# Patient Record
Sex: FEMALE | Race: WHITE | NOT HISPANIC OR LATINO | Employment: UNEMPLOYED | URBAN - METROPOLITAN AREA
[De-identification: names, ages, dates, MRNs, and addresses within clinical notes are randomized per-mention and may not be internally consistent; named-entity substitution may affect disease eponyms.]

---

## 2017-01-09 ENCOUNTER — OFFICE VISIT - RIVER FALLS (OUTPATIENT)
Dept: FAMILY MEDICINE | Facility: CLINIC | Age: 42
End: 2017-01-09

## 2017-01-09 ASSESSMENT — MIFFLIN-ST. JEOR: SCORE: 1444.19

## 2017-10-17 ENCOUNTER — OFFICE VISIT (OUTPATIENT)
Dept: FAMILY MEDICINE | Facility: CLINIC | Age: 42
End: 2017-10-17
Payer: COMMERCIAL

## 2017-10-17 VITALS
DIASTOLIC BLOOD PRESSURE: 98 MMHG | WEIGHT: 183 LBS | HEART RATE: 114 BPM | BODY MASS INDEX: 29.41 KG/M2 | HEIGHT: 66 IN | SYSTOLIC BLOOD PRESSURE: 138 MMHG | OXYGEN SATURATION: 100 % | TEMPERATURE: 98.2 F

## 2017-10-17 DIAGNOSIS — D72.829 ELEVATED WHITE BLOOD CELL COUNT: ICD-10-CM

## 2017-10-17 DIAGNOSIS — H92.02 LEFT EAR PAIN: ICD-10-CM

## 2017-10-17 DIAGNOSIS — I10 BENIGN ESSENTIAL HYPERTENSION: Primary | ICD-10-CM

## 2017-10-17 DIAGNOSIS — R68.84 JAW PAIN: ICD-10-CM

## 2017-10-17 LAB
ANION GAP SERPL CALCULATED.3IONS-SCNC: 10 MMOL/L (ref 3–14)
BASOPHILS # BLD AUTO: 0 10E9/L (ref 0–0.2)
BASOPHILS NFR BLD AUTO: 0.2 %
BUN SERPL-MCNC: 14 MG/DL (ref 7–30)
CALCIUM SERPL-MCNC: 9.2 MG/DL (ref 8.5–10.1)
CHLORIDE SERPL-SCNC: 108 MMOL/L (ref 94–109)
CO2 SERPL-SCNC: 20 MMOL/L (ref 20–32)
CREAT SERPL-MCNC: 0.72 MG/DL (ref 0.52–1.04)
CREAT UR-MCNC: 182 MG/DL
DIFFERENTIAL METHOD BLD: ABNORMAL
EOSINOPHIL # BLD AUTO: 0.1 10E9/L (ref 0–0.7)
EOSINOPHIL NFR BLD AUTO: 0.6 %
ERYTHROCYTE [DISTWIDTH] IN BLOOD BY AUTOMATED COUNT: 13.5 % (ref 10–15)
GFR SERPL CREATININE-BSD FRML MDRD: 89 ML/MIN/1.7M2
GLUCOSE SERPL-MCNC: 98 MG/DL (ref 70–99)
HCT VFR BLD AUTO: 42.9 % (ref 35–47)
HGB BLD-MCNC: 14.3 G/DL (ref 11.7–15.7)
LYMPHOCYTES # BLD AUTO: 3 10E9/L (ref 0.8–5.3)
LYMPHOCYTES NFR BLD AUTO: 21.6 %
MCH RBC QN AUTO: 29.6 PG (ref 26.5–33)
MCHC RBC AUTO-ENTMCNC: 33.3 G/DL (ref 31.5–36.5)
MCV RBC AUTO: 89 FL (ref 78–100)
MICROALBUMIN UR-MCNC: 27 MG/L
MICROALBUMIN/CREAT UR: 14.95 MG/G CR (ref 0–25)
MONOCYTES # BLD AUTO: 0.8 10E9/L (ref 0–1.3)
MONOCYTES NFR BLD AUTO: 5.6 %
NEUTROPHILS # BLD AUTO: 10.1 10E9/L (ref 1.6–8.3)
NEUTROPHILS NFR BLD AUTO: 72 %
PLATELET # BLD AUTO: 296 10E9/L (ref 150–450)
POTASSIUM SERPL-SCNC: 4 MMOL/L (ref 3.4–5.3)
RBC # BLD AUTO: 4.83 10E12/L (ref 3.8–5.2)
SODIUM SERPL-SCNC: 138 MMOL/L (ref 133–144)
WBC # BLD AUTO: 14 10E9/L (ref 4–11)

## 2017-10-17 PROCEDURE — 36415 COLL VENOUS BLD VENIPUNCTURE: CPT | Performed by: PHYSICIAN ASSISTANT

## 2017-10-17 PROCEDURE — 80048 BASIC METABOLIC PNL TOTAL CA: CPT | Performed by: PHYSICIAN ASSISTANT

## 2017-10-17 PROCEDURE — 82043 UR ALBUMIN QUANTITATIVE: CPT | Performed by: PHYSICIAN ASSISTANT

## 2017-10-17 PROCEDURE — 99214 OFFICE O/P EST MOD 30 MIN: CPT | Performed by: PHYSICIAN ASSISTANT

## 2017-10-17 PROCEDURE — 85025 COMPLETE CBC W/AUTO DIFF WBC: CPT | Performed by: PHYSICIAN ASSISTANT

## 2017-10-17 RX ORDER — LISINOPRIL 5 MG/1
5 TABLET ORAL DAILY
Qty: 30 TABLET | Refills: 1 | Status: SHIPPED | OUTPATIENT
Start: 2017-10-17 | End: 2017-12-22

## 2017-10-17 NOTE — PROGRESS NOTES
SUBJECTIVE:                                                    Danielle Tadeo is a 42 year old female who presents to clinic today for the following health issues:      Acute Illness   Acute illness concerns: Ear Pain  Onset: x4-5 months    Fever: no    Chills/Sweats: no    Headache (location?): YES- from ear pain -     Sinus Pressure:no    Conjunctivitis:  no    Ear Pain: YES- L - radiates to jaw and down neck to shoulder    Rhinorrhea: no    Congestion: no    Sore Throat: no     Cough: no    Wheeze: no    Decreased Appetite: no    Nausea: no    Vomiting: no    Diarrhea:  no    Dysuria/Freq.: no    Fatigue/Achiness: no    Sick/Strep Exposure: no    Tooth crack 2 weeks ago   Therapies Tried and outcome: Heating pad, tylenol, Advil - Advil minor relief      Patient reports that she has had ear pain for the past 4 months.  She denies fevers.  She denies any runny nose or sinus congestion.    She reports that the pain feels like burning.  She says that the pain is dull and goes into her jaw.  She states that about two weeks ago she cracked a tooth in the back of her mouth on the same side.  She has not been to a dentist because she does not have dental insurance.  She denies any pain from the tooth.      Patient reports that she got got her medical insurance, but she did not have insurance for the past year.  She has not been on her medication for high blood pressure.  She also used to be on anxiety medication and has not been on that in the last year either.  She does not have concerns regarding anxiety or depression.  She denies thoughts of wanting to hurt herself or others.       BP Readings from Last 6 Encounters:   10/17/17 (!) 138/98   09/16/15 112/64   08/04/15 110/60   07/24/15 138/90   07/01/15 135/78   06/22/15 118/76     Problem list and histories reviewed & adjusted, as indicated.  Additional history: as documented      ROS:  Constitutional, HEENT, cardiovascular, pulmonary, GI, , musculoskeletal,  "neuro, skin, endocrine and psych systems are negative, except as otherwise noted.    OBJECTIVE:                                                    BP (!) 138/98 (BP Location: Left arm, Patient Position: Chair, Cuff Size: Adult Large)  Pulse 114  Temp 98.2  F (36.8  C) (Oral)  Ht 5' 6\" (1.676 m)  Wt 183 lb (83 kg)  LMP 06/07/2015  SpO2 100%  Breastfeeding? No  BMI 29.54 kg/m2  Body mass index is 29.54 kg/(m^2).  GENERAL: healthy, alert and no distress  EYES: Eyes grossly normal to inspection, PERRL and conjunctivae and sclerae normal  HENT: ear canals and TM's normal, nose and mouth without ulcers or lesions  NECK: no adenopathy, no asymmetry, masses, or scars and thyroid normal to palpation  RESP: lungs clear to auscultation - no rales, rhonchi or wheezes  CV: regular rate and rhythm, normal S1 S2, no S3 or S4, no murmur, click or rub, no peripheral edema and peripheral pulses strong  MS: no gross musculoskeletal defects noted, no edema  NEURO: Normal strength and tone, mentation intact and speech normal  PSYCH: mentation appears normal, affect normal/bright    Diagnostic Test Results:  Results for orders placed or performed in visit on 10/17/17   CBC with platelets differential   Result Value Ref Range    WBC 14.0 (H) 4.0 - 11.0 10e9/L    RBC Count 4.83 3.8 - 5.2 10e12/L    Hemoglobin 14.3 11.7 - 15.7 g/dL    Hematocrit 42.9 35.0 - 47.0 %    MCV 89 78 - 100 fl    MCH 29.6 26.5 - 33.0 pg    MCHC 33.3 31.5 - 36.5 g/dL    RDW 13.5 10.0 - 15.0 %    Platelet Count 296 150 - 450 10e9/L    Diff Method Automated Method     % Neutrophils 72.0 %    % Lymphocytes 21.6 %    % Monocytes 5.6 %    % Eosinophils 0.6 %    % Basophils 0.2 %    Absolute Neutrophil 10.1 (H) 1.6 - 8.3 10e9/L    Absolute Lymphocytes 3.0 0.8 - 5.3 10e9/L    Absolute Monocytes 0.8 0.0 - 1.3 10e9/L    Absolute Eosinophils 0.1 0.0 - 0.7 10e9/L    Absolute Basophils 0.0 0.0 - 0.2 10e9/L   Albumin Random Urine Quantitative with Creat Ratio   Result " Value Ref Range    Creatinine Urine 182 mg/dL    Albumin Urine mg/L 27 mg/L    Albumin Urine mg/g Cr 14.95 0 - 25 mg/g Cr   Basic metabolic panel  (Ca, Cl, CO2, Creat, Gluc, K, Na, BUN)   Result Value Ref Range    Sodium 138 133 - 144 mmol/L    Potassium 4.0 3.4 - 5.3 mmol/L    Chloride 108 94 - 109 mmol/L    Carbon Dioxide 20 20 - 32 mmol/L    Anion Gap 10 3 - 14 mmol/L    Glucose 98 70 - 99 mg/dL    Urea Nitrogen 14 7 - 30 mg/dL    Creatinine 0.72 0.52 - 1.04 mg/dL    GFR Estimate 89 >60 mL/min/1.7m2    GFR Estimate If Black >90 >60 mL/min/1.7m2    Calcium 9.2 8.5 - 10.1 mg/dL        ASSESSMENT/PLAN:                                                      Danielle was seen today for otalgia.    Diagnoses and all orders for this visit:    Benign essential hypertension  -     lisinopril (PRINIVIL/ZESTRIL) 5 MG tablet; Take 1 tablet (5 mg) by mouth daily  -     Albumin Random Urine Quantitative with Creat Ratio  -     Basic metabolic panel  (Ca, Cl, CO2, Creat, Gluc, K, Na, BUN)    Jaw pain  -     OTOLARYNGOLOGY REFERRAL  -     CBC with platelets differential    Left ear pain      - Ear is well appearing on exam today.  NO sign of OM or Otitis externa.    - Patient advised to followup with the ENT - TMJ clinic in [place], as this is likely due to TMJ.  Patient has some popping when opening jaw on exam.  - Patient also encouraged to followup with a dentist for the cracked tooth as these can become infected.  Patient continues to deny tooth pain in clinic.      - BP treated as she has been on treatment in the past and her BP is elevated in clinic.  Close followup is advised for the BP.      - Patient to followup as advised for the BP.  She should be seen sooner if symptoms change or worsen in any way.     -- Patient agrees with and understands the plan today.        See Patient Instructions:  Please re-start the blood pressure medication.  Please followup in the pharmacy this Friday for a blood pressure check.  Please  make an office appointment next week to review the BP as well.     For the jaw pain, please see the TMJ/neck pain clinic.      Also, please followup with a dentist for the cracked tooth.          Lisa Diego PA-C    [facility] [place]

## 2017-10-17 NOTE — MR AVS SNAPSHOT
After Visit Summary   10/17/2017    Danielle Tadeo    MRN: [id number]           Patient Information     Date Of Birth          1975        Visit Information        Provider Department      10/17/2017 11:20 AM Lisa Diego PA-C [facility]        Today's Diagnoses     Benign essential hypertension    -  1    Jaw pain          Care Instructions    Please re-start the blood pressure medication.  Please followup in the pharmacy this Friday for a blood pressure check.  Please make an office appointment next week to review the BP as well.     For the jaw pain, please see the TMJ/neck pain clinic.      Also, please followup with a dentist for the cracked tooth.            Follow-ups after your visit        Additional Services     OTOLARYNGOLOGY REFERRAL       Your provider has referred you to: [facility]: [place] [facility] Only[facility] - [place] [phone]   [email or web address]    Please be aware that coverage of these services is subject to the terms and limitations of your health insurance plan.  Call member services at your health plan with any benefit or coverage questions.      Please bring the following with you to your appointment:    (1) Any X-Rays, CTs or MRIs which have been performed.  Contact the facility where they were done to arrange for  prior to your scheduled appointment.  Any new CT, MRI or other procedures ordered by your specialist must be performed at a [place] facility or coordinated by your clinic's referral office.  (2) List of current medications  (3) This referral request   (4) Any documents/labs given to you for this referral                  Who to contact     If you have questions or need follow up information about today's clinic visit or your schedule please contact [facility] directly at [phone].  Normal or non-critical lab and imaging results will be communicated to you by MyChart, letter or phone  "within 4 business days after the clinic has received the results. If you do not hear from us within 7 days, please contact the clinic through Flaviar or phone. If you have a critical or abnormal lab result, we will notify you by phone as soon as possible.  Submit refill requests through Flaviar or call your pharmacy and they will forward the refill request to us. Please allow 3 business days for your refill to be completed.          Additional Information About Your Visit        Flaviar Information     Flaviar lets you send messages to your doctor, view your test results, renew your prescriptions, schedule appointments and more. To sign up, go to [email or web address] . Click on \"Log in\" on the left side of the screen, which will take you to the Welcome page. Then click on \"Sign up Now\" on the right side of the page.     You will be asked to enter the access code listed below, as well as some personal information. Please follow the directions to create your username and password.     Your access code is: [email or web address]  Expires: 1/15/2018 12:03 PM     Your access code will  in 90 days. If you need help or a new code, please call your [place] clinic or [phone].        Care EveryWhere ID     This is your Care EveryWhere ID. This could be used by other organizations to access your [place] medical records  [id number]        Your Vitals Were     Pulse Temperature Height Last Period Pulse Oximetry Breastfeeding?    114 98.2  F (36.8  C) (Oral) 5' 6\" (1.676 m) 2015 100% No    BMI (Body Mass Index)                   29.54 kg/m2            Blood Pressure from Last 3 Encounters:   10/17/17 (!) 138/98   09/16/15 112/64   08/04/15 110/60    Weight from Last 3 Encounters:   10/17/17 183 lb (83 kg)   09/16/15 154 lb (69.9 kg)   08/04/15 148 lb (67.1 kg)              We Performed the Following     Albumin Random Urine Quantitative with Creat Ratio     Basic metabolic panel  (Ca, Cl, CO2, Creat, Gluc, K, Na, " BUN)     CBC with platelets differential     OTOLARYNGOLOGY REFERRAL          Today's Medication Changes          These changes are accurate as of: 10/17/17 12:03 PM.  If you have any questions, ask your nurse or doctor.               Start taking these medicines.        Dose/Directions    lisinopril 5 MG tablet   Commonly known as:  PRINIVIL/ZESTRIL   Used for:  Benign essential hypertension   Started by:  Lisa Diego PA-C        Dose:  5 mg   Take 1 tablet (5 mg) by mouth daily   Quantity:  30 tablet   Refills:  1            Where to get your medicines      These medications were sent to [facility] - [place], [place] - [address]  [address], [facility] [address]     Phone:  [phone]     lisinopril 5 MG tablet                Primary Care Provider Office Phone # Fax #    Fuentes kAbar [phone] [address] [facility] [address]        Equal Access to Services     DIAMOND [facility] HealthTYLER : Hadii dallas ku hadasho Soomaali, waaxda luqadaha, qaybta kaalmada adeegyada, waxay idiin hayaan henrry paul . So [facility] [phone].    ATENCIÓN: Si habla español, tiene a espinal disposición servicios gratuitos de asistencia lingüística. Llame al [phone].    We comply with applicable federal civil rights laws and [place] laws. We do not discriminate on the basis of race, color, national origin, age, disability, sex, sexual orientation, or gender identity.            Thank you!     Thank you for choosing [facility]  for your care. Our goal is always to provide you with excellent care. Hearing back from our patients is one way we can continue to improve our services. Please take a few minutes to complete the written survey that you may receive in the mail after your visit with us. Thank you!             Your Updated Medication List - Protect others around you: Learn how to safely use, store and throw away your medicines at  www.disposemymeds.org.          This list is accurate as of: 10/17/17 12:03 PM.  Always use your most recent med list.                   Brand Name Dispense Instructions for use Diagnosis    lisinopril 5 MG tablet    PRINIVIL/ZESTRIL    30 tablet    Take 1 tablet (5 mg) by mouth daily    Benign essential hypertension

## 2017-10-17 NOTE — LETTER
[address] [address]  [phone]        November 28, 2017    Danielle Tadeo  [address] [place] [id number]              Dear Danielle Tadeo    This is to remind you that your non-fasting lab work is due.    You may call our office at [phone] to schedule an appointment.    Please disregard this notice if you have already had your labs drawn or made an appointment.        Sincerely,        Lisa Diego PA-C

## 2017-10-17 NOTE — NURSING NOTE
"Chief Complaint   Patient presents with     Otalgia       Initial BP (!) 138/98 (BP Location: Left arm, Patient Position: Chair, Cuff Size: Adult Large)  Pulse 114  Temp 98.2  F (36.8  C) (Oral)  Ht 5' 6\" (1.676 m)  Wt 183 lb (83 kg)  SpO2 100%  Breastfeeding? No  BMI 29.54 kg/m2 Estimated body mass index is 29.54 kg/(m^2) as calculated from the following:    Height as of this encounter: 5' 6\" (1.676 m).    Weight as of this encounter: 183 lb (83 kg).  Medication Reconciliation: complete   Csaba Mlnarik CMA    "

## 2017-10-17 NOTE — PATIENT INSTRUCTIONS
Please re-start the blood pressure medication.  Please followup in the pharmacy this Friday for a blood pressure check.  Please make an office appointment next week to review the BP as well.     For the jaw pain, please see the TMJ/neck pain clinic.      Also, please followup with a dentist for the cracked tooth.

## 2017-10-19 NOTE — PROGRESS NOTES
Note to staff: Please call the patient to explain results.    The results from your recent lab work are within normal limits, but the white blood cells are elevated.  This should be repeated in about one week.  We can do this at your followup appointment for the blood pressure.  Please be sure to get this scheduled.      Please followup sooner if you have any symptoms or concern of infection such as fever, chills, sweats, body aches or pains or other concerns.         Thank you for choosing [facility] for your health care needs,      Lisa Diego PA-C

## 2017-10-20 ENCOUNTER — ALLIED HEALTH/NURSE VISIT (OUTPATIENT)
Dept: FAMILY MEDICINE | Facility: CLINIC | Age: 42
End: 2017-10-20
Payer: COMMERCIAL

## 2017-10-20 VITALS — DIASTOLIC BLOOD PRESSURE: 100 MMHG | SYSTOLIC BLOOD PRESSURE: 150 MMHG

## 2017-10-20 DIAGNOSIS — Z01.30 BP CHECK: Primary | ICD-10-CM

## 2017-10-20 PROCEDURE — 99207 ZZC NO CHARGE NURSE ONLY: CPT | Performed by: FAMILY MEDICINE

## 2017-10-20 NOTE — MR AVS SNAPSHOT
"              After Visit Summary   10/20/2017    Danielle Tadeo    MRN: [id number]           Patient Information     Date Of Birth          1975        Visit Information        Provider Department      10/20/2017 10:11 AM Fuentes Akbar MD [facility]        Today's Diagnoses     BP check    -  1       Follow-ups after your visit        Who to contact     If you have questions or need follow up information about today's clinic visit or your schedule please contact [facility] directly at [phone].  Normal or non-critical lab and imaging results will be communicated to you by MyChart, letter or phone within 4 business days after the clinic has received the results. If you do not hear from us within 7 days, please contact the clinic through [employer] or phone. If you have a critical or abnormal lab result, we will notify you by phone as soon as possible.  Submit refill requests through HelloFax or call your pharmacy and they will forward the refill request to us. Please allow 3 business days for your refill to be completed.          Additional Information About Your Visit        MyChart Information     HelloFax lets you send messages to your doctor, view your test results, renew your prescriptions, schedule appointments and more. To sign up, go to [email or web address] . Click on \"Log in\" on the left side of the screen, which will take you to the Welcome page. Then click on \"Sign up Now\" on the right side of the page.     You will be asked to enter the access code listed below, as well as some personal information. Please follow the directions to create your username and password.     Your access code is: [email or web address]  Expires: 1/15/2018 12:03 PM     Your access code will  in 90 days. If you need help or a new code, please call your [place] clinic or [phone].        Care EveryWhere ID     This is your Care EveryWhere ID. This could be used by other " organizations to access your [place] medical records  [id number]        Your Vitals Were     Last Period                   06/07/2015            Blood Pressure from Last 3 Encounters:   10/20/17 (!) 150/100   10/17/17 (!) 138/98   09/16/15 112/64    Weight from Last 3 Encounters:   10/17/17 183 lb (83 kg)   09/16/15 154 lb (69.9 kg)   08/04/15 148 lb (67.1 kg)              Today, you had the following     No orders found for display       Primary Care Provider Office Phone # Fax #    Fuentes Billy Akbar [phone] [address] [facility] [address]        Equal Access to Services     SARAH DOW : Hadii dallas Bowen, waaxda luqadaha, qaybta kaalmada adeegyada, aileen ayala. So [facility] [place] [phone].    ATENCIÓN: Si habla español, tiene a espinal disposición servicios gratuitos de asistencia lingüística. Llame al [phone].    We comply with applicable federal civil rights laws and [place] laws. We do not discriminate on the basis of race, color, national origin, age, disability, sex, sexual orientation, or gender identity.            Thank you!     Thank you for choosing [facility]  for your care. Our goal is always to provide you with excellent care. Hearing back from our patients is one way we can continue to improve our services. Please take a few minutes to complete the written survey that you may receive in the mail after your visit with us. Thank you!             Your Updated Medication List - Protect others around you: Learn how to safely use, store and throw away your medicines at www.disposemymeds.org.          This list is accurate as of: 10/20/17 10:13 AM.  Always use your most recent med list.                   Brand Name Dispense Instructions for use Diagnosis    lisinopril 5 MG tablet    PRINIVIL/ZESTRIL    30 tablet    Take 1 tablet (5 mg) by mouth daily    Benign essential hypertension

## 2017-10-20 NOTE — PROGRESS NOTES
Danielle Tadeo is enrolled/participating in the retail pharmacy Blood Pressure Goals Achievement Program (BPGAP).  Danielle Tadeo was evaluated at [facility] on October 20, 2017 at which time her blood pressure was:    BP Readings from Last 3 Encounters:   10/20/17 (!) 150/100   10/17/17 (!) 138/98   09/16/15 112/64     Reviewed lifestyle modifications for blood pressure control and reduction: including making healthy food choices, managing weight, getting regular exercise, smoking cessation, reducing alcohol consumption, monitoring blood pressure regularly.     Danielle Tadeo is not experiencing symptoms.    Follow-Up: BP is not at goal of < 140/90mmHg (patient 18+ years of age with or without diabetes), Recommended follow-up with PCP.  Routing to PCP for further review.        Completed by: Yuliya Sanders [facility]   [phone]

## 2017-12-22 ENCOUNTER — OFFICE VISIT (OUTPATIENT)
Dept: FAMILY MEDICINE | Facility: CLINIC | Age: 42
End: 2017-12-22
Payer: COMMERCIAL

## 2017-12-22 VITALS
RESPIRATION RATE: 16 BRPM | HEART RATE: 104 BPM | DIASTOLIC BLOOD PRESSURE: 94 MMHG | SYSTOLIC BLOOD PRESSURE: 160 MMHG | OXYGEN SATURATION: 98 % | BODY MASS INDEX: 30.62 KG/M2 | TEMPERATURE: 98.3 F | HEIGHT: 66 IN | WEIGHT: 190.5 LBS

## 2017-12-22 DIAGNOSIS — M26.609 TMJ (TEMPOROMANDIBULAR JOINT SYNDROME): ICD-10-CM

## 2017-12-22 DIAGNOSIS — M54.2 NECK PAIN: ICD-10-CM

## 2017-12-22 DIAGNOSIS — I10 HYPERTENSION GOAL BP (BLOOD PRESSURE) < 140/90: Primary | ICD-10-CM

## 2017-12-22 LAB
ANION GAP SERPL CALCULATED.3IONS-SCNC: 8 MMOL/L (ref 3–14)
BUN SERPL-MCNC: 16 MG/DL (ref 7–30)
CALCIUM SERPL-MCNC: 9 MG/DL (ref 8.5–10.1)
CHLORIDE SERPL-SCNC: 108 MMOL/L (ref 94–109)
CO2 SERPL-SCNC: 24 MMOL/L (ref 20–32)
CREAT SERPL-MCNC: 0.69 MG/DL (ref 0.52–1.04)
GFR SERPL CREATININE-BSD FRML MDRD: >90 ML/MIN/1.7M2
GLUCOSE SERPL-MCNC: 79 MG/DL (ref 70–99)
POTASSIUM SERPL-SCNC: 3.8 MMOL/L (ref 3.4–5.3)
SODIUM SERPL-SCNC: 140 MMOL/L (ref 133–144)

## 2017-12-22 PROCEDURE — 36415 COLL VENOUS BLD VENIPUNCTURE: CPT | Performed by: FAMILY MEDICINE

## 2017-12-22 PROCEDURE — 99214 OFFICE O/P EST MOD 30 MIN: CPT | Performed by: FAMILY MEDICINE

## 2017-12-22 PROCEDURE — 80048 BASIC METABOLIC PNL TOTAL CA: CPT | Performed by: FAMILY MEDICINE

## 2017-12-22 RX ORDER — CYCLOBENZAPRINE HCL 5 MG
5 TABLET ORAL 3 TIMES DAILY PRN
Qty: 30 TABLET | Refills: 0 | Status: SHIPPED | OUTPATIENT
Start: 2017-12-22 | End: 2018-03-27

## 2017-12-22 RX ORDER — LISINOPRIL 20 MG/1
20 TABLET ORAL DAILY
Qty: 30 TABLET | Refills: 1 | Status: SHIPPED | OUTPATIENT
Start: 2017-12-22 | End: 2018-04-12

## 2017-12-22 NOTE — PROGRESS NOTES
"  SUBJECTIVE:   Danielle Tadeo is a 42 year old female who presents to clinic today for the following health issues:        Left Jaw, neck and shoulder pain      Duration: Ongoing for at least 6 months    Description (location/character/radiation): See above    Intensity:  8/10    Accompanying signs and symptoms: Difficult to sleep on that side, interferes with sleep    History (similar episodes/previous evaluation): None    Precipitating or alleviating factors: None    Therapies tried and outcome: Uses Advil and tylenol daily     Has been taking her 's 10 mg tabs of Lisinopril over the past month but BP still high.    Requesting pain medicine, specifically hydro-codon for her chronic neck and jaw pain.        Problem list and histories reviewed & adjusted, as indicated.  Additional history: as documented    Labs reviewed in EPIC    Reviewed and updated as needed this visit by clinical staffTobacco  Allergies  Meds  Problems  Med Hx  Surg Hx  Fam Hx  Soc Hx        Reviewed and updated as needed this visit by Provider  Allergies  Meds  Problems         ROS:  C: NEGATIVE for fever, chills, change in weight  I: NEGATIVE for worrisome rashes, moles or lesions  E: NEGATIVE for vision changes or irritation  E/M: NEGATIVE for ear, mouth and throat problems  R: NEGATIVE for significant cough or SOB  CV: NEGATIVE for chest pain, palpitations or peripheral edema  GI: NEGATIVE for nausea, abdominal pain, heartburn, or change in bowel habits  : NEGATIVE for frequency, dysuria, or hematuria  N: NEGATIVE for weakness, dizziness or paresthesias    OBJECTIVE:     BP (!) 160/94  Pulse 104  Temp 98.3  F (36.8  C) (Tympanic)  Resp 16  Ht 5' 6\" (1.676 m)  Wt 190 lb 8 oz (86.4 kg)  LMP 06/07/2015  SpO2 98%  Breastfeeding? No  BMI 30.75 kg/m2  Body mass index is 30.75 kg/(m^2).   GENERAL: alert and no acute distress  EYES: Eyes grossly normal to inspection, PERRL and conjunctivae and sclerae normal  HENT: " ear canals and TM's normal, nose and mouth without ulcers or lesions  NECK: no adenopathy, no asymmetry, masses, or scars and thyroid normal to palpation  RESP: lungs clear to auscultation - no rales, rhonchi or wheezes  CV: regular rate and rhythm, normal S1 S2, no S3 or S4, no murmur, click or rub, no peripheral edema and peripheral pulses strong  MS: no gross musculoskeletal defects noted, no edema.  +neck musculature palpated--feels tight/tense.  No spinal pain/tenderness.  ROM/Strength in head/neck/truck normal.  Mild TMJ on the left side with soft click heard in mandible with opening/closing of the mouth  SKIN: no suspicious lesions or rashes  NEURO: Normal strength and tone, mentation intact and speech normal    Diagnostic Test Results:  BMP  ASSESSMENT/PLAN:     Problem List Items Addressed This Visit     Hypertension goal BP (blood pressure) < 140/90 - Primary    Relevant Medications    lisinopril (PRINIVIL/ZESTRIL) 20 MG tablet    Other Relevant Orders    Basic metabolic panel  (Ca, Cl, CO2, Creat, Gluc, K, Na, BUN) (Completed)      Other Visit Diagnoses     Neck pain        Relevant Medications    cyclobenzaprine (FLEXERIL) 5 MG tablet    Other Relevant Orders    PHYSICAL THERAPY REFERRAL    TMJ (temporomandibular joint syndrome)        Relevant Medications    cyclobenzaprine (FLEXERIL) 5 MG tablet    Other Relevant Orders    PHYSICAL THERAPY REFERRAL           Increasing Lisinopril to 20 mg po qd.  Obtaining BMP today.  F/u in 2-3 weeks to recheck BP  No opioids for neck pain.  Recommended to try Flexeril prn pain.  Referral to PT ordered.      Kylah Kuhn,   [facility]

## 2017-12-22 NOTE — LETTER
December 22, 2017      Danielle Tadeo  [address] [place] [id number]        Dear ,    We are writing to inform you of your test results.    Your BMP (kidney function test) is NORMAL.      Resulted Orders   Basic metabolic panel  (Ca, Cl, CO2, Creat, Gluc, K, Na, BUN)   Result Value Ref Range    Sodium 140 133 - 144 mmol/L    Potassium 3.8 3.4 - 5.3 mmol/L    Chloride 108 94 - 109 mmol/L    Carbon Dioxide 24 20 - 32 mmol/L    Anion Gap 8 3 - 14 mmol/L    Glucose 79 70 - 99 mg/dL      Comment:      Non Fasting    Urea Nitrogen 16 7 - 30 mg/dL    Creatinine 0.69 0.52 - 1.04 mg/dL    GFR Estimate >90 >60 mL/min/1.7m2      Comment:      Non  GFR Calc    GFR Estimate If Black >90 >60 mL/min/1.7m2      Comment:       GFR Calc    Calcium 9.0 8.5 - 10.1 mg/dL       If you have any questions or concerns, please call the clinic at the number listed above.       Sincerely,        Kylah Kuhn, DO

## 2017-12-22 NOTE — MR AVS SNAPSHOT
"              After Visit Summary   12/22/2017    Danielle Tadeo    MRN: [id number]           Patient Information     Date Of Birth          1975        Visit Information        Provider Department      12/22/2017 10:30 AM Kylah Kuhn DO [facility]        Today's Diagnoses     Hypertension goal BP (blood pressure) < 140/90    -  1    Muscle pain           Follow-ups after your visit        Additional Services     PHYSICAL THERAPY REFERRAL       *This therapy referral will be filtered to a centralized scheduling office at [facility] and the patient will receive a call to schedule an appointment at a [facility] location most convenient for them. *     [facility] provides Physical Therapy evaluation and treatment and many specialty services across the [facility] system.  If requesting a specialty program, please choose from the list below.    If you have not heard from the scheduling office within 2 business days, please call [phone] for all locations, with the exception of [place], please call [phone].  Treatment: Evaluation & Treatment  Special Instructions/Modalities:   Special Programs: None    Please be aware that coverage of these services is subject to the terms and limitations of your health insurance plan.  Call member services at your health plan with any benefit or coverage questions.      **Note to Provider:  If you are referring outside of [facility] for the therapy appointment, please list the name of the location in the \"special instructions\" above, print the referral and give to the patient to schedule the appointment.                  Who to contact     If you have questions or need follow up information about today's clinic visit or your schedule please contact [facility] directly at [phone].  Normal or non-critical lab and imaging results will be communicated to you by " "MyChart, letter or phone within 4 business days after the clinic has received the results. If you do not hear from us within 7 days, please contact the clinic through [employer] Templates[employer] or phone. If you have a critical or abnormal lab result, we will notify you by phone as soon as possible.  Submit refill requests through Biglion or call your pharmacy and they will forward the refill request to us. Please allow 3 business days for your refill to be completed.          Additional Information About Your Visit        Molecular TemplatesharJammit Information     Biglion lets you send messages to your doctor, view your test results, renew your prescriptions, schedule appointments and more. To sign up, go to [email or web address] . Click on \"Log in\" on the left side of the screen, which will take you to the Welcome page. Then click on \"Sign up Now\" on the right side of the page.     You will be asked to enter the access code listed below, as well as some personal information. Please follow the directions to create your username and password.     Your access code is: [email or web address]  Expires: 1/15/2018 11:03 AM     Your access code will  in 90 days. If you need help or a new code, please call your [place] clinic or [phone].        Care EveryWhere ID     This is your Care EveryWhere ID. This could be used by other organizations to access your [place] medical records  [id number]        Your Vitals Were     Pulse Temperature Respirations Height Last Period Pulse Oximetry    104 98.3  F (36.8  C) (Tympanic) 16 5' 6\" (1.676 m) 2015 98%    Breastfeeding? BMI (Body Mass Index)                No 30.75 kg/m2           Blood Pressure from Last 3 Encounters:   17 (!) 160/94   10/20/17 (!) 150/100   10/17/17 (!) 138/98    Weight from Last 3 Encounters:   17 190 lb 8 oz (86.4 kg)   10/17/17 183 lb (83 kg)   09/16/15 154 lb (69.9 kg)              We Performed the Following     Basic metabolic panel  (Ca, Cl, CO2, Creat, Gluc, K, Na, " BUN)     PHYSICAL THERAPY REFERRAL          Today's Medication Changes          These changes are accurate as of: 12/22/17 10:57 AM.  If you have any questions, ask your nurse or doctor.               Start taking these medicines.        Dose/Directions    cyclobenzaprine 5 MG tablet   Commonly known as:  FLEXERIL   Used for:  Muscle pain   Started by:  Kylah Kuhn DO        Dose:  5 mg   Take 1 tablet (5 mg) by mouth 3 times daily as needed for muscle spasms   Quantity:  30 tablet   Refills:  0         These medicines have changed or have updated prescriptions.        Dose/Directions    lisinopril 20 MG tablet   Commonly known as:  PRINIVIL/ZESTRIL   This may have changed:    - medication strength  - how much to take   Used for:  Hypertension goal BP (blood pressure) < 140/90   Changed by:  Kylah Kuhn DO        Dose:  20 mg   Take 1 tablet (20 mg) by mouth daily   Quantity:  30 tablet   Refills:  1            Where to get your medicines      These medications were sent to [facility] [address] [place], [place] - [address] AT [facility] [address]  [address], [facility] [address]     Phone:  [phone]     cyclobenzaprine 5 MG tablet    lisinopril 20 MG tablet                Primary Care Provider Office Phone # Fax #    Fuentes Akbar [phone] [address] [facility] [address]        Equal Access to Services     [facility] AH: Hadii aad ku hadasho Soomaali, waaxda luqadaha, qaybta kaalmada adeegyada, aileen santizo hayreidn henrry paul . So [facility] [phone].    ATENCIÓN: Si habla español, tiene a espinal disposición servicios gratuitos de asistencia lingüística. [place] al [phone].    We comply with applicable federal civil rights laws and [place] laws. We do not discriminate on the basis of race, color, national origin, age, disability, sex, sexual orientation, or gender identity.            Thank you!     Thank  you for choosing [facility]  for your care. Our goal is always to provide you with excellent care. Hearing back from our patients is one way we can continue to improve our services. Please take a few minutes to complete the written survey that you may receive in the mail after your visit with us. Thank you!             Your Updated Medication List - Protect others around you: Learn how to safely use, store and throw away your medicines at www.disposemymeds.org.          This list is accurate as of: 12/22/17 10:57 AM.  Always use your most recent med list.                   Brand Name Dispense Instructions for use Diagnosis    cyclobenzaprine 5 MG tablet    FLEXERIL    30 tablet    Take 1 tablet (5 mg) by mouth 3 times daily as needed for muscle spasms    Muscle pain       lisinopril 20 MG tablet    PRINIVIL/ZESTRIL    30 tablet    Take 1 tablet (20 mg) by mouth daily    Hypertension goal BP (blood pressure) < 140/90

## 2018-01-02 ENCOUNTER — TELEPHONE (OUTPATIENT)
Dept: LAB | Facility: CLINIC | Age: 43
End: 2018-01-02

## 2018-01-03 DIAGNOSIS — M26.609 TMJ (TEMPOROMANDIBULAR JOINT SYNDROME): ICD-10-CM

## 2018-01-03 DIAGNOSIS — M54.2 NECK PAIN: ICD-10-CM

## 2018-01-03 RX ORDER — CYCLOBENZAPRINE HCL 5 MG
5 TABLET ORAL 3 TIMES DAILY PRN
Qty: 30 TABLET | Refills: 0 | OUTPATIENT
Start: 2018-01-03

## 2018-01-03 NOTE — TELEPHONE ENCOUNTER
Pt called  clinic asking for a refill on flexeril     Rn advise that [facility] has not filled this for pt she should contact that providers office   Pt asked that we put the request in     cyclobenzaprine (FLEXERIL) 5 MG tablet 30 tablet 0 12/22/2017  --   Sig: Take 1 tablet (5 mg) by mouth 3 times daily as needed for muscle spasms   Class: E-Prescribe       Last Office Visit: 12/22/2017  Future Office visit:       Routing refill request to provider for review/approval because:  Drug not on the [facility], [facility] or  Health refill protocol or controlled substance    Stefania Cook RN, BSN  [facility]

## 2018-01-04 DIAGNOSIS — D72.829 ELEVATED WHITE BLOOD CELL COUNT: ICD-10-CM

## 2018-01-04 LAB
ERYTHROCYTE [DISTWIDTH] IN BLOOD BY AUTOMATED COUNT: 13.1 % (ref 10–15)
HCT VFR BLD AUTO: 38.6 % (ref 35–47)
HGB BLD-MCNC: 12.7 G/DL (ref 11.7–15.7)
MCH RBC QN AUTO: 29.7 PG (ref 26.5–33)
MCHC RBC AUTO-ENTMCNC: 32.9 G/DL (ref 31.5–36.5)
MCV RBC AUTO: 90 FL (ref 78–100)
PLATELET # BLD AUTO: 274 10E9/L (ref 150–450)
RBC # BLD AUTO: 4.27 10E12/L (ref 3.8–5.2)
WBC # BLD AUTO: 10.9 10E9/L (ref 4–11)

## 2018-01-04 PROCEDURE — 85027 COMPLETE CBC AUTOMATED: CPT | Performed by: PHYSICIAN ASSISTANT

## 2018-01-04 PROCEDURE — 36415 COLL VENOUS BLD VENIPUNCTURE: CPT | Performed by: PHYSICIAN ASSISTANT

## 2018-01-04 NOTE — PROGRESS NOTES
Note to staff: Please send a result letter    The results from your recent lab work are within normal limits.    -Normal red blood cell (hgb) levels, normal white blood cell count and normal platelet levels.      Thank you for choosing [facility] for your health care needs,      Lisa Diego PA-C

## 2018-01-04 NOTE — LETTER
[address] [place] [address]                  [phone]   January 4, 2018    Danielle Tadeo  [address] [place] [address]      Dear Danielle,    Here is a summary of your recent test results:    The results from your recent lab work are within normal limits.     -Normal red blood cell (hgb) levels, normal white blood cell count and normal platelet levels.       Your test results are enclosed.      Please contact me if you have any questions.    In addition, here is a list of due or overdue Health Maintenance reminders.    Health Maintenance Due   Topic Date Due     Tetanus Vaccine - every 10 years  08/08/2016     Flu Vaccine - yearly  09/01/2017     Pap Smear - every 3 years  06/22/2018       Please call us at [phone] (or use [employer]) to address the above recommendations.            Thank you very much for trusting [facility]..     Healthy regards,      Lisa Diego PA-C        Results for orders placed or performed in visit on 01/04/18   **CBC with platelets FUTURE 14d   Result Value Ref [place]    WBC 10.9 4.0 - 11.0 10e9/L    RBC Count 4.27 3.8 - 5.2 10e12/L    Hemoglobin 12.7 11.7 - 15.7 g/dL    Hematocrit 38.6 35.0 - 47.0 %    MCV 90 78 - 100 fl    MCH 29.7 26.5 - 33.0 pg    MCHC 32.9 31.5 - 36.5 g/dL    RDW 13.1 10.0 - 15.0 %    Platelet Count 274 150 - 450 10e9/L

## 2018-01-18 ENCOUNTER — DOCUMENTATION ONLY (OUTPATIENT)
Dept: SURGERY | Facility: CLINIC | Age: 43
End: 2018-01-18

## 2018-02-27 ENCOUNTER — TELEPHONE (OUTPATIENT)
Dept: FAMILY MEDICINE | Facility: CLINIC | Age: 43
End: 2018-02-27

## 2018-03-27 ENCOUNTER — OFFICE VISIT (OUTPATIENT)
Dept: FAMILY MEDICINE | Facility: CLINIC | Age: 43
End: 2018-03-27
Payer: COMMERCIAL

## 2018-03-27 VITALS
OXYGEN SATURATION: 98 % | SYSTOLIC BLOOD PRESSURE: 150 MMHG | RESPIRATION RATE: 14 BRPM | WEIGHT: 194.8 LBS | HEART RATE: 102 BPM | TEMPERATURE: 98.1 F | DIASTOLIC BLOOD PRESSURE: 89 MMHG | BODY MASS INDEX: 31.44 KG/M2

## 2018-03-27 DIAGNOSIS — M54.2 NECK PAIN: ICD-10-CM

## 2018-03-27 DIAGNOSIS — F41.9 ANXIETY: ICD-10-CM

## 2018-03-27 DIAGNOSIS — M26.609 TMJ (TEMPOROMANDIBULAR JOINT SYNDROME): Primary | ICD-10-CM

## 2018-03-27 PROCEDURE — 99214 OFFICE O/P EST MOD 30 MIN: CPT | Performed by: FAMILY MEDICINE

## 2018-03-27 RX ORDER — BUPROPION HYDROCHLORIDE 150 MG/1
150 TABLET, EXTENDED RELEASE ORAL 2 TIMES DAILY
Qty: 60 TABLET | Refills: 1 | Status: SHIPPED | OUTPATIENT
Start: 2018-03-27 | End: 2018-06-26

## 2018-03-27 RX ORDER — CYCLOBENZAPRINE HCL 5 MG
5 TABLET ORAL 3 TIMES DAILY PRN
Qty: 30 TABLET | Refills: 0 | Status: SHIPPED | OUTPATIENT
Start: 2018-03-27 | End: 2018-04-03

## 2018-03-27 NOTE — MR AVS SNAPSHOT
After Visit Summary   3/27/2018    Danielle Tadeo    MRN: [id number]           Patient Information     Date Of Birth          1975        Visit Information        Provider Department      3/27/2018 1:00 PM Fuentes Akbar MD [facility]        Today's Diagnoses     TMJ (temporomandibular joint syndrome)    -  1    Neck pain        Anxiety           Follow-ups after your visit        Additional Services     OTOLARYNGOLOGY REFERRAL       Your provider has referred you to: N: [facility] [phone]   [email or web address]    Please be aware that coverage of these services is subject to the terms and limitations of your health insurance plan.  Call member services at your health plan with any benefit or coverage questions.      Please bring the following with you to your appointment:    (1) Any X-Rays, CTs or MRIs which have been performed.  Contact the facility where they were done to arrange for  prior to your scheduled appointment.   (2) List of current medications  (3) This referral request   (4) Any documents/labs given to you for this referral                  Who to contact     If you have questions or need follow up information about today's clinic visit or your schedule please contact [facility] directly at [phone].  Normal or non-critical lab and imaging results will be communicated to you by MyChart, letter or phone within 4 business days after the clinic has received the results. If you do not hear from us within 7 days, please contact the clinic through MyChart or phone. If you have a critical or abnormal lab result, we will notify you by phone as soon as possible.  Submit refill requests through DeNA or call your pharmacy and they will forward the refill request to us. Please allow 3 business days for your refill to be completed.          Additional Information About Your  "Visit        Astonish Results Information     [employer]stonish Results lets you send messages to your doctor, view your test results, renew your prescriptions, schedule appointments and more. To sign up, go to [email or web address] . Click on \"Log in\" on the left side of the screen, which will take you to the Welcome page. Then click on \"Sign up Now\" on the right side of the page.     You will be asked to enter the access code listed below, as well as some personal information. Please follow the directions to create your username and password.     Your access code is: [email or web address]  Expires: 2018  1:21 PM     Your access code will  in 90 days. If you need help or a new code, please call your [place] clinic or [phone].        Care EveryWhere ID     This is your Care EveryWhere ID. This could be used by other organizations to access your [place] medical records  [id number]        Your Vitals Were     Pulse Temperature Respirations Last Period Pulse Oximetry BMI (Body Mass Index)    102 98.1  F (36.7  C) (Oral) 14 2015 98% 31.44 kg/m2       Blood Pressure from Last 3 Encounters:   18 150/89   17 (!) 160/94   10/20/17 (!) 150/100    Weight from Last 3 Encounters:   18 194 lb 12.8 oz (88.4 kg)   17 190 lb 8 oz (86.4 kg)   10/17/17 183 lb (83 kg)              We Performed the Following     OTOLARYNGOLOGY REFERRAL          Today's Medication Changes          These changes are accurate as of 3/27/18  1:23 PM.  If you have any questions, ask your nurse or doctor.               Start taking these medicines.        Dose/Directions    buPROPion 150 MG 12 hr tablet   Commonly known as:  WELLBUTRIN SR   Used for:  Anxiety   Started by:  Fuentes Akbar MD        Dose:  150 mg   Take 1 tablet (150 mg) by mouth 2 times daily   Quantity:  60 tablet   Refills:  1            Where to get your medicines      These medications were sent to [facility] [address] [facility], [place] - [address] AT [facility] [address]  [address], [facility] [place] [address]     Phone:  [phone]     buPROPion 150 MG 12 hr tablet    cyclobenzaprine 5 MG tablet                Primary Care Provider Office Phone # Fax #    Fuentes Billy Akbar [phone] [address]  [facility] [address]        Equal Access to Services     SARAH DOW : Hadii aad ku hadasho Soomaali, waaxda luqadaha, qaybta kaalmada adeegyada, waxay idiin hayaan adeeg kharash la'reidn ah. So [place] [phone].    ATENCIÓN: Si noah espparveen, tiene a espinal disposición servicios gratuitos de asistencia lingüística. [facility] [phone].    We comply with applicable federal civil rights laws and [place] laws. We do not discriminate on the basis of race, color, national origin, age, disability, sex, sexual orientation, or gender identity.            Thank you!     Thank you for choosing [facility]  for your care. Our goal is always to provide you with excellent care. Hearing back from our patients is one way we can continue to improve our services. Please take a few minutes to complete the written survey that you may receive in the mail after your visit with us. Thank you!             Your Updated Medication List - Protect others around you: Learn how to safely use, store and throw away your medicines at www.disposemymeds.org.          This list is accurate as of 3/27/18  1:23 PM.  Always use your most recent med list.                   Brand Name Dispense Instructions for use Diagnosis    buPROPion 150 MG 12 hr tablet    WELLBUTRIN SR    60 tablet    Take 1 tablet (150 mg) by mouth 2 times daily    Anxiety       cyclobenzaprine 5 MG tablet    FLEXERIL    30 tablet    Take 1 tablet (5 mg) by mouth 3 times daily as needed for muscle spasms    Neck pain, TMJ (temporomandibular joint syndrome)       lisinopril 20 MG tablet    PRINIVIL/ZESTRIL    30 tablet    Take 1 tablet (20 mg) by mouth daily     Hypertension goal BP (blood pressure) < 140/90

## 2018-03-27 NOTE — PROGRESS NOTES
SUBJECTIVE:   Danielle Tadeo is a 42 year old female who presents to clinic today for the following health issues:      Jaw pain       Duration: months    Description (location/character/radiation): cramping discomfort in jaws    Intensity:  severe    Accompanying signs and symptoms: radiates to the ears and is unable to sleep     History (similar episodes/previous evaluation): been seen a couple times for this    Precipitating or alleviating factors: None    Therapies tried and outcome: Ibuprofen constantly       Has had prior tmj, neck pain, chronic pain issues, other chronic pain      REVIEW OF SYSTEMS    Generally has been otherwise  feeling well until this episode. No problems with vision, hearing, dental or neck pain.Has hph airborne or ingestion allergy  No chest pain, palpitations, dyspnea, change in bowel habits, blood  in stool or dyspepsia.  No rashes, changing moles, weakness, lassitude or back problems.  No chronic issues . No dysuria  Patient past hx  a smoker. No problems with significant headaches.  On exam the vital signs are stable  Weight is Body mass index is 31.44 kg/(m^2).   Eyes show gibson   No neck masses or thyromegaly.Ear nose and throat shows normal   No bruits, murmers, rubs or extrasounds. No cardiomegaly or chest wall tenderness. Lungs clear, no abdominal masses or organomegaly. No CVA tenderness.  Skin eval no rash   Back exam shows bilateral tmj and masseter spasm  Current Outpatient Prescriptions   Medication     cyclobenzaprine (FLEXERIL) 5 MG tablet     buPROPion (WELLBUTRIN SR) 150 MG 12 hr tablet     lisinopril (PRINIVIL/ZESTRIL) 20 MG tablet     No current facility-administered medications for this visit.      (M26.018) TMJ (temporomandibular joint syndrome)  (primary encounter diagnosis)  Comment:   Plan: OTOLARYNGOLOGY REFERRAL, cyclobenzaprine         (FLEXERIL) 5 MG tablet        [facility]    (M54.2) Neck pain  Comment:   Plan: cyclobenzaprine (FLEXERIL) 5 MG  tablet            (F41.9) Anxiety  Comment:   Plan: buPROPion (WELLBUTRIN SR) 150 MG 12 hr tablet        Had been off , ready to get back on

## 2018-04-03 DIAGNOSIS — M26.609 TMJ (TEMPOROMANDIBULAR JOINT SYNDROME): ICD-10-CM

## 2018-04-03 DIAGNOSIS — M54.2 NECK PAIN: ICD-10-CM

## 2018-04-03 NOTE — TELEPHONE ENCOUNTER
Requested Prescriptions   Pending Prescriptions Disp Refills     cyclobenzaprine (FLEXERIL) 5 MG tablet [Pharmacy Med Name: CYCLOBENZAPRINE 5MG TABLETS] 30 tablet 0     Sig: TAKE 1 TABLET(5 MG) BY MOUTH THREE TIMES DAILY AS NEEDED FOR MUSCLE SPASMS    There is no refill protocol information for this order            Last Written Prescription Date:  3/27/18  Last Fill Quantity: 30 tablet,   # refills: 0  Last Office Visit: 3/27/18 (Naomie)  Future Office visit:       Routing refill request to provider for review/approval because:  Drug not on the [facility], P or [facility] refill protocol or controlled substance

## 2018-04-04 NOTE — TELEPHONE ENCOUNTER
Routing refill request to provider for review/approval because:  Drug not on the [facility] refill protocol     Vianca Morse RN, BS  Clinical Nurse Triage.

## 2018-04-05 ENCOUNTER — TRANSFERRED RECORDS (OUTPATIENT)
Dept: HEALTH INFORMATION MANAGEMENT | Facility: CLINIC | Age: 43
End: 2018-04-05

## 2018-04-05 DIAGNOSIS — M54.2 NECK PAIN: Primary | ICD-10-CM

## 2018-04-05 DIAGNOSIS — M26.609 TMJ (TEMPOROMANDIBULAR JOINT SYNDROME): ICD-10-CM

## 2018-04-05 NOTE — TELEPHONE ENCOUNTER
Routing refill request to provider for review/approval because:  Drug not on the [facility] refill protocol   30 tabs = 10 days    Vianca Morse RN, BS  Clinical Nurse Triage.

## 2018-04-05 NOTE — TELEPHONE ENCOUNTER
Requested Prescriptions   Pending Prescriptions Disp Refills     cyclobenzaprine (FLEXERIL) 5 MG tablet [Pharmacy Med Name: CYCLOBENZAPRINE 5MG TABLETS] 30 tablet 0     Sig: TAKE 1 TABLET(5 MG) BY MOUTH THREE TIMES DAILY AS NEEDED FOR MUSCLE SPASMS    There is no refill protocol information for this order            Last Written Prescription Date:  3/27/18  Last Fill Quantity: 30 tablet,   # refills: 0  Last Office Visit: 3/27/18 (Naomie)  Future Office visit:       Routing refill request to provider for review/approval because:  Drug not on the [facility], P or [facility] refill protocol or controlled substance

## 2018-04-06 RX ORDER — CYCLOBENZAPRINE HCL 5 MG
TABLET ORAL
Qty: 30 TABLET | Refills: 0 | Status: SHIPPED | OUTPATIENT
Start: 2018-04-06 | End: 2020-04-17

## 2018-04-06 RX ORDER — CYCLOBENZAPRINE HCL 5 MG
TABLET ORAL
Qty: 30 TABLET | Refills: 0 | OUTPATIENT
Start: 2018-04-06

## 2018-04-12 DIAGNOSIS — I10 HYPERTENSION GOAL BP (BLOOD PRESSURE) < 140/90: ICD-10-CM

## 2018-04-12 RX ORDER — LISINOPRIL 20 MG/1
20 TABLET ORAL DAILY
Qty: 30 TABLET | Refills: 1 | Status: SHIPPED | OUTPATIENT
Start: 2018-04-12 | End: 2019-01-16

## 2018-04-12 NOTE — TELEPHONE ENCOUNTER
"Last OV 03/27/2018.    Requested Prescriptions   Pending Prescriptions Disp Refills     lisinopril (PRINIVIL/ZESTRIL) 20 MG tablet 30 tablet 1     Sig: Take 1 tablet (20 mg) by mouth daily    ACE Inhibitors (Including Combos) Protocol Failed    4/12/2018  4:03 PM       Failed - Blood pressure under 140/90 in past 12 months    BP Readings from Last 3 Encounters:   03/27/18 150/89   12/22/17 (!) 160/94   10/20/17 (!) 150/100                Passed - Recent (12 mo) or future (30 days) visit within the authorizing provider's specialty    Patient had office visit in the last 12 months or has a visit in the next 30 days with authorizing provider or within the authorizing provider's specialty.  See \"Patient Info\" tab in inbasket, or \"Choose Columns\" in Meds & Orders section of the refill encounter.           Passed - Patient is age 18 or older       Passed - No active pregnancy on record       Passed - Normal serum creatinine on file in past 12 months    Recent Labs   Lab Test  12/22/17   1100   CR  0.69            Passed - Normal serum potassium on file in past 12 months    Recent Labs   Lab Test  12/22/17   1100   POTASSIUM  3.8            Passed - No positive pregnancy test in past 12 months        Routing refill request to provider for review/approval because:  Drug not on the G refill protocol      Failed - Blood pressure under 140/90 in past 12 months       "

## 2018-06-21 ENCOUNTER — TELEPHONE (OUTPATIENT)
Dept: FAMILY MEDICINE | Facility: CLINIC | Age: 43
End: 2018-06-21

## 2018-06-21 NOTE — TELEPHONE ENCOUNTER
Panel Management Review      Patient has the following on her problem list:     Hypertension   Last three blood pressure readings:  BP Readings from Last 3 Encounters:   03/27/18 150/89   12/22/17 (!) 160/94   10/20/17 (!) 150/100     Blood pressure: Failed    HTN Guidelines:  Age 18-59 BP range:  Less than 140/90  Age 60-85 with Diabetes:  Less than 140/90  Age 60-85 without Diabetes:  less than 150/90      Composite cancer screening  Chart review shows that this patient is due/due soon for the following: pap  Summary:    Patient is due/failing the following:   BP CHECK and PAP    Action needed:   Patient needs office visit for physical and pap also follow up on blood pressure.    Type of outreach:    routed to panel pool for outreach    Questions for provider review:    None                                                                                                                                    Paige Mandujano CMA       Chart routed to Care Team .

## 2018-06-26 DIAGNOSIS — F41.9 ANXIETY: ICD-10-CM

## 2018-06-26 NOTE — TELEPHONE ENCOUNTER
"Last Written Prescription Date:  3/27/18  Last Fill Quantity: 60 tablet,  # refills: 1   Last office visit: 3/27/2018 with prescribing provider:  Naomie   Future Office Visit:      Requested Prescriptions   Pending Prescriptions Disp Refills     buPROPion (WELLBUTRIN SR) 150 MG 12 hr tablet [Pharmacy Med Name: BUPROPION SR 150MG TABLETS (12 H)] 60 tablet 0     Sig: TAKE 1 TABLET(150 MG) BY MOUTH TWICE DAILY    SSRIs Protocol Passed    6/26/2018 10:53 AM       Passed - Recent (12 mo) or future (30 days) visit within the authorizing provider's specialty    Patient had office visit in the last 12 months or has a visit in the next 30 days with authorizing provider or within the authorizing provider's specialty.  See \"Patient Info\" tab in inbasket, or \"Choose Columns\" in Meds & Orders section of the refill encounter.           Passed - Medication is Bupropion    If the medication is Bupropion (Wellbutrin), and the patient is taking for smoking cessation; OK to refill.         Passed - Patient is age 18 or older       Passed - No active pregnancy on record       Passed - No positive pregnancy test in last 12 months        PHQ-9 SCORE 7/1/2015 8/4/2015 9/16/2015   Total Score 24 23 -   Total Score - - 9     JERSON-7 SCORE 3/13/2014 5/1/2014 7/1/2015   Total Score 17 21 20         "

## 2018-06-26 NOTE — LETTER
June 28, 2018      Danielle Tadeo  [address] [place] [address]        Dear Danielle,     We recently received a call from your pharmacy requesting a refill of your medication (wellbutrin).    A review of your chart indicates that an appointment is required.  Please contact our office at [phone] to schedule your doctor's appointment for your follow up visit. We have tried to reach you by phone.     We have authorized one refill of your medication to allow time for you to schedule your appointment.    Taking care of your health is important to us and ongoing visits with your provider are vital to your care.  We look forward to seeing you in the near future.    Sincerely,    Fuentes Akbar MD/Zainab LYNCH

## 2018-06-26 NOTE — TELEPHONE ENCOUNTER
Using medication for anxiety  Needs updated JERSON 7  Restarted wellbutrin last visit in March, should be seen in follow up    LM to CB, update JERSON 7, schedule appt    Vianca Morse RN, BS  Clinical Nurse Triage.

## 2018-06-28 RX ORDER — BUPROPION HYDROCHLORIDE 150 MG/1
TABLET, EXTENDED RELEASE ORAL
Qty: 30 TABLET | Refills: 0 | Status: SHIPPED | OUTPATIENT
Start: 2018-06-28 | End: 2020-04-17

## 2018-06-28 NOTE — TELEPHONE ENCOUNTER
Sent 2 weeks with pharmacy note, also letter sent, can inform pt if calls back    Prescription approved per [facility] Refill Protocol.  Zainab Norman RN, BSN

## 2018-08-25 ENCOUNTER — HEALTH MAINTENANCE LETTER (OUTPATIENT)
Age: 43
End: 2018-08-25

## 2018-10-10 ENCOUNTER — TELEPHONE (OUTPATIENT)
Dept: FAMILY MEDICINE | Facility: CLINIC | Age: 43
End: 2018-10-10

## 2018-10-10 NOTE — TELEPHONE ENCOUNTER
Panel Management Review      Patient has the following on her problem list:     Hypertension   Last three blood pressure readings:  BP Readings from Last 3 Encounters:   03/27/18 150/89   12/22/17 (!) 160/94   10/20/17 (!) 150/100     Blood pressure: FAILED    HTN Guidelines:  Age 18-59 BP range:  Less than 140/90  Age 60-85 with Diabetes:  Less than 140/90  Age 60-85 without Diabetes:  less than 150/90      Composite cancer screening  Chart review shows that this patient is due/due soon for the following Pap Smear  Summary:    Patient is due/failing the following:   BP CHECK and PAP    Action needed:   Patient needs office visit for hypertension and physical and pap.    Type of outreach:    routed to panel pool for outreach    Questions for provider review:    None                                                                                                                                    Paige Mandujano       Chart routed to care team .

## 2019-01-16 ENCOUNTER — TRANSFERRED RECORDS (OUTPATIENT)
Dept: HEALTH INFORMATION MANAGEMENT | Facility: CLINIC | Age: 44
End: 2019-01-16

## 2019-01-16 ENCOUNTER — OFFICE VISIT (OUTPATIENT)
Dept: FAMILY MEDICINE | Facility: CLINIC | Age: 44
End: 2019-01-16
Payer: COMMERCIAL

## 2019-01-16 VITALS
SYSTOLIC BLOOD PRESSURE: 138 MMHG | TEMPERATURE: 98.6 F | OXYGEN SATURATION: 96 % | BODY MASS INDEX: 26.36 KG/M2 | WEIGHT: 164 LBS | DIASTOLIC BLOOD PRESSURE: 88 MMHG | HEART RATE: 99 BPM | HEIGHT: 66 IN

## 2019-01-16 DIAGNOSIS — R19.7 VOMITING AND DIARRHEA: ICD-10-CM

## 2019-01-16 DIAGNOSIS — R11.10 VOMITING AND DIARRHEA: ICD-10-CM

## 2019-01-16 DIAGNOSIS — I10 HYPERTENSION GOAL BP (BLOOD PRESSURE) < 140/90: ICD-10-CM

## 2019-01-16 DIAGNOSIS — F07.81 POST CONCUSSIVE SYNDROME: Primary | ICD-10-CM

## 2019-01-16 LAB
BASOPHILS # BLD AUTO: 0 10E9/L (ref 0–0.2)
BASOPHILS NFR BLD AUTO: 0.3 %
DIFFERENTIAL METHOD BLD: ABNORMAL
EOSINOPHIL # BLD AUTO: 0.1 10E9/L (ref 0–0.7)
EOSINOPHIL NFR BLD AUTO: 0.9 %
ERYTHROCYTE [DISTWIDTH] IN BLOOD BY AUTOMATED COUNT: 18 % (ref 10–15)
HCT VFR BLD AUTO: 38.6 % (ref 35–47)
HGB BLD-MCNC: 12.4 G/DL (ref 11.7–15.7)
LIPASE SERPL-CCNC: 79 U/L (ref 73–393)
LYMPHOCYTES # BLD AUTO: 2.4 10E9/L (ref 0.8–5.3)
LYMPHOCYTES NFR BLD AUTO: 27.8 %
MCH RBC QN AUTO: 27.1 PG (ref 26.5–33)
MCHC RBC AUTO-ENTMCNC: 32.1 G/DL (ref 31.5–36.5)
MCV RBC AUTO: 84 FL (ref 78–100)
MONOCYTES # BLD AUTO: 0.7 10E9/L (ref 0–1.3)
MONOCYTES NFR BLD AUTO: 8.2 %
NEUTROPHILS # BLD AUTO: 5.5 10E9/L (ref 1.6–8.3)
NEUTROPHILS NFR BLD AUTO: 62.8 %
PLATELET # BLD AUTO: 358 10E9/L (ref 150–450)
RBC # BLD AUTO: 4.58 10E12/L (ref 3.8–5.2)
WBC # BLD AUTO: 8.8 10E9/L (ref 4–11)

## 2019-01-16 PROCEDURE — 85025 COMPLETE CBC W/AUTO DIFF WBC: CPT | Performed by: PHYSICIAN ASSISTANT

## 2019-01-16 PROCEDURE — 99214 OFFICE O/P EST MOD 30 MIN: CPT | Performed by: PHYSICIAN ASSISTANT

## 2019-01-16 PROCEDURE — 36415 COLL VENOUS BLD VENIPUNCTURE: CPT | Performed by: PHYSICIAN ASSISTANT

## 2019-01-16 PROCEDURE — 83690 ASSAY OF LIPASE: CPT | Performed by: PHYSICIAN ASSISTANT

## 2019-01-16 PROCEDURE — 80053 COMPREHEN METABOLIC PANEL: CPT | Performed by: PHYSICIAN ASSISTANT

## 2019-01-16 RX ORDER — LISINOPRIL 20 MG/1
20 TABLET ORAL DAILY
Qty: 30 TABLET | Refills: 1 | Status: SHIPPED | OUTPATIENT
Start: 2019-01-16 | End: 2019-04-30

## 2019-01-16 ASSESSMENT — MIFFLIN-ST. JEOR: SCORE: 1415.65

## 2019-01-16 NOTE — PROGRESS NOTES
SUBJECTIVE:                                                    Danielle Tadeo is a 43 year old female who presents to clinic today for the following health issues:      ED/UC Followup:    Facility:  [facility]  Date of visit: 12/29/2018  Reason for visit: Post Concussion Syndrome - slipped on ice aprrox 1 month ago - not seen until ER visit above. Hit back of head and tailbone.   Current Status: Still nauseated, occas headache-has tightness in jaw also causes HA.   Vomit at least 1x per day - does have cough that started 2 weeks ago.  Night after leaving ER had diarrhea - has had it every couple days.  Imodium ad-getting some relief.   States she has abdominal pain/cramping with the loose BM's.           Started taking Lisinopril again since ER visit.  Is out of meds so taking husbands 20mgs. She has mostly been taking one in morning and one at night. Not checking BP - pt states can just tell it is high.  Does not get as many chest pains since starting medication again.    Requesting refill of flexeril also.    Patient is here for an ED followup.  She states that she was seen in the [facility] about 1-3 weeks after a fall she had outside.  She says she is unsure of the specific date of the fall.  She reports that she did hit her head and tailbone, but does not think she lost consciousness.  Patient did have a head CT scan done in the ED that was within normal limits.  Patient states that since the fall she has continued to have nausea and vomiting nearly daily.  She states that now this week she has had diarrhea and abdominal pain/cramping with her symptoms.  Patient works as a , but she says she does not drink at work.  She will occasionally have a drink at home.      Patient is also here for her elevated BP.  She states that she was not regularly taking her lisinopril when she went to the ED, but that after seeing how high it was in the ED, she has been taking at least one, sometimes two  "lisinopril once daily for her BP.  Patient admits to taking only one lisinopril yesterday and none today.      Problem list and histories reviewed & adjusted, as indicated.  Additional history: as documented      ROS:  Constitutional, HEENT, cardiovascular, pulmonary, GI, , musculoskeletal, neuro, skin, endocrine and psych systems are negative, except as otherwise noted.    OBJECTIVE:                                                    /88   Pulse 99   Temp 98.6  F (37  C) (Oral)   Ht 1.676 m (5' 6\")   Wt 74.4 kg (164 lb)   LMP 06/07/2015   SpO2 96%   BMI 26.47 kg/m     Body mass index is 26.47 kg/m .  GENERAL: healthy, alert and no distress  EYES: Eyes grossly normal to inspection, PERRL and conjunctivae and sclerae normal  HENT: ear canals and TM's normal, nose and mouth without ulcers or lesions  NECK: no adenopathy, no asymmetry, masses, or scars and thyroid normal to palpation  RESP: lungs clear to auscultation - no rales, rhonchi or wheezes  CV: regular rate and rhythm, normal S1 S2, no S3 or S4, no murmur, click or rub, no peripheral edema and peripheral pulses strong  ABDOMEN: Non-tender to palpation  MS: no gross musculoskeletal defects noted, no edema  NEURO: Normal strength and tone, mentation intact and speech normal, cranial nerves in tact, normal memory recall, normal rapid alternating movements and can spell word \"world\" backwards.    PSYCH: mentation appears normal, affect normal/bright    Diagnostic Test Results:  Labs - pending     ASSESSMENT/PLAN:                                                      Diagnoses and all orders for this visit:    Post concussive syndrome  -     CONCUSSION  REFERRAL    Vomiting and diarrhea  -     CBC with platelets differential  -     Comprehensive metabolic panel  -     Lipase  -     Enteric Bacteria and Virus Panel by [facility] Stool; Future  -     Giardia antigen; Future  -     Clostridium difficile Toxin B PCR; Future  -     Ova and Parasite Exam " Routine; Future    Hypertension goal BP (blood pressure) < 140/90  -     lisinopril (PRINIVIL/ZESTRIL) 20 MG tablet; Take 1 tablet (20 mg) by mouth daily      Patient has been advised to followup in the concussion clinic for continued symptoms.  Neuro-exam today is within normal limits.    Stool cultures and lab work ordered as patient has now developed diarrhea, she may have a GI syndrome going on.      BP is within normal limits today on second BP reading.  Patient reports that she has been without the lisinopril today and took only one tablet yesterday.  Will have patient get back on her usual daily dose of 20 mg lisinopril once daily and followup closely to monitor blood pressure.  Discussed adding on Amlodipine if the BP is still elevated.    Patient to seek more immediate care if symptoms change or worsen in any way.      Followup: Return in about 5 days (around 1/21/2019) for BP Recheck, please be seen sooner if needed.    -- I have discussed the patient's diagnosis, and my plan of treatment with the patient and/or family. Patient is aware to followup if symptoms do not improve.  Patient has been advised to be seen sooner or seek more immediate care if symptoms change or worsen.  Patient agrees with and understands the plan today.     See Patient Instructions        Lisa Digeo PA-C    [facility]

## 2019-01-16 NOTE — LETTER
[facility]  [address] [place] [address]  [place] [address]                  [phone]   January 24, 2019    Danielle Tadeo  [address]  [place] [facility] [address]      Dear Danielle,    Here is a summary of your recent test results:    The results from your recent lab work are within normal limits, there is very minimally decreased levels of protein, which is likely due to the loose stool and vomiting.  We should recheck this lab in about 2 weeks.  Also, we are still awaiting the stool cultures.  Please be sure to collect those and bring them in if you are still having symptoms.       Your test results are enclosed.      Please contact me if you have any questions.    Please call us at [phone] (or use [employer]) to address the above recommendations.            Thank you very much for trusting [facility]..     Healthy regards,      Lisa Diego PA-C        Results for orders placed or performed in visit on 01/16/19   CBC with platelets differential   Result Value Ref Range    WBC 8.8 4.0 - 11.0 10e9/L    RBC Count 4.58 3.8 - 5.2 10e12/L    Hemoglobin 12.4 11.7 - 15.7 g/dL    Hematocrit 38.6 35.0 - 47.0 %    MCV 84 78 - 100 fl    MCH 27.1 26.5 - 33.0 pg    MCHC 32.1 31.5 - 36.5 g/dL    RDW 18.0 (H) 10.0 - 15.0 %    Platelet Count 358 150 - 450 10e9/L    % Neutrophils 62.8 %    % Lymphocytes 27.8 %    % Monocytes 8.2 %    % Eosinophils 0.9 %    % Basophils 0.3 %    Absolute Neutrophil 5.5 1.6 - 8.3 10e9/L    Absolute Lymphocytes 2.4 0.8 - 5.3 10e9/L    Absolute Monocytes 0.7 0.0 - 1.3 10e9/L    Absolute Eosinophils 0.1 0.0 - 0.7 10e9/L    Absolute Basophils 0.0 0.0 - 0.2 10e9/L    Diff Method Automated Method    Comprehensive metabolic panel   Result Value Ref Range    Sodium 139 133 - 144 mmol/L    Potassium 4.8 3.4 - 5.3 mmol/L    Chloride 109 94 - 109 mmol/L    Carbon Dioxide 24 20 - 32 mmol/L    Anion Gap 6 3 - 14 mmol/L    Glucose 98 70 - 99 mg/dL     Urea Nitrogen 13 7 - 30 mg/dL    Creatinine 0.68 0.52 - 1.04 mg/dL    GFR Estimate >90 >60 mL/min/[1.73_m2]    GFR Estimate If Black >90 >60 mL/min/[1.73_m2]    Calcium 8.9 8.5 - 10.1 mg/dL    Bilirubin Total 0.2 0.2 - 1.3 mg/dL    Albumin 3.2 (L) 3.4 - 5.0 g/dL    Protein Total 6.2 (L) 6.8 - 8.8 g/dL    Alkaline Phosphatase 78 40 - 150 U/L    ALT 17 0 - 50 U/L    AST 20 0 - 45 U/L   Lipase   Result Value Ref Range    Lipase 79 73 - 393 U/L

## 2019-01-16 NOTE — PATIENT INSTRUCTIONS
Please take the Lisinopril daily as prescribed.  Please followup in the pharmacy by Monday next week for a BP recheck.  Please seek more immediate medical attention if you develop any concerning symptoms.      Please followup with the concussion clinic.

## 2019-01-17 LAB
ALBUMIN SERPL-MCNC: 3.2 G/DL (ref 3.4–5)
ALP SERPL-CCNC: 78 U/L (ref 40–150)
ALT SERPL W P-5'-P-CCNC: 17 U/L (ref 0–50)
ANION GAP SERPL CALCULATED.3IONS-SCNC: 6 MMOL/L (ref 3–14)
AST SERPL W P-5'-P-CCNC: 20 U/L (ref 0–45)
BILIRUB SERPL-MCNC: 0.2 MG/DL (ref 0.2–1.3)
BUN SERPL-MCNC: 13 MG/DL (ref 7–30)
CALCIUM SERPL-MCNC: 8.9 MG/DL (ref 8.5–10.1)
CHLORIDE SERPL-SCNC: 109 MMOL/L (ref 94–109)
CO2 SERPL-SCNC: 24 MMOL/L (ref 20–32)
CREAT SERPL-MCNC: 0.68 MG/DL (ref 0.52–1.04)
GFR SERPL CREATININE-BSD FRML MDRD: >90 ML/MIN/{1.73_M2}
GLUCOSE SERPL-MCNC: 98 MG/DL (ref 70–99)
POTASSIUM SERPL-SCNC: 4.8 MMOL/L (ref 3.4–5.3)
PROT SERPL-MCNC: 6.2 G/DL (ref 6.8–8.8)
SODIUM SERPL-SCNC: 139 MMOL/L (ref 133–144)

## 2019-01-24 DIAGNOSIS — R79.9 ABNORMAL SERUM TOTAL PROTEIN LEVEL: Primary | ICD-10-CM

## 2019-01-24 NOTE — RESULT ENCOUNTER NOTE
Note to staff: Please send a result letter    The results from your recent lab work are within normal limits, there is very minimally decreased levels of protein, which is likely due to the loose stool and vomiting.  We should recheck this lab in about 2 weeks.  Also, we are still awaiting the stool cultures.  Please be sure to collect those and bring them in if you are still having symptoms.       Thank you for choosing [facility] for your health care needs,      Lisa Diego PA-C

## 2019-02-21 ENCOUNTER — TELEPHONE (OUTPATIENT)
Dept: FAMILY MEDICINE | Facility: CLINIC | Age: 44
End: 2019-02-21

## 2019-02-26 ENCOUNTER — TELEPHONE (OUTPATIENT)
Dept: FAMILY MEDICINE | Facility: CLINIC | Age: 44
End: 2019-02-26

## 2019-02-26 ENCOUNTER — OFFICE VISIT (OUTPATIENT)
Dept: FAMILY MEDICINE | Facility: CLINIC | Age: 44
End: 2019-02-26
Payer: COMMERCIAL

## 2019-02-26 VITALS
TEMPERATURE: 98 F | RESPIRATION RATE: 14 BRPM | WEIGHT: 163.2 LBS | SYSTOLIC BLOOD PRESSURE: 138 MMHG | DIASTOLIC BLOOD PRESSURE: 82 MMHG | OXYGEN SATURATION: 100 % | BODY MASS INDEX: 26.34 KG/M2 | HEART RATE: 97 BPM

## 2019-02-26 DIAGNOSIS — M54.2 NECK PAIN: ICD-10-CM

## 2019-02-26 DIAGNOSIS — M26.609 TMJ (TEMPOROMANDIBULAR JOINT SYNDROME): ICD-10-CM

## 2019-02-26 DIAGNOSIS — J01.01 ACUTE RECURRENT MAXILLARY SINUSITIS: Primary | ICD-10-CM

## 2019-02-26 PROCEDURE — 99213 OFFICE O/P EST LOW 20 MIN: CPT | Performed by: FAMILY MEDICINE

## 2019-02-26 PROCEDURE — 36415 COLL VENOUS BLD VENIPUNCTURE: CPT | Performed by: FAMILY MEDICINE

## 2019-02-26 PROCEDURE — 80076 HEPATIC FUNCTION PANEL: CPT | Performed by: FAMILY MEDICINE

## 2019-02-26 RX ORDER — CYCLOBENZAPRINE HCL 5 MG
5 TABLET ORAL 3 TIMES DAILY PRN
Qty: 60 TABLET | Refills: 0 | Status: SHIPPED | OUTPATIENT
Start: 2019-02-26 | End: 2019-03-18

## 2019-02-26 RX ORDER — CYCLOBENZAPRINE HCL 5 MG
TABLET ORAL
Qty: 30 TABLET | Refills: 0 | Status: CANCELLED | OUTPATIENT
Start: 2019-02-26

## 2019-02-26 RX ORDER — AMOXICILLIN 500 MG/1
500 CAPSULE ORAL 3 TIMES DAILY
Qty: 30 CAPSULE | Refills: 0 | Status: SHIPPED | OUTPATIENT
Start: 2019-02-26 | End: 2020-04-17

## 2019-02-26 NOTE — TELEPHONE ENCOUNTER
Pt calls, saw LOUISE COBB MD today, ANTIBIOTIC NOT SENT TO [facility] FOR SINUSITIS, pt informs [facility] will call when received, routed to Louise Cobb MD please send and close, inform pt if problem  Zainab Norman RN, BSN  Message handled by Nurse Triage.

## 2019-02-26 NOTE — LETTER
February 28, 2019      Danielle Tadeo  [address]  [place] [address]        Dear ,    We are writing to inform you of your test results.    Liver tests are normal again.    Resulted Orders   Hepatic panel   Result Value Ref Range    Bilirubin Direct <0.1 0.0 - 0.2 mg/dL    Bilirubin Total 0.2 0.2 - 1.3 mg/dL    Albumin 3.7 3.4 - 5.0 g/dL    Protein Total 6.9 6.8 - 8.8 g/dL    Alkaline Phosphatase 82 40 - 150 U/L    ALT 29 0 - 50 U/L    AST 32 0 - 45 U/L       If you have any questions or concerns, please call the clinic at the number listed above.       Sincerely,        Fuentes Akbar MD/AL

## 2019-02-26 NOTE — PROGRESS NOTES
SUBJECTIVE:   Danielle Tadeo is a 43 year old female who presents to clinic today for the following health issues:      ED/UC Followup:mental health admission after situational challenge and TMJ flare    [facility]ility: [facility]   Date of visit: 19  Reason for visit: fall  Current Status: doing better     SUBJECTIVE:    CC: Danielle Tadeo is a 43 year old female who presents for TMJ and depression follow up     HPI: she has had recurrent jaw issues, unstable pain got worse and she stressed out and was admitted to ER//Mental health: drug screen was negative         PROBLEM LIST:                   Patient Active Problem List   Diagnosis     Encounter for other general counseling or advice on contraception     Hypertension goal BP (blood pressure) < 140/90     CARDIOVASCULAR SCREENING; LDL GOAL LESS THAN 160     Lumbago     Anxiety     HTN (hypertension)     Inattention     Cervical spondylosis without myelopathy     S/P gastric bypass       PAST MEDICAL HISTORY:                  Past Medical History:   Diagnosis Date     Benign essential hypertension complicating pregnancy, childbirth, and the puerperium, unspecified as to episode of care     [facility]     Cervical spondylosis without myelopathy 2015     Myocardial infarction (H)        PAST SURGICAL HISTORY:                  Past Surgical History:   Procedure Laterality Date     C NONSPECIFIC PROCEDURE  2004    1      C NONSPECIFIC PROCEDURE      Lasik     GASTRIC BYPASS         CURRENT MEDICATIONS:                  Current Outpatient Medications   Medication Sig Dispense Refill     buPROPion (WELLBUTRIN SR) 150 MG 12 hr tablet TAKE 1 TABLET(150 MG) BY MOUTH TWICE DAILY 30 tablet 0     cyclobenzaprine (FLEXERIL) 5 MG tablet Take 1 tablet (5 mg) by mouth 3 times daily as needed for muscle spasms 60 tablet 0     cyclobenzaprine (FLEXERIL) 5 MG tablet TAKE 1 TABLET(5 MG) BY MOUTH THREE TIMES DAILY AS NEEDED FOR MUSCLE SPASMS 30  tablet 0     lisinopril (PRINIVIL/ZESTRIL) 20 MG tablet Take 1 tablet (20 mg) by mouth daily 30 tablet 1             FAMILY HISTORY:                   Family History   Problem Relation Age of Onset     Cancer Maternal Grandmother         lung ca     Hypertension Father      Cancer Father 63        pancreatic cancer     Hypertension Mother      Allergies Mother         asthma     Cancer - colorectal No family hx of      Breast Cancer No family hx of        HEALTH MAINTENANCE:                    REVIEW OF OUTSIDE RECORDS: NO    REVIEW OF SYSTEMS:  CONSTITUTIONAL: NEGATIVE for fever, chills  EYES: NEGATIVE for vision changes   RESP: NEGATIVE for significant cough or SOB  CV: NEGATIVE for chest pain, palpitations   GI: NEGATIVE for nausea, abdominal pain, heartburn, or change in bowel habits  : NEGATIVE for frequency, dysuria, or hematuria  MUSCULOSKELETAL: NEGATIVE for significant arthralgias or myalgia  NEURO: NEGATIVE for weakness, dizziness or paresthesias or headache  NVS:no headache or balance issus  INTEG:no moles or new rashes  LYMPH:no nodes or night sweats    EXAM:    /82 (BP Location: Right arm, Patient Position: Chair, Cuff Size: Adult Regular)   Pulse 97   Temp 98  F (36.7  C) (Oral)   Resp 14   Wt 74 kg (163 lb 3.2 oz)   LMP 06/07/2015   SpO2 100%   BMI 26.34 kg/m    GENERAL APPEARANCE: healthy, alert and no distress   EXAM:  GENERAL APPEARANCE: healthy, alert and no distress  EYES: EOMI,  PERRL  HENT: ear canals and TM's normal and nose and mouth without ulcers or lesions  RESP: lungs clear to auscultation - no rales, rhonchi or wheezes  CV: regular rates and rhythm, normal S1 S2, no S3 or S4 and no murmur, click or rub -  ABDOMEN:  soft, nontender, no HSM or masses and bowel sounds normal  BACK:no tenderness or pain on straight let raise           ASSESSMENT/PLAN  1. Neck pain    2. TMJ (temporomandibular joint syndrome)    myofascial release has helped, prn muscle relaxant, dental  specialist appointment upcoming                                  I have discussed with patient the risks, benefits, medications, treatment options and modalities.   I have instructed the patient to call or schedule a follow-up appointment if any problems or failure to improve.

## 2019-02-27 LAB
ALBUMIN SERPL-MCNC: 3.7 G/DL (ref 3.4–5)
ALP SERPL-CCNC: 82 U/L (ref 40–150)
ALT SERPL W P-5'-P-CCNC: 29 U/L (ref 0–50)
AST SERPL W P-5'-P-CCNC: 32 U/L (ref 0–45)
BILIRUB DIRECT SERPL-MCNC: <0.1 MG/DL (ref 0–0.2)
BILIRUB SERPL-MCNC: 0.2 MG/DL (ref 0.2–1.3)
PROT SERPL-MCNC: 6.9 G/DL (ref 6.8–8.8)

## 2019-03-01 ENCOUNTER — TELEPHONE (OUTPATIENT)
Dept: FAMILY MEDICINE | Facility: CLINIC | Age: 44
End: 2019-03-01

## 2019-03-01 NOTE — LETTER
[facility]  [address] [place], [address]  [phone]        March 1, 2019    Danielle Tadeo                                                                                                                                                       [address]  [facility] [address]    Off work 2/8 and 2/9 for illness.          Fuentes Akbar MD

## 2019-03-01 NOTE — TELEPHONE ENCOUNTER
Pt called requesting note for days she was off work due to illness. The dates are 20/08 and 02/09/19.. Please call pt when note is ready to  at [phone].

## 2019-04-30 DIAGNOSIS — I10 HYPERTENSION GOAL BP (BLOOD PRESSURE) < 140/90: ICD-10-CM

## 2019-04-30 RX ORDER — LISINOPRIL 20 MG/1
TABLET ORAL
Qty: 30 TABLET | Refills: 0 | Status: SHIPPED | OUTPATIENT
Start: 2019-04-30 | End: 2019-06-05

## 2019-05-01 NOTE — TELEPHONE ENCOUNTER
"Last Written Prescription Date:  1/16/2019  Last Fill Quantity: 30,  # refills: 1   Last office visit: 2/26/2019 with prescribing provider:  Yes   Future Office Visit:        Requested Prescriptions   Pending Prescriptions Disp Refills     lisinopril (PRINIVIL/ZESTRIL) 20 MG tablet [Pharmacy Med Name: LISINOPRIL 20MG TABLETS] 30 tablet 0     Sig: TAKE 1 TABLET(20 MG) BY MOUTH DAILY       ACE Inhibitors (Including Combos) Protocol Passed - 4/30/2019 12:33 PM        Passed - Blood pressure under 140/90 in past 12 months     BP Readings from Last 3 Encounters:   02/26/19 138/82   01/16/19 138/88   03/27/18 150/89                 Passed - Recent (12 mo) or future (30 days) visit within the authorizing provider's specialty     Patient had office visit in the last 12 months or has a visit in the next 30 days with authorizing provider or within the authorizing provider's specialty.  See \"Patient Info\" tab in inbasket, or \"Choose Columns\" in Meds & Orders section of the refill encounter.              Passed - Medication is active on med list        Passed - Patient is age 18 or older        Passed - No active pregnancy on record        Passed - Normal serum creatinine on file in past 12 months     Recent Labs   Lab Test 01/16/19  1428   CR 0.68             Passed - Normal serum potassium on file in past 12 months     Recent Labs   Lab Test 01/16/19  1428   POTASSIUM 4.8             Passed - No positive pregnancy test within past 12 months        Prescription approved per [facility] Refill Protocol.    Marie Rodriguez, ROXI, RN, PHN  [facility] [phone]      "

## 2019-11-25 ENCOUNTER — TELEPHONE (OUTPATIENT)
Dept: FAMILY MEDICINE | Facility: CLINIC | Age: 44
End: 2019-11-25

## 2019-11-25 NOTE — LETTER
[facility]  [address] [address] [address]  [phone]  December 3, 2019    Danielle Tadeo  [address]  [facility] [address]    Dear Danielle,    I care about your health and have reviewed your health plan. I have reviewed your medical conditions, medication list, and lab results and am making recommendations based on this review, to better manage your health.    You are in particular need of attention regarding:  -Cervical Cancer Screening    I am recommending that you:  {recommendations:-schedule a PAP SMEAR EXAM which is due.  Please disregard this reminder if you have had this exam elsewhere within the last year.  It would be helpful for us to have a copy of your recent pap smear report in our file so that we can best coordinate your care.  Please call us at [phone] (or use [employer]) to address the above recommendations.     Thank you for trusting [facility] and we appreciate the opportunity to serve you.  We look forward to supporting your healthcare needs in the future.    Healthy Regards,    Fuentes Akbar MD/benito

## 2019-11-26 NOTE — TELEPHONE ENCOUNTER
Panel Management Review      Patient has the following on her problem list: None      Composite cancer screening  Chart review shows that this patient is due/due soon for the following Pap Smear  Summary:    Patient is due/failing the following:   PAP    Action needed:   Patient needs office visit for Pap.    Type of outreach:    Phone, left message for patient to call back.     Questions for provider review:    None                                                                                                                                    Minerva Phillips MA  [facility].         Chart routed to Care Team .

## 2019-12-03 NOTE — TELEPHONE ENCOUNTER
Left msg to call back and letter sent.  Minerva Phillips MA  [facility].

## 2020-04-17 ENCOUNTER — VIRTUAL VISIT (OUTPATIENT)
Dept: FAMILY MEDICINE | Facility: CLINIC | Age: 45
End: 2020-04-17
Payer: COMMERCIAL

## 2020-04-17 DIAGNOSIS — F11.23 OPIOID DEPENDENCE WITH WITHDRAWAL (H): Primary | ICD-10-CM

## 2020-04-17 PROCEDURE — 99213 OFFICE O/P EST LOW 20 MIN: CPT | Mod: TEL | Performed by: PHYSICIAN ASSISTANT

## 2020-04-17 NOTE — PROGRESS NOTES
"Subjective     Danielle Tadeo is a 45 year old female who is being evaluated via a billable telephone visit.      The patient has been notified of following:     \"This telephone visit will be conducted via a call between you and your physician/provider. We have found that certain health care needs can be provided without the need for a physical exam.  This service lets us provide the care you need with a short phone conversation.  If a prescription is necessary we can send it directly to your pharmacy.  If lab work is needed we can place an order for that and you can then stop by our lab to have the test done at a later time.    Telephone visits are billed at different rates depending on your insurance coverage. During this emergency period, for some insurers they may be billed the same as an in-person visit.  Please reach out to your insurance provider with any questions.    If during the course of the call the physician/provider feels a telephone visit is not appropriate, you will not be charged for this service.\"     Physician has received verbal consent for a Telephone Visit from the patient? Yes    Danielle Tadeo complains of   Chief Complaint   Patient presents with     Medication Problem     Opoid withdrawal       ALLERGIES  Patient has no known allergies.    -going through Opoid withdrawals - has been without if for a day - was previously taking Percocet (getting illegally) 30 mg - taking a few a day for back pain, jaw pain and neck pain. Doesn't want to be on it anymore.  Reports that she took her last dose yesterday and is starting to develop sweats.  Went to the ER but had to leave as she didn't have \"time\" to wait as she had to go to work.  It appears pt was admitted to [facility] for acute intoxication/dependence and SI in 2019.  She was referred to [facility].         Patient Active Problem List   Diagnosis     Encounter for other general counseling or advice on contraception     Hypertension " goal BP (blood pressure) < 140/90     CARDIOVASCULAR SCREENING; LDL GOAL LESS THAN 160     Lumbago     Anxiety     HTN (hypertension)     Inattention     Cervical spondylosis without myelopathy     S/P gastric bypass     Past Surgical History:   Procedure Laterality Date     C NONSPECIFIC PROCEDURE  2004    1      C NONSPECIFIC PROCEDURE      Lasik     GASTRIC BYPASS         Social History     Tobacco Use     Smoking status: Current Every Day Smoker     Packs/day: 0.50     Years: 5.00     Pack years: 2.50     Types: Cigarettes     Smokeless tobacco: Never Used   Substance Use Topics     Alcohol use: Yes     Comment: 2 drinks per week     Family History   Problem Relation Age of Onset     Cancer Maternal Grandmother         lung ca     Hypertension Father      Cancer Father 63        pancreatic cancer     Hypertension Mother      Allergies Mother         asthma     Cancer - colorectal No family hx of      Breast Cancer No family hx of          Current Outpatient Medications   Medication Sig Dispense Refill     lisinopril (PRINIVIL/ZESTRIL) 20 MG tablet TAKE 1 TABLET BY MOUTH DAILY 90 tablet 1     No Known Allergies    Reviewed and updated as needed this visit by Provider  Tobacco  Allergies  Meds  Problems  Med Hx  Surg Hx  Fam Hx         Review of Systems   ROS COMP: Constitutional, HEENT, cardiovascular, pulmonary, GI, , musculoskeletal, neuro, skin, endocrine and psych systems are negative, except as otherwise noted.       Objective   Reported vitals:  LMP 2015    healthy, alert and no distress  Psych: Alert and oriented times 3; coherent speech, normal   rate and volume, able to articulate logical thoughts, able   to abstract reason, no tangential thoughts, no hallucinations   or delusions  Her affect is anxious     Diagnostic Test Results:  Labs reviewed in Epic        Assessment/Plan:   Danielle was seen today for medication problem.    Diagnoses and all orders for this  "visit:    Opioid dependence with withdrawal (H)  Patient advised that we are unable to manage opiate withdrawal in the virtual setting and that she need to be seen in a formal detox program ([facility] or [facility] recommended) for safe withdrawal.  Pt became very angry that we \"couldn't fucking help\" and that we are \"never wanting to help her\".  Tried to advise of safety concerns and the pt was very dismissive.  Pt said \"thanks for fucking nothing\" and hung up.         Return today (on 4/17/2020) for Emergency room for detox.      Phone call duration:  5:20 minutes      Deann Miranda PA-C  "

## 2020-07-14 ENCOUNTER — TELEPHONE (OUTPATIENT)
Dept: FAMILY MEDICINE | Facility: CLINIC | Age: 45
End: 2020-07-14

## 2020-07-14 NOTE — TELEPHONE ENCOUNTER
Per reception patient wants to get medications for anxiety after hospital stay yesterday. Spoke to telephone room staff and advised a visit is needed with provider.      Shelby Grissom RN Flex

## 2020-07-15 ENCOUNTER — VIRTUAL VISIT (OUTPATIENT)
Dept: FAMILY MEDICINE | Facility: CLINIC | Age: 45
End: 2020-07-15
Payer: COMMERCIAL

## 2020-07-15 ENCOUNTER — TELEPHONE (OUTPATIENT)
Dept: FAMILY MEDICINE | Facility: CLINIC | Age: 45
End: 2020-07-15

## 2020-07-15 DIAGNOSIS — F41.9 ANXIETY: Primary | ICD-10-CM

## 2020-07-15 DIAGNOSIS — M62.838 MUSCLE SPASM: ICD-10-CM

## 2020-07-15 DIAGNOSIS — I10 HYPERTENSION GOAL BP (BLOOD PRESSURE) < 140/90: ICD-10-CM

## 2020-07-15 PROCEDURE — 99214 OFFICE O/P EST MOD 30 MIN: CPT | Mod: TEL | Performed by: FAMILY MEDICINE

## 2020-07-15 RX ORDER — LISINOPRIL 20 MG/1
20 TABLET ORAL DAILY
Qty: 90 TABLET | Refills: 1 | Status: ON HOLD | OUTPATIENT
Start: 2020-07-15 | End: 2024-01-25

## 2020-07-15 RX ORDER — CYCLOBENZAPRINE HCL 10 MG
10 TABLET ORAL 3 TIMES DAILY PRN
Qty: 30 TABLET | Refills: 1 | Status: SHIPPED | OUTPATIENT
Start: 2020-07-15 | End: 2021-09-11

## 2020-07-15 RX ORDER — HYDROXYZINE HYDROCHLORIDE 25 MG/1
25 TABLET, FILM COATED ORAL EVERY 6 HOURS PRN
Qty: 30 TABLET | Refills: 0 | Status: SHIPPED | OUTPATIENT
Start: 2020-07-15 | End: 2021-09-11

## 2020-07-15 RX ORDER — CYCLOBENZAPRINE HCL 10 MG
TABLET ORAL
COMMUNITY
End: 2020-07-15

## 2020-07-15 NOTE — TELEPHONE ENCOUNTER
Called patient, left a msg to call clinic, need to schedule f/u appointment.     Return in about 4 weeks (around 8/12/2020) for Recheck Depression/Anxiety, BP Recheck.   Check out comments: Please call to schedule 4 week follow up visit for BP recheck and Anxiety recheck       Ruth Behrens.

## 2020-07-15 NOTE — PROGRESS NOTES
"Danielle Tadeo is a 45 year old female who is being evaluated via a billable telephone visit.      The patient has been notified of following:     \"This telephone visit will be conducted via a call between you and your physician/provider. We have found that certain health care needs can be provided without the need for a physical exam.  This service lets us provide the care you need with a short phone conversation.  If a prescription is necessary we can send it directly to your pharmacy.  If lab work is needed we can place an order for that and you can then stop by our lab to have the test done at a later time.    Telephone visits are billed at different rates depending on your insurance coverage. During this emergency period, for some insurers they may be billed the same as an in-person visit.  Please reach out to your insurance provider with any questions.    If during the course of the call the physician/provider feels a telephone visit is not appropriate, you will not be charged for this service.\"    Patient has given verbal consent for Telephone visit?  Yes    What phone number would you like to be contacted at? [phone]     How would you like to obtain your AVS? MyChart    Subjective     Danielle Tadeo is a 45 year old female who presents via phone visit today for the following health issues:    HPI    Hypertension Follow-up      Do you check your blood pressure regularly outside of the clinic? Yes     Are you following a low salt diet? Yes    Are your blood pressures ever more than 140 on the top number (systolic) OR more   than 90 on the bottom number (diastolic), for example 140/90? Yes    Depression and Anxiety Follow-Up    How are you doing with your depression since your last visit? Worsened    How are you doing with your anxiety since your last visit?  Worsened     Are you having other symptoms that might be associated with depression or anxiety? No    Have you had a significant life event? " Job Concerns, Housing Concerns and Grief or Loss Working on sobriety, was in ER two days ago with withdrawal symptoms    Do you have any concerns with your use of alcohol or other drugs? Yes:  sometimes    Wanted to refill some prescriptions.  Lisinopril, Cyclobenzaprine, and anxiety meds-  Last night BP was over 200.  Was not taking her medications regularly, but was also going through alcohol/drug withdrawal.  Would like refill of cyclobenzaprine for muscle spasms.  Was previously on Duloxetine and Wellbutrin for anxiety/depression, did not like either of them.  Would like to get her benzo refilled if able.  No other concerns.    Social History     Tobacco Use     Smoking status: Former Smoker     Packs/day: 0.50     Years: 5.00     Pack years: 2.50     Types: Cigarettes     Smokeless tobacco: Never Used     Tobacco comment: quit 1 week ago   Substance Use Topics     Alcohol use: Yes     Comment: 4-5 glasses/week     Drug use: No     PHQ 9/16/2015   PHQ-9 Total Score 9   Q9: Thoughts of better off dead/self-harm past 2 weeks Not at all     JERSON-7 SCORE 3/13/2014 5/1/2014 7/1/2015   Total Score 17 21 20           Current Outpatient Medications   Medication Sig Dispense Refill     cyclobenzaprine (FLEXERIL) 10 MG tablet Take 1 tablet (10 mg) by mouth 3 times daily as needed for muscle spasms 30 tablet 1     hydrOXYzine (ATARAX) 25 MG tablet Take 1 tablet (25 mg) by mouth every 6 hours as needed for anxiety 30 tablet 0     lisinopril (ZESTRIL) 20 MG tablet Take 1 tablet (20 mg) by mouth daily 90 tablet 1     sertraline (ZOLOFT) 50 MG tablet Take 1 tablet (50 mg) by mouth daily for 7 days, THEN 1.5 tablets (75 mg) daily for 23 days. 42 tablet 0     No Known Allergies    Reviewed and updated as needed this visit by Provider         Review of Systems   Constitutional, HEENT, cardiovascular, pulmonary, gi and gu systems are negative, except as otherwise noted.       Objective   Reported vitals:  [facility] 06/07/2015    healthy,  alert and no distress  PSYCH: Alert and oriented times 3; coherent speech, normal   rate and volume, able to articulate logical thoughts, able   to abstract reason, no tangential thoughts, no hallucinations   or delusions  Her affect is flat  RESP: No cough, no audible wheezing, able to talk in full sentences  Remainder of exam unable to be completed due to telephone visits    Diagnostic Test Results:  none         Assessment/Plan:    1. Anxiety  Will start patient on Sertraline for anxiety, with PRN hydroxyzine.  No benzos given her addiction history and recent withdrawal symptoms, though a benzo might be helpful to get her through withdrawals.  Will follow up in 4 weeks or sooner as needed.  - hydrOXYzine (ATARAX) 25 MG tablet; Take 1 tablet (25 mg) by mouth every 6 hours as needed for anxiety  Dispense: 30 tablet; Refill: 0  - sertraline (ZOLOFT) 50 MG tablet; Take 1 tablet (50 mg) by mouth daily for 7 days, THEN 1.5 tablets (75 mg) daily for 23 days.  Dispense: 42 tablet; Refill: 0    2. Hypertension goal BP (blood pressure) < 140/90  Refill meds.  Follow up 4 weeks or sooner as needed.  - lisinopril (ZESTRIL) 20 MG tablet; Take 1 tablet (20 mg) by mouth daily  Dispense: 90 tablet; Refill: 1    3. Muscle spasm  Follow up as needed.  - cyclobenzaprine (FLEXERIL) 10 MG tablet; Take 1 tablet (10 mg) by mouth 3 times daily as needed for muscle spasms  Dispense: 30 tablet; Refill: 1    Return in about 4 weeks (around 8/12/2020) for Recheck Depression/Anxiety, BP Recheck.      Phone call duration:  10 minutes    April EMILIE Helm MD

## 2020-08-25 ENCOUNTER — OFFICE VISIT (OUTPATIENT)
Dept: FAMILY MEDICINE | Facility: CLINIC | Age: 45
End: 2020-08-25
Payer: COMMERCIAL

## 2020-08-25 VITALS
BODY MASS INDEX: 23.46 KG/M2 | OXYGEN SATURATION: 99 % | HEART RATE: 91 BPM | SYSTOLIC BLOOD PRESSURE: 142 MMHG | WEIGHT: 146 LBS | HEIGHT: 66 IN | DIASTOLIC BLOOD PRESSURE: 84 MMHG | TEMPERATURE: 98.4 F

## 2020-08-25 DIAGNOSIS — F10.10 ETOH ABUSE: Primary | ICD-10-CM

## 2020-08-25 DIAGNOSIS — F11.90 CHRONIC, CONTINUOUS USE OF OPIOIDS: ICD-10-CM

## 2020-08-25 DIAGNOSIS — I45.81 LONG Q-T SYNDROME: ICD-10-CM

## 2020-08-25 ASSESSMENT — ANXIETY QUESTIONNAIRES
4. TROUBLE RELAXING: MORE THAN HALF THE DAYS
GAD7 TOTAL SCORE: 12
7. FEELING AFRAID AS IF SOMETHING AWFUL MIGHT HAPPEN: MORE THAN HALF THE DAYS
7. FEELING AFRAID AS IF SOMETHING AWFUL MIGHT HAPPEN: MORE THAN HALF THE DAYS
5. BEING SO RESTLESS THAT IT IS HARD TO SIT STILL: NOT AT ALL
6. BECOMING EASILY ANNOYED OR IRRITABLE: SEVERAL DAYS
GAD7 TOTAL SCORE: 12
GAD7 TOTAL SCORE: 12
3. WORRYING TOO MUCH ABOUT DIFFERENT THINGS: MORE THAN HALF THE DAYS
2. NOT BEING ABLE TO STOP OR CONTROL WORRYING: MORE THAN HALF THE DAYS
1. FEELING NERVOUS, ANXIOUS, OR ON EDGE: NEARLY EVERY DAY

## 2020-08-25 ASSESSMENT — PATIENT HEALTH QUESTIONNAIRE - PHQ9
SUM OF ALL RESPONSES TO PHQ QUESTIONS 1-9: 10
10. IF YOU CHECKED OFF ANY PROBLEMS, HOW DIFFICULT HAVE THESE PROBLEMS MADE IT FOR YOU TO DO YOUR WORK, TAKE CARE OF THINGS AT HOME, OR GET ALONG WITH OTHER PEOPLE: SOMEWHAT DIFFICULT
SUM OF ALL RESPONSES TO PHQ QUESTIONS 1-9: 10

## 2020-08-25 ASSESSMENT — MIFFLIN-ST. JEOR: SCORE: 1324

## 2020-08-25 NOTE — PROGRESS NOTES
Subjective     Danielle Tadeo is a 45 year old female who presents to clinic today for the following health issues:    History of Present Illness        Mental Health Follow-up:  Patient presents to follow-up on Anxiety.    Patient's anxiety since last visit has been:  Bad  The patient is having other symptoms associated with anxiety.  Any significant life events: financial concerns, grief or loss and health concerns  Patient is feeling anxious or having panic attacks.  Patient has no concerns about alcohol or drug use.     Social History  Tobacco Use    Smoking status: Former Smoker      Packs/day: 0.50      Years: 5.00      Pack years: 2.5      Types: Cigarettes    Smokeless tobacco: Never Used    Tobacco comment: quit 1 week ago  Alcohol use: Yes    Comment: 4-5 glasses/week  Drug use: No      Today's PHQ-9         PHQ-9 Total Score:     (P) 10   PHQ-9 Q9 Thoughts of better off dead/self-harm past 2 weeks :   (P) Not at all   Thoughts of suicide or self harm:      Self-harm Plan:        Self-harm Action:          Safety concerns for self or others:           Vascular Disease:  She presents for follow up of vascular disease.  She never takes nitroglycerin. She is not taking daily aspirin.    She eats 2-3 servings of fruits and vegetables daily.She consumes 1 sweetened beverage(s) daily.She exercises with enough effort to increase her heart rate 20 to 29 minutes per day.  She exercises with enough effort to increase her heart rate 5 days per week.   She is taking medications regularly.           Hospital Follow-up Visit:    Hospital/Nursing Home/ Rehab Facility: [facility]  Date of Admission: 8/19/20  Date of Discharge: 8/24/20  Reason(s) for Admission: Heart Problem      Was your hospitalization related to COVID-19? No   Problems taking medications regularly:  None  Medication changes since discharge: Pt has list with her  Problems adhering to non-medication therapy:  None    Summary of hospitalization:   [facility] discharge summary reviewed [place]  Diagnostic Tests/Treatments reviewed.  Follow up needed: none Outpatient CD treatment   Other Healthcare Providers Involved in Patient s Care:         CD:  [facility]   Update since discharge: improved.       Post Discharge Medication Reconciliation: discharge medications reconciled, continue medications without change.  Plan of care communicated with patient                Hospital Follow-up Visit:    Hospital/Nursing Home/IP Rehab Facility: [facility]   Date of Admission: Aug 22  Date of Discharge: Aug 25  Reason(s) for Admission: alcohol and drug overdose, long QT arrest       Was your hospitalization related to COVID-19? No   Problems taking medications regularly:  Cd   Medication changes since discharge: new regimen  Problems adhering to non-medication therapy:  Hasn't started treatment yet     Summary of hospitalization:  [facility] discharge summary reviewed  Cardiac standstill and recovery with Cardiology care   Diagnostic Tests/Treatments reviewed.  Follow up needed: inpatient note s  Other Healthcare Providers Involved in Patient s Care:           Update since discharge: improved. Post Discharge Medication Reconciliation: discharge medications reconciled, continue medications without change.  Plan of care communicated with patient            Review of Systems   Constitutional, HEENT, cardiovascular, pulmonary, GI, , musculoskeletal, neuro, skin, endocrine and psych systems are negative, except as otherwise noted.      Objective    LMP 06/07/2015   There is no height or weight on file to calculate BMI.  Physical Exam   GENERAL: healthy, alert and no distress  EYES: Eyes grossly normal to inspection, PERRL and conjunctivae and sclerae normal  HENT: ear canals and TM's normal, nose and mouth without ulcers or lesions  NECK: no adenopathy, no asymmetry, masses, or scars and thyroid normal to palpation  RESP: lungs clear to auscultation - no  rales, rhonchi or wheezes  BREAST: normal without masses, tenderness or nipple discharge and no palpable axillary masses or adenopathy  CV: regular rate and rhythm, normal S1 S2, no S3 or S4, no murmur, click or rub, no peripheral edema and peripheral pulses strong  ABDOMEN: soft, nontender, no hepatosplenomegaly, no masses and bowel sounds normal  MS: no gross musculoskeletal defects noted, no edema  SKIN: no suspicious lesions or rashes  NEURO: Normal strength and tone, mentation intact and speech normal  PSYCH: mentation appears normal, affect normal/bright            Assessment & Plan     ETOH abuse  In remission    Long Q-T syndrome  With cardiac standstill    Chronic, continuous use of opioids  Quit in the past month        Tobacco Cessation:   reports that she has been smoking cigarettes. She has a 2.50 pack-year smoking history. She has never used smokeless tobacco.  Tobacco Cessation Action Plan: Information offered: Patient not interested at this time      Depression Screening Follow Up    PHQ 8/25/2020   PHQ-9 Total Score 10   Q9: Thoughts of better off dead/self-harm past 2 weeks Not at all     Last PHQ-9 8/25/2020   1.  Little interest or pleasure in doing things 1   2.  Feeling down, depressed, or hopeless 1   3.  Trouble falling or staying asleep, or sleeping too much 3   4.  Feeling tired or having little energy 3   5.  Poor appetite or overeating 1   6.  Feeling bad about yourself 1   7.  Trouble concentrating 0   8.  Moving slowly or restless 0   Q9: Thoughts of better off dead/self-harm past 2 weeks 0   PHQ-9 Total Score 10       Follow Up Actions Taken  Crisis resource information provided in After Visit Summary  Patient counseled, no additional follow up at this time.         Follow up will be through [facility] close to her home     Return in about 2 weeks (around 9/8/2020), or follow up Cardiology  ,  arrange for Alcohol treatment startup in [place].    Fuentes Akbar MD  [facility]  [place]    Answers for HPI/ROS submitted by the patient on 8/25/2020   Chronic problems general questions HPI Form  If you checked off any problems, how difficult have these problems made it for you to do your work, take care of things at home, or get along with other people?: Somewhat difficult  PHQ9 TOTAL SCORE: 10  JERSON 7 TOTAL SCORE: 12

## 2020-08-26 ENCOUNTER — PATIENT OUTREACH (OUTPATIENT)
Dept: CARE COORDINATION | Facility: CLINIC | Age: 45
End: 2020-08-26

## 2020-08-26 DIAGNOSIS — F10.10 ETOH ABUSE: Primary | ICD-10-CM

## 2020-08-26 ASSESSMENT — PATIENT HEALTH QUESTIONNAIRE - PHQ9: SUM OF ALL RESPONSES TO PHQ QUESTIONS 1-9: 10

## 2020-08-26 ASSESSMENT — ANXIETY QUESTIONNAIRES: GAD7 TOTAL SCORE: 12

## 2020-08-26 NOTE — PROGRESS NOTES
Clinic Care Coordination Contact  [facility]/Voicemail    Referral Source: Care Team  Clinical Data: Care Coordinator Outreach  Outreach attempted x 1.  Left message on patient's voicemail with call back information and requested return call.  Plan: Care Coordinator will send care coordination introduction letter with care coordinator contact information and explanation of care coordination services via mail. Care Coordinator will try to reach patient again in 1-2 business days.    CATHERINE Garg   Care Coordination Team  [phone]

## 2020-09-01 ENCOUNTER — PATIENT OUTREACH (OUTPATIENT)
Dept: CARE COORDINATION | Facility: CLINIC | Age: 45
End: 2020-09-01

## 2020-09-01 NOTE — LETTER
[place] CARE COORDINATION  [facility]   September 1, 2020    Danielle Tadeo  [address]  [place] [facility] [address]      Dear Danielle,    I am a clinic care coordinator who works with Fuentes Akbar MD at [facility] . I have been trying to reach you recently to introduce Clinic Care Coordination and to see if there was anything I could assist you with.  Below is a description of clinic care coordination and how I can further assist you.      The clinic care coordination team is made up of a registered nurse,  and community health worker who understand the health care system. The goal of clinic care coordination is to help you manage your health and improve access to the health care system in the most efficient manner. The team can assist you in meeting your health care goals by providing education, coordinating services, strengthening the communication among your providers and supporting you with any resource needs.    Please feel free to contact me at [phone] with any questions or concerns. We are focused on providing you with the highest-quality healthcare experience possible and that all starts with you.     Sincerely,     CATHERINE Garg   Care Coordination Team  [phone]

## 2020-09-01 NOTE — PROGRESS NOTES
Clinic Care Coordination Contact  [facility]/Voicemail    Referral Source: Care Team  Clinical Data: Care Coordinator Outreach  Outreach attempted x 2.  Left message on patient's voicemail with call back information and requested return call.  Plan: Care Coordinator sent care coordination introduction letter on*9/1/20 via mail. Care Coordinator will try to reach patient again in 3-5 business days.    CATHERINE Garg   Care Coordination Team  [phone]

## 2020-09-03 DIAGNOSIS — F41.9 ANXIETY: ICD-10-CM

## 2020-09-03 RX ORDER — CHLORDIAZEPOXIDE HYDROCHLORIDE 10 MG/1
10 CAPSULE, GELATIN COATED ORAL 3 TIMES DAILY PRN
Qty: 18 CAPSULE | Refills: 0 | OUTPATIENT
Start: 2020-09-03

## 2020-09-03 NOTE — TELEPHONE ENCOUNTER
Patient calls,    1)Needs rx for Sertraline    2)Patient was given Librium from hospital  chlordiazePOXIDE (LIBRIUM) 10 mg capsule   Take 1 capsule (10 mg total) by mouth 3 (three) times a day for 5 doses. 5 capsule   0 08/24/2020 08/26/2020 Active   chlordiazePOXIDE (LIBRIUM) 10 mg capsule   Take 1 capsule (10 mg total) by mouth 2 (two) times a day for 3 days. 6 capsule   0 08/27/2020 08/30/2020 Active   chlordiazePOXIDE (LIBRIUM) 10 mg capsule   Take 1 capsule (10 mg total) by mouth daily for 3 days. 3 capsule   0 08/31/2020 09/03/2020 Active     Did not give correct amount, Last took Librium 2 days ago, patient concerned about withdrawals.     Pended librium taper that was supposed to occur post-hospitalization and Sertraline rx, please sign    Bridger Murillo RN

## 2020-09-11 ENCOUNTER — PATIENT OUTREACH (OUTPATIENT)
Dept: CARE COORDINATION | Facility: CLINIC | Age: 45
End: 2020-09-11

## 2020-09-11 NOTE — PROGRESS NOTES
Clinic Care Coordination Contact  [facility]/Voicemail    Referral Source: Care Team  Clinical Data: Care Coordinator Outreach  Outreach attempted x 3.  Left message on patient's voicemail with call back information and requested return call.  Plan: Care Coordinator sent care coordination introduction letter on 9/1/20 via mail. Care Coordinator will do no further outreaches at this time.    CATHERINE Garg   Care Coordination Team  [phone]

## 2021-09-11 ENCOUNTER — APPOINTMENT (OUTPATIENT)
Dept: ULTRASOUND IMAGING | Facility: CLINIC | Age: 46
End: 2021-09-11
Attending: EMERGENCY MEDICINE
Payer: COMMERCIAL

## 2021-09-11 ENCOUNTER — HOSPITAL ENCOUNTER (INPATIENT)
Facility: CLINIC | Age: 46
LOS: 2 days | Discharge: HOME | End: 2021-09-13
Attending: EMERGENCY MEDICINE | Admitting: INTERNAL MEDICINE
Payer: COMMERCIAL

## 2021-09-11 ENCOUNTER — APPOINTMENT (OUTPATIENT)
Dept: CT IMAGING | Facility: CLINIC | Age: 46
End: 2021-09-11
Attending: EMERGENCY MEDICINE
Payer: COMMERCIAL

## 2021-09-11 ENCOUNTER — APPOINTMENT (OUTPATIENT)
Dept: GENERAL RADIOLOGY | Facility: CLINIC | Age: 46
End: 2021-09-11
Attending: EMERGENCY MEDICINE
Payer: COMMERCIAL

## 2021-09-11 DIAGNOSIS — F10.930 ALCOHOL WITHDRAWAL SYNDROME WITHOUT COMPLICATION (H): ICD-10-CM

## 2021-09-11 DIAGNOSIS — R94.31 PROLONGED Q-T INTERVAL ON ECG: ICD-10-CM

## 2021-09-11 DIAGNOSIS — F11.23 OPIOID DEPENDENCE WITH WITHDRAWAL (H): Primary | ICD-10-CM

## 2021-09-11 LAB
ALBUMIN SERPL-MCNC: 2.9 G/DL (ref 3.4–5)
ALBUMIN UR-MCNC: 20 MG/DL
ALP SERPL-CCNC: 168 U/L (ref 40–150)
ALT SERPL W P-5'-P-CCNC: 80 U/L (ref 0–50)
ANION GAP SERPL CALCULATED.3IONS-SCNC: 9 MMOL/L (ref 3–14)
APPEARANCE UR: ABNORMAL
AST SERPL W P-5'-P-CCNC: 272 U/L (ref 0–45)
B-HCG FREE SERPL-ACNC: <5 IU/L (ref 0–5)
BACTERIA #/AREA URNS HPF: ABNORMAL /HPF
BASOPHILS # BLD AUTO: 0.1 10E3/UL (ref 0–0.2)
BASOPHILS NFR BLD AUTO: 1 %
BILIRUB SERPL-MCNC: 0.4 MG/DL (ref 0.2–1.3)
BILIRUB UR QL STRIP: NEGATIVE
BUN SERPL-MCNC: 9 MG/DL (ref 7–30)
CALCIUM SERPL-MCNC: 8.2 MG/DL (ref 8.5–10.1)
CHLORIDE BLD-SCNC: 107 MMOL/L (ref 94–109)
CO2 SERPL-SCNC: 24 MMOL/L (ref 20–32)
COLOR UR AUTO: YELLOW
CREAT SERPL-MCNC: 0.71 MG/DL (ref 0.52–1.04)
EOSINOPHIL # BLD AUTO: 0.1 10E3/UL (ref 0–0.7)
EOSINOPHIL NFR BLD AUTO: 2 %
ERYTHROCYTE [DISTWIDTH] IN BLOOD BY AUTOMATED COUNT: 15.2 % (ref 10–15)
ETHANOL SERPL-MCNC: 0.11 G/DL
GFR SERPL CREATININE-BSD FRML MDRD: >90 ML/MIN/1.73M2
GLUCOSE BLD-MCNC: 132 MG/DL (ref 70–99)
GLUCOSE UR STRIP-MCNC: NEGATIVE MG/DL
HCT VFR BLD AUTO: 38.7 % (ref 35–47)
HEMOCCULT STL QL: NEGATIVE
HGB BLD-MCNC: 12.4 G/DL (ref 11.7–15.7)
HGB UR QL STRIP: NEGATIVE
HOLD SPECIMEN: NORMAL
IMM GRANULOCYTES # BLD: 0 10E3/UL
IMM GRANULOCYTES NFR BLD: 0 %
KETONES UR STRIP-MCNC: ABNORMAL MG/DL
LEUKOCYTE ESTERASE UR QL STRIP: NEGATIVE
LIPASE SERPL-CCNC: 70 U/L (ref 73–393)
LYMPHOCYTES # BLD AUTO: 2 10E3/UL (ref 0.8–5.3)
LYMPHOCYTES NFR BLD AUTO: 43 %
MAGNESIUM SERPL-MCNC: 1.9 MG/DL (ref 1.6–2.3)
MAGNESIUM SERPL-MCNC: 2.1 MG/DL (ref 1.6–2.3)
MCH RBC QN AUTO: 32.8 PG (ref 26.5–33)
MCHC RBC AUTO-ENTMCNC: 32 G/DL (ref 31.5–36.5)
MCV RBC AUTO: 102 FL (ref 78–100)
MONOCYTES # BLD AUTO: 0.3 10E3/UL (ref 0–1.3)
MONOCYTES NFR BLD AUTO: 6 %
MUCOUS THREADS #/AREA URNS LPF: PRESENT /LPF
NEUTROPHILS # BLD AUTO: 2.3 10E3/UL (ref 1.6–8.3)
NEUTROPHILS NFR BLD AUTO: 48 %
NITRATE UR QL: NEGATIVE
NRBC # BLD AUTO: 0 10E3/UL
NRBC BLD AUTO-RTO: 0 /100
PH UR STRIP: 5.5 [PH] (ref 5–7)
PHOSPHATE SERPL-MCNC: 3.1 MG/DL (ref 2.5–4.5)
PLATELET # BLD AUTO: 272 10E3/UL (ref 150–450)
POTASSIUM BLD-SCNC: 3.3 MMOL/L (ref 3.4–5.3)
POTASSIUM BLD-SCNC: 3.4 MMOL/L (ref 3.4–5.3)
POTASSIUM BLD-SCNC: 4.4 MMOL/L (ref 3.4–5.3)
PROT SERPL-MCNC: 6.6 G/DL (ref 6.8–8.8)
RBC # BLD AUTO: 3.78 10E6/UL (ref 3.8–5.2)
RBC URINE: 2 /HPF
SARS-COV-2 RNA RESP QL NAA+PROBE: NEGATIVE
SODIUM SERPL-SCNC: 140 MMOL/L (ref 133–144)
SP GR UR STRIP: 1.03 (ref 1–1.03)
SQUAMOUS EPITHELIAL: 10 /HPF
TROPONIN I SERPL-MCNC: <0.015 UG/L (ref 0–0.04)
UROBILINOGEN UR STRIP-MCNC: 3 MG/DL
WBC # BLD AUTO: 4.8 10E3/UL (ref 4–11)
WBC URINE: 3 /HPF

## 2021-09-11 PROCEDURE — 74177 CT ABD & PELVIS W/CONTRAST: CPT

## 2021-09-11 PROCEDURE — 84484 ASSAY OF TROPONIN QUANT: CPT | Performed by: EMERGENCY MEDICINE

## 2021-09-11 PROCEDURE — 83690 ASSAY OF LIPASE: CPT | Performed by: EMERGENCY MEDICINE

## 2021-09-11 PROCEDURE — C9803 HOPD COVID-19 SPEC COLLECT: HCPCS

## 2021-09-11 PROCEDURE — 87635 SARS-COV-2 COVID-19 AMP PRB: CPT | Performed by: EMERGENCY MEDICINE

## 2021-09-11 PROCEDURE — 76705 ECHO EXAM OF ABDOMEN: CPT

## 2021-09-11 PROCEDURE — 84100 ASSAY OF PHOSPHORUS: CPT | Performed by: INTERNAL MEDICINE

## 2021-09-11 PROCEDURE — HZ2ZZZZ DETOXIFICATION SERVICES FOR SUBSTANCE ABUSE TREATMENT: ICD-10-PCS | Performed by: INTERNAL MEDICINE

## 2021-09-11 PROCEDURE — 84702 CHORIONIC GONADOTROPIN TEST: CPT

## 2021-09-11 PROCEDURE — 85004 AUTOMATED DIFF WBC COUNT: CPT | Performed by: EMERGENCY MEDICINE

## 2021-09-11 PROCEDURE — 250N000011 HC RX IP 250 OP 636: Performed by: EMERGENCY MEDICINE

## 2021-09-11 PROCEDURE — 250N000013 HC RX MED GY IP 250 OP 250 PS 637: Performed by: EMERGENCY MEDICINE

## 2021-09-11 PROCEDURE — 84132 ASSAY OF SERUM POTASSIUM: CPT | Performed by: INTERNAL MEDICINE

## 2021-09-11 PROCEDURE — 99223 1ST HOSP IP/OBS HIGH 75: CPT | Performed by: INTERNAL MEDICINE

## 2021-09-11 PROCEDURE — 80053 COMPREHEN METABOLIC PANEL: CPT | Performed by: EMERGENCY MEDICINE

## 2021-09-11 PROCEDURE — 36415 COLL VENOUS BLD VENIPUNCTURE: CPT | Performed by: INTERNAL MEDICINE

## 2021-09-11 PROCEDURE — 96374 THER/PROPH/DIAG INJ IV PUSH: CPT | Mod: 59

## 2021-09-11 PROCEDURE — 82272 OCCULT BLD FECES 1-3 TESTS: CPT | Performed by: EMERGENCY MEDICINE

## 2021-09-11 PROCEDURE — 250N000009 HC RX 250: Performed by: INTERNAL MEDICINE

## 2021-09-11 PROCEDURE — 83735 ASSAY OF MAGNESIUM: CPT | Performed by: INTERNAL MEDICINE

## 2021-09-11 PROCEDURE — 81001 URINALYSIS AUTO W/SCOPE: CPT | Performed by: EMERGENCY MEDICINE

## 2021-09-11 PROCEDURE — 258N000003 HC RX IP 258 OP 636: Performed by: INTERNAL MEDICINE

## 2021-09-11 PROCEDURE — 82077 ASSAY SPEC XCP UR&BREATH IA: CPT | Performed by: EMERGENCY MEDICINE

## 2021-09-11 PROCEDURE — 96376 TX/PRO/DX INJ SAME DRUG ADON: CPT | Mod: 76

## 2021-09-11 PROCEDURE — 71046 X-RAY EXAM CHEST 2 VIEWS: CPT

## 2021-09-11 PROCEDURE — 250N000011 HC RX IP 250 OP 636: Performed by: INTERNAL MEDICINE

## 2021-09-11 PROCEDURE — 93005 ELECTROCARDIOGRAM TRACING: CPT

## 2021-09-11 PROCEDURE — 36415 COLL VENOUS BLD VENIPUNCTURE: CPT | Performed by: EMERGENCY MEDICINE

## 2021-09-11 PROCEDURE — 99285 EMERGENCY DEPT VISIT HI MDM: CPT | Mod: 25

## 2021-09-11 PROCEDURE — 250N000009 HC RX 250: Performed by: EMERGENCY MEDICINE

## 2021-09-11 PROCEDURE — 250N000013 HC RX MED GY IP 250 OP 250 PS 637: Performed by: INTERNAL MEDICINE

## 2021-09-11 PROCEDURE — 83735 ASSAY OF MAGNESIUM: CPT | Performed by: EMERGENCY MEDICINE

## 2021-09-11 PROCEDURE — 120N000001 HC R&B MED SURG/OB

## 2021-09-11 RX ORDER — POTASSIUM CHLORIDE 1500 MG/1
40 TABLET, EXTENDED RELEASE ORAL ONCE
Status: COMPLETED | OUTPATIENT
Start: 2021-09-11 | End: 2021-09-11

## 2021-09-11 RX ORDER — GABAPENTIN 600 MG/1
1200 TABLET ORAL ONCE
Status: COMPLETED | OUTPATIENT
Start: 2021-09-11 | End: 2021-09-11

## 2021-09-11 RX ORDER — LORAZEPAM 2 MG/ML
1-2 INJECTION INTRAMUSCULAR EVERY 30 MIN PRN
Status: DISCONTINUED | OUTPATIENT
Start: 2021-09-11 | End: 2021-09-13 | Stop reason: HOSPADM

## 2021-09-11 RX ORDER — ACETAMINOPHEN 325 MG/1
650 TABLET ORAL EVERY 6 HOURS PRN
Status: DISCONTINUED | OUTPATIENT
Start: 2021-09-11 | End: 2021-09-13 | Stop reason: HOSPADM

## 2021-09-11 RX ORDER — FOLIC ACID 1 MG/1
1 TABLET ORAL ONCE
Status: COMPLETED | OUTPATIENT
Start: 2021-09-11 | End: 2021-09-11

## 2021-09-11 RX ORDER — OLANZAPINE 5 MG/1
5-10 TABLET, ORALLY DISINTEGRATING ORAL EVERY 6 HOURS PRN
Status: DISCONTINUED | OUTPATIENT
Start: 2021-09-11 | End: 2021-09-13 | Stop reason: HOSPADM

## 2021-09-11 RX ORDER — FLUMAZENIL 0.1 MG/ML
0.2 INJECTION, SOLUTION INTRAVENOUS
Status: DISCONTINUED | OUTPATIENT
Start: 2021-09-11 | End: 2021-09-13 | Stop reason: HOSPADM

## 2021-09-11 RX ORDER — MULTIPLE VITAMINS W/ MINERALS TAB 9MG-400MCG
1 TAB ORAL ONCE
Status: COMPLETED | OUTPATIENT
Start: 2021-09-11 | End: 2021-09-11

## 2021-09-11 RX ORDER — GABAPENTIN 300 MG/1
300 CAPSULE ORAL EVERY 8 HOURS
Status: DISCONTINUED | OUTPATIENT
Start: 2021-09-16 | End: 2021-09-13 | Stop reason: HOSPADM

## 2021-09-11 RX ORDER — GABAPENTIN 300 MG/1
600 CAPSULE ORAL EVERY 8 HOURS
Status: DISCONTINUED | OUTPATIENT
Start: 2021-09-14 | End: 2021-09-13 | Stop reason: HOSPADM

## 2021-09-11 RX ORDER — IOPAMIDOL 755 MG/ML
500 INJECTION, SOLUTION INTRAVASCULAR ONCE
Status: COMPLETED | OUTPATIENT
Start: 2021-09-11 | End: 2021-09-11

## 2021-09-11 RX ORDER — LIDOCAINE 40 MG/G
CREAM TOPICAL
Status: DISCONTINUED | OUTPATIENT
Start: 2021-09-11 | End: 2021-09-13 | Stop reason: HOSPADM

## 2021-09-11 RX ORDER — LISINOPRIL 20 MG/1
20 TABLET ORAL DAILY
Status: DISCONTINUED | OUTPATIENT
Start: 2021-09-12 | End: 2021-09-13 | Stop reason: HOSPADM

## 2021-09-11 RX ORDER — SODIUM CHLORIDE AND POTASSIUM CHLORIDE 150; 900 MG/100ML; MG/100ML
INJECTION, SOLUTION INTRAVENOUS CONTINUOUS
Status: DISCONTINUED | OUTPATIENT
Start: 2021-09-11 | End: 2021-09-12

## 2021-09-11 RX ORDER — MULTIPLE VITAMINS W/ MINERALS TAB 9MG-400MCG
1 TAB ORAL DAILY
Status: DISCONTINUED | OUTPATIENT
Start: 2021-09-12 | End: 2021-09-13 | Stop reason: HOSPADM

## 2021-09-11 RX ORDER — LORAZEPAM 2 MG/ML
1 INJECTION INTRAMUSCULAR
Status: DISCONTINUED | OUTPATIENT
Start: 2021-09-11 | End: 2021-09-11

## 2021-09-11 RX ORDER — GABAPENTIN 100 MG/1
100 CAPSULE ORAL EVERY 8 HOURS
Status: DISCONTINUED | OUTPATIENT
Start: 2021-09-18 | End: 2021-09-13 | Stop reason: HOSPADM

## 2021-09-11 RX ORDER — LORAZEPAM 1 MG/1
1-2 TABLET ORAL EVERY 30 MIN PRN
Status: DISCONTINUED | OUTPATIENT
Start: 2021-09-11 | End: 2021-09-13 | Stop reason: HOSPADM

## 2021-09-11 RX ORDER — ACETAMINOPHEN 650 MG/1
650 SUPPOSITORY RECTAL EVERY 6 HOURS PRN
Status: DISCONTINUED | OUTPATIENT
Start: 2021-09-11 | End: 2021-09-13 | Stop reason: HOSPADM

## 2021-09-11 RX ORDER — METHADONE HYDROCHLORIDE 10 MG/ML
100 CONCENTRATE ORAL DAILY
Status: ON HOLD | COMMUNITY
End: 2021-10-04

## 2021-09-11 RX ORDER — FOLIC ACID 1 MG/1
1 TABLET ORAL DAILY
Status: DISCONTINUED | OUTPATIENT
Start: 2021-09-12 | End: 2021-09-13 | Stop reason: HOSPADM

## 2021-09-11 RX ORDER — METOPROLOL SUCCINATE 50 MG/1
50 TABLET, EXTENDED RELEASE ORAL DAILY
Status: ON HOLD | COMMUNITY
End: 2024-01-25

## 2021-09-11 RX ORDER — CALCIUM CARBONATE 500 MG/1
500 TABLET, CHEWABLE ORAL 3 TIMES DAILY PRN
Status: DISCONTINUED | OUTPATIENT
Start: 2021-09-11 | End: 2021-09-13 | Stop reason: HOSPADM

## 2021-09-11 RX ORDER — GABAPENTIN 300 MG/1
900 CAPSULE ORAL EVERY 8 HOURS
Status: DISCONTINUED | OUTPATIENT
Start: 2021-09-11 | End: 2021-09-13 | Stop reason: HOSPADM

## 2021-09-11 RX ORDER — LANOLIN ALCOHOL/MO/W.PET/CERES
100 CREAM (GRAM) TOPICAL DAILY
Status: DISCONTINUED | OUTPATIENT
Start: 2021-09-12 | End: 2021-09-13 | Stop reason: HOSPADM

## 2021-09-11 RX ORDER — LANOLIN ALCOHOL/MO/W.PET/CERES
100 CREAM (GRAM) TOPICAL ONCE
Status: COMPLETED | OUTPATIENT
Start: 2021-09-11 | End: 2021-09-11

## 2021-09-11 RX ORDER — HALOPERIDOL 5 MG/ML
2.5-5 INJECTION INTRAMUSCULAR EVERY 6 HOURS PRN
Status: DISCONTINUED | OUTPATIENT
Start: 2021-09-11 | End: 2021-09-13 | Stop reason: HOSPADM

## 2021-09-11 RX ADMIN — THIAMINE HCL TAB 100 MG 100 MG: 100 TAB at 14:48

## 2021-09-11 RX ADMIN — FOLIC ACID: 5 INJECTION, SOLUTION INTRAMUSCULAR; INTRAVENOUS; SUBCUTANEOUS at 17:45

## 2021-09-11 RX ADMIN — GABAPENTIN 900 MG: 300 CAPSULE ORAL at 22:54

## 2021-09-11 RX ADMIN — FOLIC ACID 1 MG: 1 TABLET ORAL at 14:48

## 2021-09-11 RX ADMIN — POTASSIUM CHLORIDE 40 MEQ: 1500 TABLET, EXTENDED RELEASE ORAL at 17:56

## 2021-09-11 RX ADMIN — POTASSIUM CHLORIDE 40 MEQ: 1500 TABLET, EXTENDED RELEASE ORAL at 12:59

## 2021-09-11 RX ADMIN — IOPAMIDOL 73 ML: 755 INJECTION, SOLUTION INTRAVENOUS at 12:17

## 2021-09-11 RX ADMIN — LORAZEPAM 1 MG: 2 INJECTION INTRAMUSCULAR; INTRAVENOUS at 16:09

## 2021-09-11 RX ADMIN — MULTIPLE VITAMINS W/ MINERALS TAB 1 TABLET: TAB at 14:48

## 2021-09-11 RX ADMIN — GABAPENTIN 1200 MG: 600 TABLET, FILM COATED ORAL at 17:44

## 2021-09-11 RX ADMIN — SODIUM CHLORIDE 58 ML: 9 INJECTION, SOLUTION INTRAVENOUS at 12:17

## 2021-09-11 RX ADMIN — LORAZEPAM 1 MG: 2 INJECTION INTRAMUSCULAR; INTRAVENOUS at 14:48

## 2021-09-11 ASSESSMENT — ACTIVITIES OF DAILY LIVING (ADL)
DIFFICULTY_COMMUNICATING: NO
HEARING_DIFFICULTY_OR_DEAF: NO
DOING_ERRANDS_INDEPENDENTLY_DIFFICULTY: NO
DIFFICULTY_EATING/SWALLOWING: NO
DRESSING/BATHING_DIFFICULTY: NO
FALL_HISTORY_WITHIN_LAST_SIX_MONTHS: NO
CONCENTRATING,_REMEMBERING_OR_MAKING_DECISIONS_DIFFICULTY: NO
DIFFICULTY_EATING/SWALLOWING: NO
DOING_ERRANDS_INDEPENDENTLY_DIFFICULTY: NO
DRESSING/BATHING_DIFFICULTY: NO
WALKING_OR_CLIMBING_STAIRS_DIFFICULTY: NO
ADLS_ACUITY_SCORE: 16
PATIENT_/_FAMILY_COMMUNICATION_STYLE: SPOKEN LANGUAGE (ENGLISH OR BILINGUAL)
CONCENTRATING,_REMEMBERING_OR_MAKING_DECISIONS_DIFFICULTY: NO
WEAR_GLASSES_OR_BLIND: NO
WALKING_OR_CLIMBING_STAIRS_DIFFICULTY: NO
WEAR_GLASSES_OR_BLIND: NO
TOILETING_ISSUES: NO
DIFFICULTY_COMMUNICATING: NO
TOILETING_ISSUES: NO

## 2021-09-11 ASSESSMENT — MIFFLIN-ST. JEOR: SCORE: 1430.59

## 2021-09-11 ASSESSMENT — ENCOUNTER SYMPTOMS
BLOOD IN STOOL: 1
ABDOMINAL PAIN: 1
DYSURIA: 1

## 2021-09-11 NOTE — ED NOTES
"RN went into room to assess patient. Patient crying and walking around room. Patient stating she is crying because she doesn't know where her  went and she is requesting a phone. RN assessed if patient felt safe at home. Patient stating she does \"sometimes\" . RN offered resources for patient. Patient willing to talk with social work,. Patient also states they are going to start family counseling at home but would be open to speaking with social work here.   "

## 2021-09-11 NOTE — H&P
[facility]  Hospitalist Admission Note  Name: Danielle Tadeo    MRN: [id number]  YOB: 1975    Age: 46 year old  Date of admission: 9/11/2021  Primary care provider: Fuentes Akbar    Chief Complaint:  withdrawal    Assessment and Plan:     Danielle Tadeo is a 46 year old female with PMH including alcohol use disorder, opiate use disorder (on Methadone), HTN and prior gastric bypass surgery who was admitted 09/11/21 with acute alcohol intoxication and acute alcohol withdrawal and the desire to quit drinking alcohol.    1.  Acute alcohol intoxication with alcohol use disorder and acute alcohol withdrawal: Patient has been drinking daily for at least the past several weeks, attempted to quit a few weeks ago but developed withdrawal so started drinking again.  She has not been to alcohol treatment in the past.  She would like to quit.  BAL of 0.11 on admission.  She was already exhibiting symptoms of withdrawal and treated with Ativan in the emergency room.  -CIWA protocol with Ativan  -Gabapentin per withdrawal protocol  -IV followed by oral vitamins  -Social work consult    2.  Opiate use disorder on methadone: Patient goes to [facility] in [place].  She reports she is on methadone 100 mg daily, last dose was 9/10.  -Resume methadone once verified by pharmacy    3.  Hypokalemia: Replace per protocol.    4.  Alcohol transaminitis: Moderate transaminitis, pattern consistent with alcohol use disorder.  Repeat tomorrow.  Needs alcohol cessation.    5.  Hypertension: PTA on lisinopril.  Resume with hold parameters.    6.  Prolonged QTc with History of torsades: Previously patient had an episode of torsades after she was given Zofran.  At that time she did have prolonged QTC.  Will avoid QTC prolonging agents and replace electrolytes.  Currently QTc is 507 in the setting of hypokalemia.    7.  Nausea/vomiting: Patient has been eating very little.  She has  basically just from alcohol.  Daily episodes of nausea and vomiting.  I suspect this is due to alcoholic gastritis.  No hematemesis.  Avoid PPI for now given prolonged QTc.  Give 1 L normal saline overnight.  Advance diet as tolerated.  Avoid Zofran or Compazine as these are QTC prolonging agents.  She does have Ativan as needed. PRN Tums.    Diet:   Regular  DVT Prophylaxis: Pneumatic Compression Devices  Gaytan Catheter: Not present  Code Status:   Full code    Disposition Plan   Expected discharge: Admit to inpatient status recommended to prior living arrangement once withdrawal resolved.  Entered: Umm Meza MD 09/11/2021, 2:53 PM     The patient's care was discussed with the Bedside Nurse and Patient.    Umm Meza MD  [facility]        Clinically Significant Risk Factors Present on Admission                        History of Present Illness:  Danielle Tadeo is a 46 year old female with PMH including alcohol use disorder, opiate use disorder (on Methadone), HTN and prior gastric bypass surgery who was admitted 09/11/21 with acute alcohol intoxication and acute alcohol withdrawal and the desire to quit drinking alcohol. History was obtained through patient interview, chart review and discussion with Dr. Zarate in the ER.    Patient was seen with her daughter at the bedside.  She states that she has been drinking heavily daily for at least the past several weeks.  She drinks at least a pint of hard alcohol daily, probably more.  Her last drink was this morning.  She tried to quit drinking a few weeks ago but developed symptoms of withdrawal so went back to drinking.    She came to the emergency room today because she was intoxicated this morning and she knew she could go to her methadone clinic.  She felt like she was going to go through withdrawal and wants help to quit drinking.    She goes to [facility] in [place] for her methadone clinic.  She states she is on 100 mg  daily methadone.  Her last dose was on 9/10.  She goes for daily dosing.  She has not had a recent dose adjustment in her medication.  She has never talked with the eller about alcohol treatment, they have just been managing her opiate use disorder.      She has been having daily nausea and vomiting. She is eating and drinking very little.  Basically just drinking alcohol.  She has not had hematemesis.  She has had some loose stools.  Her abdomen has felt distended and intermittently painful.  She does get chest tightness and some shortness of breath after an episode of emesis.  She has hot and cold flashes but no fevers.     Past Medical History:  Past Medical History:   Diagnosis Date     Benign essential hypertension complicating pregnancy, childbirth, and the puerperium, unspecified as to episode of care     [facility]     Cervical spondylosis without myelopathy 2015     Myocardial infarction (H)      Past Surgical History:  Past Surgical History:   Procedure Laterality Date     GASTRIC BYPASS       [facility] NONSPECIFIC PROCEDURE      1      [facility] NONSPECIFIC PROCEDURE      Sally     Social History:  Social History     Tobacco Use     Smoking status: Current Some Day Smoker     Packs/day: 0.50     Years: 5.00     Pack years: 2.50     Types: Cigarettes     Smokeless tobacco: Never Used     Tobacco comment: some   Substance Use Topics     Alcohol use: Not Currently     Comment: stopped  10 days ago     Social History     Social History Narrative     Not on file   Patient drinks daily.  She occasionally smokes cigarettes.  Denies other illicit drugs.    Family History:  Family History   Problem Relation Age of Onset     Cancer Maternal Grandmother         lung ca     Hypertension Father      Cancer Father 63        pancreatic cancer     Hypertension Mother      Allergies Mother         asthma     Cancer - colorectal No family hx of      Breast Cancer No family hx of      Allergies:  Allergies   Allergen  "Reactions     Zofran [Ondansetron] Other (See Comments)     \"heart stopped\" \"Long QT\"     Medications:  Current Facility-Administered Medications   Medication     LORazepam (ATIVAN) injection 1 mg     Current Outpatient Medications   Medication     cyclobenzaprine (FLEXERIL) 10 MG tablet     hydrOXYzine (ATARAX) 25 MG tablet     lisinopril (ZESTRIL) 20 MG tablet     sertraline (ZOLOFT) 50 MG tablet      Review of Systems:  A Comprehensive greater than 10 system review of systems was carried out.  Pertinent positives and negatives are noted above.  Otherwise negative for contributory information.     Physical Exam:  Blood pressure (!) 153/86, pulse 109, temperature 97.1  F (36.2  C), temperature source Temporal, resp. rate 20, last menstrual period 06/07/2015, SpO2 99 %, not currently breastfeeding.  Wt Readings from Last 1 Encounters:   08/25/20 66.2 kg (146 lb)     Exam:   General: Alert, awake, no acute distress.  HEENT: NC/AT, eyes anicteric and without injection, EOMI, face symmetric.  Dentition WNL, MMM.  Cardiac: Tachycardic, regular rhythm, normal S1, S2.  No murmurs/g/r.  No LE edema  Pulmonary: Normal chest rise, normal work of breathing.  Lungs CTAB without crackles or wheezing  Abdomen: soft, non-tender, non-distended.  Normoactive BS.  No guarding or rebound tenderness.  Extremities: no deformities.  Warm, well perfused.  Skin: no rashes or lesions noted.  Warm and Dry.  Neuro: No focal deficits noted.  Speech clear.  Coordination and strength grossly normal. Tremor of outstretched hand  Psych: Appropriate affect. Alert and oriented x3    Data Reviewed Today:  EKG:  Sinus tachycardia with prolonged QTc  Imaging:  Results for orders placed or performed during the hospital encounter of 09/11/21   US Abdomen Limited (RUQ)    Narrative    US ABDOMEN LIMITED 9/11/2021 10:12 AM    CLINICAL HISTORY: Right upper quadrant pain, alcohol use.    TECHNIQUE: Limited abdominal ultrasound.    COMPARISON: " None.    FINDINGS:    GALLBLADDER: The gallbladder is normal. No gallstones, wall  thickening, or pericholecystic fluid. Negative sonographic Lopez's  sign.    BILE DUCTS: There is no biliary dilatation. The common duct measures 5  mm.    LIVER: Liver is enlarged at 22.5 cm in length and diffusely increased  in echogenicity. No focal liver lesions are evident.    RIGHT KIDNEY: No hydronephrosis.    PANCREAS: Visualized portions of the pancreas are unremarkable.  Superior to the pancreas, there is a hypoechoic lesion with  questionable posterior acoustic enhancement that measures 3.4 x 2.1 x  2.5 cm.    No ascites.      Impression    IMPRESSION:  1.  Enlarged, fatty infiltrated liver.  2.  No evidence of gallbladder disease.  3.  Hypoechoic, possibly cystic lesion above the head of the pancreas  could represent a cystic pancreatic lesion or pseudocyst. Consider  contrast-enhanced CT of the abdomen and pelvis for further evaluation.      ADRY CHURCHILL MD         SYSTEM ID:  [id number]   XR Chest 2 Views    Narrative    CHEST TWO VIEWS 9/11/2021 10:16 AM     HISTORY: Chest pain.    COMPARISON: 10/5/2013.    FINDINGS: Heart size and pulmonary vascularity are within normal  limits. The lungs are clear. No pneumothorax or pleural effusion.       Impression    IMPRESSION: Normal two views of the chest.      ADRY CHURCHILL MD         SYSTEM ID:  [id number]   CT Abdomen Pelvis w Contrast    Narrative    CT ABDOMEN PELVIS WITH CONTRAST 9/11/2021 12:38 PM    CLINICAL HISTORY: Abdominal distention.    TECHNIQUE: CT scan of the abdomen and pelvis was performed following  injection of IV contrast. Multiplanar reformats were obtained. Dose  reduction techniques were used.  CONTRAST: 73mL Isovue-370    COMPARISON: None.    FINDINGS:   LOWER CHEST: Small hiatal hernia.    HEPATOBILIARY: Marked fatty infiltration of the liver without focal  lesion. Gallbladder unremarkable.    PANCREAS: Normal.    SPLEEN: Normal.    ADRENAL GLANDS:  Normal.    KIDNEYS/BLADDER: Normal.    BOWEL: Previous gastric bypass surgery. There are a few colonic  diverticula but no evidence of diverticulitis. Normal appendix. No  bowel obstruction or inflammatory change. No free air.    PELVIC ORGANS: Uterus not visualized.    ADDITIONAL FINDINGS: No ascites or lymphadenopathy.    MUSCULOSKELETAL: No destructive bone lesions.      Impression    IMPRESSION: No acute cause for abdominal distention demonstrated.     ADRY CHURCHILL MD         SYSTEM ID:  [id number]     Labs:  Recent Labs   Lab 09/11/21  0910   WBC 4.8   HGB 12.4   HCT 38.7   *        Recent Labs   Lab 09/11/21  0910      POTASSIUM 3.3*   CHLORIDE 107   CO2 24   ANIONGAP 9   *   BUN 9   CR 0.71   GFRESTIMATED >90   HAZEL 8.2*   MAG 2.1   PROTTOTAL 6.6*   ALBUMIN 2.9*   BILITOTAL 0.4   ALKPHOS 168*   *   ALT 80*       Umm Meza MD  Hospitalist  [facility]

## 2021-09-11 NOTE — PHARMACY
"Pharmacy contacted Methadone clinic. Methadone dose confirmed with [facility]- Phone number  [phone]. Spoke with Arina.  Addiction counselor (if known) [place]  Maintenance dose: 100mg  Patient receives (daily dose dispensed or take out dose every \"__\" days): daily dose, last given 9/9/21. No take home doses  Patient has missed 1+ doses over the past month     "

## 2021-09-11 NOTE — ED NOTES
"[facility]  ED Nurse Handoff Report    Danielle Tadeo is a 46 year old female   ED Chief complaint: Abdominal Pain and Alcohol Problem  . ED Diagnosis:   Final diagnoses:   None     Allergies:   Allergies   Allergen Reactions     Zofran [Ondansetron] Other (See Comments)     \"heart stopped\" \"Long QT\"       Code Status: Prior  Activity level - Baseline/Home:  Independent. Activity Level - Current:   Independent. Lift room needed: No. Bariatric: No   Needed: No   Isolation: No. Infection: Not Applicable.     Vital Signs:   Vitals:    09/11/21 0910 09/11/21 0915 09/11/21 0930 09/11/21 0940   BP: (!) 157/105 (!) 176/102 (!) 140/89    Pulse: 107 100 95 91   Resp:       Temp:       TempSrc:       SpO2:   96% 98%       Cardiac Rhythm:  ,      Pain level:    Patient confused: No. Patient Falls Risk: Yes.   Elimination Status: Has voided   Patient Report - Initial Complaint: Arrives with right side abdomen pain and ETOH abuse, sleepy in triage, words garbled, ABCs intact at this time.. Focused Assessment: Generalized Abdominal Pain.    Tests Performed: Labs, UA, EKG. Abnormal Results:   Labs Ordered and Resulted from Time of ED Arrival Up to the Time of Departure from the ED   COMPREHENSIVE METABOLIC PANEL - Abnormal; Notable for the following components:       Result Value    Potassium 3.3 (*)     Calcium 8.2 (*)     Glucose 132 (*)     Alkaline Phosphatase 168 (*)      (*)     ALT 80 (*)     Protein Total 6.6 (*)     Albumin 2.9 (*)     All other components within normal limits   LIPASE - Abnormal; Notable for the following components:    Lipase 70 (*)     All other components within normal limits   CBC WITH PLATELETS AND DIFFERENTIAL - Abnormal; Notable for the following components:    RBC Count 3.78 (*)      (*)     RDW 15.2 (*)     All other components within normal limits   MAGNESIUM - Normal   TROPONIN I - Normal   COVID-19 VIRUS (CORONAVIRUS) BY PCR - Normal    Narrative:  "    Testing was performed using the belinda  SARS-CoV-2 & Influenza A/B Assay on the belinda  Bettie  System.  This test should be ordered for the detection of SARS-COV-2 in individuals who meet SARS-CoV-2 clinical and/or epidemiological criteria. Test performance is unknown in asymptomatic patients.  This test is for in vitro diagnostic use under the FDA EUA for laboratories certified under CLIA to perform moderate and/or high complexity testing. This test has not been FDA cleared or approved.  A negative test does not rule out the presence of PCR inhibitors in the specimen or target RNA in concentration below the limit of detection for the assay. The possibility of a false negative should be considered if the patient's recent exposure or clinical presentation suggests COVID-19.  [facility] are certified under the Clinical Laboratory Improvement Amendments of 1988 (CLIA-88) as qualified to perform moderate and/or high complexity laboratory testing.   OCCULT BLOOD STOOL - Normal   ISTAT HCG QUANTITATIVE PREGNANCY POCT - Normal   CBC WITH PLATELETS & DIFFERENTIAL    Narrative:     The following orders were created for panel order CBC with platelets differential.  Procedure                               Abnormality         Status                     ---------                               -----------         ------                     CBC with platelets and d...[[id number]]  Abnormal            Final result                 Please view results for these tests on the individual orders.   EXTRA BLUE TOP TUBE   EXTRA RED TOP TUBE   EXTRA GREEN TOP (LITHIUM HEPARIN) TUBE   EXTRA PURPLE TOP TUBE   EXTRA BLOOD BANK PURPLE TOP TUBE   EXTRA BLOOD BANK PURPLE TOP TUBE   ETHYL ALCOHOL LEVEL   ROUTINE UA WITH MICROSCOPIC REFLEX TO CULTURE   ISTAT HCG QUANTITATIVE PREGNANCY NURSING POCT   EXTRA TUBE    Narrative:     The following orders were created for panel order [place] Draw.  Procedure                                Abnormality         Status                     ---------                               -----------         ------                     Extra Blue Top Tube[[id number]]                              Final result               Extra Red Top Tube[[id number]]                               Final result               Extra Green Top (Lithium...[[id number]]                      Final result               Extra Purple Top Tube[[id number]]                            Final result               Extra Blood Bank Purple ...[[id number]]                      Final result               Extra Blood Bank Purple ...[[id number]]                      Final result                 Please view results for these tests on the individual orders.     XR Chest 2 Views   Final Result   IMPRESSION: Normal two views of the chest.        ADRY CHURCHILL MD            SYSTEM ID:  [id number]      US Abdomen Limited (RUQ)   Final Result   IMPRESSION:   1.  Enlarged, fatty infiltrated liver.   2.  No evidence of gallbladder disease.   3.  Hypoechoic, possibly cystic lesion above the head of the pancreas   could represent a cystic pancreatic lesion or pseudocyst. Consider   contrast-enhanced CT of the abdomen and pelvis for further evaluation.         ADRY CHURCHILL MD            SYSTEM ID:  [id number]        .   Treatments provided: Monitor.   Family Comments:  at bedside.   OBS brochure/video discussed/provided to patient:  Yes  ED Medications: Medications - No data to display  Drips infusing:  No  For the majority of the shift, the patient's behavior Green. Interventions performed were NA.    Sepsis treatment initiated: No     Patient tested for COVID 19 prior to admission: YES    ED Nurse Name/Phone Number: Ashley Escalante RN,   10:37 AM   RECEIVING UNIT ED HANDOFF REVIEW    Above ED Nurse Handoff Report was reviewed: Yes  Reviewed by: Fabi Clarke RN on September 11, 2021 at 4:03 PM

## 2021-09-11 NOTE — ED NOTES
"Patient presents to ED with . SW was asked to speak with patient to give resources after patient reported she \"sometimes\" feels safe at home.  walked into the room and her  was present. The patient was sleeping and did not wake up with SW said her name. Per chart review, patient uses methadone. Patient's  noted she has a  at [facility], which is an addiction and mental health provider. It is unknown at this time what types of services the patient gets at [facility]. Patient's  noted that it is best if \"we keep her sleeping\" so that she is not riled up.     SW noted she would check in later once patient is awake.     MARISELA Luo, [facility]  , ED Care Management   [phone]  "

## 2021-09-11 NOTE — PHARMACY-ADMISSION MEDICATION HISTORY
Admission medication history interview status for this patient is complete. See [facility] admission navigator for allergy information, prior to admission medications and immunization status.     Medication history interview done, indicate source(s): Patient and Caregiver-[facility] ([phone])  Medication history resources (including written lists, pill bottles, clinic record): [facility] records, [facility]s and Care Everywhere  Pharmacy: [facility] [place]. [facility] for discharge.    Changes made to PTA medication list:  Added: methadone and metoprolol  Changed: none  Reported as Not Taking: none  Removed: cyclobenzaprine, hydroxyzine, sertraline (2020)    Actions taken by pharmacist (provider contacted, etc): Spoke with patient about home med list. Called [facility] to confirm methadone dose.     Additional medication history information: Last methadone dose of 100mg 9/9/21, no take home doses given to patient. Patient has been taking spouse's metoprolol succinate 50mg dose at home. Pt's spouse also requested a consult for the vitamins Danielle should be taking post gastric bypass.    Medication reconciliation/reorder completed by provider prior to medication history?  N   (Y/N)   Prior to Admission medications    Medication Sig Last Dose Taking? Auth Provider   lisinopril (ZESTRIL) 20 MG tablet Take 1 tablet (20 mg) by mouth daily 9/10/2021 at am Yes Coming Deepika Helm MD   methadone (DOLOPHINE-INTENSOL) 10 MG/ML (HIGH CONC) solution Take 100 mg by mouth daily  9/9/2021 at am Yes Unknown, Entered By History   metoprolol succinate ER (TOPROL-XL) 50 MG 24 hr tablet Take 50 mg by mouth daily 9/10/2021 at am Yes Unknown, Entered By History
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
## 2021-09-11 NOTE — CONSULTS
See ED note for care management SW Consult.     MARISELA Luo, [facility]  , ED Care Management   [phone]

## 2021-09-11 NOTE — ED TRIAGE NOTES
Arrives with right side abdomen pain and ETOH abuse, sleepy in triage, words garbled, ABCs intact at this time.

## 2021-09-11 NOTE — ED PROVIDER NOTES
"                                         History   Chief Complaint:  Abdominal Pain and Alcohol Problem       The history is provided by the patient.      Danielle Tadeo is a 46 year old female with history of hypertension, alcohol abuse, and alcohol withdrawal who presents with abdominal pain and alcohol problem. Symptoms have been ongoing for a \"couple\" of months. She endorses black and bloody stool, dry heaving (baseline), dysuria, urgency, and chest pain. Denies bloody vomit, shortness of breath, and cough. Her last drink was last night at approximately 2200. She goes to a methadone clinic every morning, so she uses a home breathalyzer. This morning, she was still positive for alcohol which is atypical. She has not taken her methadone dose today. Denies taking Tylenol, ibuprofen, and aspirin. She drinks one bottle of alcohol a day. She still has her gallbladder.     Review of Systems   Cardiovascular: Positive for chest pain.   Gastrointestinal: Positive for abdominal pain and blood in stool.        Black stool  Dry heaving (baseline)   Genitourinary: Positive for dysuria and urgency.   Psychiatric/Behavioral:        Alcohol problem    All other systems reviewed and are negative.      Allergies:  Zofran [Ondansetron]    Medications:  hydroxyzine   lisinopril   sertraline   methadone     Past Medical History:    Myocardial Infarction   Cervical spondylosis without myelopathy   Opioid dependence with withdrawal    Inattention   Hypertension   Anxiety   Lumbago   Depression   Degenerative disc disease   Suicidal ideation   Mood disorder   Alcohol abuse   TBI (traumatic brain injury)   Prolonged QT Syndrome    Past Surgical History:    Gastric bypass    section   Lasik   Breast reduction   Vaginal Hysterectomy   Arm and thigh lift   Abdominoplasty     Family History:    Father: pancreatic cancer, hypertension   Mother: hypertension, asthma     Social History:  Presents with her , Eleazar.   PCP: " Fuentes Akbar  Drinks 1 bottle of alcohol daily.     Physical Exam     Patient Vitals for the past 24 hrs:   BP Temp Temp src Pulse Resp SpO2   09/11/21 1430 (!) 153/86 -- -- 109 -- 99 %   09/11/21 1400 (!) 153/92 -- -- 95 -- 96 %   09/11/21 1345 (!) 150/92 -- -- 93 -- 97 %   09/11/21 1330 (!) 149/92 -- -- 96 -- 98 %   09/11/21 1300 (!) 157/97 -- -- 98 -- 100 %   09/11/21 1200 -- -- -- -- -- 98 %   09/11/21 0940 -- -- -- 91 -- 98 %   09/11/21 0930 (!) 140/89 -- -- 95 -- 96 %   09/11/21 0915 (!) 176/102 -- -- 100 -- --   09/11/21 0910 (!) 157/105 -- -- 107 -- --   09/11/21 0850 (!) 160/105 97.1  F (36.2  C) Temporal 112 20 98 %       Physical Exam  General: Resting on the bed.  Appears sleepy but answers to voice  Head: No obvious trauma to head.  Ears, Nose, Throat:  External ears normal.  Nose normal.    Eyes:  Conjunctivae clear.  Pupils are equal, round, and reactive.   Neck: Normal range of motion.  Neck supple.   CV: Regular rate and rhythm.  No murmurs.      Respiratory: Effort normal and breath sounds normal.  No wheezing or crackles.   Gastrointestinal: Soft.  mild distension. There is no tenderness.  There is no rigidity, no rebound and no guarding.   Neuro: Alert. Moving all extremities appropriately.  Normal speech.    Skin: Skin is warm and dry.  No rash noted.     Emergency Department Course     ECG:  ECG taken at 1026  Normal sinus rhythm  Prolonged QT   Abnormal ECG   No significant change when compared to EKG dated 3/8/14.   Rate 94 bpm. RI interval 126 ms. QRS duration 88 ms. QT/QTc 406/507 ms. P-R-T axes 75 26 40.    Imaging:  CT Abdomen Pelvis w Contrast  IMPRESSION: No acute cause for abdominal distention demonstrated.   Reading per radiology    XR Chest 2 Views  IMPRESSION: Normal two views of the chest.    Reading per radiology    US Abdomen Limited (RUQ)  IMPRESSION:  1.  Enlarged, fatty infiltrated liver.  2.  No evidence of gallbladder disease.  3.  Hypoechoic, possibly cystic  lesion above the head of the pancreas  could represent a cystic pancreatic lesion or pseudocyst. Consider  contrast-enhanced CT of the abdomen and pelvis for further evaluation.    Laboratory:  CBC: WBC 4.8, HGB 12.4,    CMP: Potassium 3.3 (L) , Calcium 8.2 (L), Glucose 132 (H), alkaline phosphatase 168 (H),  (H), ALT 80 (H), Protein total 6.6 (L), Albumin 2.9 (L), Creatinine 0.71 o/w WNL  Lipase: 70 (L)   Magnesium: 2.1  Asymptomatic COVID19 Virus PCR by nasopharyngeal swab Negative   ISTAT HCG Quantitative Pregnancy POCT: <5.0  Stool: occult blood: Negative   UA with microscopic: appearance slightly cloudy, ketones trace, Protein Albumin 20, urobilinogen 3.0, bacteria moderate, mucus present , squamous epithelial 10 (H) o/w WNL  Troponin (Collected 0910): <0.015  Ethyl Alcohol level: 0.11 (H)    Emergency Department Course:    Reviewed:  I reviewed nursing notes, vitals, past medical history and care everywhere    Assessments:  0900 I obtained history and examined the patient as noted above.   1200 I rechecked the patient and explained findings.   1425 I returned to recheck the patient.    Consults:   1451 : I spoke with Ruchi dietz for Dr. Meza of the Hospitalist service from [facility] regarding patient's presentation, findings, and plan of care.    Interventions:  1259 KLOR-CON M 40mEq Oral     Disposition:  The patient was admitted to the hospital under the care of Dr. Meza.     Impression & Plan     Medical Decision Making:  Danielle Tadeo is a 46 year old female who presents for evaluation of symptoms of alcohol withdrawal.  She is intoxicated here in ED by blood work.  Broad differential was pursued include not limited to alcohol withdrawal, liver disease, alcoholic hepatitis, GI bleed, electrolyte, metabolic or renal dysfunction, ACS, arrhythmia, etc.  CBC without leukocytosis or anemia.  BMP with mild hypokalemia and mild elevation in AST and ALT consistent with alcohol abuse.   Remainder of LFTs unremarkable.  Lipase normal not suggestive of pancreatic disease.  Magnesium level normal.  EKG reveals a mildly prolonged  but no other acute ischemic changes.  Troponin is negative.  Story does not appear consistent with ACS at this time.  Largely presents with abdominal pain.  Chest x-ray without evidence acute pneumonia, pneumothorax or effusion.  Ultrasound shows no evidence of acute gallbladder disease.  CT abdomen showed no evidence of abdominal distention or acute other pathology.  Patient became more tremulous and tachycardic and hypertensive in the ER.  Will treat with alcohol withdrawal.   was consulted as well due to other social concerns.  Discussed with hospitalist who graciously accepted    Diagnosis:    ICD-10-CM    1. Alcohol withdrawal syndrome without complication (H)  F10.230    2. Prolonged Q-T interval on ECG  R94.31        Scribe Disclosure:  Lilian WADDELL, am serving as a scribe at 8:59 AM on 9/11/2021 to document services personally performed by Tootie Gusman MD based on my observations and the provider's statements to me.        Tootie Gusman MD  09/11/21 [id number]

## 2021-09-11 NOTE — PLAN OF CARE
End of Shift Summary  For vital signs and complete assessments, please see documentation flowsheets.     Pertinent assessments: AAOx4, VSS, afebrile, denies pain. no N/V.  Eating full meal for dinner. CIWA 6, gabapentin given, IVF running, pleasant and cooperative. No tremors, voiding without difficulty.  Major Shift Events: none  Treatment Plan:  CIWA, tele, ativan PRN.   Bedside Nurse: Deann Garrison RN
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
## 2021-09-12 LAB
ALBUMIN SERPL-MCNC: 2.3 G/DL (ref 3.4–5)
ALP SERPL-CCNC: 162 U/L (ref 40–150)
ALT SERPL W P-5'-P-CCNC: 69 U/L (ref 0–50)
ANION GAP SERPL CALCULATED.3IONS-SCNC: 5 MMOL/L (ref 3–14)
AST SERPL W P-5'-P-CCNC: 266 U/L (ref 0–45)
BILIRUB SERPL-MCNC: 0.4 MG/DL (ref 0.2–1.3)
BUN SERPL-MCNC: 5 MG/DL (ref 7–30)
CALCIUM SERPL-MCNC: 8.3 MG/DL (ref 8.5–10.1)
CHLORIDE BLD-SCNC: 112 MMOL/L (ref 94–109)
CO2 SERPL-SCNC: 25 MMOL/L (ref 20–32)
CREAT SERPL-MCNC: 0.65 MG/DL (ref 0.52–1.04)
ERYTHROCYTE [DISTWIDTH] IN BLOOD BY AUTOMATED COUNT: 14.9 % (ref 10–15)
GFR SERPL CREATININE-BSD FRML MDRD: >90 ML/MIN/1.73M2
GLUCOSE BLD-MCNC: 89 MG/DL (ref 70–99)
HCT VFR BLD AUTO: 32.9 % (ref 35–47)
HGB BLD-MCNC: 10.6 G/DL (ref 11.7–15.7)
MAGNESIUM SERPL-MCNC: 1.9 MG/DL (ref 1.6–2.3)
MCH RBC QN AUTO: 34 PG (ref 26.5–33)
MCHC RBC AUTO-ENTMCNC: 32.2 G/DL (ref 31.5–36.5)
MCV RBC AUTO: 105 FL (ref 78–100)
PLATELET # BLD AUTO: 177 10E3/UL (ref 150–450)
POTASSIUM BLD-SCNC: 4 MMOL/L (ref 3.4–5.3)
PROT SERPL-MCNC: 5.3 G/DL (ref 6.8–8.8)
RBC # BLD AUTO: 3.12 10E6/UL (ref 3.8–5.2)
SODIUM SERPL-SCNC: 142 MMOL/L (ref 133–144)
WBC # BLD AUTO: 3.9 10E3/UL (ref 4–11)

## 2021-09-12 PROCEDURE — 250N000013 HC RX MED GY IP 250 OP 250 PS 637: Performed by: INTERNAL MEDICINE

## 2021-09-12 PROCEDURE — 99232 SBSQ HOSP IP/OBS MODERATE 35: CPT | Performed by: INTERNAL MEDICINE

## 2021-09-12 PROCEDURE — 80053 COMPREHEN METABOLIC PANEL: CPT | Performed by: INTERNAL MEDICINE

## 2021-09-12 PROCEDURE — 36415 COLL VENOUS BLD VENIPUNCTURE: CPT | Performed by: INTERNAL MEDICINE

## 2021-09-12 PROCEDURE — 120N000001 HC R&B MED SURG/OB

## 2021-09-12 PROCEDURE — 85014 HEMATOCRIT: CPT | Performed by: INTERNAL MEDICINE

## 2021-09-12 PROCEDURE — 83735 ASSAY OF MAGNESIUM: CPT | Performed by: INTERNAL MEDICINE

## 2021-09-12 RX ORDER — METHADONE HYDROCHLORIDE 10 MG/ML
100 CONCENTRATE ORAL DAILY
Status: DISCONTINUED | OUTPATIENT
Start: 2021-09-12 | End: 2021-09-13 | Stop reason: HOSPADM

## 2021-09-12 RX ORDER — MAGNESIUM OXIDE 400 MG/1
400 TABLET ORAL 2 TIMES DAILY
Status: DISCONTINUED | OUTPATIENT
Start: 2021-09-12 | End: 2021-09-13 | Stop reason: HOSPADM

## 2021-09-12 RX ORDER — METOPROLOL SUCCINATE 50 MG/1
50 TABLET, EXTENDED RELEASE ORAL DAILY
Status: DISCONTINUED | OUTPATIENT
Start: 2021-09-12 | End: 2021-09-13 | Stop reason: HOSPADM

## 2021-09-12 RX ADMIN — GABAPENTIN 900 MG: 300 CAPSULE ORAL at 22:40

## 2021-09-12 RX ADMIN — GABAPENTIN 900 MG: 300 CAPSULE ORAL at 07:53

## 2021-09-12 RX ADMIN — FOLIC ACID 1 MG: 1 TABLET ORAL at 08:33

## 2021-09-12 RX ADMIN — LORAZEPAM 1 MG: 1 TABLET ORAL at 06:53

## 2021-09-12 RX ADMIN — MULTIPLE VITAMINS W/ MINERALS TAB 1 TABLET: TAB at 08:33

## 2021-09-12 RX ADMIN — ACETAMINOPHEN 650 MG: 325 TABLET, FILM COATED ORAL at 08:32

## 2021-09-12 RX ADMIN — GABAPENTIN 900 MG: 300 CAPSULE ORAL at 15:59

## 2021-09-12 RX ADMIN — THIAMINE HCL TAB 100 MG 100 MG: 100 TAB at 08:33

## 2021-09-12 RX ADMIN — Medication 400 MG: at 20:47

## 2021-09-12 RX ADMIN — METHADONE HYDROCHLORIDE 100 MG: 10 CONCENTRATE ORAL at 09:46

## 2021-09-12 RX ADMIN — Medication 400 MG: at 09:46

## 2021-09-12 RX ADMIN — LISINOPRIL 20 MG: 20 TABLET ORAL at 08:33

## 2021-09-12 RX ADMIN — METOPROLOL SUCCINATE 50 MG: 50 TABLET, EXTENDED RELEASE ORAL at 09:46

## 2021-09-12 RX ADMIN — LORAZEPAM 1 MG: 1 TABLET ORAL at 16:14

## 2021-09-12 ASSESSMENT — ACTIVITIES OF DAILY LIVING (ADL)
ADLS_ACUITY_SCORE: 16

## 2021-09-12 NOTE — PROGRESS NOTES
[facility]  Hospitalist Progress Note  Umm Meza MD 09/12/21  Text Page  Pager: [phone] (7am-6pm)    Reason for Stay (Diagnosis): withdrawal         Assessment and Plan:      Summary of Stay: Danielle Tadeo is a 46 year old female with PMH including alcohol use disorder, opiate use disorder (on Methadone), HTN and prior gastric bypass surgery who was admitted 09/11/21 with acute alcohol intoxication and acute alcohol withdrawal and the desire to quit drinking alcohol. She continues to have mild withdrawal.    Problem List/Assessment and Plan:     1.  Acute alcohol intoxication with alcohol use disorder and acute alcohol withdrawal: Patient has been drinking daily for at least the past several weeks, attempted to quit a few weeks ago but developed withdrawal so started drinking again.  She has not been to alcohol treatment in the past.  She would like to quit.  BAL of 0.11 on admission.  She is being treated for withdrawal, moderate symptoms at this time.  -CIWA protocol with Ativan  -Gabapentin per withdrawal protocol  -Continue vitamins  -Social work consulted, patient would like to follow up with [facility]     2.  Opiate use disorder on methadone: Patient goes to [facility] in [place].  She reports she is on methadone 100 mg daily, last dose was 9/9, pharmacy confirmed this.  -Resumed Methadone     3.  Hypokalemia: Replace per protocol.     4.  Alcohol transaminitis: Moderate transaminitis, pattern consistent with alcohol use disorder.  Repeat tomorrow.  Needs alcohol cessation.  Slightly improved from admission.     5.  Hypertension: PTA on lisinopril and Metoprolol.  Resume with hold parameters.     6.  Prolonged QTc with History of torsades: Previously patient had an episode of torsades after she was given Zofran.  At that time she did have prolonged QTC.  Will avoid QTC prolonging agents and replace electrolytes.  Currently QTc is 507 in the  "setting of hypokalemia.     7.  Nausea/vomiting: Patient has been eating very little.  She has basically just from alcohol.  Daily episodes of nausea and vomiting.  I suspect this is due to alcoholic gastritis.  No hematemesis.  Avoid PPI for now given prolonged QTc.  She was given 1L NS.  Advance diet as tolerated.  Avoid Zofran or Compazine as these are QTC prolonging agents.  She does have Ativan as needed. PRN Tums.    Diet: Combination Diet Regular Diet Adult    DVT Prophylaxis: Pneumatic Compression Devices  Gaytan Catheter: Not present  Code Status: Full Code      Disposition Plan   Expected discharge: Discharge in 1-2 days pending resolution of withdrawal.  Entered: Umm Meza MD 09/12/2021, 1:26 PM       The patient's care was discussed with the Bedside Nurse and Patient.    Hospitalist Service  [facility]          Interval History (Subjective):      Patient states that she still feels like she is going through withdrawal.  She had a lot of nausea and emesis around 6 AM.  She did receive Ativan and has subsequently not had further emesis.  She was able to tolerate some apple juice and a tien cracker.  She has a headache and feels tremulous.  She denies chest pain, shortness of breath or cough.  No fevers or chills.  She is planning to speak with [facility] tomorrow.                  Physical Exam:      Last Vital Signs:  BP (!) 185/82 (BP Location: Left arm)   Pulse 107   Temp 97.5  F (36.4  C) (Oral)   Resp 20   Ht 1.676 m (5' 6\")   Wt 77.4 kg (170 lb 9.6 oz)   LMP 06/07/2015   SpO2 100%   BMI 27.54 kg/m      General: Sleeping, easily awoken, appears uncomfortable when awake  HEENT: Normocephalic and atraumatic, eyes anicteric and without scleral injection, EOMI, face symmetric, MMM.  Cardiac: Tachycardic with regular rhythm, normal S1, S2. No m/g/r, no LE edema.  Pulmonary: Normal chest rise, normal work of breathing.  Lungs CTAB without crackles or " wheezing.  Abdomen: soft, non-tender, non-distended.  Normoactive bowel sounds, no guarding or rebound tenderness.  Extremities: no deformities.  Warm, well perfused.  Skin: no rashes or lesions.  Warm and Dry.  Neuro: No focal deficits.  Speech clear.  Coordination and strength grossly normal. Tremor of outstretched hands  Psych: Alert and oriented x3. Appropriate affect.         Medications:      All current medications were reviewed with changes reflected in problem list.         Data:      All new lab and imaging data was reviewed.   Labs:  Recent Labs   Lab 09/12/21  0635 09/11/21  0910   WBC 3.9* 4.8   HGB 10.6* 12.4   HCT 32.9* 38.7   * 102*    272     Recent Labs   Lab 09/12/21 0635 09/11/21  2206 09/11/21  1702 09/11/21  0910     --   --  140   POTASSIUM 4.0 4.4 3.4 3.3*   CHLORIDE 112*  --   --  107   CO2 25  --   --  24   ANIONGAP 5  --   --  9   GLC 89  --   --  132*   BUN 5*  --   --  9   CR 0.65  --   --  0.71   GFRESTIMATED >90  --   --  >90   HAZEL 8.3*  --   --  8.2*   MAG 1.9  --  1.9 2.1   PHOS  --   --  3.1  --    PROTTOTAL 5.3*  --   --  6.6*   ALBUMIN 2.3*  --   --  2.9*   BILITOTAL 0.4  --   --  0.4   ALKPHOS 162*  --   --  168*   *  --   --  272*   ALT 69*  --   --  80*      Imaging:   No results found for this or any previous visit (from the past 24 hour(s)).    Umm Meza MD

## 2021-09-12 NOTE — CONSULTS
SW received another consult for MH/MANAS, however, pt was seen by SW on 9/11/21.      I did meet with pt to find out if she was requesting to see SW and she let SW know that she was provided with resources from SW on 9/11.  She also reports she has a counselor she works with at [facility].  She also reports her dtr is working with  on finding a therapist for family counseling.     Pt denies any other concerns/issues at this time.      Katlyn ANTONIO, [facility]  Inpatient Care Coordination   [facility]   [phone]

## 2021-09-12 NOTE — PLAN OF CARE
To Do:  End of Shift Summary  For vital signs and complete assessments, please see documentation flowsheets.     Pertinent assessments: -175, DBP 80-95; AAOx4, VSS, afebrile, denies pain. no N/V.  Eating full meal for dinner. Gabapentin given, IVF running, pleasant and cooperative. No tremors, voiding without difficulty.    4 am last CIWA score was at 1 (one),   Major Shift Events: none    Treatment Plan:  CIWA, tele, ativan for nausea.

## 2021-09-13 VITALS
DIASTOLIC BLOOD PRESSURE: 77 MMHG | RESPIRATION RATE: 18 BRPM | BODY MASS INDEX: 27.42 KG/M2 | TEMPERATURE: 98.9 F | SYSTOLIC BLOOD PRESSURE: 140 MMHG | OXYGEN SATURATION: 97 % | HEART RATE: 93 BPM | HEIGHT: 66 IN | WEIGHT: 170.6 LBS

## 2021-09-13 LAB
ALBUMIN SERPL-MCNC: 2.3 G/DL (ref 3.4–5)
ALP SERPL-CCNC: 137 U/L (ref 40–150)
ALT SERPL W P-5'-P-CCNC: 62 U/L (ref 0–50)
ANION GAP SERPL CALCULATED.3IONS-SCNC: 5 MMOL/L (ref 3–14)
AST SERPL W P-5'-P-CCNC: 193 U/L (ref 0–45)
ATRIAL RATE - MUSE: 94 BPM
BILIRUB SERPL-MCNC: 0.3 MG/DL (ref 0.2–1.3)
BUN SERPL-MCNC: 4 MG/DL (ref 7–30)
CALCIUM SERPL-MCNC: 8.1 MG/DL (ref 8.5–10.1)
CHLORIDE BLD-SCNC: 112 MMOL/L (ref 94–109)
CO2 SERPL-SCNC: 25 MMOL/L (ref 20–32)
CREAT SERPL-MCNC: 0.58 MG/DL (ref 0.52–1.04)
DIASTOLIC BLOOD PRESSURE - MUSE: NORMAL MMHG
GFR SERPL CREATININE-BSD FRML MDRD: >90 ML/MIN/1.73M2
GLUCOSE BLD-MCNC: 113 MG/DL (ref 70–99)
INTERPRETATION ECG - MUSE: NORMAL
P AXIS - MUSE: 75 DEGREES
POTASSIUM BLD-SCNC: 3.6 MMOL/L (ref 3.4–5.3)
PR INTERVAL - MUSE: 126 MS
PROT SERPL-MCNC: 5.4 G/DL (ref 6.8–8.8)
QRS DURATION - MUSE: 88 MS
QT - MUSE: 406 MS
QTC - MUSE: 507 MS
R AXIS - MUSE: 26 DEGREES
SODIUM SERPL-SCNC: 142 MMOL/L (ref 133–144)
SYSTOLIC BLOOD PRESSURE - MUSE: NORMAL MMHG
T AXIS - MUSE: 40 DEGREES
VENTRICULAR RATE- MUSE: 94 BPM

## 2021-09-13 PROCEDURE — 250N000013 HC RX MED GY IP 250 OP 250 PS 637: Performed by: INTERNAL MEDICINE

## 2021-09-13 PROCEDURE — 36415 COLL VENOUS BLD VENIPUNCTURE: CPT | Performed by: INTERNAL MEDICINE

## 2021-09-13 PROCEDURE — 99239 HOSP IP/OBS DSCHRG MGMT >30: CPT | Performed by: INTERNAL MEDICINE

## 2021-09-13 PROCEDURE — 82040 ASSAY OF SERUM ALBUMIN: CPT | Performed by: INTERNAL MEDICINE

## 2021-09-13 RX ADMIN — GABAPENTIN 900 MG: 300 CAPSULE ORAL at 06:28

## 2021-09-13 RX ADMIN — METHADONE HYDROCHLORIDE 100 MG: 10 CONCENTRATE ORAL at 10:19

## 2021-09-13 RX ADMIN — METOPROLOL SUCCINATE 50 MG: 50 TABLET, EXTENDED RELEASE ORAL at 09:44

## 2021-09-13 RX ADMIN — FOLIC ACID 1 MG: 1 TABLET ORAL at 09:44

## 2021-09-13 RX ADMIN — LISINOPRIL 20 MG: 20 TABLET ORAL at 09:44

## 2021-09-13 RX ADMIN — Medication 400 MG: at 09:44

## 2021-09-13 RX ADMIN — THIAMINE HCL TAB 100 MG 100 MG: 100 TAB at 09:44

## 2021-09-13 RX ADMIN — MULTIPLE VITAMINS W/ MINERALS TAB 1 TABLET: TAB at 09:44

## 2021-09-13 ASSESSMENT — ACTIVITIES OF DAILY LIVING (ADL)
ADLS_ACUITY_SCORE: 16

## 2021-09-13 NOTE — PLAN OF CARE
Patient hospitalized for acute alcohol withdrawal. Cleared for discharge to home today per MD. Discharge instructions and follow-ups reviewed with patient and spouse in detail. No medication changes were made. Patient and spouse verbalized understanding of discharge instructions. Belongings were returned to patient at time of discharge, including cell phone and purse. Spouse providing transport home.

## 2021-09-13 NOTE — DISCHARGE SUMMARY
[facility]  Discharge Summary  Name: Danielle Tadeo    MRN: [id number]  YOB: 1975    Age: 46 year old  Date of Discharge:  9/13/2021  Date of Admission: 9/11/2021  Primary Care Provider: Fuentes Akbar  Discharge Physician:  Umm Meza MD  Discharging Service:  Hospitalist      Discharge Diagnoses:  1.  Acute alcohol intoxication and alcohol use disorder and acute alcohol withdrawal   2.  Opiate use disorder on methadone  3.  Hypokalemia 4.  Alcohol transaminitis  5.  Hypertension  6.  Prolonged QTc  7.  Nausea/Vomiting  8.  Anxiety     Follow-ups Needed After Discharge   Follow up with [facility] tomorrow, PCP within one week    Unresulted Labs Ordered in the Past 30 Days of this Admission     No orders found from 10/16/2018 to 12/16/2018.        Hospital Course:  Danielle Tadeo is a 46 year old female with PMH including alcohol use disorder, opiate use disorder (on Methadone), HTN and prior gastric bypass surgery who was admitted 09/11/21 with acute alcohol intoxication and acute alcohol withdrawal and the desire to quit drinking alcohol. She was treated for mild withdrawal which resolved by time of discharge.     1.  Acute alcohol intoxication with alcohol use disorder and acute alcohol withdrawal: Patient has been drinking daily for at least the past several weeks, attempted to quit a few weeks ago but developed withdrawal so started drinking again.  She has not been to alcohol treatment in the past.  She would like to quit.  BAL of 0.11 on admission.  Treated for alcohol withdrawal with CIWA (Ativan) and Gabapentin.  She was seen by SW.  She would like to follow up with [facility] regarding alcohol use resources.     2.  Opiate use disorder on methadone: Patient goes to [facility] in [place].  She reports she is on methadone 100 mg daily, last dose was 9/9, pharmacy confirmed this.  She was given Methadone per her treatment  "protocol.     3.  Hypokalemia: Replace per protocol.     4.  Alcohol transaminitis: Moderate transaminitis, pattern consistent with alcohol use disorder.  Needs alcohol cessation.  Slightly improved from admission.  Discussed with her that she needs to have follow up with   PCP to ensure this normalizes.     5.  Hypertension: PTA on lisinopril and Metoprolol, continued.     6.  Prolonged QTc with History of torsades: Previously patient had an episode of torsades after she was given Zofran.  At that time she did have prolonged QTC.  Will avoid QTC prolonging agents and replace electrolytes.  Currently QTc is 507 in the setting of hypokalemia.     7.  Nausea/vomiting: Patient has been eating very little.  She has basically just from alcohol.  Daily episodes of nausea and vomiting.  I suspect this is due to alcoholic gastritis.  No hematemesis.  Avoid PPI for now given prolonged QTc.  She was given 1L NS.  Advance diet as tolerated.  Avoid Zofran or Compazine as these are QTC prolonging agents.  she was able to tolerate regular diet by discharge.    8.  Social Stressors/Anxiety: Suspect this is leading her to drink heavily.  Offered psychiatry consult this admission but she does not feel she needs this.  Follow up in outpatient setting.     Discharge Disposition:  Discharged to home     Allergies:  Allergies   Allergen Reactions     Zofran [Ondansetron] Other (See Comments)     \"heart stopped\" \"Long QT\"        Condition on Discharge:  Discharge condition: Stable   Discharge vitals: Blood pressure (!) 140/77, pulse 93, temperature 98.9  F (37.2  C), temperature source Oral, resp. rate 18, height 1.676 m (5' 6\"), weight 77.4 kg (170 lb 9.6 oz), last menstrual period 06/07/2015, SpO2 97 %, not currently breastfeeding.   Code status on discharge: Full Code     History of Illness:  See detailed admission note for full details.    Physical Exam:  Blood pressure (!) 140/77, pulse 93, temperature 98.9  F (37.2  C), temperature " "source Oral, resp. rate 18, height 1.676 m (5' 6\"), weight 77.4 kg (170 lb 9.6 oz), last menstrual period 06/07/2015, SpO2 97 %, not currently breastfeeding.  Wt Readings from Last 1 Encounters:   09/11/21 77.4 kg (170 lb 9.6 oz)     General: Alert, awake, no acute distress.  HEENT: Normocephalic, atraumatic, eyes anicteric and without scleral injection, EOMI, MMM.  Cardiac: RRR, normal S1, S2.  No m/g/r. No LE edema.  Pulmonary: Normal chest rise, normal work of breathing.  Lungs CTAB  Abdomen: soft, non-tender, non-distended.  Normoactive BS.  No guarding or rebound tenderness.  Extremities: no deformities.  Warm, well perfused.  Skin: no rashes or lesions noted.  Warm and Dry.  Neuro: No focal deficits noted.  Speech clear.  Coordination and strength grossly normal.  Psych: Appropriate affect. Alert and oriented x3    Procedures other than Imaging:  Phlebotomy     Imaging:  Results for orders placed or performed during the hospital encounter of 09/11/21   US Abdomen Limited (RUQ)    Narrative    US ABDOMEN LIMITED 9/11/2021 10:12 AM    CLINICAL HISTORY: Right upper quadrant pain, alcohol use.    TECHNIQUE: Limited abdominal ultrasound.    COMPARISON: None.    FINDINGS:    GALLBLADDER: The gallbladder is normal. No gallstones, wall  thickening, or pericholecystic fluid. Negative sonographic Lopez's  sign.    BILE DUCTS: There is no biliary dilatation. The common duct measures 5  mm.    LIVER: Liver is enlarged at 22.5 cm in length and diffusely increased  in echogenicity. No focal liver lesions are evident.    RIGHT KIDNEY: No hydronephrosis.    PANCREAS: Visualized portions of the pancreas are unremarkable.  Superior to the pancreas, there is a hypoechoic lesion with  questionable posterior acoustic enhancement that measures 3.4 x 2.1 x  2.5 cm.    No ascites.      Impression    IMPRESSION:  1.  Enlarged, fatty infiltrated liver.  2.  No evidence of gallbladder disease.  3.  Hypoechoic, possibly cystic lesion " above the head of the pancreas  could represent a cystic pancreatic lesion or pseudocyst. Consider  contrast-enhanced CT of the abdomen and pelvis for further evaluation.      ADRY CHURCHILL MD         SYSTEM ID:  [id number]   XR Chest 2 Views    Narrative    CHEST TWO VIEWS 9/11/2021 10:16 AM     HISTORY: Chest pain.    COMPARISON: 10/5/2013.    FINDINGS: Heart size and pulmonary vascularity are within normal  limits. The lungs are clear. No pneumothorax or pleural effusion.       Impression    IMPRESSION: Normal two views of the chest.      ADRY CHURCHILL MD         SYSTEM ID:  [id number]   CT Abdomen Pelvis w Contrast    Narrative    CT ABDOMEN PELVIS WITH CONTRAST 9/11/2021 12:38 PM    CLINICAL HISTORY: Abdominal distention.    TECHNIQUE: CT scan of the abdomen and pelvis was performed following  injection of IV contrast. Multiplanar reformats were obtained. Dose  reduction techniques were used.  CONTRAST: 73mL [id number]    COMPARISON: None.    FINDINGS:   LOWER CHEST: Small hiatal hernia.    HEPATOBILIARY: Marked fatty infiltration of the liver without focal  lesion. Gallbladder unremarkable.    PANCREAS: Normal.    SPLEEN: Normal.    ADRENAL GLANDS: Normal.    KIDNEYS/BLADDER: Normal.    BOWEL: Previous gastric bypass surgery. There are a few colonic  diverticula but no evidence of diverticulitis. Normal appendix. No  bowel obstruction or inflammatory change. No free air.    PELVIC ORGANS: Uterus not visualized.    ADDITIONAL FINDINGS: No ascites or lymphadenopathy.    MUSCULOSKELETAL: No destructive bone lesions.      Impression    IMPRESSION: No acute cause for abdominal distention demonstrated.     ADRY CHURCHILL MD         SYSTEM ID:  [id number]        Consultations:  Consultations This Hospital Stay   CARE MANAGEMENT / SOCIAL WORK IP CONSULT  CARE MANAGEMENT / SOCIAL WORK IP CONSULT  PSYCHIATRY IP CONSULT     Recent Lab Results:  Recent Labs   Lab 09/12/21  0635 09/11/21  0910   WBC 3.9* 4.8   HGB 10.6*  12.4   HCT 32.9* 38.7   * 102*    272     Recent Labs   Lab 09/13/21  0727 09/12/21  0635 09/11/21  2206 09/11/21  1702 09/11/21  0910    142  --   --  140   POTASSIUM 3.6 4.0 4.4 3.4 3.3*   CHLORIDE 112* 112*  --   --  107   CO2 25 25  --   --  24   ANIONGAP 5 5  --   --  9   * 89  --   --  132*   BUN 4* 5*  --   --  9   CR 0.58 0.65  --   --  0.71   GFRESTIMATED >90 >90  --   --  >90   HAZEL 8.1* 8.3*  --   --  8.2*   MAG  --  1.9  --  1.9 2.1   PHOS  --   --   --  3.1  --    PROTTOTAL 5.4* 5.3*  --   --  6.6*   ALBUMIN 2.3* 2.3*  --   --  2.9*   BILITOTAL 0.3 0.4  --   --  0.4   ALKPHOS 137 162*  --   --  168*   * 266*  --   --  272*   ALT 62* 69*  --   --  80*          Pending Results:    Unresulted Labs Ordered in the Past 30 Days of this Admission     No orders found from 8/12/2021 to 9/12/2021.           Discharge Instructions and Follow-Up:   Discharge Orders      Reason for your hospital stay    You were hospitalized for nausea, vomiting and alcohol intoxication and were found to have mild alcohol withdrawal.  You were seen by social work during your admission.  I would recommend that you speak with Ana Lilia about treatment options for alcohol withdrawal as well as finding a psychiatrist/psychologist to help with anxiety.     Follow-up and recommended labs and tests     Follow up tomorrow with Ana Lilia.  Follow up with your primary care doctor within 2 weeks. At that time you should have blood work drawn to recheck your liver function tests. These were elevated which was likely due to your alcohol use. With cessation of alcohol this should normalize. If your liver function tests do not normalize after cessation of alcohol he will need further work-up to determine the underlying etiology of your abnormal liver function.     Activity    Your activity upon discharge: activity as tolerated     Diet    Follow this diet upon discharge: Orders Placed This Encounter      Combination  Diet Regular Diet Adult     Discharge Medications   Current Discharge Medication List      CONTINUE these medications which have NOT CHANGED    Details   lisinopril (ZESTRIL) 20 MG tablet Take 1 tablet (20 mg) by mouth daily  Qty: 90 tablet, Refills: 1    Associated Diagnoses: Hypertension goal BP (blood pressure) < 140/90      methadone (DOLOPHINE-INTENSOL) 10 MG/ML (HIGH CONC) solution Take 100 mg by mouth daily       metoprolol succinate ER (TOPROL-XL) 50 MG 24 hr tablet Take 50 mg by mouth daily             Time Spent on this Encounter   I, Umm Meza MD, personally saw the patient today and spent greater than 30 minutes discharging this patient.    Umm Meza MD

## 2021-09-13 NOTE — CONSULTS
Care Management Discharge Note    Discharge Date: 09/13/2021     Additional Information:  Pt identified as a Primary Care through [place] . SW did see in ED.    Tierney De La Rosa RN BSN   Inpatient Care Coordination  [facility]  [phone]      Jane De La Rosa, RN

## 2021-09-13 NOTE — PLAN OF CARE
End of Shift Summary  For vital signs and complete assessments, please see documentation flowsheets.     Pertinent assessments: AAOx4, B/p elevated, denies pain x headache, vomited her food up twice. Eating frequently throughout the day. About midday, became very anxious about spouse not being here with her and family calling her nonstop. Ativan given for anxiety (ok per Dr. Meza). Pt has a hard time coping with stress and her anxiety. Magnesium being replaced orally. Tele SR. No hallucinations/tremors, agitation. Pt pleasant.     Pt was not able to be located outside of her room this PM. Staff notified security and searched lower level of the hospital. Tele tech lost signal and notified staff. After approx 15 minutes, patient arrived back to room and stated that she was on the floor going for a walk. Pt educated on importance of staying on the unit at all times. Door alarm ordered.       Treatment Plan:  tracy KELLEY, ativan for nausea.     Deann Garrison RN

## 2021-09-13 NOTE — PLAN OF CARE
To Do:  End of Shift Summary  For vital signs and complete assessments, please see documentation flowsheets.     Pertinent assessments: AAOx4, B/p elevated, denies pain x headache, vomited three times for the last 24 hrs. Eating frequently throughout the day. Magnesium given orally. Tele SR. No hallucinations/tremors, agitation. Psych consult ordered. CIWA q4h New LDA placed    Treatment Plan:  CIWA, tele, ativan for nausea and anxiety

## 2021-09-14 ENCOUNTER — PATIENT OUTREACH (OUTPATIENT)
Dept: CARE COORDINATION | Facility: CLINIC | Age: 46
End: 2021-09-14

## 2021-09-14 NOTE — PROGRESS NOTES
Clinic Care Coordination Contact  [facility]/Voicemail    Referral Source: IP Report  Clinical Data: Care Coordinator Outreach  Outreach attempted x 1.  Unable toleave t message on patient's voicemail with call back information and requested return call.  Plan: Care Coordinator will send care coordination introduction letter with care coordinator contact information and explanation of care coordination services via mail. Care Coordinator will try to reach patient again in 1-2 business days.    CATHERINE Garg   Care Coordination Team  [phone]

## 2021-09-14 NOTE — LETTER
[facility] CARE COORDINATION  [facility]   September 16, 2021    Danielle Tadeo  [address]  [place] [address]      Dear Danielle,    I am a clinic care coordinator who works with Fuentes Akbar MD at [facility] . I have been trying to reach you recently to introduce Clinic Care Coordination and to see if there was anything I could assist you with.  Below is a description of clinic care coordination and how I can further assist you.      The clinic care coordination team is made up of a registered nurse,  and community health worker who understand the health care system. The goal of clinic care coordination is to help you manage your health and improve access to the health care system in the most efficient manner. The team can assist you in meeting your health care goals by providing education, coordinating services, strengthening the communication among your providers and supporting you with any resource needs.    Please feel free to contact me at [phone] with any questions or concerns. We are focused on providing you with the highest-quality healthcare experience possible and that all starts with you.     Sincerely,     CATHERINE Garg   Care Coordination Team  [phone]

## 2021-09-16 NOTE — PROGRESS NOTES
Clinic Care Coordination Contact  [facility]/Voicemail    Referral Source: IP Report  Clinical Data: Care Coordinator Outreach  Outreach attempted x 2.  Left message on patient's voicemail with call back information and requested return call.  Plan: Care Coordinator sent care coordination introduction letter on 9/16/21 via mail. Care Coordinator will try to reach patient again in 3-5 business days.    CATHERINE Garg   Care Coordination Team  [phone]

## 2021-09-27 ENCOUNTER — HOSPITAL ENCOUNTER (INPATIENT)
Facility: CLINIC | Age: 46
LOS: 7 days | Discharge: HOME | End: 2021-10-04
Attending: EMERGENCY MEDICINE | Admitting: INTERNAL MEDICINE
Payer: COMMERCIAL

## 2021-09-27 DIAGNOSIS — F10.930 ALCOHOL WITHDRAWAL SYNDROME WITHOUT COMPLICATION (H): Primary | ICD-10-CM

## 2021-09-27 DIAGNOSIS — F10.921 ALCOHOL INTOXICATION WITH DELIRIUM (H): ICD-10-CM

## 2021-09-27 DIAGNOSIS — R79.89 ELEVATED LACTIC ACID LEVEL: ICD-10-CM

## 2021-09-27 DIAGNOSIS — E86.0 DEHYDRATION: ICD-10-CM

## 2021-09-27 DIAGNOSIS — F11.23 OPIOID DEPENDENCE WITH WITHDRAWAL (H): ICD-10-CM

## 2021-09-27 DIAGNOSIS — E87.6 HYPOKALEMIA: ICD-10-CM

## 2021-09-27 DIAGNOSIS — R11.2 NON-INTRACTABLE VOMITING WITH NAUSEA, UNSPECIFIED VOMITING TYPE: ICD-10-CM

## 2021-09-27 DIAGNOSIS — R79.89 ELEVATED LFTS: ICD-10-CM

## 2021-09-27 LAB
ALBUMIN SERPL-MCNC: 2.9 G/DL (ref 3.4–5)
ALBUMIN UR-MCNC: 20 MG/DL
ALP SERPL-CCNC: 199 U/L (ref 40–150)
ALT SERPL W P-5'-P-CCNC: 112 U/L (ref 0–50)
AMPHETAMINES UR QL SCN: ABNORMAL
ANION GAP SERPL CALCULATED.3IONS-SCNC: 11 MMOL/L (ref 3–14)
ANION GAP SERPL CALCULATED.3IONS-SCNC: 23 MMOL/L (ref 3–14)
APAP SERPL-MCNC: <2 MG/L (ref 10–30)
APPEARANCE UR: CLEAR
AST SERPL W P-5'-P-CCNC: 750 U/L (ref 0–45)
ATRIAL RATE - MUSE: 147 BPM
BARBITURATES UR QL: ABNORMAL
BASOPHILS # BLD AUTO: 0 10E3/UL (ref 0–0.2)
BASOPHILS NFR BLD AUTO: 0 %
BENZODIAZ UR QL: ABNORMAL
BILIRUB SERPL-MCNC: 1.3 MG/DL (ref 0.2–1.3)
BILIRUB UR QL STRIP: ABNORMAL
BUN SERPL-MCNC: 12 MG/DL (ref 7–30)
BUN SERPL-MCNC: 7 MG/DL (ref 7–30)
CALCIUM SERPL-MCNC: 7.3 MG/DL (ref 8.5–10.1)
CALCIUM SERPL-MCNC: 8.3 MG/DL (ref 8.5–10.1)
CANNABINOIDS UR QL SCN: ABNORMAL
CHLORIDE BLD-SCNC: 107 MMOL/L (ref 94–109)
CHLORIDE BLD-SCNC: 96 MMOL/L (ref 94–109)
CO2 SERPL-SCNC: 15 MMOL/L (ref 20–32)
CO2 SERPL-SCNC: 20 MMOL/L (ref 20–32)
COCAINE UR QL: ABNORMAL
COLOR UR AUTO: YELLOW
CREAT SERPL-MCNC: 0.5 MG/DL (ref 0.52–1.04)
CREAT SERPL-MCNC: 0.55 MG/DL (ref 0.52–1.04)
DIASTOLIC BLOOD PRESSURE - MUSE: NORMAL MMHG
EOSINOPHIL # BLD AUTO: 0 10E3/UL (ref 0–0.7)
EOSINOPHIL NFR BLD AUTO: 0 %
ERYTHROCYTE [DISTWIDTH] IN BLOOD BY AUTOMATED COUNT: 15.3 % (ref 10–15)
ETHANOL SERPL-MCNC: 0.16 G/DL
GFR SERPL CREATININE-BSD FRML MDRD: >90 ML/MIN/1.73M2
GFR SERPL CREATININE-BSD FRML MDRD: >90 ML/MIN/1.73M2
GLUCOSE BLD-MCNC: 122 MG/DL (ref 70–99)
GLUCOSE BLD-MCNC: 146 MG/DL (ref 70–99)
GLUCOSE UR STRIP-MCNC: NEGATIVE MG/DL
HCT VFR BLD AUTO: 41.7 % (ref 35–47)
HGB BLD-MCNC: 14.5 G/DL (ref 11.7–15.7)
HGB UR QL STRIP: NEGATIVE
HOLD SPECIMEN: NORMAL
IMM GRANULOCYTES # BLD: 0 10E3/UL
IMM GRANULOCYTES NFR BLD: 0 %
INTERPRETATION ECG - MUSE: NORMAL
KETONES UR STRIP-MCNC: ABNORMAL MG/DL
LACTATE SERPL-SCNC: 7.9 MMOL/L (ref 0.7–2)
LACTATE SERPL-SCNC: 9.8 MMOL/L (ref 0.7–2)
LEUKOCYTE ESTERASE UR QL STRIP: NEGATIVE
LYMPHOCYTES # BLD AUTO: 1.7 10E3/UL (ref 0.8–5.3)
LYMPHOCYTES NFR BLD AUTO: 16 %
MAGNESIUM SERPL-MCNC: 1.7 MG/DL (ref 1.6–2.3)
MCH RBC QN AUTO: 33.8 PG (ref 26.5–33)
MCHC RBC AUTO-ENTMCNC: 34.8 G/DL (ref 31.5–36.5)
MCV RBC AUTO: 97 FL (ref 78–100)
MONOCYTES # BLD AUTO: 0.3 10E3/UL (ref 0–1.3)
MONOCYTES NFR BLD AUTO: 3 %
MUCOUS THREADS #/AREA URNS LPF: PRESENT /LPF
NEUTROPHILS # BLD AUTO: 8.8 10E3/UL (ref 1.6–8.3)
NEUTROPHILS NFR BLD AUTO: 81 %
NITRATE UR QL: NEGATIVE
NRBC # BLD AUTO: 0 10E3/UL
NRBC BLD AUTO-RTO: 0 /100
OPIATES UR QL SCN: ABNORMAL
P AXIS - MUSE: 68 DEGREES
PH UR STRIP: 6.5 [PH] (ref 5–7)
PHOSPHATE SERPL-MCNC: 1 MG/DL (ref 2.5–4.5)
PHOSPHATE SERPL-MCNC: 1.7 MG/DL (ref 2.5–4.5)
PLATELET # BLD AUTO: 278 10E3/UL (ref 150–450)
POTASSIUM BLD-SCNC: 2.5 MMOL/L (ref 3.4–5.3)
POTASSIUM BLD-SCNC: 4.2 MMOL/L (ref 3.4–5.3)
PR INTERVAL - MUSE: 96 MS
PROT SERPL-MCNC: 6.8 G/DL (ref 6.8–8.8)
QRS DURATION - MUSE: 78 MS
QT - MUSE: 346 MS
QTC - MUSE: 541 MS
R AXIS - MUSE: 18 DEGREES
RBC # BLD AUTO: 4.29 10E6/UL (ref 3.8–5.2)
RBC URINE: 1 /HPF
SARS-COV-2 RNA RESP QL NAA+PROBE: NEGATIVE
SODIUM SERPL-SCNC: 134 MMOL/L (ref 133–144)
SODIUM SERPL-SCNC: 138 MMOL/L (ref 133–144)
SP GR UR STRIP: 1.02 (ref 1–1.03)
SQUAMOUS EPITHELIAL: 3 /HPF
SYSTOLIC BLOOD PRESSURE - MUSE: NORMAL MMHG
T AXIS - MUSE: 59 DEGREES
TROPONIN I SERPL-MCNC: <0.015 UG/L (ref 0–0.04)
UROBILINOGEN UR STRIP-MCNC: 4 MG/DL
VENTRICULAR RATE- MUSE: 147 BPM
WBC # BLD AUTO: 10.9 10E3/UL (ref 4–11)
WBC URINE: 1 /HPF

## 2021-09-27 PROCEDURE — 250N000009 HC RX 250: Performed by: EMERGENCY MEDICINE

## 2021-09-27 PROCEDURE — 87635 SARS-COV-2 COVID-19 AMP PRB: CPT | Performed by: EMERGENCY MEDICINE

## 2021-09-27 PROCEDURE — HZ2ZZZZ DETOXIFICATION SERVICES FOR SUBSTANCE ABUSE TREATMENT: ICD-10-PCS | Performed by: INTERNAL MEDICINE

## 2021-09-27 PROCEDURE — 96375 TX/PRO/DX INJ NEW DRUG ADDON: CPT

## 2021-09-27 PROCEDURE — 93005 ELECTROCARDIOGRAM TRACING: CPT

## 2021-09-27 PROCEDURE — 80307 DRUG TEST PRSMV CHEM ANLYZR: CPT | Performed by: INTERNAL MEDICINE

## 2021-09-27 PROCEDURE — C9803 HOPD COVID-19 SPEC COLLECT: HCPCS

## 2021-09-27 PROCEDURE — 99207 PR APP CREDIT; MD BILLING SHARED VISIT: CPT | Performed by: INTERNAL MEDICINE

## 2021-09-27 PROCEDURE — 96365 THER/PROPH/DIAG IV INF INIT: CPT

## 2021-09-27 PROCEDURE — 120N000013 HC R&B IMCU

## 2021-09-27 PROCEDURE — 99285 EMERGENCY DEPT VISIT HI MDM: CPT | Mod: 25

## 2021-09-27 PROCEDURE — 36415 COLL VENOUS BLD VENIPUNCTURE: CPT | Performed by: INTERNAL MEDICINE

## 2021-09-27 PROCEDURE — 250N000009 HC RX 250: Performed by: INTERNAL MEDICINE

## 2021-09-27 PROCEDURE — 80143 DRUG ASSAY ACETAMINOPHEN: CPT | Performed by: EMERGENCY MEDICINE

## 2021-09-27 PROCEDURE — 96366 THER/PROPH/DIAG IV INF ADDON: CPT

## 2021-09-27 PROCEDURE — 250N000011 HC RX IP 250 OP 636: Performed by: INTERNAL MEDICINE

## 2021-09-27 PROCEDURE — 84484 ASSAY OF TROPONIN QUANT: CPT | Performed by: EMERGENCY MEDICINE

## 2021-09-27 PROCEDURE — 83735 ASSAY OF MAGNESIUM: CPT | Performed by: INTERNAL MEDICINE

## 2021-09-27 PROCEDURE — 80053 COMPREHEN METABOLIC PANEL: CPT | Performed by: EMERGENCY MEDICINE

## 2021-09-27 PROCEDURE — 81001 URINALYSIS AUTO W/SCOPE: CPT | Performed by: INTERNAL MEDICINE

## 2021-09-27 PROCEDURE — 83605 ASSAY OF LACTIC ACID: CPT | Performed by: EMERGENCY MEDICINE

## 2021-09-27 PROCEDURE — 250N000013 HC RX MED GY IP 250 OP 250 PS 637: Performed by: INTERNAL MEDICINE

## 2021-09-27 PROCEDURE — 36415 COLL VENOUS BLD VENIPUNCTURE: CPT | Performed by: EMERGENCY MEDICINE

## 2021-09-27 PROCEDURE — 250N000013 HC RX MED GY IP 250 OP 250 PS 637: Performed by: EMERGENCY MEDICINE

## 2021-09-27 PROCEDURE — 99223 1ST HOSP IP/OBS HIGH 75: CPT | Mod: AI | Performed by: INTERNAL MEDICINE

## 2021-09-27 PROCEDURE — 84100 ASSAY OF PHOSPHORUS: CPT | Performed by: INTERNAL MEDICINE

## 2021-09-27 PROCEDURE — 258N000003 HC RX IP 258 OP 636: Performed by: EMERGENCY MEDICINE

## 2021-09-27 PROCEDURE — 82077 ASSAY SPEC XCP UR&BREATH IA: CPT | Performed by: EMERGENCY MEDICINE

## 2021-09-27 PROCEDURE — 96367 TX/PROPH/DG ADDL SEQ IV INF: CPT

## 2021-09-27 PROCEDURE — 96376 TX/PRO/DX INJ SAME DRUG ADON: CPT

## 2021-09-27 PROCEDURE — 250N000011 HC RX IP 250 OP 636: Performed by: EMERGENCY MEDICINE

## 2021-09-27 PROCEDURE — 258N000003 HC RX IP 258 OP 636: Performed by: INTERNAL MEDICINE

## 2021-09-27 PROCEDURE — 85025 COMPLETE CBC W/AUTO DIFF WBC: CPT | Performed by: EMERGENCY MEDICINE

## 2021-09-27 RX ORDER — LORAZEPAM 2 MG/ML
1-2 INJECTION INTRAMUSCULAR EVERY 30 MIN PRN
Status: DISCONTINUED | OUTPATIENT
Start: 2021-09-27 | End: 2021-10-04

## 2021-09-27 RX ORDER — AMOXICILLIN 250 MG
1 CAPSULE ORAL 2 TIMES DAILY PRN
Status: DISCONTINUED | OUTPATIENT
Start: 2021-09-27 | End: 2021-10-04 | Stop reason: HOSPADM

## 2021-09-27 RX ORDER — LORAZEPAM 1 MG/1
1-2 TABLET ORAL EVERY 30 MIN PRN
Status: DISCONTINUED | OUTPATIENT
Start: 2021-09-27 | End: 2021-10-04

## 2021-09-27 RX ORDER — GABAPENTIN 300 MG/1
900 CAPSULE ORAL EVERY 8 HOURS
Status: COMPLETED | OUTPATIENT
Start: 2021-09-27 | End: 2021-09-30

## 2021-09-27 RX ORDER — POTASSIUM CHLORIDE 7.45 MG/ML
10 INJECTION INTRAVENOUS ONCE
Status: COMPLETED | OUTPATIENT
Start: 2021-09-27 | End: 2021-09-27

## 2021-09-27 RX ORDER — MULTIPLE VITAMINS W/ MINERALS TAB 9MG-400MCG
1 TAB ORAL DAILY
Status: DISCONTINUED | OUTPATIENT
Start: 2021-09-28 | End: 2021-10-04 | Stop reason: HOSPADM

## 2021-09-27 RX ORDER — LIDOCAINE 40 MG/G
CREAM TOPICAL
Status: DISCONTINUED | OUTPATIENT
Start: 2021-09-27 | End: 2021-09-27

## 2021-09-27 RX ORDER — HALOPERIDOL 5 MG/ML
2.5-5 INJECTION INTRAMUSCULAR EVERY 6 HOURS PRN
Status: DISCONTINUED | OUTPATIENT
Start: 2021-09-27 | End: 2021-10-03

## 2021-09-27 RX ORDER — DIPHENHYDRAMINE HYDROCHLORIDE 50 MG/ML
25 INJECTION INTRAMUSCULAR; INTRAVENOUS ONCE
Status: COMPLETED | OUTPATIENT
Start: 2021-09-27 | End: 2021-09-27

## 2021-09-27 RX ORDER — METOCLOPRAMIDE HYDROCHLORIDE 5 MG/ML
10 INJECTION INTRAMUSCULAR; INTRAVENOUS ONCE
Status: COMPLETED | OUTPATIENT
Start: 2021-09-27 | End: 2021-09-27

## 2021-09-27 RX ORDER — GABAPENTIN 300 MG/1
300 CAPSULE ORAL EVERY 8 HOURS
Status: COMPLETED | OUTPATIENT
Start: 2021-10-02 | End: 2021-10-04

## 2021-09-27 RX ORDER — SODIUM CHLORIDE AND POTASSIUM CHLORIDE 150; 900 MG/100ML; MG/100ML
INJECTION, SOLUTION INTRAVENOUS CONTINUOUS
Status: DISCONTINUED | OUTPATIENT
Start: 2021-09-27 | End: 2021-09-30

## 2021-09-27 RX ORDER — LANOLIN ALCOHOL/MO/W.PET/CERES
100 CREAM (GRAM) TOPICAL DAILY
Status: COMPLETED | OUTPATIENT
Start: 2021-09-28 | End: 2021-10-02

## 2021-09-27 RX ORDER — POLYETHYLENE GLYCOL 3350 17 G/17G
17 POWDER, FOR SOLUTION ORAL DAILY PRN
Status: DISCONTINUED | OUTPATIENT
Start: 2021-09-27 | End: 2021-10-04 | Stop reason: HOSPADM

## 2021-09-27 RX ORDER — LORAZEPAM 2 MG/ML
.5-1 INJECTION INTRAMUSCULAR EVERY 4 HOURS PRN
Status: DISCONTINUED | OUTPATIENT
Start: 2021-09-27 | End: 2021-10-04

## 2021-09-27 RX ORDER — OLANZAPINE 5 MG/1
5-10 TABLET, ORALLY DISINTEGRATING ORAL EVERY 6 HOURS PRN
Status: DISCONTINUED | OUTPATIENT
Start: 2021-09-27 | End: 2021-10-03

## 2021-09-27 RX ORDER — FLUMAZENIL 0.1 MG/ML
0.2 INJECTION, SOLUTION INTRAVENOUS
Status: DISCONTINUED | OUTPATIENT
Start: 2021-09-27 | End: 2021-10-04 | Stop reason: HOSPADM

## 2021-09-27 RX ORDER — DIAZEPAM 10 MG/2ML
5 INJECTION, SOLUTION INTRAMUSCULAR; INTRAVENOUS
Status: DISCONTINUED | OUTPATIENT
Start: 2021-09-27 | End: 2021-09-27

## 2021-09-27 RX ORDER — GABAPENTIN 600 MG/1
1200 TABLET ORAL ONCE
Status: COMPLETED | OUTPATIENT
Start: 2021-09-27 | End: 2021-09-27

## 2021-09-27 RX ORDER — GABAPENTIN 100 MG/1
100 CAPSULE ORAL EVERY 8 HOURS
Status: DISCONTINUED | OUTPATIENT
Start: 2021-10-04 | End: 2021-10-04 | Stop reason: HOSPADM

## 2021-09-27 RX ORDER — LIDOCAINE 40 MG/G
CREAM TOPICAL
Status: DISCONTINUED | OUTPATIENT
Start: 2021-09-27 | End: 2021-10-04 | Stop reason: HOSPADM

## 2021-09-27 RX ORDER — FOLIC ACID 1 MG/1
1 TABLET ORAL DAILY
Status: DISCONTINUED | OUTPATIENT
Start: 2021-09-28 | End: 2021-10-04 | Stop reason: HOSPADM

## 2021-09-27 RX ORDER — CLONIDINE HYDROCHLORIDE 0.1 MG/1
0.1 TABLET ORAL EVERY 8 HOURS
Status: DISCONTINUED | OUTPATIENT
Start: 2021-09-27 | End: 2021-10-04 | Stop reason: HOSPADM

## 2021-09-27 RX ORDER — GABAPENTIN 300 MG/1
600 CAPSULE ORAL EVERY 8 HOURS
Status: COMPLETED | OUTPATIENT
Start: 2021-09-30 | End: 2021-10-02

## 2021-09-27 RX ORDER — NITROGLYCERIN 0.4 MG/1
0.4 TABLET SUBLINGUAL EVERY 5 MIN PRN
Status: DISCONTINUED | OUTPATIENT
Start: 2021-09-27 | End: 2021-10-04 | Stop reason: HOSPADM

## 2021-09-27 RX ORDER — AMOXICILLIN 250 MG
2 CAPSULE ORAL 2 TIMES DAILY PRN
Status: DISCONTINUED | OUTPATIENT
Start: 2021-09-27 | End: 2021-10-04 | Stop reason: HOSPADM

## 2021-09-27 RX ORDER — POTASSIUM CHLORIDE 1.5 G/1.58G
40 POWDER, FOR SOLUTION ORAL ONCE
Status: COMPLETED | OUTPATIENT
Start: 2021-09-27 | End: 2021-09-27

## 2021-09-27 RX ADMIN — DIPHENHYDRAMINE HYDROCHLORIDE 25 MG: 50 INJECTION INTRAMUSCULAR; INTRAVENOUS at 01:14

## 2021-09-27 RX ADMIN — GABAPENTIN 900 MG: 300 CAPSULE ORAL at 15:43

## 2021-09-27 RX ADMIN — POTASSIUM CHLORIDE 40 MEQ: 1.5 POWDER, FOR SOLUTION ORAL at 03:22

## 2021-09-27 RX ADMIN — LORAZEPAM 2 MG: 2 INJECTION INTRAMUSCULAR; INTRAVENOUS at 08:03

## 2021-09-27 RX ADMIN — LORAZEPAM 1 MG: 1 TABLET ORAL at 12:13

## 2021-09-27 RX ADMIN — POTASSIUM CHLORIDE 10 MEQ: 7.46 INJECTION, SOLUTION INTRAVENOUS at 03:37

## 2021-09-27 RX ADMIN — SODIUM PHOSPHATE, MONOBASIC, MONOHYDRATE 15 MMOL: 276; 142 INJECTION, SOLUTION INTRAVENOUS at 15:20

## 2021-09-27 RX ADMIN — GABAPENTIN 900 MG: 300 CAPSULE ORAL at 23:49

## 2021-09-27 RX ADMIN — SODIUM CHLORIDE 1000 ML: 9 INJECTION, SOLUTION INTRAVENOUS at 01:13

## 2021-09-27 RX ADMIN — LORAZEPAM 1 MG: 1 TABLET ORAL at 19:03

## 2021-09-27 RX ADMIN — CLONIDINE HYDROCHLORIDE 0.1 MG: 0.1 TABLET ORAL at 08:04

## 2021-09-27 RX ADMIN — SODIUM CHLORIDE 1000 ML: 9 INJECTION, SOLUTION INTRAVENOUS at 02:00

## 2021-09-27 RX ADMIN — POTASSIUM CHLORIDE AND SODIUM CHLORIDE: 900; 150 INJECTION, SOLUTION INTRAVENOUS at 19:04

## 2021-09-27 RX ADMIN — DIAZEPAM 5 MG: 5 INJECTION, SOLUTION INTRAMUSCULAR; INTRAVENOUS at 04:33

## 2021-09-27 RX ADMIN — DIAZEPAM 5 MG: 5 INJECTION, SOLUTION INTRAMUSCULAR; INTRAVENOUS at 06:49

## 2021-09-27 RX ADMIN — POTASSIUM CHLORIDE 10 MEQ: 7.46 INJECTION, SOLUTION INTRAVENOUS at 04:27

## 2021-09-27 RX ADMIN — DIAZEPAM 5 MG: 5 INJECTION, SOLUTION INTRAMUSCULAR; INTRAVENOUS at 05:29

## 2021-09-27 RX ADMIN — LORAZEPAM 2 MG: 2 INJECTION INTRAMUSCULAR; INTRAVENOUS at 23:02

## 2021-09-27 RX ADMIN — DIAZEPAM 5 MG: 5 INJECTION, SOLUTION INTRAMUSCULAR; INTRAVENOUS at 03:21

## 2021-09-27 RX ADMIN — METOCLOPRAMIDE HYDROCHLORIDE 10 MG: 5 INJECTION INTRAMUSCULAR; INTRAVENOUS at 01:19

## 2021-09-27 RX ADMIN — LORAZEPAM 1 MG: 1 TABLET ORAL at 17:59

## 2021-09-27 RX ADMIN — LORAZEPAM 1 MG: 1 TABLET ORAL at 15:02

## 2021-09-27 RX ADMIN — GABAPENTIN 1200 MG: 600 TABLET, FILM COATED ORAL at 08:03

## 2021-09-27 RX ADMIN — POTASSIUM CHLORIDE AND SODIUM CHLORIDE: 900; 150 INJECTION, SOLUTION INTRAVENOUS at 08:21

## 2021-09-27 RX ADMIN — LORAZEPAM 1 MG: 2 INJECTION INTRAMUSCULAR; INTRAVENOUS at 16:15

## 2021-09-27 RX ADMIN — LORAZEPAM 2 MG: 1 TABLET ORAL at 23:50

## 2021-09-27 RX ADMIN — CLONIDINE HYDROCHLORIDE 0.1 MG: 0.1 TABLET ORAL at 15:43

## 2021-09-27 RX ADMIN — LORAZEPAM 2 MG: 1 TABLET ORAL at 09:24

## 2021-09-27 RX ADMIN — POTASSIUM CHLORIDE: 2 INJECTION, SOLUTION, CONCENTRATE INTRAVENOUS at 01:31

## 2021-09-27 RX ADMIN — CLONIDINE HYDROCHLORIDE 0.1 MG: 0.1 TABLET ORAL at 23:53

## 2021-09-27 ASSESSMENT — ACTIVITIES OF DAILY LIVING (ADL)
FALL_HISTORY_WITHIN_LAST_SIX_MONTHS: NO
ADLS_ACUITY_SCORE: 3
WEAR_GLASSES_OR_BLIND: NO
DRESSING/BATHING_DIFFICULTY: NO
ADLS_ACUITY_SCORE: 7
CONCENTRATING,_REMEMBERING_OR_MAKING_DECISIONS_DIFFICULTY: NO
TOILETING_ISSUES: NO
DIFFICULTY_EATING/SWALLOWING: NO
HEARING_DIFFICULTY_OR_DEAF: NO
WALKING_OR_CLIMBING_STAIRS_DIFFICULTY: NO
DIFFICULTY_COMMUNICATING: NO
ADLS_ACUITY_SCORE: 7
ADLS_ACUITY_SCORE: 7
DOING_ERRANDS_INDEPENDENTLY_DIFFICULTY: NO

## 2021-09-27 ASSESSMENT — ENCOUNTER SYMPTOMS
VOMITING: 1
NAUSEA: 1
DIARRHEA: 0

## 2021-09-27 ASSESSMENT — MIFFLIN-ST. JEOR: SCORE: 1407.75

## 2021-09-27 NOTE — H&P
[facility]  History and Physical  Hospitalist - Jayro Gillis DO       Date of Admission:  9/27/2021    Chief Complaint   Vomiting    History is obtained from the patient, the emergency department physician, as well as the electronic medical record.    History of Present Illness   Danielle Tadeo is a 46 year old female with past medical history of alcohol abuse, opiate use disorder previously on methadone, hypertension, prolonged QTC, anxiety who presented on 9/27/2021 with chief complaint of nausea and vomiting.  The patient was recently admitted from 9/11/2021 to 9/13/2021 with treatment of acute alcohol intoxication and alcohol use disorder and alcohol withdrawal, hypokalemia, alcohol transaminitis.  The patient was discharged home and began to drink again.  She states because she was drinking she could not take her methadone.  The patient states that she has been vomiting since yesterday.  She denies any blood or black emesis.  She denies any abdominal pain.  She denies any diarrhea.  She denies any fevers or chills.  She denies using any other illicit drugs.  The patient is unable to quantify how much she has been drinking but states that she has been drinking vodka.    In the emergency department the patient had a heart rate of 152, blood pressure 185/127, respiratory rate 33, temperature 98.9  F.  Lab work revealed a potassium of 2.5, bicarb 15, lactic acid 9.8, alkaline phosphatase 199, AST//112, troponin less than 0.015, blood alcohol level 0.16.  The patient was started on IV fluids.    ASSESSMENT/PLAN    Acute Intractable Nausea and Vomiting  Acute Alcohol Intoxication with Severe Alcohol Abuse and Impending Alcohol Withdrawal  Opiate Use Dependence with Methadone Withdrawal  - CIWA with Ativan and Gabapentin taper  - MV, FA, Thiamine  - Clonidine  - IVF  - Would benefit from CD evaluation once stable  - Consult Social Work    Lactic Acidosis  - Likely secondary to  vomiting  - IVF  - Repeat LA in AM    Hypokalemia  - Likely secondary to decreased PO intake and vomiting  - IVF with KCl  - Electrolyte replacement protocol    Alcoholic Hepatitis  - IVF  - Daily Hepatic Panel    Prolonged QTc  - QTc = 541  - Avoid QT pronlonging medications    Hypertension  - Resume home antihypertensives once confirmed by pharmacy    PLAN: Resume home medications as appropriate once confirmed by pharmacy.     DVT Prophylaxis: Pneumatic Compression Devices  Code Status: Full Code  Discharge Plan: Expected discharge: 2-3 days recommended to prior living arrangement once withdrawal symptoms improved, electrolytes stabalized.      Jayro Gillis DO    Primary Care Physician   Fuentes Akbar    -----------------------------------------------------------------------------------------------------------------------------------------------------------------------------------------------------    Past Medical History    I have reviewed this patient's medical history and updated it with pertinent information if needed.   Past Medical History:   Diagnosis Date     Alcohol dependence      Anxiety      Benign essential hypertension     PIH     CAD      Cervical spondylosis without myelopathy 2015     Continuous opioid dependence      Depression      Seasonal allergies        Past Surgical History   I have reviewed this patient's surgical history and updated it with pertinent information if needed.  Past Surgical History:   Procedure Laterality Date     ABDOMINOPLASTY       Arm and thigh lift        SECTION  2004     GASTRIC BYPASS       HYSTERECTOMY       LASIK Bilateral 2001     MAMMOPLASTY REDUCTION         Prior to Admission Medications   Prior to Admission Medications   Prescriptions Last Dose Informant Patient Reported? Taking?   lisinopril (ZESTRIL) 20 MG tablet   No No   Sig: Take 1 tablet (20 mg) by mouth daily   methadone (DOLOPHINE-INTENSOL) 10 MG/ML (HIGH CONC) solution   Yes  "No   Sig: Take 100 mg by mouth daily    metoprolol succinate ER (TOPROL-XL) 50 MG 24 hr tablet   Yes No   Sig: Take 50 mg by mouth daily      Facility-Administered Medications: None     Allergies   Allergies   Allergen Reactions     Zofran [Ondansetron] Other (See Comments)     \"heart stopped\" \"Long QT\"       Social History   I have reviewed this patient's social history and updated it with pertinent information if needed. Danielle Tadeo  reports that she has been smoking cigarettes. She has a 2.50 pack-year smoking history. She has never used smokeless tobacco. She reports previous alcohol use. She reports that she does not use drugs.    Family History   I have reviewed this patient's family history and updated it with pertinent information if needed.   Family History   Problem Relation Age of Onset     Cancer Maternal Grandmother         lung ca     Hypertension Father      Cancer Father 63        pancreatic cancer     Hypertension Mother      Allergies Mother         asthma     Cancer - colorectal No family hx of      Breast Cancer No family hx of        -----------------------------------------------------------------------------------------------------------------------------------------------------------------------------------------------------    Review of Systems   The 10 point Review of Systems is negative other than noted in the HPI or here.     Physical Exam   Temp: 98.9  F (37.2  C) Temp src: Oral BP: (!) 172/95 Pulse: (!) 122   Resp: (!) 32 SpO2: 99 % O2 Device: None (Room air)    Vital Signs with Ranges  Temp:  [98.9  F (37.2  C)] 98.9  F (37.2  C)  Pulse:  [116-152] 122  Resp:  [17-33] 32  BP: (172-190)/() 172/95  SpO2:  [97 %-100 %] 99 %  165 lbs 9.05 oz    Constitutional: Awake, alert, cooperative, no apparent distress.  Eyes: Conjunctiva and pupils examined and normal.  HEENT: Poor dentition  Respiratory: Clear to auscultation bilaterally, no crackles or wheezing.  Cardiovascular: " Regular rate and rhythm, normal S1 and S2, and no murmur noted.  GI: Soft, non-distended, non-tender, normal bowel sounds.  Lymph/Hematologic: No anterior cervical or supraclavicular adenopathy.  Skin: No rashes, no cyanosis, no edema.  Musculoskeletal: No joint swelling, erythema or tenderness.  Neurologic: Cranial nerves 2-12 intact, normal strength and sensation.  Psychiatric: Alert, oriented to person, place and time, no obvious anxiety or depression.     Data     Recent Labs   Lab 09/27/21  0105 09/27/21  0059   WBC 10.9  --    HGB 14.5  --    MCV 97  --      --    NA  --  134   POTASSIUM  --  2.5*   CHLORIDE  --  96   CO2  --  15*   BUN  --  12   CR  --  0.55   ANIONGAP  --  23*   HAZEL  --  8.3*   GLC  --  146*   ALBUMIN  --  2.9*   PROTTOTAL  --  6.8   BILITOTAL  --  1.3   ALKPHOS  --  199*   ALT  --  112*   AST  --  750*   TROPONIN  --  <0.015       No results found for this or any previous visit (from the past 24 hour(s)).

## 2021-09-27 NOTE — ED PROVIDER NOTES
History     Chief Complaint:  Alcohol Intoxication    HPI   Danielle Tadeo is a 46 year old female with history of alcohol dependence and opoid dependence who presents via EMS with alcohol intoxication and opoid withdrawal. Patient says that she has had around 10 drinks of alcohol this evening. Later on she developed nausea and vomiting with tremors. She believed this to be alcohol withdrawal and called EMS. En route to the ED, EMS reports 25 mg benadryl administration, 0.5 Ativan given, -180s, 's. Here at the ED Danielle now claims that she also has stopped going to Methadone clinic one week ago. She says the last time she had methadone was 4 days ago.    Review of Systems   Gastrointestinal: Positive for nausea and vomiting. Negative for diarrhea.   All other systems reviewed and are negative.    Allergies:  Zofran [Ondansetron]    Medications:  lisinopril   methadone   metoprolol succinate ER     Past Medical History:    Diagnosis   Alcohol dependence   Anxiety   Benign essential hypertension   CAD   Cervical spondylosis without myelopathy   Continuous opioid dependence   Depression   Seasonal allergies       Past Surgical History:    Abdominoplasty  Arm and thigh lift   section  Gastric bypass  Hysterectomy  Lasik  Mammoplasty reduction    Family History:    Cancer  HTN  HTN  Allergies    Social History:  Patient unaccompanied in the ED    Physical Exam     Patient Vitals for the past 24 hrs:   BP Temp Temp src Pulse Resp SpO2 Height Weight   21 0730  169/102 -- --  121 23 99 % -- --   21 0700  162/101 -- --  125 19 99 % -- --   21 0645 158/97 -- --  126 15 99 % -- --   21 0630  162/97 -- --  129 30 99 % -- --   21 0615  151/96 -- --  123 25 96 % -- --   21 0600 156/96 -- --  125 32 99 % -- --   21 0545 145/91 -- --  126 25 99 % -- --   21 0530 162/97 -- --  123 30 99 % -- --   21 0515  176/104 -- --  126  33 -- -- --   21 0500  " 170/112 -- -- 123  32 100 % -- --   09/27/21 0445  178/117 -- -- 116  33 100 % -- --   09/27/21 0430  173/110 -- --  124  34 100 % -- --   09/27/21 0415  169/108 100  F (37.8  C) Oral  125 30 100 % -- --   09/27/21 0400  175/107 -- --  122  31 100 % -- --   09/27/21 0345  180/138 -- --  131  40 -- -- --   09/27/21 0330  186/110 -- -- 116 24 99 % -- --   09/27/21 0315  185/118 -- -- 120 25 100 % -- --   09/27/21 0300  164/108 -- --  125 22 99 % -- --   09/27/21 0245  184/106 -- --  128 15 100 % -- --   09/27/21 0230 148/98 -- -- 118 26 99 % -- --   09/27/21 0215 145/92 -- --  122 32 99 % -- --   09/27/21 0200  172/95 -- -- 118 28 -- -- --   09/27/21 0145  183/111 -- -- 116 25 99 % -- --   09/27/21 0130  177/118 -- -- 131 28 97 % -- --   09/27/21 0115  190/145 -- --  144 17 98 % -- --   09/27/21 0100  172/122 -- --  151  33 100 % -- --   09/27/21 0045  185/127 98.9  F (37.2  C) Oral 152 22 99 % 1.676 m (5' 6\") 75.1 kg (165 lb 9.1 oz)     Physical Exam  Nursing note and vitals reviewed.  Constitutional: Cooperative but ill appearing. Tremulous. Actively retching.   HENT:   Mouth/Throat: Mucous membranes are very dry.  Eyes: Pupils are equal, round, and reactive to light. Horizontal nystagmus noted.   Cardiovascular: Tachycardic, regular rhythm and normal heart sounds.  No murmur.  Pulmonary/Chest: Effort normal and breath sounds normal. No respiratory distress. No wheezes. No rales.   Abdominal: Soft. Normal appearance and bowel sounds are normal. No distension. There is no tenderness. There is no rigidity and no guarding.   Musculoskeletal: No LE edema  Neurological: Alert. Oriented x4  Skin: Skin is warm and dry. No rash noted.   Psychiatric: Anxious    Emergency Department Course   ECG  ECG taken at 0052, ECG read at 0058  Sinus tachycardia with short ND   No previous ECG compared.  Rate 147 bpm. ND interval 96 ms. QRS duration 78 ms. QT/QTc 346/541 ms. P-R-T axes 68 18 59.     Laboratory:  Asymptomatic COVID19 " Virus PCR by nasopharyngeal swab: Negative     CBC: WBC 10.9, HGB 14.5,    CMP: Potassium 2.5 (L), CO2 15 (L), Anion Gap 23 (H), Calcium 8.3 (L), Glucose 146 (H), Alkaline Phosphatase 199 (H),  (H),  (H), Albumin 2.9 (L), o/w WNL (Creatinine 0.55)   Magnesium: 1.7  Phosphorus: 1.7 (L)    Troponin (Collected 0059): <0.015    Lactic acid (result time 0122) 9.8 (H)  Lactic acid (result time 0244) 7.9 (H)     Alcohol ethyl: 0.16 (H)  Acetaminophen level: <2 (L)    UA: pending  Urine drug screen: pending    Reviewed:  I reviewed nursing notes, vitals, past medical history and care everywhere    Assessments:  0059 I obtained history and examined the patient as noted above.   0200 I rechecked the patient. She reports that her nausea is better, but she still feels poor. Heart rate was 118.     Consults:   0235 I consulted with Dr. Matt Gillis regarding the patients history and presentation in the emergency department.    Interventions:  0113 0.9 % sodium chloride BOLUS 1000 mL, IV  0119 Metoclopramide 10 mg, IV  0131 sodium chloride 0.9 % 1,000 mL with Infuvite Adult 10 mL, thiamine 100 mg, folic acid 1 mg, potassium chloride 20 mEq, magnesium sulfate 2 g infusion, IV  0200 0.9 % sodium chloride BOLUS 1000 mL, IV  0321 Diazepam 5 mg, IV  0322 Potassium chloride 40 mEq, Oral  0337 Potassium chloride 10 mEq in 100 sterile water intermittent infusion  0433 Diazepam 5 mg, IV  0529 Diazepam 5 mg, IV  0604 Potassium chloride 10 mEq in 100 sterile water intermittent infusion  0649 Diazepam 5 mg, IV    Disposition:  The patient was admitted to the hospital under the care of Dr. Gillis.     Impression & Plan     Medical Decision Making:  Danielle Tadeo is a 46 year old female with a history of both opoid as well as alcohol dependency. She appears to be having a complex withdrawal syndrome including opoid withdrawal. She has not been taking her methadone in the past several days. She is currently intoxicated with a  blood alcohol of 0.16 but with a tachycardia and hypertension. I do suspect that she has mild alcohol withdrawal given her chronic dependencies as well. She is being treated aggressively with IV fluids and received 3 liters total including 1 liter of a banana bag. We are repleting her potassium both orally and intravenously. Magnesium was added to the IV fluid. Initial lactic acid is markedly elevated and improving with IV fluid. No fevers or infectious source identified to this point. Her neurological exam has been reassuring. No indication for head imaging. We are waiting on her urinalysis and drug screen to complete our evaluation. Given the complex nature of her presentation she [place] [facility] [place] and acidosis correction. She is at high risk for alcohol withdrawal and is currently getting valium in the ER and I would place her one the [facility] protocol    Critical Care Time: was 35 minutes for this patient excluding procedures    Covid-19  Danielle Tadeo was evaluated during a global COVID-19 pandemic, which necessitated consideration that the patient might be at risk for infection with the SARS-CoV-2 virus that causes COVID-19.   Applicable protocols for evaluation were followed during the patient's care.   COVID-19 was considered as part of the patient's evaluation. The plan for testing is:  a test was obtained during this visit.    Diagnosis:    ICD-10-CM    1. Opioid dependence with withdrawal  F11.23    2. Alcohol intoxication with delirium  F10.921    3. Non-intractable vomiting with nausea R11.2    4. Dehydration  E86.0    5. Hypokalemia  E87.6    6. Elevated LFTs  R79.89    7. Elevated lactic acid level  R79.89        Scribe Disclosure:  BAIRON, Dawood Anna, am serving as a scribe at 12:58 AM on 9/27/2021 to document services personally performed by Marcelo Ridley MD based on my observations and the provider's statements to me.            Marcelo Ridley MD  09/27/21  0757

## 2021-09-27 NOTE — PHARMACY-ADMISSION MEDICATION HISTORY
Admission medication history interview status for this patient is complete. See [facility] admission navigator for allergy information, prior to admission medications and immunization status.     Medication history interview done, indicate source(s): Patient  Medication history resources (including written lists, pill bottles, clinic record):[facility]  [facility]: [facility]    Changes made to PTA medication list:  Added: None  Changed: None  Reported as Not Taking: None  Removed: None    Actions taken by pharmacist (provider contacted, etc):Called [facility], [facility] ([phone])  Per provider - Patient  Missed last 10 days of methadone (Last dose on Sept 16th, 2021)  When plan to restart (depending on pt's assessment) would recommend the following:)  Day 1: Methadone 50mg (50% of normal dose) daily  Day 2: Methadone 70mg daily  Day 3: Methadone 100mg daily    Additional medication history information:None    Medication reconciliation/reorder completed by provider prior to medication history?  N   (Y/N)       Prior to Admission medications    Medication Sig Last Dose Taking? Auth Provider   lisinopril (ZESTRIL) 20 MG tablet Take 1 tablet (20 mg) by mouth daily Past Week at Unknown time Yes Coming Deepika Helm MD   methadone (DOLOPHINE-INTENSOL) 10 MG/ML (HIGH CONC) solution Take 100 mg by mouth daily  9/16/2021 at Unknown time Yes Unknown, Entered By History   metoprolol succinate ER (TOPROL-XL) 50 MG 24 hr tablet Take 50 mg by mouth daily Past Week at Unknown time Yes Unknown, Entered By History

## 2021-09-27 NOTE — ED NOTES
Pt up on commode pt has runny stool has voider 600 ml now clear fluid, very orange at first pt is cooperative able to drink potassium ok

## 2021-09-27 NOTE — PROGRESS NOTES
[facility]    Hospitalist Progress Note  Name: Danielle Tadeo    MRN: [id number]  Provider: Ev Ramos MD  Date of Service: 09/27/2021    Assessment & Plan   Summary of Stay: Danielle Tadeo is a 46 year old female who was admitted on 9/27/2021 for intractable nausea vomiting acute alcohol intoxication with severe alcohol abuse and impending alcohol withdrawal.  Opioid use disorder with methadone withdrawal.     Patient with known history of alcohol abuse opioid disorder on methadone, hypertension prolonged QTC recent hospitalization for acute alcohol intoxication discharged on 9/13/2021.    She once again presented with nausea and vomiting after consuming alcohol.  On admission in the emergency room he had lactic acid levels of 9.8, blood alcohol level was 0.16.  She was hypertensive tachycardic tachypneic.    Acute Intractable Nausea and Vomiting  Acute Alcohol Intoxication with Severe Alcohol Abuse and Impending Alcohol Withdrawal  Opiate Use Dependence with Methadone Withdrawal  - CIWA with Ativan and Gabapentin taper  -Multivitamin, folic acid and thiamine  - Clonidine  - IVF  - Would benefit from CD evaluation once stable  - Consult Social Work     Lactic Acidosis  - Likely secondary to vomiting  - IVF       Hypokalemia  - Likely secondary to decreased PO intake and vomiting  - IVF with KCl  - Electrolyte replacement protocol     Alcoholic Hepatitis  - IVF  - Daily Hepatic Panel     Prolonged QTc  - QTc = 541  - Avoid QT pronlonging medications     Hypertension  - prn hydralazine     Hypokalemia  - replace per protocol       DVT Prophylaxis: Pneumatic Compression Devices  Code Status: Full Code    Disposition: Expected discharge to be decided at least 2 nights      Interval History   Assumed care reviewed chart.  Patient has been requiring Ativan was pretty somnolent.  Unable to get more than 10 point review of systems as patient was sleeping    -Data reviewed today: I reviewed all  new labs and imaging reports over the last 24 hours. I personally reviewed no images or EKG's today.    Physical Exam   Temp: 97.9  F (36.6  C) Temp src: Oral BP: (!) 159/102 Pulse: 117   Resp: 20 SpO2: 100 % O2 Device: None (Room air)    Vitals:    09/27/21 0045   Weight: 75.1 kg (165 lb 9.1 oz)     Vital Signs with Ranges  Temp:  [97.9  F (36.6  C)-100  F (37.8  C)] 97.9  F (36.6  C)  Pulse:  [116-152] 117  Resp:  [15-40] 20  BP: (145-190)/() 159/102  SpO2:  [96 %-100 %] 100 %  No intake/output data recorded.      GEN: Sleeping after Ativan.  HEENT:  Normocephalic/atraumatic, no scleral icterus, no nasal discharge, mouth moist.  CV: Tachycardic S1 + S2 noted, no S3 or S4.  LUNGS:  Clear to auscultation bilaterally without rales/rhonchi/wheezing/retractions.  Symmetric chest rise on inhalation noted.  ABD:  Active bowel sounds, soft, non-tender/non-distended.  No rebound/guarding/rigidity.  EXT:  No edema.  No cyanosis.  No joint synovitis noted.  SKIN:  Dry to touch, no exanthems noted in the visualized areas.    Medications     0.9% sodium chloride + KCl 20 mEq/L 100 mL/hr at 09/27/21 0821       cloNIDine  0.1 mg Oral Q8H     [START ON 9/28/2021] folic acid  1 mg Oral Daily     [START ON 10/4/2021] gabapentin  100 mg Oral Q8H     [START ON 10/2/2021] gabapentin  300 mg Oral Q8H     [START ON 9/30/2021] gabapentin  600 mg Oral Q8H     gabapentin  900 mg Oral Q8H     [START ON 9/28/2021] multivitamin w/minerals  1 tablet Oral Daily     sodium chloride (PF)  3 mL Intracatheter Q8H     [START ON 9/28/2021] thiamine  100 mg Oral Daily     Data     No results for input(s): PH, PHV, PO2, PO2V, SAT, PCO2, PCO2V, HCO3, HCO3V in the last 168 hours.  Recent Labs   Lab 09/27/21  0105   WBC 10.9   HGB 14.5   HCT 41.7   MCV 97        Recent Labs   Lab 09/27/21  0059      POTASSIUM 2.5*   CHLORIDE 96   CO2 15*   ANIONGAP 23*   *   BUN 12   CR 0.55   GFRESTIMATED >90   HAZEL 8.3*     No results for  input(s): CULT in the last 168 hours.  No results for input(s): NTBNPI, NTBNP in the last 168 hours.  No results for input(s): CKT in the last 168 hours.    Invalid input(s): CK, CK TOTAL  Recent Labs   Lab 09/27/21  0059   CR 0.55     Recent Labs   Lab 09/27/21 0059   *     Recent Labs   Lab 09/27/21  0105   HGB 14.5     Recent Labs   Lab 09/27/21 0059   *   *   ALKPHOS 199*   BILITOTAL 1.3     No results for input(s): INR in the last 168 hours.  Recent Labs   Lab 09/27/21  0234 09/27/21 0105   LACT 7.9* 9.8*     No results for input(s): LIPASE in the last 168 hours.  Recent Labs   Lab 09/27/21 0105        Recent Labs   Lab 09/27/21 0059   BUN 12   CR 0.55     No results for input(s): TSH in the last 168 hours.  Recent Labs   Lab 09/27/21 0059   TROPONIN <0.015     No results for input(s): COLOR, APPEARANCE, URINEGLC, URINEBILI, URINEKETONE, SG, UBLD, URINEPH, PROTEIN, UROBILINOGEN, NITRITE, LEUKEST, RBCU, WBCU in the last 168 hours.    No results found for this or any previous visit (from the past 24 hour(s)).

## 2021-09-27 NOTE — CONSULTS
"CLINICAL NUTRITION SERVICES  -  ASSESSMENT NOTE  Future/Additional Recommendations:    Electrolyte replacement protocols     Continue micronutrients as ordered    When able: Oral nutrition supplements   Malnutrition:   % Weight Loss:None noted  % Intake:Decreased intake does not meet criteria for malnutrition   Subcutaneous Fat Loss: none observed   Muscle Loss: none observed   Fluid Retention:None noted    Malnutrition Diagnosis: Patient does not meet two of the above criteria necessary for diagnosing malnutrition, with risk for decline      REASON FOR ASSESSMENT  Danielle Tadeo is a 46 year old female seen by Registered Dietitian for Provider Order - alcohol withdrawal    History of  alcohol abuse, opiate use disorder previously on methadone, hypertension, prolonged QTC, anxiety who presented on 9/27/2021 with chief complaint of nausea and vomiting.  The patient was recently admitted from 9/11/2021 to 9/13/2021 with treatment of acute alcohol intoxication and alcohol use disorder and alcohol withdrawal, hypokalemia, alcohol transaminitis.  The patient was discharged home and began to drink again.     NUTRITION HISTORY    Information obtained from patient, limited due to lethargy this AM    Food allergies/intolerances: NKFA     Patient is on a regular diet at home.    Typical food/fluid intake: variable meal intake (baseline) until ~2 days prior to admit, when nausea and vomiting started    Supplements at home: none    Meal preparation and grocery shopping: she and \"everyone\" help    Food access concerns: denies    CURRENT NUTRITION ORDERS    Diet: Clear liquid     Supplement: none   Current Intake/Tolerance:    Per flow sheet review, no intake for meals documented.     NUTRITION FOCUSED PHYSICAL ASSESSMENT FOR DIAGNOSING MALNUTRITION + PHYSICAL FINDINGS  Completed and Observed:  Yes, no obvious signs of fat or msucle wasting. Unable to assess dentition. Lethargy  Obtained from Chart/Interdisciplinary " "Team    Edema: none    CIWA trending    Temp (24hrs), Av.9  F (37.2  C), Min:97.9  F (36.6  C), Max:100  F (37.8  C)       ANTHROPOMETRICS  Height: 5' 6\"  Weight: 75 kg   Body mass index is 26.72 kg/m .  Weight Status:  Overweight BMI 25-29.9  Weight History: Weight appears relatively stable, as noted below  Wt Readings from Last 10 Encounters:   21 75.1 kg (165 lb 9.1 oz)   21 77.4 kg (170 lb 9.6 oz)   20 66.2 kg (146 lb)   19 74 kg (163 lb 3.2 oz)   19 74.4 kg (164 lb)   18 88.4 kg (194 lb 12.8 oz)   17 86.4 kg (190 lb 8 oz)   10/17/17 83 kg (183 lb)   09/16/15 69.9 kg (154 lb)   08/04/15 67.1 kg (148 lb)       ASSESSED NUTRITION NEEDS (PER APPROVED PRACTICE GUIDELINES, Dosing weight: 75 kg):  Estimated Energy Needs: 25-30 kcal per kg  Justification: minimum maintenance while hospitalized   Estimated Protein Needs: 1+ grams per kg  Justification: preservation of lean body mass  Estimated Fluid Needs: per provider     LABS  Labs reviewed  Labs:  Electrolytes  Potassium (mmol/L)   Date Value   2021 2.5 (LL)   2021 3.6   2021 4.0   2019 4.8   2017 3.8   10/17/2017 4.0     Phosphorus (mg/dL)   Date Value   2021 1.7 (L)   2021 3.1    Blood Glucose  Glucose (mg/dL)   Date Value   2021 146 (H)   2021 113 (H)   2021 89   2021 132 (H)   2019 66 (L)   2019 98   2017 79   10/17/2017 98   2015 76   2014 81    Inflammatory Markers    WBC Count (10e3/uL)   Date Value   2021 10.9   2021 3.9 (L)   2021 4.8     Albumin (g/dL)   Date Value   2021 2.9 (L)   2021 2.3 (L)   2021 2.3 (L)      Magnesium (mg/dL)   Date Value   2021 1.7   2021 1.9   2021 1.9   2014 2.2     Sodium (mmol/L)   Date Value   2021 134   2021 142   2021 142   2019 139   2017 140   10/17/2017 138    Renal  Urea Nitrogen (mg/dL)   Date " Value   09/27/2021 12   09/13/2021 4 (L)   09/12/2021 5 (L)   01/16/2019 13   12/22/2017 16   10/17/2017 14     Creatinine (mg/dL)   Date Value   09/27/2021 0.55   09/13/2021 0.58   09/12/2021 0.65   01/16/2019 0.68   12/22/2017 0.69   10/17/2017 0.72     Additional  Triglycerides (mg/dL)   Date Value   08/08/2006 199 (H)     Ketones Urine (mg/dL)   Date Value   09/11/2021 Trace (A)   07/08/2015 Negative        MEDICATIONS    Medications reviewed    cloNIDine  0.1 mg Oral Q8H     [START ON 9/28/2021] folic acid  1 mg Oral Daily     [START ON 10/4/2021] gabapentin  100 mg Oral Q8H     [START ON 10/2/2021] gabapentin  300 mg Oral Q8H     [START ON 9/30/2021] gabapentin  600 mg Oral Q8H     gabapentin  900 mg Oral Q8H     [START ON 9/28/2021] multivitamin w/minerals  1 tablet Oral Daily     sodium chloride (PF)  3 mL Intracatheter Q8H     [START ON 9/28/2021] thiamine  100 mg Oral Daily          0.9% sodium chloride + KCl 20 mEq/L 100 mL/hr at 09/27/21 0821          NUTRITION DIAGNOSIS:  Inadequate nutrient intake (protein energy) related to nausea and vomiting with ETOH withdrawal as evidenced by clear liquid diet, minimal PO x 3 days    INTERVENTIONS  Recommendations / Nutrition Prescription  Diet advancement per MD discretion  Continue micronutrients diet  Oral nutrition supplements when able  Electrolyte replacement protocols     Implementation  Nutrition education: not appropriate   Collaboration and Referral of care: Discussed patient during interdisciplinary care rounds this morning and with RN    Goals  Diet >/= full liquids within 72 hours    MONITORING AND EVALUATION:  Progress towards goals will be monitored and evaluated per protocol and Practice Guidelines      Chantal Collado, MS, RDN-AP, LD, CNSC  Pager - 3rd floor/ICU: [phone]  Pager - All other floors: [phone]  Pager - Weekend/holiday: [phone]  Office: [phone]

## 2021-09-27 NOTE — ED TRIAGE NOTES
Pt. Brought in via EMS for alcohol withdrawal. Unknown amount of alcohol intake. Pt. Stopped attending Methadone clinic 1 week ago. Nausea and vomiting. 25mg Benadryl given, 0.5 Ativan given PTA. A&Ox4. -180's. 's. ABC's intact. EKG completed.

## 2021-09-27 NOTE — ED NOTES
"[facility]  ED Nurse Handoff Report    Danielle Tadeo is a 46 year old female   ED Chief complaint: Alcohol Intoxication  . ED Diagnosis:   Final diagnoses:   Opioid dependence with withdrawal (H)   Alcohol intoxication with delirium (H)   Non-intractable vomiting with nausea, unspecified vomiting type   Dehydration   Hypokalemia   Elevated LFTs   Elevated lactic acid level     Allergies:   Allergies   Allergen Reactions     Zofran [Ondansetron] Other (See Comments)     \"heart stopped\" \"Long QT\"       Code Status: Full Code  Activity level - Baseline/Home:  Independent. Activity Level - Current:   Assist X 1. Lift room needed: No. Bariatric: No   Needed: No   Isolation: No. Infection: Not Applicable.     Vital Signs:   Vitals:    09/27/21 0245 09/27/21 0300 09/27/21 0315 09/27/21 0330   BP: (!) 184/106 (!) 164/108 (!) 185/118 (!) 186/110   Pulse: (!) 128 (!) 125 120 116   Resp: 15 22 25 24   Temp:       TempSrc:       SpO2: 100% 99% 100% 99%   Weight:       Height:           Cardiac Rhythm:  ,   Cardiac  Cardiac Rhythm: Other (Comment) (Sinus tachycardia)  Pain level:    Patient confused: No. Patient Falls Risk: Yes.   Elimination Status: Has voided   Patient Report - Initial Complaint: pt came in by ambulance etoh. Focused Assessment:pt high heart rate and bp  Tests Performed:labsAbnormal Results: labs  Treatments provided:meds, potassium  Family Comments: pt will do  OBS brochure/video discussed/provided to patient:  Yes  ED Medications:   Medications   lidocaine 1 % 0.1-1 mL (has no administration in time range)   lidocaine (LMX4) cream (has no administration in time range)   sodium chloride (PF) 0.9% PF flush 3 mL (has no administration in time range)   sodium chloride (PF) 0.9% PF flush 3 mL (has no administration in time range)   diazepam (VALIUM) injection 5 mg (5 mg Intravenous Given 9/27/21 0321)   potassium chloride 10 mEq in 100 mL sterile water intermittent infusion (premix) " (has no administration in time range)   potassium chloride 10 mEq in 100 mL sterile water intermittent infusion (premix) (10 mEq Intravenous New Bag 9/27/21 0337)   0.9% sodium chloride BOLUS (0 mLs Intravenous Stopped 9/27/21 0200)   sodium chloride 0.9 % 1,000 mL with Infuvite Adult 10 mL, thiamine 100 mg, folic acid 1 mg, potassium chloride 20 mEq, magnesium sulfate 2 g infusion ( Intravenous Stopped 9/27/21 0404)   metoclopramide (REGLAN) injection 10 mg (10 mg Intravenous Given 9/27/21 0119)   diphenhydrAMINE (BENADRYL) injection 25 mg (25 mg Intravenous Given 9/27/21 0114)   0.9% sodium chloride BOLUS (0 mLs Intravenous Stopped 9/27/21 0239)   potassium chloride (KLOR-CON) Packet 40 mEq (40 mEq Oral Given 9/27/21 0322)     Drips infusing:  No  For the majority of the shift, the patient's behavior Green. Interventions performed were none.    Sepsis treatment initiated: No     Patient tested for COVID 19 prior to admission: YES    ED Nurse Name/Phone Number: Carol Crespo RN,   4:16 AM  RECEIVING UNIT ED HANDOFF REVIEW    Above ED Nurse Handoff Report was reviewed: Yes  Reviewed by: Reji Medina RN on September 27, 2021 at 7:30 AM

## 2021-09-27 NOTE — PLAN OF CARE
ER admit this am. B/p elevated. Some decrease with clonidine. CIWA 15 for tremors, nausea, diaphoresis and anxiety and then 7 after ativan. Falls asleep easily after receiving ativan. Continuous pulse ox on. Sats % on RA. Alarms on. Pt can be impulsive. Up with A1 and belt. Unsteady. K 2.5 replaced in er recheck 4.2. potassium chloride infusing continuously. Phos 1.7 replacing now. Lactic 7.9. lose stools. Continent.

## 2021-09-28 ENCOUNTER — PATIENT OUTREACH (OUTPATIENT)
Dept: CARE COORDINATION | Facility: CLINIC | Age: 46
End: 2021-09-28

## 2021-09-28 LAB
ALBUMIN SERPL-MCNC: 2.1 G/DL (ref 3.4–5)
ALP SERPL-CCNC: 148 U/L (ref 40–150)
ALT SERPL W P-5'-P-CCNC: 93 U/L (ref 0–50)
ANION GAP SERPL CALCULATED.3IONS-SCNC: 8 MMOL/L (ref 3–14)
AST SERPL W P-5'-P-CCNC: 647 U/L (ref 0–45)
BILIRUB DIRECT SERPL-MCNC: 0.9 MG/DL (ref 0–0.2)
BILIRUB SERPL-MCNC: 1.5 MG/DL (ref 0.2–1.3)
BUN SERPL-MCNC: 2 MG/DL (ref 7–30)
CALCIUM SERPL-MCNC: 7.7 MG/DL (ref 8.5–10.1)
CHLORIDE BLD-SCNC: 113 MMOL/L (ref 94–109)
CO2 SERPL-SCNC: 20 MMOL/L (ref 20–32)
CREAT SERPL-MCNC: 0.49 MG/DL (ref 0.52–1.04)
ERYTHROCYTE [DISTWIDTH] IN BLOOD BY AUTOMATED COUNT: 15.8 % (ref 10–15)
GFR SERPL CREATININE-BSD FRML MDRD: >90 ML/MIN/1.73M2
GLUCOSE BLD-MCNC: 108 MG/DL (ref 70–99)
HCT VFR BLD AUTO: 32 % (ref 35–47)
HGB BLD-MCNC: 10.5 G/DL (ref 11.7–15.7)
INR PPP: 1.03 (ref 0.85–1.15)
LACTATE SERPL-SCNC: 2 MMOL/L (ref 0.7–2)
LIPASE SERPL-CCNC: 1007 U/L (ref 73–393)
MAGNESIUM SERPL-MCNC: 1.9 MG/DL (ref 1.6–2.3)
MCH RBC QN AUTO: 33.7 PG (ref 26.5–33)
MCHC RBC AUTO-ENTMCNC: 32.8 G/DL (ref 31.5–36.5)
MCV RBC AUTO: 103 FL (ref 78–100)
PHOSPHATE SERPL-MCNC: 1.4 MG/DL (ref 2.5–4.5)
PHOSPHATE SERPL-MCNC: 1.6 MG/DL (ref 2.5–4.5)
PLATELET # BLD AUTO: 112 10E3/UL (ref 150–450)
POTASSIUM BLD-SCNC: 3.9 MMOL/L (ref 3.4–5.3)
PROT SERPL-MCNC: 5.4 G/DL (ref 6.8–8.8)
RBC # BLD AUTO: 3.12 10E6/UL (ref 3.8–5.2)
SODIUM SERPL-SCNC: 141 MMOL/L (ref 133–144)
WBC # BLD AUTO: 4.4 10E3/UL (ref 4–11)

## 2021-09-28 PROCEDURE — 84100 ASSAY OF PHOSPHORUS: CPT | Performed by: INTERNAL MEDICINE

## 2021-09-28 PROCEDURE — 80048 BASIC METABOLIC PNL TOTAL CA: CPT | Performed by: INTERNAL MEDICINE

## 2021-09-28 PROCEDURE — 36415 COLL VENOUS BLD VENIPUNCTURE: CPT | Performed by: INTERNAL MEDICINE

## 2021-09-28 PROCEDURE — 83735 ASSAY OF MAGNESIUM: CPT | Performed by: INTERNAL MEDICINE

## 2021-09-28 PROCEDURE — 83605 ASSAY OF LACTIC ACID: CPT | Performed by: INTERNAL MEDICINE

## 2021-09-28 PROCEDURE — 85027 COMPLETE CBC AUTOMATED: CPT | Performed by: INTERNAL MEDICINE

## 2021-09-28 PROCEDURE — 82248 BILIRUBIN DIRECT: CPT | Performed by: INTERNAL MEDICINE

## 2021-09-28 PROCEDURE — 85610 PROTHROMBIN TIME: CPT | Performed by: INTERNAL MEDICINE

## 2021-09-28 PROCEDURE — 99233 SBSQ HOSP IP/OBS HIGH 50: CPT | Performed by: INTERNAL MEDICINE

## 2021-09-28 PROCEDURE — 250N000011 HC RX IP 250 OP 636: Performed by: INTERNAL MEDICINE

## 2021-09-28 PROCEDURE — 80069 RENAL FUNCTION PANEL: CPT | Performed by: INTERNAL MEDICINE

## 2021-09-28 PROCEDURE — 83690 ASSAY OF LIPASE: CPT | Performed by: INTERNAL MEDICINE

## 2021-09-28 PROCEDURE — 120N000013 HC R&B IMCU

## 2021-09-28 PROCEDURE — 250N000013 HC RX MED GY IP 250 OP 250 PS 637: Performed by: INTERNAL MEDICINE

## 2021-09-28 RX ADMIN — LORAZEPAM 2 MG: 1 TABLET ORAL at 12:01

## 2021-09-28 RX ADMIN — LORAZEPAM 1 MG: 1 TABLET ORAL at 07:03

## 2021-09-28 RX ADMIN — CLONIDINE HYDROCHLORIDE 0.1 MG: 0.1 TABLET ORAL at 14:54

## 2021-09-28 RX ADMIN — LORAZEPAM 2 MG: 1 TABLET ORAL at 19:29

## 2021-09-28 RX ADMIN — GABAPENTIN 900 MG: 300 CAPSULE ORAL at 14:55

## 2021-09-28 RX ADMIN — LORAZEPAM 1 MG: 1 TABLET ORAL at 14:55

## 2021-09-28 RX ADMIN — MULTIPLE VITAMINS W/ MINERALS TAB 1 TABLET: TAB at 08:22

## 2021-09-28 RX ADMIN — FOLIC ACID 1 MG: 1 TABLET ORAL at 08:22

## 2021-09-28 RX ADMIN — THIAMINE HCL TAB 100 MG 100 MG: 100 TAB at 08:22

## 2021-09-28 RX ADMIN — CLONIDINE HYDROCHLORIDE 0.1 MG: 0.1 TABLET ORAL at 08:22

## 2021-09-28 RX ADMIN — POTASSIUM CHLORIDE AND SODIUM CHLORIDE: 900; 150 INJECTION, SOLUTION INTRAVENOUS at 05:48

## 2021-09-28 RX ADMIN — LORAZEPAM 1 MG: 1 TABLET ORAL at 03:09

## 2021-09-28 RX ADMIN — LORAZEPAM 1 MG: 1 TABLET ORAL at 01:46

## 2021-09-28 RX ADMIN — GABAPENTIN 900 MG: 300 CAPSULE ORAL at 08:22

## 2021-09-28 RX ADMIN — LORAZEPAM 2 MG: 2 INJECTION INTRAMUSCULAR; INTRAVENOUS at 14:04

## 2021-09-28 RX ADMIN — LORAZEPAM 1 MG: 1 TABLET ORAL at 20:09

## 2021-09-28 RX ADMIN — POTASSIUM & SODIUM PHOSPHATES POWDER PACK 280-160-250 MG 2 PACKET: 280-160-250 PACK at 03:42

## 2021-09-28 RX ADMIN — LORAZEPAM 1 MG: 1 TABLET ORAL at 00:28

## 2021-09-28 RX ADMIN — LORAZEPAM 1 MG: 1 TABLET ORAL at 23:16

## 2021-09-28 RX ADMIN — POTASSIUM & SODIUM PHOSPHATES POWDER PACK 280-160-250 MG 2 PACKET: 280-160-250 PACK at 08:21

## 2021-09-28 RX ADMIN — LORAZEPAM 2 MG: 1 TABLET ORAL at 21:34

## 2021-09-28 RX ADMIN — POTASSIUM CHLORIDE AND SODIUM CHLORIDE: 900; 150 INJECTION, SOLUTION INTRAVENOUS at 16:47

## 2021-09-28 RX ADMIN — LORAZEPAM 2 MG: 1 TABLET ORAL at 08:21

## 2021-09-28 RX ADMIN — POTASSIUM & SODIUM PHOSPHATES POWDER PACK 280-160-250 MG 2 PACKET: 280-160-250 PACK at 14:55

## 2021-09-28 ASSESSMENT — ACTIVITIES OF DAILY LIVING (ADL)
ADLS_ACUITY_SCORE: 7
ADLS_ACUITY_SCORE: 5

## 2021-09-28 ASSESSMENT — MIFFLIN-ST. JEOR: SCORE: 1450.09

## 2021-09-28 NOTE — PROGRESS NOTES
Clinic Care Coordination Contact    Situation: Patient chart reviewed by care coordinator.    Background: Past medical history of alcohol abuse, opiate use disorder previously on methadone, hypertension, prolonged QTC, anxiety who presented on 9/27/2021 with chief complaint of nausea and vomiting.  The patient was recently admitted from 9/11/2021 to 9/13/2021 with treatment of acute alcohol intoxication and alcohol use disorder and alcohol withdrawal, hypokalemia, alcohol transaminitis.  The patient was discharged home and began to drink agai    Assessment: Danielle is currently hospitalized at [facility]    Plan/Recommendations:  CC will monitor when she is discharged from the hospital and will outreach to her at that time.      CATHERINE Garg Care Coordination Team  [phone]

## 2021-09-28 NOTE — PLAN OF CARE
VSS ex HTN. Pt A&Ox4. Denies pain ex HA. On CIWA protocol. Scoring on tremors, HA, nausea, and some pins/needles. Intermittent visual hallucinations. Total of 7mg of ativan given. CIWA scored 7-20 this shift. IVF infusing. On clear liquid diet, advanced to regular. Tele SR. Intermittently anxious/tearful about POC. Emotional support provided. Discharge pending WD symptoms.

## 2021-09-28 NOTE — PLAN OF CARE
"BP (!) 155/90 (BP Location: Left arm)   Pulse 115   Temp 99  F (37.2  C) (Axillary)   Resp 22   Ht 1.676 m (5' 6\")   Wt 75.1 kg (165 lb 9.1 oz)   LMP 06/07/2015   SpO2 100%   BMI 26.72 kg/m      NEURO:A/Ox4, anxious and restless, CIWAs - given 5 mg of ativan per protocol this shift  VS:BP elevated 150s systolic, HR tachy  PAIN:Denies  TELE:ST  IV:LA PIV X2, IVF infusing at 100 ml/hr  RESPIRATORY:LS- Clear, denies SOB  GI:+BS, diarrhea, denies nausea  : Voiding appropriately  SKIN:Scabs, bruises  ACTIVITY:Ax1 and gait belt  DIET:Clears  LABS: Phos replaced recheck ordered  PLAN: Continue IVF, CIWAs, electrolyte replacement, continue POC.   "

## 2021-09-28 NOTE — PROGRESS NOTES
[facility]    Hospitalist Progress Note  Name: Danielle Tadeo    MRN: [id number]  Provider: Ev Ramos MD  Date of Service: 09/28/2021    Assessment & Plan   Summary of Stay: Danielle Tadeo is a 46 year old female who was admitted on 9/27/2021 for intractable nausea vomiting acute alcohol intoxication with severe alcohol abuse and impending alcohol withdrawal.  Opioid use disorder with methadone withdrawal.     Patient with known history of alcohol abuse opioid disorder on methadone, hypertension prolonged QTC recent hospitalization for acute alcohol intoxication discharged on 9/13/2021.    She once again presented with nausea and vomiting after consuming alcohol.  On admission in the emergency room he had lactic acid levels of 9.8, blood alcohol level was 0.16.  She was hypertensive tachycardic tachypneic.    On CIWA protocol scored about 18 earlier today required 2 mg of Ativan has been asleep for me during evaluation    Acute Intractable Nausea and Vomiting  Acute Alcohol Intoxication with Severe Alcohol Abuse and Impending Alcohol Withdrawal  Opiate Use Dependence with Methadone Withdrawal  - CIWA with Ativan and Gabapentin taper  -Multivitamin, folic acid and thiamine  - Clonidine  - IVF  - Would benefit from CD evaluation once stable  - Consult Social Work/chemical dependency consult     Lactic Acidosis: resolved  - Likely secondary to vomiting  - IVF       Hypokalemia  - Likely secondary to decreased PO intake and vomiting  - IVF with KCl  - Electrolyte replacement protocol     Alcoholic Hepatitis  - slow improvement  - IVF  - Daily Hepatic Panel     Prolonged QTc  - QTc = 541  - Avoid QT pronlonging medications     Hypertension  - prn hydralazine     Hypokalemia  - replace per protocol    Drop in hgb  - no signs of bleeding  - likely dilutional  - will recheck hgb       DVT Prophylaxis: Pneumatic Compression Devices  Code Status: Full Code    Disposition: Expected discharge to be  decided at least 2 nights      Interval History   Reviewed chart.  Once again was scoring about 18.  Sleeping for me unable to get more than 10 point review of systems as patient was sleeping    -Data reviewed today: I reviewed all new labs and imaging reports over the last 24 hours. I personally reviewed no images or EKG's today.    Physical Exam   Temp: 99.4  F (37.4  C) Temp src: Tympanic BP: (!) 154/85 Pulse: 95   Resp: 22 SpO2: 99 % O2 Device: None (Room air)    Vitals:    09/27/21 0045 09/28/21 0556   Weight: 75.1 kg (165 lb 9.1 oz) 79.3 kg (174 lb 14.4 oz)     Vital Signs with Ranges  Temp:  [98  F (36.7  C)-100  F (37.8  C)] 99.4  F (37.4  C)  Pulse:  [] 95  Resp:  [20-32] 22  BP: (144-157)/() 154/85  SpO2:  [97 %-100 %] 99 %  I/O last 3 completed shifts:  In: 720 [P.O.:720]  Out: 1900 [Urine:1900]      GEN: Sleeping after Ativan.  HEENT:  Normocephalic/atraumatic, no scleral icterus, no nasal discharge, mouth moist.  CV: Tachycardic S1 + S2 noted, no S3 or S4.  LUNGS:  Clear to auscultation bilaterally without rales/rhonchi/wheezing/retractions.  Symmetric chest rise on inhalation noted.  ABD:  Active bowel sounds, soft, non-tender/non-distended.  No rebound/guarding/rigidity.  EXT:  No edema.  No cyanosis.  No joint synovitis noted.  SKIN:  Dry to touch, no exanthems noted in the visualized areas.    Medications     0.9% sodium chloride + KCl 20 mEq/L 100 mL/hr at 09/28/21 0826       cloNIDine  0.1 mg Oral Q8H     folic acid  1 mg Oral Daily     [START ON 10/4/2021] gabapentin  100 mg Oral Q8H     [START ON 10/2/2021] gabapentin  300 mg Oral Q8H     [START ON 9/30/2021] gabapentin  600 mg Oral Q8H     gabapentin  900 mg Oral Q8H     multivitamin w/minerals  1 tablet Oral Daily     potassium & sodium phosphates  2 packet Oral or Feeding Tube TID     sodium chloride (PF)  3 mL Intracatheter Q8H     thiamine  100 mg Oral Daily     Data     No results for input(s): PH, PHV, PO2, PO2V, SAT, PCO2,  PCO2V, HCO3, HCO3V in the last 168 hours.  Recent Labs   Lab 09/28/21  0737 09/27/21  0105   WBC 4.4 10.9   HGB 10.5* 14.5   HCT 32.0* 41.7   * 97   * 278     Recent Labs   Lab 09/28/21  0737 09/27/21  1105 09/27/21  0059    138 134   POTASSIUM 3.9 4.2 2.5*   CHLORIDE 113* 107 96   CO2 20 20 15*   ANIONGAP 8 11 23*   * 122* 146*   BUN 2* 7 12   CR 0.49* 0.50* 0.55   GFRESTIMATED >90 >90 >90   HAZEL 7.7* 7.3* 8.3*     No results for input(s): CULT in the last 168 hours.  No results for input(s): NTBNPI, NTBNP in the last 168 hours.  No results for input(s): CKT in the last 168 hours.    Invalid input(s): CK, CK TOTAL  Recent Labs   Lab 09/28/21  0737 09/27/21  1105 09/27/21  0059   CR 0.49* 0.50* 0.55     Recent Labs   Lab 09/28/21  0737 09/27/21  1105 09/27/21  0059   * 122* 146*     Recent Labs   Lab 09/28/21  0737 09/27/21  0105   HGB 10.5* 14.5     Recent Labs   Lab 09/28/21  0737 09/27/21  0059   * 750*   ALT 93* 112*   ALKPHOS 148 199*   BILITOTAL 1.5* 1.3     Recent Labs   Lab 09/28/21  0737   INR 1.03     Recent Labs   Lab 09/28/21  0737 09/27/21  0234 09/27/21  0105   LACT 2.0 7.9* 9.8*     Recent Labs   Lab 09/28/21  0737   LIPASE 1,007*     Recent Labs   Lab 09/28/21  0737 09/27/21  0105   * 278     Recent Labs   Lab 09/28/21  0737 09/27/21  1105 09/27/21  0059   BUN 2* 7 12   CR 0.49* 0.50* 0.55     No results for input(s): TSH in the last 168 hours.  Recent Labs   Lab 09/27/21  0059   TROPONIN <0.015     Recent Labs   Lab 09/27/21  1030   COLOR Yellow   APPEARANCE Clear   URINEGLC Negative   URINEBILI Small*   URINEKETONE Trace*   SG 1.022   UBLD Negative   URINEPH 6.5   PROTEIN 20 *   NITRITE Negative   LEUKEST Negative   RBCU 1   WBCU 1       No results found for this or any previous visit (from the past 24 hour(s)).

## 2021-09-28 NOTE — PLAN OF CARE
VSS BP elevated at 155/91. Tele: Sinus tachy, scheduled clonidine given. CIWA 18, 11,10,9,7. Tremors, nausea, headache, visual hallucinations, anxiety. 5MG Ativan given during shift. Pt responded well and slept. Pt experiencing loose stools and incontinence. Continuous pulse O2 Sats % on RA. Assist x1. Phos 1.4 replacement given at 03:42 last dose due at 16:00.

## 2021-09-28 NOTE — PROVIDER NOTIFICATION
MD notified: Can pts diet be advanced? She has been tolerating clears all day just fine. Kitchen closes soon and she would love to eat, thank you!    Regular diet ordered.

## 2021-09-28 NOTE — CONSULTS
Brief social work (Sw) note:    Sw is aware of consult ordered on 9/27 for discharge planning and chemical dependency resources.  Per chart review and RN's note from this morning, the patient (pt) scored a 18,11,10,9, and 7 overnight on the CIWA.  The pt is having visual hallucinations, tremors, nausea, and anxiety.  The pt is not appropriate to be seen by Sw at this time.     Sw will continue to follow the pt and see her once appropriate.      MARISELA Domínguez, [facility]  Inpatient Care Coordination  [facility]  [phone]

## 2021-09-29 LAB
ALBUMIN SERPL-MCNC: 2.2 G/DL (ref 3.4–5)
ALP SERPL-CCNC: 162 U/L (ref 40–150)
ALT SERPL W P-5'-P-CCNC: 92 U/L (ref 0–50)
ANION GAP SERPL CALCULATED.3IONS-SCNC: 11 MMOL/L (ref 3–14)
AST SERPL W P-5'-P-CCNC: 414 U/L (ref 0–45)
BILIRUB DIRECT SERPL-MCNC: 0.5 MG/DL (ref 0–0.2)
BILIRUB SERPL-MCNC: 1 MG/DL (ref 0.2–1.3)
BUN SERPL-MCNC: 1 MG/DL (ref 7–30)
CALCIUM SERPL-MCNC: 7.7 MG/DL (ref 8.5–10.1)
CHLORIDE BLD-SCNC: 112 MMOL/L (ref 94–109)
CO2 SERPL-SCNC: 19 MMOL/L (ref 20–32)
CREAT SERPL-MCNC: 0.49 MG/DL (ref 0.52–1.04)
ERYTHROCYTE [DISTWIDTH] IN BLOOD BY AUTOMATED COUNT: 15.9 % (ref 10–15)
GFR SERPL CREATININE-BSD FRML MDRD: >90 ML/MIN/1.73M2
GLUCOSE BLD-MCNC: 139 MG/DL (ref 70–99)
HCT VFR BLD AUTO: 32.1 % (ref 35–47)
HGB BLD-MCNC: 9.6 G/DL (ref 11.7–15.7)
MAGNESIUM SERPL-MCNC: 1.7 MG/DL (ref 1.6–2.3)
MCH RBC QN AUTO: 32.5 PG (ref 26.5–33)
MCHC RBC AUTO-ENTMCNC: 29.9 G/DL (ref 31.5–36.5)
MCV RBC AUTO: 109 FL (ref 78–100)
PLATELET # BLD AUTO: 100 10E3/UL (ref 150–450)
POTASSIUM BLD-SCNC: 3.6 MMOL/L (ref 3.4–5.3)
PROT SERPL-MCNC: 5.5 G/DL (ref 6.8–8.8)
RBC # BLD AUTO: 2.95 10E6/UL (ref 3.8–5.2)
SODIUM SERPL-SCNC: 142 MMOL/L (ref 133–144)
WBC # BLD AUTO: 4.2 10E3/UL (ref 4–11)

## 2021-09-29 PROCEDURE — 36415 COLL VENOUS BLD VENIPUNCTURE: CPT | Performed by: INTERNAL MEDICINE

## 2021-09-29 PROCEDURE — 83735 ASSAY OF MAGNESIUM: CPT | Performed by: INTERNAL MEDICINE

## 2021-09-29 PROCEDURE — 120N000013 HC R&B IMCU

## 2021-09-29 PROCEDURE — 80048 BASIC METABOLIC PNL TOTAL CA: CPT | Performed by: INTERNAL MEDICINE

## 2021-09-29 PROCEDURE — 82040 ASSAY OF SERUM ALBUMIN: CPT | Performed by: INTERNAL MEDICINE

## 2021-09-29 PROCEDURE — 250N000013 HC RX MED GY IP 250 OP 250 PS 637: Performed by: INTERNAL MEDICINE

## 2021-09-29 PROCEDURE — 250N000011 HC RX IP 250 OP 636: Performed by: INTERNAL MEDICINE

## 2021-09-29 PROCEDURE — 85027 COMPLETE CBC AUTOMATED: CPT | Performed by: INTERNAL MEDICINE

## 2021-09-29 PROCEDURE — 99233 SBSQ HOSP IP/OBS HIGH 50: CPT | Performed by: INTERNAL MEDICINE

## 2021-09-29 RX ORDER — METOPROLOL TARTRATE 25 MG/1
25 TABLET, FILM COATED ORAL 2 TIMES DAILY
Status: DISCONTINUED | OUTPATIENT
Start: 2021-09-29 | End: 2021-10-04 | Stop reason: HOSPADM

## 2021-09-29 RX ORDER — HYDRALAZINE HYDROCHLORIDE 20 MG/ML
10 INJECTION INTRAMUSCULAR; INTRAVENOUS EVERY 4 HOURS PRN
Status: DISCONTINUED | OUTPATIENT
Start: 2021-09-29 | End: 2021-10-04 | Stop reason: HOSPADM

## 2021-09-29 RX ORDER — CYCLOBENZAPRINE HCL 10 MG
10 TABLET ORAL EVERY 8 HOURS PRN
Status: DISCONTINUED | OUTPATIENT
Start: 2021-09-29 | End: 2021-10-04 | Stop reason: HOSPADM

## 2021-09-29 RX ADMIN — LORAZEPAM 1 MG: 1 TABLET ORAL at 03:08

## 2021-09-29 RX ADMIN — POTASSIUM & SODIUM PHOSPHATES POWDER PACK 280-160-250 MG 2 PACKET: 280-160-250 PACK at 16:43

## 2021-09-29 RX ADMIN — GABAPENTIN 900 MG: 300 CAPSULE ORAL at 23:58

## 2021-09-29 RX ADMIN — LORAZEPAM 2 MG: 1 TABLET ORAL at 08:34

## 2021-09-29 RX ADMIN — CLONIDINE HYDROCHLORIDE 0.1 MG: 0.1 TABLET ORAL at 23:58

## 2021-09-29 RX ADMIN — METOPROLOL TARTRATE 25 MG: 25 TABLET, FILM COATED ORAL at 21:27

## 2021-09-29 RX ADMIN — LORAZEPAM 1 MG: 1 TABLET ORAL at 14:20

## 2021-09-29 RX ADMIN — MULTIPLE VITAMINS W/ MINERALS TAB 1 TABLET: TAB at 08:35

## 2021-09-29 RX ADMIN — LORAZEPAM 1 MG: 1 TABLET ORAL at 20:15

## 2021-09-29 RX ADMIN — LORAZEPAM 1 MG: 1 TABLET ORAL at 11:56

## 2021-09-29 RX ADMIN — POTASSIUM & SODIUM PHOSPHATES POWDER PACK 280-160-250 MG 2 PACKET: 280-160-250 PACK at 08:34

## 2021-09-29 RX ADMIN — LORAZEPAM 2 MG: 1 TABLET ORAL at 09:40

## 2021-09-29 RX ADMIN — THIAMINE HCL TAB 100 MG 100 MG: 100 TAB at 08:35

## 2021-09-29 RX ADMIN — POTASSIUM CHLORIDE AND SODIUM CHLORIDE: 900; 150 INJECTION, SOLUTION INTRAVENOUS at 04:22

## 2021-09-29 RX ADMIN — GABAPENTIN 900 MG: 300 CAPSULE ORAL at 00:27

## 2021-09-29 RX ADMIN — CLONIDINE HYDROCHLORIDE 0.1 MG: 0.1 TABLET ORAL at 00:27

## 2021-09-29 RX ADMIN — GABAPENTIN 900 MG: 300 CAPSULE ORAL at 16:43

## 2021-09-29 RX ADMIN — CYCLOBENZAPRINE HYDROCHLORIDE 10 MG: 5 TABLET, FILM COATED ORAL at 21:27

## 2021-09-29 RX ADMIN — LORAZEPAM 1 MG: 1 TABLET ORAL at 06:41

## 2021-09-29 RX ADMIN — CLONIDINE HYDROCHLORIDE 0.1 MG: 0.1 TABLET ORAL at 08:36

## 2021-09-29 RX ADMIN — LORAZEPAM 1 MG: 1 TABLET ORAL at 04:10

## 2021-09-29 RX ADMIN — POTASSIUM CHLORIDE AND SODIUM CHLORIDE: 900; 150 INJECTION, SOLUTION INTRAVENOUS at 14:29

## 2021-09-29 RX ADMIN — FOLIC ACID 1 MG: 1 TABLET ORAL at 08:35

## 2021-09-29 RX ADMIN — CYCLOBENZAPRINE HYDROCHLORIDE 10 MG: 5 TABLET, FILM COATED ORAL at 03:36

## 2021-09-29 RX ADMIN — LORAZEPAM 1 MG: 1 TABLET ORAL at 00:27

## 2021-09-29 RX ADMIN — GABAPENTIN 900 MG: 300 CAPSULE ORAL at 08:35

## 2021-09-29 RX ADMIN — CLONIDINE HYDROCHLORIDE 0.1 MG: 0.1 TABLET ORAL at 16:43

## 2021-09-29 RX ADMIN — LORAZEPAM 1 MG: 1 TABLET ORAL at 05:45

## 2021-09-29 RX ADMIN — LORAZEPAM 1 MG: 1 TABLET ORAL at 22:34

## 2021-09-29 RX ADMIN — POTASSIUM & SODIUM PHOSPHATES POWDER PACK 280-160-250 MG 2 PACKET: 280-160-250 PACK at 00:26

## 2021-09-29 ASSESSMENT — ACTIVITIES OF DAILY LIVING (ADL)
ADLS_ACUITY_SCORE: 5

## 2021-09-29 ASSESSMENT — MIFFLIN-ST. JEOR: SCORE: 1463.7

## 2021-09-29 NOTE — PROVIDER NOTIFICATION
Pt c/o 8/10 pain from back spasms that extend to bilateral legs. Could she get any medication ordered for spasms/pain? Thanks!    Response: prn flexeril ordered

## 2021-09-29 NOTE — PLAN OF CARE
Temp: 97.2  F (36.2  C) Temp src: Oral BP: 135/73 Pulse: 91   Resp: 25 SpO2: 100 % O2 Device: None (Room air)      CIWA scores ranging from 6-13 this shift, total of 5 mg PO ativan given. Scoring mainly for anxiety, headache, tremor. A&Ox4. Pt c/o spasms to low back radiating down to darius legs. PRN flexeril given x1, heating pad applied. Continues to have loose stools. NS+20KCl @100 ml/hr. Phosphorus 1.6, 1 dose phos replacement given PO with 2 more doses scheduled 0900 and 1600.

## 2021-09-29 NOTE — PLAN OF CARE
"Patient continues on CIWA protocol; tearful/anxious and weeping at times. Requesting \"medication for anxiety\" frequently. CIWA scored per protocol. Drowsy at times, awakens easily. Pt up to BR with SBA x1. Bed alarms on; pt slightly unsteady on feet. Good Po intake. Loose stools; pt states this is \"normal for me when I with draw from alcohol\". Denies abd pain. Tele- SR/ST. SWS and Chem Dep following.   "

## 2021-09-29 NOTE — PROGRESS NOTES
[facility]    Hospitalist Progress Note  Name: Danielle Tadeo    MRN: [id number]  Provider: Ev Ramos MD  Date of Service: 09/29/2021    Assessment & Plan   Summary of Stay: Danielle Tadeo is a 46 year old female who was admitted on 9/27/2021 for intractable nausea vomiting acute alcohol intoxication with severe alcohol abuse and impending alcohol withdrawal.  Opioid use disorder with methadone withdrawal.     Patient with known history of alcohol abuse opioid disorder on methadone, hypertension prolonged QTC recent hospitalization for acute alcohol intoxication discharged on 9/13/2021.    She once again presented with nausea and vomiting after consuming alcohol.  On admission in the emergency room he had lactic acid levels of 9.8, blood alcohol level was 0.16.  She was hypertensive tachycardic tachypneic.    On CIWA protocol.  Today patient is awake able to communicate is anxious and tremulous.  Is afraid of for future with ongoing challenges with alcohol     Acute Intractable Nausea and Vomiting  Acute Alcohol Intoxication with Severe Alcohol Abuse and Impending Alcohol Withdrawal  Opiate Use Dependence with Methadone Withdrawal  - CIWA with Ativan and Gabapentin taper  -Multivitamin, folic acid and thiamine  - Clonidine  - IVF  -CD consult pending  - Consult Social Work/chemical dependency consult     Lactic Acidosis: resolved  - Likely secondary to vomiting  - IVF       Hypokalemia  - Likely secondary to decreased PO intake and vomiting  - IVF with KCl  - Electrolyte replacement protocol     Alcoholic Hepatitis  - slow improvement  - IVF  - Daily Hepatic Panel     Prolonged QTc  - QTc = 541  - Avoid QT pronlonging medications     Elevated Lipase  -Denies any abdominal pain nausea vomiting.  Able to tolerate p.o.    Hypertension  - prn hydralazine     Hypokalemia  - replace per protocol    Drop in hgb  - no signs of bleeding  - likely dilutional  - will recheck hgb in a.m.       DVT  Prophylaxis: Pneumatic Compression Devices  Code Status: Full Code    Disposition: Expected discharge to be decided at least 2 nights      Interval History   Reviewed chart.  Patient is awake today is tremulous teary and anxious.  Denies chest pain pressure heaviness or tightness.  Is fearful for what to do next.*more than 10 point review of systems as patient was sleeping    -Data reviewed today: I reviewed all new labs and imaging reports over the last 24 hours. I personally reviewed no images or EKG's today.    Physical Exam   Temp: 98.9  F (37.2  C) Temp src: Oral BP: (!) 153/90 Pulse: 97   Resp: 20 SpO2: 99 % O2 Device: None (Room air)    Vitals:    09/27/21 0045 09/28/21 0556 09/29/21 0413   Weight: 75.1 kg (165 lb 9.1 oz) 79.3 kg (174 lb 14.4 oz) 80.7 kg (177 lb 14.4 oz)     Vital Signs with Ranges  Temp:  [97.2  F (36.2  C)-99.5  F (37.5  C)] 98.9  F (37.2  C)  Pulse:  [] 97  Resp:  [16-27] 20  BP: (124-162)/() 153/90  SpO2:  [94 %-100 %] 99 %  I/O last 3 completed shifts:  In: 5425 [P.O.:1200; I.V.:4225]  Out: 250 [Urine:250]      GEN: Sleeping after Ativan.  HEENT:  Normocephalic/atraumatic, no scleral icterus, no nasal discharge, mouth moist.  CV: Tachycardic S1 + S2 noted, no S3 or S4.  LUNGS:  Clear to auscultation bilaterally without rales/rhonchi/wheezing/retractions.  Symmetric chest rise on inhalation noted.  ABD:  Active bowel sounds, soft, non-tender/non-distended.  No rebound/guarding/rigidity.  EXT:  No edema.  No cyanosis.  No joint synovitis noted.  SKIN:  Dry to touch, no exanthems noted in the visualized areas.    Medications     0.9% sodium chloride + KCl 20 mEq/L 100 mL/hr at 09/29/21 0422       cloNIDine  0.1 mg Oral Q8H     folic acid  1 mg Oral Daily     [START ON 10/4/2021] gabapentin  100 mg Oral Q8H     [START ON 10/2/2021] gabapentin  300 mg Oral Q8H     [START ON 9/30/2021] gabapentin  600 mg Oral Q8H     gabapentin  900 mg Oral Q8H     multivitamin w/minerals  1 tablet  Oral Daily     potassium & sodium phosphates  2 packet Oral or Feeding Tube TID     sodium chloride (PF)  3 mL Intracatheter Q8H     thiamine  100 mg Oral Daily     Data     No results for input(s): PH, PHV, PO2, PO2V, SAT, PCO2, PCO2V, HCO3, HCO3V in the last 168 hours.  Recent Labs   Lab 09/29/21  0617 09/28/21  0737 09/27/21  0105   WBC 4.2 4.4 10.9   HGB 9.6* 10.5* 14.5   HCT 32.1* 32.0* 41.7   * 103* 97   * 112* 278     Recent Labs   Lab 09/29/21  0617 09/28/21  0737 09/27/21  1105    141 138   POTASSIUM 3.6 3.9 4.2   CHLORIDE 112* 113* 107   CO2 19* 20 20   ANIONGAP 11 8 11   * 108* 122*   BUN 1* 2* 7   CR 0.49* 0.49* 0.50*   GFRESTIMATED >90 >90 >90   HAZEL 7.7* 7.7* 7.3*     No results for input(s): CULT in the last 168 hours.  No results for input(s): NTBNPI, NTBNP in the last 168 hours.  No results for input(s): CKT in the last 168 hours.    Invalid input(s): CK, CK TOTAL  Recent Labs   Lab 09/29/21  0617 09/28/21  0737 09/27/21  1105   CR 0.49* 0.49* 0.50*     Recent Labs   Lab 09/29/21  0617 09/28/21  0737 09/27/21  1105 09/27/21  0059   * 108* 122* 146*     Recent Labs   Lab 09/29/21  0617 09/28/21  0737 09/27/21  0105   HGB 9.6* 10.5* 14.5     Recent Labs   Lab 09/29/21  0617 09/28/21  0737 09/27/21  0059   * 647* 750*   ALT 92* 93* 112*   ALKPHOS 162* 148 199*   BILITOTAL 1.0 1.5* 1.3     Recent Labs   Lab 09/28/21  0737   INR 1.03     Recent Labs   Lab 09/28/21  0737 09/27/21  0234 09/27/21  0105   LACT 2.0 7.9* 9.8*     Recent Labs   Lab 09/28/21  0737   LIPASE 1,007*     Recent Labs   Lab 09/29/21  0617 09/28/21  0737 09/27/21  0105   * 112* 278     Recent Labs   Lab 09/29/21  0617 09/28/21  0737 09/27/21  1105   BUN 1* 2* 7   CR 0.49* 0.49* 0.50*     No results for input(s): TSH in the last 168 hours.  Recent Labs   Lab 09/27/21  0059   TROPONIN <0.015     Recent Labs   Lab 09/27/21  1030   COLOR Yellow   APPEARANCE Clear   URINEGLC Negative    URINEBILI Small*   URINEKETONE Trace*   SG 1.022   UBLD Negative   URINEPH 6.5   PROTEIN 20 *   NITRITE Negative   LEUKEST Negative   RBCU 1   WBCU 1       No results found for this or any previous visit (from the past 24 hour(s)).

## 2021-09-29 NOTE — PLAN OF CARE
"VSS. On room air. LS clear. A&O, tearful throughout shift. States \"I have no where to go after being here\". SW following. Gave ativan per CIWA protocol, 6mg on shift. Moves SBA. Having diarrhea. PIV left infusing fluids. Chem dep still needs to see patient. Phosphorus low, will continue to replace.Tele- SR/ST. Continue to monitor patient.   "

## 2021-09-30 LAB
BASOPHILS # BLD AUTO: 0 10E3/UL (ref 0–0.2)
BASOPHILS NFR BLD AUTO: 1 %
EOSINOPHIL # BLD AUTO: 0.1 10E3/UL (ref 0–0.7)
EOSINOPHIL NFR BLD AUTO: 3 %
ERYTHROCYTE [DISTWIDTH] IN BLOOD BY AUTOMATED COUNT: 16.5 % (ref 10–15)
HCT VFR BLD AUTO: 34.4 % (ref 35–47)
HGB BLD-MCNC: 10.1 G/DL (ref 11.7–15.7)
IMM GRANULOCYTES # BLD: 0 10E3/UL
IMM GRANULOCYTES NFR BLD: 1 %
LIPASE SERPL-CCNC: 316 U/L (ref 73–393)
LYMPHOCYTES # BLD AUTO: 2 10E3/UL (ref 0.8–5.3)
LYMPHOCYTES NFR BLD AUTO: 48 %
MAGNESIUM SERPL-MCNC: 1.9 MG/DL (ref 1.6–2.3)
MCH RBC QN AUTO: 32.9 PG (ref 26.5–33)
MCHC RBC AUTO-ENTMCNC: 29.4 G/DL (ref 31.5–36.5)
MCV RBC AUTO: 112 FL (ref 78–100)
MONOCYTES # BLD AUTO: 0.3 10E3/UL (ref 0–1.3)
MONOCYTES NFR BLD AUTO: 6 %
NEUTROPHILS # BLD AUTO: 1.7 10E3/UL (ref 1.6–8.3)
NEUTROPHILS NFR BLD AUTO: 41 %
NRBC # BLD AUTO: 0 10E3/UL
NRBC BLD AUTO-RTO: 1 /100
PHOSPHATE SERPL-MCNC: 3.7 MG/DL (ref 2.5–4.5)
PLATELET # BLD AUTO: 91 10E3/UL (ref 150–450)
POTASSIUM BLD-SCNC: 4.2 MMOL/L (ref 3.4–5.3)
RBC # BLD AUTO: 3.07 10E6/UL (ref 3.8–5.2)
WBC # BLD AUTO: 4.1 10E3/UL (ref 4–11)

## 2021-09-30 PROCEDURE — 83735 ASSAY OF MAGNESIUM: CPT | Performed by: INTERNAL MEDICINE

## 2021-09-30 PROCEDURE — 36415 COLL VENOUS BLD VENIPUNCTURE: CPT | Performed by: INTERNAL MEDICINE

## 2021-09-30 PROCEDURE — 84100 ASSAY OF PHOSPHORUS: CPT | Performed by: INTERNAL MEDICINE

## 2021-09-30 PROCEDURE — 84132 ASSAY OF SERUM POTASSIUM: CPT | Performed by: INTERNAL MEDICINE

## 2021-09-30 PROCEDURE — 250N000013 HC RX MED GY IP 250 OP 250 PS 637: Performed by: INTERNAL MEDICINE

## 2021-09-30 PROCEDURE — 83690 ASSAY OF LIPASE: CPT | Performed by: INTERNAL MEDICINE

## 2021-09-30 PROCEDURE — 99233 SBSQ HOSP IP/OBS HIGH 50: CPT | Performed by: INTERNAL MEDICINE

## 2021-09-30 PROCEDURE — 85025 COMPLETE CBC W/AUTO DIFF WBC: CPT | Performed by: INTERNAL MEDICINE

## 2021-09-30 PROCEDURE — 250N000011 HC RX IP 250 OP 636: Performed by: INTERNAL MEDICINE

## 2021-09-30 PROCEDURE — 120N000001 HC R&B MED SURG/OB

## 2021-09-30 RX ORDER — TRAZODONE HYDROCHLORIDE 50 MG/1
50 TABLET, FILM COATED ORAL
Status: DISCONTINUED | OUTPATIENT
Start: 2021-09-30 | End: 2021-10-04 | Stop reason: HOSPADM

## 2021-09-30 RX ORDER — FAMOTIDINE 10 MG
10 TABLET ORAL 2 TIMES DAILY
Status: DISCONTINUED | OUTPATIENT
Start: 2021-09-30 | End: 2021-10-04 | Stop reason: HOSPADM

## 2021-09-30 RX ADMIN — METOPROLOL TARTRATE 25 MG: 25 TABLET, FILM COATED ORAL at 09:16

## 2021-09-30 RX ADMIN — FOLIC ACID 1 MG: 1 TABLET ORAL at 09:15

## 2021-09-30 RX ADMIN — CLONIDINE HYDROCHLORIDE 0.1 MG: 0.1 TABLET ORAL at 15:51

## 2021-09-30 RX ADMIN — LORAZEPAM 2 MG: 1 TABLET ORAL at 12:18

## 2021-09-30 RX ADMIN — LORAZEPAM 2 MG: 1 TABLET ORAL at 23:07

## 2021-09-30 RX ADMIN — METOPROLOL TARTRATE 25 MG: 25 TABLET, FILM COATED ORAL at 20:22

## 2021-09-30 RX ADMIN — LORAZEPAM 1 MG: 1 TABLET ORAL at 00:08

## 2021-09-30 RX ADMIN — FAMOTIDINE 10 MG: 10 TABLET, FILM COATED ORAL at 14:05

## 2021-09-30 RX ADMIN — LORAZEPAM 2 MG: 1 TABLET ORAL at 09:40

## 2021-09-30 RX ADMIN — LORAZEPAM 2 MG: 1 TABLET ORAL at 18:24

## 2021-09-30 RX ADMIN — THIAMINE HCL TAB 100 MG 100 MG: 100 TAB at 09:15

## 2021-09-30 RX ADMIN — LORAZEPAM 1 MG: 1 TABLET ORAL at 05:49

## 2021-09-30 RX ADMIN — POTASSIUM CHLORIDE AND SODIUM CHLORIDE: 900; 150 INJECTION, SOLUTION INTRAVENOUS at 10:59

## 2021-09-30 RX ADMIN — GABAPENTIN 900 MG: 300 CAPSULE ORAL at 09:15

## 2021-09-30 RX ADMIN — LORAZEPAM 1 MG: 1 TABLET ORAL at 03:03

## 2021-09-30 RX ADMIN — GABAPENTIN 600 MG: 300 CAPSULE ORAL at 15:51

## 2021-09-30 RX ADMIN — CYCLOBENZAPRINE HYDROCHLORIDE 10 MG: 5 TABLET, FILM COATED ORAL at 21:33

## 2021-09-30 RX ADMIN — FAMOTIDINE 10 MG: 10 TABLET, FILM COATED ORAL at 20:22

## 2021-09-30 RX ADMIN — POTASSIUM CHLORIDE AND SODIUM CHLORIDE: 900; 150 INJECTION, SOLUTION INTRAVENOUS at 00:29

## 2021-09-30 RX ADMIN — MULTIPLE VITAMINS W/ MINERALS TAB 1 TABLET: TAB at 09:15

## 2021-09-30 RX ADMIN — CLONIDINE HYDROCHLORIDE 0.1 MG: 0.1 TABLET ORAL at 09:16

## 2021-09-30 RX ADMIN — CLONIDINE HYDROCHLORIDE 0.1 MG: 0.1 TABLET ORAL at 23:51

## 2021-09-30 RX ADMIN — CYCLOBENZAPRINE HYDROCHLORIDE 10 MG: 5 TABLET, FILM COATED ORAL at 14:05

## 2021-09-30 RX ADMIN — LORAZEPAM 2 MG: 1 TABLET ORAL at 20:22

## 2021-09-30 RX ADMIN — GABAPENTIN 600 MG: 300 CAPSULE ORAL at 23:50

## 2021-09-30 ASSESSMENT — ACTIVITIES OF DAILY LIVING (ADL)
ADLS_ACUITY_SCORE: 7
ADLS_ACUITY_SCORE: 5

## 2021-09-30 ASSESSMENT — MIFFLIN-ST. JEOR: SCORE: 1451.45

## 2021-09-30 NOTE — PROGRESS NOTES
[facility]    Hospitalist Progress Note  Name: Danielle Tadeo    MRN: [id number]  Provider: Ev Ramos MD  Date of Service: 09/30/2021    Assessment & Plan   Summary of Stay: Danielle Tadeo is a 46 year old female who was admitted on 9/27/2021 for intractable nausea vomiting acute alcohol intoxication with severe alcohol abuse and impending alcohol withdrawal.  Opioid use disorder with methadone withdrawal.     Patient with known history of alcohol abuse opioid disorder on methadone, hypertension prolonged QTC recent hospitalization for acute alcohol intoxication discharged on 9/13/2021.    She once again presented with nausea and vomiting after consuming alcohol.  On admission in the emergency room he had lactic acid levels of 9.8, blood alcohol level was 0.16.  She was hypertensive tachycardic tachypneic.    On CIWA protocol.  Continues to score about 17 requiring Ativan.   Acute Intractable Nausea and Vomiting  Acute Alcohol Intoxication with Severe Alcohol Abuse and Impending Alcohol Withdrawal  Opiate Use Dependence with Methadone Withdrawal  - CIWA with Ativan and Gabapentin taper  -Multivitamin, folic acid and thiamine  - Clonidine  - IVF  -CD consult pending  - Consult Social Work/chemical dependency consult    Hypertension  -Started on metoprolol 25 mg twice daily  -As needed hydralazine  -Likely secondary to alcohol withdrawal     Lactic Acidosis: resolved  - Likely secondary to vomiting  - IVF       Hypokalemia  - Likely secondary to decreased PO intake and vomiting  - Electrolyte replacement protocol     Alcoholic Hepatitis  - slow improvement  - IVF  - Daily Hepatic Panel     Prolonged QTc  - QTc = 541 now down to 470  - Avoid QT pronlonging medications     Elevated Lipase  -Denies any abdominal pain nausea vomiting.  Able to tolerate p.o.    Hypertension  - prn hydralazine     Hypokalemia  - replace per protocol    Drop in hgb  - no signs of bleeding  - likely dilutional  -:  Recheck stable       DVT Prophylaxis: Pneumatic Compression Devices  Code Status: Full Code    Disposition: Expected discharge to be decided at least 2 nights      Interval History   Reviewed chart. ,  Continues to feel nauseous unsteady complains of generalized malaise and weakness earlier scored 17 complains of nausea vomiting unable to tolerate p.o.  More than 10 point review of systems was carried out was otherwise negative    -Data reviewed today: I reviewed all new labs and imaging reports over the last 24 hours. I personally reviewed no images or EKG's today.    Physical Exam   Temp: 99  F (37.2  C) Temp src: Oral BP: (!) 149/87 Pulse: 76   Resp: 18 SpO2: 97 % O2 Device: None (Room air)    Vitals:    09/28/21 0556 09/29/21 0413 09/30/21 0616   Weight: 79.3 kg (174 lb 14.4 oz) 80.7 kg (177 lb 14.4 oz) 79.5 kg (175 lb 3.2 oz)     Vital Signs with Ranges  Temp:  [98.2  F (36.8  C)-100.7  F (38.2  C)] 99  F (37.2  C)  Pulse:  [] 76  Resp:  [18-30] 18  BP: (144-178)/() 149/87  SpO2:  [94 %-99 %] 97 %  I/O last 3 completed shifts:  In: 600 [P.O.:600]  Out: 350 [Urine:350]      GEN: Sleeping after Ativan.  HEENT:  Normocephalic/atraumatic, no scleral icterus, no nasal discharge, mouth moist.  CV: Tachycardic S1 + S2 noted, no S3 or S4.  LUNGS:  Clear to auscultation bilaterally without rales/rhonchi/wheezing/retractions.  Symmetric chest rise on inhalation noted.  ABD:  Active bowel sounds, soft, non-tender/non-distended.  No rebound/guarding/rigidity.  EXT:  No edema.  No cyanosis.  No joint synovitis noted.  SKIN:  Dry to touch, no exanthems noted in the visualized areas.    Medications     0.9% sodium chloride + KCl 20 mEq/L 100 mL/hr at 09/30/21 0029       cloNIDine  0.1 mg Oral Q8H     folic acid  1 mg Oral Daily     [START ON 10/4/2021] gabapentin  100 mg Oral Q8H     [START ON 10/2/2021] gabapentin  300 mg Oral Q8H     gabapentin  600 mg Oral Q8H     gabapentin  900 mg Oral Q8H     metoprolol  tartrate  25 mg Oral BID     multivitamin w/minerals  1 tablet Oral Daily     sodium chloride (PF)  3 mL Intracatheter Q8H     thiamine  100 mg Oral Daily     Data     No results for input(s): PH, PHV, PO2, PO2V, SAT, PCO2, PCO2V, HCO3, HCO3V in the last 168 hours.  Recent Labs   Lab 09/30/21  0703 09/29/21  0617 09/28/21  0737   WBC 4.1 4.2 4.4   HGB 10.1* 9.6* 10.5*   HCT 34.4* 32.1* 32.0*   * 109* 103*   PLT 91* 100* 112*     Recent Labs   Lab 09/29/21  0617 09/28/21  0737 09/27/21  1105    141 138   POTASSIUM 3.6 3.9 4.2   CHLORIDE 112* 113* 107   CO2 19* 20 20   ANIONGAP 11 8 11   * 108* 122*   BUN 1* 2* 7   CR 0.49* 0.49* 0.50*   GFRESTIMATED >90 >90 >90   HAZEL 7.7* 7.7* 7.3*     No results for input(s): CULT in the last 168 hours.  No results for input(s): NTBNPI, NTBNP in the last 168 hours.  No results for input(s): CKT in the last 168 hours.    Invalid input(s): CK, CK TOTAL  Recent Labs   Lab 09/29/21  0617 09/28/21  0737 09/27/21  1105   CR 0.49* 0.49* 0.50*     Recent Labs   Lab 09/29/21  0617 09/28/21  0737 09/27/21  1105 09/27/21  0059   * 108* 122* 146*     Recent Labs   Lab 09/30/21  0703 09/29/21  0617 09/28/21  0737   HGB 10.1* 9.6* 10.5*     Recent Labs   Lab 09/29/21  0617 09/28/21  0737 09/27/21  0059   * 647* 750*   ALT 92* 93* 112*   ALKPHOS 162* 148 199*   BILITOTAL 1.0 1.5* 1.3     Recent Labs   Lab 09/28/21  0737   INR 1.03     Recent Labs   Lab 09/28/21  0737 09/27/21  0234 09/27/21  0105   LACT 2.0 7.9* 9.8*     Recent Labs   Lab 09/30/21  0703 09/28/21  0737   LIPASE 316 1,007*     Recent Labs   Lab 09/30/21  0703 09/29/21  0617 09/28/21  0737   PLT 91* 100* 112*     Recent Labs   Lab 09/29/21  0617 09/28/21  0737 09/27/21  1105   BUN 1* 2* 7   CR 0.49* 0.49* 0.50*     No results for input(s): TSH in the last 168 hours.  Recent Labs   Lab 09/27/21  0059   TROPONIN <0.015     Recent Labs   Lab 09/27/21  1030   COLOR Yellow   APPEARANCE Clear   URINEGLC  Negative   URINEBILI Small*   URINEKETONE Trace*   SG 1.022   UBLD Negative   URINEPH 6.5   PROTEIN 20 *   NITRITE Negative   LEUKEST Negative   RBCU 1   WBCU 1       No results found for this or any previous visit (from the past 24 hour(s)).

## 2021-09-30 NOTE — PLAN OF CARE
"A/O, SBA, Tele SR. RA, LS clear. PIV (L) arm, infusing IVF. CIWA 10,6,8,6,10 Pt has PO ativan as needed; received 3mg for night shift. Chem Dep/SW following. Continue to monitor. VS: BP (!) 150/84 (BP Location: Left arm)   Pulse 80   Temp 98.2  F (36.8  C) (Oral)   Resp 20   Ht 1.676 m (5' 6\")   Wt 79.5 kg (175 lb 3.2 oz)   LMP 06/07/2015   SpO2 99%   BMI 28.28 kg/m     "

## 2021-09-30 NOTE — PLAN OF CARE
S: pt is agitated and restless, Aox4    B: behaviors resulting from ETOH withdrawal    A: CIWA score 17, 2mg Ativan given, CIWA score of 3. Hand tremors noted during CIWA assessment but fluid during other activities.    R: calm environment, continue to monitor and manage symptoms r/t ETOH withdrawl

## 2021-09-30 NOTE — PLAN OF CARE
A&OX4. Up with SBA, slightly unsteady on feet. Bed alarm is on. VSS except BP elevated, Metoprolol given. Tele: SR/ST. CIWA scored 5,9,6,9 and 5, Ativan given X2 per CIWA protocol. Tearful/anxious through out the shift. Loose stools X 5 per this shift. Denies any abd pain. SWS and Chem Dep following. Will continue to monitor.

## 2021-09-30 NOTE — PROVIDER NOTIFICATION
MD notified if IVF could be discontinued as patient is up voiding every 20-30 mins and tolerating PO.     - IVF discontinued.

## 2021-09-30 NOTE — PROVIDER NOTIFICATION
MD paged: Pt.s /103, other VSS WNL, !mg of ativan given per CIWA protocol. Recheck /105. Please advice? Thanks    MD ordered metoprolol 25 mg twice a day and hydralazine 10 mg IV every 4 hours if needed for SBP greater than 180.

## 2021-09-30 NOTE — PROGRESS NOTES
Cross cover note.    Notified of significant hypertension.    Add metoprolol 25 mg twice a day.  Add hydralazine 10 mg IV every 4 hours if needed for SBP greater than 180.

## 2021-10-01 LAB
ANION GAP SERPL CALCULATED.3IONS-SCNC: 8 MMOL/L (ref 3–14)
BUN SERPL-MCNC: 3 MG/DL (ref 7–30)
CALCIUM SERPL-MCNC: 8.4 MG/DL (ref 8.5–10.1)
CHLORIDE BLD-SCNC: 110 MMOL/L (ref 94–109)
CO2 SERPL-SCNC: 22 MMOL/L (ref 20–32)
CREAT SERPL-MCNC: 0.45 MG/DL (ref 0.52–1.04)
GFR SERPL CREATININE-BSD FRML MDRD: >90 ML/MIN/1.73M2
GLUCOSE BLD-MCNC: 143 MG/DL (ref 70–99)
MAGNESIUM SERPL-MCNC: 2.2 MG/DL (ref 1.6–2.3)
PHOSPHATE SERPL-MCNC: 3.2 MG/DL (ref 2.5–4.5)
POTASSIUM BLD-SCNC: 4 MMOL/L (ref 3.4–5.3)
SODIUM SERPL-SCNC: 140 MMOL/L (ref 133–144)

## 2021-10-01 PROCEDURE — 250N000013 HC RX MED GY IP 250 OP 250 PS 637: Performed by: INTERNAL MEDICINE

## 2021-10-01 PROCEDURE — 120N000001 HC R&B MED SURG/OB

## 2021-10-01 PROCEDURE — 36415 COLL VENOUS BLD VENIPUNCTURE: CPT | Performed by: INTERNAL MEDICINE

## 2021-10-01 PROCEDURE — 80048 BASIC METABOLIC PNL TOTAL CA: CPT | Performed by: INTERNAL MEDICINE

## 2021-10-01 PROCEDURE — 99233 SBSQ HOSP IP/OBS HIGH 50: CPT | Performed by: INTERNAL MEDICINE

## 2021-10-01 PROCEDURE — 84100 ASSAY OF PHOSPHORUS: CPT | Performed by: INTERNAL MEDICINE

## 2021-10-01 PROCEDURE — 83735 ASSAY OF MAGNESIUM: CPT | Performed by: INTERNAL MEDICINE

## 2021-10-01 RX ADMIN — LORAZEPAM 1 MG: 1 TABLET ORAL at 11:24

## 2021-10-01 RX ADMIN — GABAPENTIN 600 MG: 300 CAPSULE ORAL at 08:30

## 2021-10-01 RX ADMIN — TRAZODONE HYDROCHLORIDE 50 MG: 50 TABLET ORAL at 23:54

## 2021-10-01 RX ADMIN — LORAZEPAM 1 MG: 1 TABLET ORAL at 23:58

## 2021-10-01 RX ADMIN — CLONIDINE HYDROCHLORIDE 0.1 MG: 0.1 TABLET ORAL at 23:55

## 2021-10-01 RX ADMIN — LORAZEPAM 1 MG: 1 TABLET ORAL at 10:45

## 2021-10-01 RX ADMIN — Medication 5 MG: at 21:00

## 2021-10-01 RX ADMIN — CYCLOBENZAPRINE HYDROCHLORIDE 10 MG: 5 TABLET, FILM COATED ORAL at 11:24

## 2021-10-01 RX ADMIN — LORAZEPAM 2 MG: 1 TABLET ORAL at 17:09

## 2021-10-01 RX ADMIN — LORAZEPAM 1 MG: 1 TABLET ORAL at 10:50

## 2021-10-01 RX ADMIN — GABAPENTIN 600 MG: 300 CAPSULE ORAL at 17:10

## 2021-10-01 RX ADMIN — FAMOTIDINE 10 MG: 10 TABLET, FILM COATED ORAL at 10:51

## 2021-10-01 RX ADMIN — GABAPENTIN 600 MG: 300 CAPSULE ORAL at 23:55

## 2021-10-01 RX ADMIN — FOLIC ACID 1 MG: 1 TABLET ORAL at 08:30

## 2021-10-01 RX ADMIN — LORAZEPAM 1 MG: 1 TABLET ORAL at 18:44

## 2021-10-01 RX ADMIN — CLONIDINE HYDROCHLORIDE 0.1 MG: 0.1 TABLET ORAL at 17:10

## 2021-10-01 RX ADMIN — FAMOTIDINE 10 MG: 10 TABLET, FILM COATED ORAL at 20:59

## 2021-10-01 RX ADMIN — LORAZEPAM 2 MG: 1 TABLET ORAL at 21:00

## 2021-10-01 RX ADMIN — THIAMINE HCL TAB 100 MG 100 MG: 100 TAB at 08:30

## 2021-10-01 RX ADMIN — CYCLOBENZAPRINE HYDROCHLORIDE 10 MG: 5 TABLET, FILM COATED ORAL at 18:44

## 2021-10-01 RX ADMIN — CLONIDINE HYDROCHLORIDE 0.1 MG: 0.1 TABLET ORAL at 08:30

## 2021-10-01 RX ADMIN — METOPROLOL TARTRATE 25 MG: 25 TABLET, FILM COATED ORAL at 20:59

## 2021-10-01 RX ADMIN — METOPROLOL TARTRATE 25 MG: 25 TABLET, FILM COATED ORAL at 08:30

## 2021-10-01 RX ADMIN — MULTIPLE VITAMINS W/ MINERALS TAB 1 TABLET: TAB at 08:30

## 2021-10-01 ASSESSMENT — ACTIVITIES OF DAILY LIVING (ADL)
ADLS_ACUITY_SCORE: 5

## 2021-10-01 ASSESSMENT — MIFFLIN-ST. JEOR: SCORE: 1448.28

## 2021-10-01 NOTE — PROGRESS NOTES
Cross Cx:     Patient requesting sleep aide. She normally drinks herself to sleep and refuses melatonin. She is actively withdrawing from alcohol, opiates and methadone. She is already on ativan, gabapentin, and flexeril. No evidence of sedation. Trazodone 50 mg q HS prn ordered. Patient to trial melatonin prior to using trazodone.

## 2021-10-01 NOTE — PLAN OF CARE
A&Ox4. VSS. BP elev Tele reads SR. MD notified d/t pt voiding very frequently and tolerating PO. PIV removed, no IV access. Had formed bowel movement at 0300. Reports no pain since 2300.  On K, Mgn, P protocol- labs @0600. - within normal range.  Pt forgets to use call light when is up, frequent bathroom visits.

## 2021-10-01 NOTE — PLAN OF CARE
Pt is A&Ox4. VSS. Pain in low back and head ache. Tooth pain d/t chipping front teeth prior to admission. Flexeril given once. Heating pad applied to low back. Provider paged for tooth pain topical medication. CIWA monitoring. Pt appears to be asleep between cares. Wakes up and asks for ativan. Scores 6, 16, 9, and 3. Up independently in the room. Refused bed alarm, gait belt, and assistance. Steady on feet and alert and oriented. Walking in the eller. Feeling better with ambulating. Barrier cream provided for tenderness on buttock d/t frequent small stools. Voiding in the bathroom. Will continue to monitor.

## 2021-10-01 NOTE — PROGRESS NOTES
[facility]    Hospitalist Progress Note  Name: Danielle Tadeo    MRN: [id number]  Provider: Ev Ramos MD  Date of Service: 10/01/2021    Assessment & Plan   Summary of Stay: Danielle Tadeo is a 46 year old female who was admitted on 9/27/2021 for intractable nausea vomiting acute alcohol intoxication with severe alcohol abuse and impending alcohol withdrawal.  Opioid use disorder with methadone withdrawal.     Patient with known history of alcohol abuse opioid disorder on methadone, hypertension prolonged QTC recent hospitalization for acute alcohol intoxication discharged on 9/13/2021.    She once again presented with nausea and vomiting after consuming alcohol.  On admission in the emergency room he had lactic acid levels of 9.8, blood alcohol level was 0.16.  She was hypertensive tachycardic tachypneic.    On CIWA protocol.  Continues to have intractable nausea and vomiting unable to tolerate p.o.      Acute Intractable Nausea and Vomiting  Acute Alcohol Intoxication with Severe Alcohol Abuse and Impending Alcohol Withdrawal  Opiate Use Dependence with Methadone Withdrawal  - CIWA with Ativan and Gabapentin taper  -Multivitamin, folic acid and thiamine  - Clonidine  - IVF  -CD consult pending  - Consult Social Work/chemical dependency consult    Hypertension  -Started on metoprolol 25 mg twice daily  -As needed hydralazine  -Likely secondary to alcohol withdrawal     Lactic Acidosis: resolved  - Likely secondary to vomiting  - IVF       Hypokalemia  - Likely secondary to decreased PO intake and vomiting  - Electrolyte replacement protocol     Alcoholic Hepatitis  - slow improvement  - IVF  - Daily Hepatic Panel     Prolonged QTc  - QTc = 541 now down to 470  - Avoid QT pronlonging medications     Elevated Lipase  -Lipase trending down  -Intractable nausea vomiting    Hypertension  - prn hydralazine     Hypokalemia  - replace per protocol    Drop in hemoglobin noted initially now  stable  - no signs of bleeding  - likely dilutional  -: Globin is stable       DVT Prophylaxis: Pneumatic Compression Devices  Code Status: Full Code    Disposition: Expected discharge to be decided at least 2 nights      Interval History   Reviewed chart. ,  Continues to be nauseous and vomited once today unable to keep food down.  More than 10 point review of systems was carried out was otherwise negative    -Data reviewed today: I reviewed all new labs and imaging reports over the last 24 hours. I personally reviewed no images or EKG's today.    Physical Exam   Temp: 97  F (36.1  C) Temp src: Oral BP: 132/83 Pulse: 75   Resp: 20 SpO2: 98 % O2 Device: None (Room air)    Vitals:    09/29/21 0413 09/30/21 0616 10/01/21 0125   Weight: 80.7 kg (177 lb 14.4 oz) 79.5 kg (175 lb 3.2 oz) 79.2 kg (174 lb 8 oz)     Vital Signs with Ranges  Temp:  [97  F (36.1  C)-98.4  F (36.9  C)] 97  F (36.1  C)  Pulse:  [73-86] 75  Resp:  [19-20] 20  BP: (127-165)/(75-87) 132/83  SpO2:  [96 %-100 %] 98 %  I/O last 3 completed shifts:  In: 3745 [P.O.:400; I.V.:3345]  Out: 2700 [Urine:2650; Stool:50]      GEN: Sleeping after Ativan.  HEENT:  Normocephalic/atraumatic, no scleral icterus, no nasal discharge, mouth moist.  CV: Tachycardic S1 + S2 noted, no S3 or S4.  LUNGS:  Clear to auscultation bilaterally without rales/rhonchi/wheezing/retractions.  Symmetric chest rise on inhalation noted.  ABD:  Active bowel sounds, soft, non-tender/non-distended.  No rebound/guarding/rigidity.  EXT:  No edema.  No cyanosis.  No joint synovitis noted.  SKIN:  Dry to touch, no exanthems noted in the visualized areas.    Medications       cloNIDine  0.1 mg Oral Q8H     famotidine  10 mg Oral BID     folic acid  1 mg Oral Daily     [START ON 10/4/2021] gabapentin  100 mg Oral Q8H     [START ON 10/2/2021] gabapentin  300 mg Oral Q8H     gabapentin  600 mg Oral Q8H     metoprolol tartrate  25 mg Oral BID     multivitamin w/minerals  1 tablet Oral Daily      sodium chloride (PF)  3 mL Intracatheter Q8H     thiamine  100 mg Oral Daily     Data     No results for input(s): PH, PHV, PO2, PO2V, SAT, PCO2, PCO2V, HCO3, HCO3V in the last 168 hours.  Recent Labs   Lab 09/30/21 0703 09/29/21 0617 09/28/21 0737   WBC 4.1 4.2 4.4   HGB 10.1* 9.6* 10.5*   HCT 34.4* 32.1* 32.0*   * 109* 103*   PLT 91* 100* 112*     Recent Labs   Lab 10/01/21  0905 09/30/21  0703 09/29/21  0617 09/28/21  0737 09/28/21  0737     --  142  --  141   POTASSIUM 4.0 4.2 3.6   < > 3.9   CHLORIDE 110*  --  112*  --  113*   CO2 22  --  19*  --  20   ANIONGAP 8  --  11  --  8   *  --  139*  --  108*   BUN 3*  --  1*  --  2*   CR 0.45*  --  0.49*  --  0.49*   GFRESTIMATED >90  --  >90  --  >90   HAZEL 8.4*  --  7.7*  --  7.7*    < > = values in this interval not displayed.     No results for input(s): CULT in the last 168 hours.  No results for input(s): NTBNPI, NTBNP in the last 168 hours.  No results for input(s): CKT in the last 168 hours.    Invalid input(s): CK, CK TOTAL  Recent Labs   Lab 10/01/21  0905 09/29/21  0617 09/28/21  0737   CR 0.45* 0.49* 0.49*     Recent Labs   Lab 10/01/21  0905 09/29/21  0617 09/28/21  0737 09/27/21  1105 09/27/21  0059   * 139* 108* 122* 146*     Recent Labs   Lab 09/30/21 0703 09/29/21 0617 09/28/21 0737   HGB 10.1* 9.6* 10.5*     Recent Labs   Lab 09/29/21 0617 09/28/21 0737 09/27/21  0059   * 647* 750*   ALT 92* 93* 112*   ALKPHOS 162* 148 199*   BILITOTAL 1.0 1.5* 1.3     Recent Labs   Lab 09/28/21  0737   INR 1.03     Recent Labs   Lab 09/28/21  0737 09/27/21  0234 09/27/21  0105   LACT 2.0 7.9* 9.8*     Recent Labs   Lab 09/30/21  0703 09/28/21  0737   LIPASE 316 1,007*     Recent Labs   Lab 09/30/21  0703 09/29/21  0617 09/28/21  0737   PLT 91* 100* 112*     Recent Labs   Lab 10/01/21  0905 09/29/21  0617 09/28/21  0737   BUN 3* 1* 2*   CR 0.45* 0.49* 0.49*     No results for input(s): TSH in the last 168 hours.  Recent Labs    Lab 09/27/21  0059   TROPONIN <0.015     Recent Labs   Lab 09/27/21  1030   COLOR Yellow   APPEARANCE Clear   URINEGLC Negative   URINEBILI Small*   URINEKETONE Trace*   SG 1.022   UBLD Negative   URINEPH 6.5   PROTEIN 20 *   NITRITE Negative   LEUKEST Negative   RBCU 1   WBCU 1       No results found for this or any previous visit (from the past 24 hour(s)).
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
## 2021-10-01 NOTE — PLAN OF CARE
"Pt transferred to floor at 1820. A&Ox4. Pt was very anxious upon arrival saying \" I didn't get my ativan for couple hours.\" pt started crying talking about her family. Ciwa score 19,14. Pt slept after giving ativan 2mg. Given flexeril for leg cramps. Eat 100% supper. Saline locked. Voiding. Bm X1 loose. Tele-Sr 80's.  VSS, RAYMOND.   "

## 2021-10-02 LAB
ANION GAP SERPL CALCULATED.3IONS-SCNC: 8 MMOL/L (ref 3–14)
BUN SERPL-MCNC: 5 MG/DL (ref 7–30)
CALCIUM SERPL-MCNC: 8.1 MG/DL (ref 8.5–10.1)
CHLORIDE BLD-SCNC: 109 MMOL/L (ref 94–109)
CO2 SERPL-SCNC: 24 MMOL/L (ref 20–32)
CREAT SERPL-MCNC: 0.48 MG/DL (ref 0.52–1.04)
GFR SERPL CREATININE-BSD FRML MDRD: >90 ML/MIN/1.73M2
GLUCOSE BLD-MCNC: 122 MG/DL (ref 70–99)
MAGNESIUM SERPL-MCNC: 2.4 MG/DL (ref 1.6–2.3)
PHOSPHATE SERPL-MCNC: 4 MG/DL (ref 2.5–4.5)
POTASSIUM BLD-SCNC: 3.9 MMOL/L (ref 3.4–5.3)
SODIUM SERPL-SCNC: 141 MMOL/L (ref 133–144)

## 2021-10-02 PROCEDURE — 36415 COLL VENOUS BLD VENIPUNCTURE: CPT | Performed by: INTERNAL MEDICINE

## 2021-10-02 PROCEDURE — 250N000013 HC RX MED GY IP 250 OP 250 PS 637: Performed by: INTERNAL MEDICINE

## 2021-10-02 PROCEDURE — 84100 ASSAY OF PHOSPHORUS: CPT | Performed by: INTERNAL MEDICINE

## 2021-10-02 PROCEDURE — 83735 ASSAY OF MAGNESIUM: CPT | Performed by: INTERNAL MEDICINE

## 2021-10-02 PROCEDURE — 99232 SBSQ HOSP IP/OBS MODERATE 35: CPT | Performed by: INTERNAL MEDICINE

## 2021-10-02 PROCEDURE — 82374 ASSAY BLOOD CARBON DIOXIDE: CPT | Performed by: INTERNAL MEDICINE

## 2021-10-02 PROCEDURE — 120N000001 HC R&B MED SURG/OB

## 2021-10-02 RX ADMIN — LORAZEPAM 2 MG: 1 TABLET ORAL at 19:50

## 2021-10-02 RX ADMIN — CLONIDINE HYDROCHLORIDE 0.1 MG: 0.1 TABLET ORAL at 16:53

## 2021-10-02 RX ADMIN — CLONIDINE HYDROCHLORIDE 0.1 MG: 0.1 TABLET ORAL at 08:35

## 2021-10-02 RX ADMIN — GABAPENTIN 300 MG: 300 CAPSULE ORAL at 16:53

## 2021-10-02 RX ADMIN — TRAZODONE HYDROCHLORIDE 50 MG: 50 TABLET ORAL at 20:54

## 2021-10-02 RX ADMIN — LORAZEPAM 2 MG: 1 TABLET ORAL at 16:53

## 2021-10-02 RX ADMIN — CYCLOBENZAPRINE HYDROCHLORIDE 10 MG: 5 TABLET, FILM COATED ORAL at 05:28

## 2021-10-02 RX ADMIN — LORAZEPAM 2 MG: 1 TABLET ORAL at 14:28

## 2021-10-02 RX ADMIN — GABAPENTIN 600 MG: 300 CAPSULE ORAL at 08:34

## 2021-10-02 RX ADMIN — FOLIC ACID 1 MG: 1 TABLET ORAL at 08:35

## 2021-10-02 RX ADMIN — MULTIPLE VITAMINS W/ MINERALS TAB 1 TABLET: TAB at 08:34

## 2021-10-02 RX ADMIN — LORAZEPAM 1 MG: 1 TABLET ORAL at 21:01

## 2021-10-02 RX ADMIN — LORAZEPAM 1 MG: 1 TABLET ORAL at 18:02

## 2021-10-02 RX ADMIN — FAMOTIDINE 10 MG: 10 TABLET, FILM COATED ORAL at 20:54

## 2021-10-02 RX ADMIN — LORAZEPAM 2 MG: 1 TABLET ORAL at 08:35

## 2021-10-02 RX ADMIN — FAMOTIDINE 10 MG: 10 TABLET, FILM COATED ORAL at 08:34

## 2021-10-02 RX ADMIN — THIAMINE HCL TAB 100 MG 100 MG: 100 TAB at 08:35

## 2021-10-02 RX ADMIN — METOPROLOL TARTRATE 25 MG: 25 TABLET, FILM COATED ORAL at 08:35

## 2021-10-02 ASSESSMENT — MIFFLIN-ST. JEOR: SCORE: 1429.23

## 2021-10-02 ASSESSMENT — ACTIVITIES OF DAILY LIVING (ADL)
ADLS_ACUITY_SCORE: 5

## 2021-10-02 NOTE — PLAN OF CARE
Evening RN    Patient vital signs are at baseline: Yes  Patient able to ambulate as they were prior to admission or with assist devices provided by therapies during their stay:  Yes  Patient MUST void prior to discharge:  Yes  Patient able to tolerate oral intake:  Yes  Pain has adequate pain control using Oral analgesics:  Yes    Pt A/O x4.  Pt anxious, restless, frustrated majority of shift, mood labile.  Frequent requests for medication - always rates symptoms extremely high if asked.  CIWAA scored as per flowsheets - pt c/o anxiety, headache, nausea, sweatiness, some tremor noteable.  Pt states she drinks because she cannot sleep, gave melatonin to trial and passed along to night RN that trazadone is available.  VSS and afebrile.  Tele SR.  Pain managed adequately with PRN PO Flexeril, scheduled PO gabapentin.  CMS intact - baseline tingling in feet.  Skin ok.  Up independently in room - steady on feet and refuses any assistance or monitoring.  Voiding adequately.  Pt reported small stools today.  Tolerating regular diet well.  Plan is home on discharge.  Will continue to monitor.
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
## 2021-10-02 NOTE — PLAN OF CARE
Pt A/O x4, VSS. Up independently in room. Pain in low back managed with scheduled gabapentin and PRN flexeril. CIWA score 8/2/5. PRN trazodone given for sleep. Voiding. IV SL. K+, mag, and phos protocol managed. Will continue to monitor.

## 2021-10-02 NOTE — PROGRESS NOTES
[facility]    Hospitalist Progress Note  Name: Danielle Tadeo    MRN: [id number]  Provider: Radha Murphy MD  Date of Service: 10/02/2021    Assessment & Plan   Summary of Stay: Danielle Tadeo is a 46 year old female who was admitted on 9/27/2021 for intractable nausea vomiting acute alcohol intoxication with severe alcohol abuse and impending alcohol withdrawal.  Opioid use disorder with methadone withdrawal.     Patient with known history of alcohol abuse opioid disorder on methadone, hypertension prolonged QTC recent hospitalization for acute alcohol intoxication discharged on 9/13/2021.    She once again presented with nausea and vomiting after consuming alcohol.  On admission in the emergency room she had lactic acid levels of 9.8, blood alcohol level was 0.16.  She was hypertensive tachycardic tachypneic.    Patient is currently on CIWA protocol.     Acute Intractable Nausea and Vomiting  Acute Alcohol Intoxication with Severe Alcohol Abuse and Impending Alcohol Withdrawal  Opiate Use Dependence with Methadone Withdrawal  -CIWA with Ativan and Gabapentin taper  -Multivitamin, folic acid and thiamine  -Clonidine  -IVF  -CD consult pending  -Consult Social Work/chemical dependency consult    Hypertension  -Started on metoprolol 25 mg twice daily  -As needed hydralazine  -Likely secondary to alcohol withdrawal     Lactic Acidosis: resolved  - Likely secondary to vomiting  - IVF    Hypokalemia  - Likely secondary to decreased PO intake and vomiting  - Electrolyte replacement protocol     Alcoholic Hepatitis  -Slow improvement. Needs outpatient follow up.   -Now off  IVF  - Daily Hepatic Panel     Prolonged QTc  - QTc = 541 now down to 470  - Avoid QT pronlonging medications     Elevated Lipase  -Lipase trending down  -Intractable nausea vomiting    Hypertension  - prn hydralazine     Hypokalemia  - replace per protocol    Drop in hemoglobin noted initially now stable  - no signs of  bleeding  - likely dilutional  -Hgb stable     DVT Prophylaxis: Pneumatic Compression Devices  Code Status: Full Code    Disposition: Expected discharge to be decided at least 2 nights      Interval History   Reviewed chart. Continues to be nauseous and vomited once today unable to keep food down.  More than 10 point review of systems was carried out was otherwise negative    -Data reviewed today: I reviewed all new labs and imaging reports over the last 24 hours. I personally reviewed no images or EKG's today.    Physical Exam   Temp: 98.3  F (36.8  C) Temp src: Temporal BP: 115/70 (standing ) Pulse: 81   Resp: 20 SpO2: 97 % O2 Device: None (Room air)    Vitals:    09/30/21 0616 10/01/21 0125 10/02/21 0813   Weight: 79.5 kg (175 lb 3.2 oz) 79.2 kg (174 lb 8 oz) 77.2 kg (170 lb 4.8 oz)     Vital Signs with Ranges  Temp:  [97  F (36.1  C)-98.3  F (36.8  C)] 98.3  F (36.8  C)  Pulse:  [75-81] 81  Resp:  [20] 20  BP: (113-143)/(68-87) 115/70  SpO2:  [96 %-98 %] 97 %  I/O last 3 completed shifts:  In: 400 [P.O.:400]  Out: 1150 [Urine:1150]      GEN: Sleeping after Ativan.  HEENT:  Normocephalic/atraumatic, no scleral icterus, no nasal discharge, mouth moist.  CV: Tachycardic S1 + S2 noted, no S3 or S4.  LUNGS:  Clear to auscultation bilaterally without rales/rhonchi/wheezing/retractions.  Symmetric chest rise on inhalation noted.  ABD:  Active bowel sounds, soft, non-tender/non-distended.  No rebound/guarding/rigidity.  EXT:  No edema.  No cyanosis.  No joint synovitis noted.  SKIN:  Dry to touch, no exanthems noted in the visualized areas.    Medications       cloNIDine  0.1 mg Oral Q8H     famotidine  10 mg Oral BID     folic acid  1 mg Oral Daily     [START ON 10/4/2021] gabapentin  100 mg Oral Q8H     gabapentin  300 mg Oral Q8H     metoprolol tartrate  25 mg Oral BID     multivitamin w/minerals  1 tablet Oral Daily     sodium chloride (PF)  3 mL Intracatheter Q8H     Data     No results for input(s): PH, PHV, PO2,  PO2V, SAT, PCO2, PCO2V, HCO3, HCO3V in the last 168 hours.  Recent Labs   Lab 09/30/21 0703 09/29/21 0617 09/28/21  0737   WBC 4.1 4.2 4.4   HGB 10.1* 9.6* 10.5*   HCT 34.4* 32.1* 32.0*   * 109* 103*   PLT 91* 100* 112*     Recent Labs   Lab 10/02/21  0600 10/01/21  0905 09/30/21  0703 09/29/21  0617 09/29/21  0617    140  --   --  142   POTASSIUM 3.9 4.0 4.2   < > 3.6   CHLORIDE 109 110*  --   --  112*   CO2 24 22  --   --  19*   ANIONGAP 8 8  --   --  11   * 143*  --   --  139*   BUN 5* 3*  --   --  1*   CR 0.48* 0.45*  --   --  0.49*   GFRESTIMATED >90 >90  --   --  >90   HAZEL 8.1* 8.4*  --   --  7.7*    < > = values in this interval not displayed.     No results for input(s): CULT in the last 168 hours.  No results for input(s): NTBNPI, NTBNP in the last 168 hours.  No results for input(s): CKT in the last 168 hours.    Invalid input(s): CK, CK TOTAL  Recent Labs   Lab 10/02/21  0600 10/01/21  0905 09/29/21  0617   CR 0.48* 0.45* 0.49*     Recent Labs   Lab 10/02/21  0600 10/01/21  0905 09/29/21  0617 09/28/21  0737 09/27/21  1105   * 143* 139* 108* 122*     Recent Labs   Lab 09/30/21 0703 09/29/21 0617 09/28/21  0737   HGB 10.1* 9.6* 10.5*     Recent Labs   Lab 09/29/21 0617 09/28/21 0737 09/27/21  0059   * 647* 750*   ALT 92* 93* 112*   ALKPHOS 162* 148 199*   BILITOTAL 1.0 1.5* 1.3     Recent Labs   Lab 09/28/21  0737   INR 1.03     Recent Labs   Lab 09/28/21  0737 09/27/21  0234 09/27/21  0105   LACT 2.0 7.9* 9.8*     Recent Labs   Lab 09/30/21  0703 09/28/21  0737   LIPASE 316 1,007*     Recent Labs   Lab 09/30/21  0703 09/29/21  0617 09/28/21  0737   PLT 91* 100* 112*     Recent Labs   Lab 10/02/21  0600 10/01/21  0905 09/29/21  0617   BUN 5* 3* 1*   CR 0.48* 0.45* 0.49*     No results for input(s): TSH in the last 168 hours.  Recent Labs   Lab 09/27/21  0059   TROPONIN <0.015     Recent Labs   Lab 09/27/21  1030   COLOR Yellow   APPEARANCE Clear   URINEGLC Negative    URINEBILI Small*   URINEKETONE Trace*   SG 1.022   UBLD Negative   URINEPH 6.5   PROTEIN 20 *   NITRITE Negative   LEUKEST Negative   RBCU 1   WBCU 1       No results found for this or any previous visit (from the past 24 hour(s)).

## 2021-10-03 LAB
MAGNESIUM SERPL-MCNC: 2.5 MG/DL (ref 1.6–2.3)
PHOSPHATE SERPL-MCNC: 4.2 MG/DL (ref 2.5–4.5)
POTASSIUM BLD-SCNC: 4.3 MMOL/L (ref 3.4–5.3)

## 2021-10-03 PROCEDURE — 250N000013 HC RX MED GY IP 250 OP 250 PS 637: Performed by: INTERNAL MEDICINE

## 2021-10-03 PROCEDURE — 120N000001 HC R&B MED SURG/OB

## 2021-10-03 PROCEDURE — 84100 ASSAY OF PHOSPHORUS: CPT | Performed by: INTERNAL MEDICINE

## 2021-10-03 PROCEDURE — 83735 ASSAY OF MAGNESIUM: CPT | Performed by: INTERNAL MEDICINE

## 2021-10-03 PROCEDURE — 99233 SBSQ HOSP IP/OBS HIGH 50: CPT | Performed by: INTERNAL MEDICINE

## 2021-10-03 PROCEDURE — 84132 ASSAY OF SERUM POTASSIUM: CPT | Performed by: INTERNAL MEDICINE

## 2021-10-03 PROCEDURE — 36415 COLL VENOUS BLD VENIPUNCTURE: CPT | Performed by: INTERNAL MEDICINE

## 2021-10-03 RX ORDER — NALOXONE HYDROCHLORIDE 0.4 MG/ML
0.2 INJECTION, SOLUTION INTRAMUSCULAR; INTRAVENOUS; SUBCUTANEOUS
Status: DISCONTINUED | OUTPATIENT
Start: 2021-10-03 | End: 2021-10-04 | Stop reason: HOSPADM

## 2021-10-03 RX ORDER — CARBOXYMETHYLCELLULOSE SODIUM 5 MG/ML
1 SOLUTION/ DROPS OPHTHALMIC
Status: DISCONTINUED | OUTPATIENT
Start: 2021-10-03 | End: 2021-10-04 | Stop reason: HOSPADM

## 2021-10-03 RX ORDER — METHADONE HYDROCHLORIDE 10 MG/ML
100 CONCENTRATE ORAL DAILY
Status: DISCONTINUED | OUTPATIENT
Start: 2021-10-03 | End: 2021-10-03

## 2021-10-03 RX ORDER — NALOXONE HYDROCHLORIDE 0.4 MG/ML
0.4 INJECTION, SOLUTION INTRAMUSCULAR; INTRAVENOUS; SUBCUTANEOUS
Status: DISCONTINUED | OUTPATIENT
Start: 2021-10-03 | End: 2021-10-04 | Stop reason: HOSPADM

## 2021-10-03 RX ADMIN — LORAZEPAM 2 MG: 1 TABLET ORAL at 21:21

## 2021-10-03 RX ADMIN — Medication 5 MG: at 21:21

## 2021-10-03 RX ADMIN — Medication 1 DROP: at 14:38

## 2021-10-03 RX ADMIN — LORAZEPAM 2 MG: 1 TABLET ORAL at 00:21

## 2021-10-03 RX ADMIN — CLONIDINE HYDROCHLORIDE 0.1 MG: 0.1 TABLET ORAL at 16:18

## 2021-10-03 RX ADMIN — CLONIDINE HYDROCHLORIDE 0.1 MG: 0.1 TABLET ORAL at 00:15

## 2021-10-03 RX ADMIN — TRAZODONE HYDROCHLORIDE 50 MG: 50 TABLET ORAL at 21:21

## 2021-10-03 RX ADMIN — CLONIDINE HYDROCHLORIDE 0.1 MG: 0.1 TABLET ORAL at 08:37

## 2021-10-03 RX ADMIN — LORAZEPAM 2 MG: 1 TABLET ORAL at 05:43

## 2021-10-03 RX ADMIN — FAMOTIDINE 10 MG: 10 TABLET, FILM COATED ORAL at 21:21

## 2021-10-03 RX ADMIN — LORAZEPAM 2 MG: 1 TABLET ORAL at 08:38

## 2021-10-03 RX ADMIN — LORAZEPAM 2 MG: 1 TABLET ORAL at 14:38

## 2021-10-03 RX ADMIN — LORAZEPAM 1 MG: 1 TABLET ORAL at 16:22

## 2021-10-03 RX ADMIN — LORAZEPAM 2 MG: 1 TABLET ORAL at 10:34

## 2021-10-03 RX ADMIN — GABAPENTIN 300 MG: 300 CAPSULE ORAL at 08:37

## 2021-10-03 RX ADMIN — METOPROLOL TARTRATE 25 MG: 25 TABLET, FILM COATED ORAL at 08:38

## 2021-10-03 RX ADMIN — LORAZEPAM 2 MG: 1 TABLET ORAL at 12:32

## 2021-10-03 RX ADMIN — GABAPENTIN 300 MG: 300 CAPSULE ORAL at 16:18

## 2021-10-03 RX ADMIN — GABAPENTIN 300 MG: 300 CAPSULE ORAL at 00:16

## 2021-10-03 RX ADMIN — METOPROLOL TARTRATE 25 MG: 25 TABLET, FILM COATED ORAL at 21:21

## 2021-10-03 RX ADMIN — Medication 5 MG: at 01:01

## 2021-10-03 RX ADMIN — LORAZEPAM 2 MG: 1 TABLET ORAL at 02:27

## 2021-10-03 RX ADMIN — FOLIC ACID 1 MG: 1 TABLET ORAL at 08:37

## 2021-10-03 RX ADMIN — FAMOTIDINE 10 MG: 10 TABLET, FILM COATED ORAL at 08:37

## 2021-10-03 RX ADMIN — MULTIPLE VITAMINS W/ MINERALS TAB 1 TABLET: TAB at 08:37

## 2021-10-03 RX ADMIN — LORAZEPAM 1 MG: 1 TABLET ORAL at 16:57

## 2021-10-03 ASSESSMENT — ACTIVITIES OF DAILY LIVING (ADL)
ADLS_ACUITY_SCORE: 5

## 2021-10-03 NOTE — PLAN OF CARE
7PM-7AM RN     Patient vital signs are at baseline: Yes  Patient able to ambulate as they were prior to admission or with assist devices provided by therapies during their stay: Yes   Patient MUST void prior to discharge: Yes   Patient able to tolerate oral intake: Yes  Patient has adequate pain control using oral analgesics: Yes    Restless and unable to sleep despite Trazadone, Melatonin and Ativan (4 mg total for CIWA 16/15). Generally pleasant and discussed desire for inpatient treatment. Stated whiskey has been the best sleep aid for her in the past. Up independently. Voiding appropriately. Forgetful.

## 2021-10-03 NOTE — PROGRESS NOTES
1825:    RN charting when briefly saw pt go by in hallway towards [facility].  RN checked pt room and found all of pt belonging still present, pt no-where on unit.  Looked outside and saw pt outside of main hospital entrance smoking.      This RN informed charge RN, charge RN having conversation with pt regarding hospital no-smoking/going outside policy and especially with her still on high doses of Ativan.  Updated that she could get nicotine patch or gum as smoking alternative.  Pt states she won't go outside again.  Will continue to monitor.

## 2021-10-03 NOTE — PLAN OF CARE
"Pt is A&Ox4. VSS. Pain in low back and head ache. Heating pad applied to low back. Tingling in bilateral LE. CIWA monitoring: see flow sheet for scores. 8 mg ativan given total. Pt appears to be asleep between cares. Wakes up and asks for ativan. States she feels \"nauseated and is dry heaving\".Tolerating small bites of food, on a regular diet. Drinking fluids. Up independently in the room. Steady on feet and alert and oriented. Voiding in the bathroom. Stating she is \"interested in inpatient treatment for alcohol and opioid dependence\". Visibly distressed after talking with father and . Stating she \"does not want to talk about it\". Chemical dependency consult placed. Will continue to monitor.          "
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
## 2021-10-03 NOTE — PROGRESS NOTES
[facility]    Hospitalist Progress Note  Name: Danielle Tadeo    MRN: [id number]  Provider: Radha Murphy MD  Date of Service: 10/03/2021    Assessment & Plan   Summary of Stay: Danielle Tadeo is a 46 year old female who was admitted on 9/27/2021 for intractable nausea vomiting acute alcohol intoxication with severe alcohol abuse and impending alcohol withdrawal.  Opioid use disorder with methadone withdrawal.     Patient with known history of alcohol abuse opioid disorder on methadone, hypertension prolonged QTC recent hospitalization for acute alcohol intoxication discharged on 9/13/2021.    She once again presented with nausea and vomiting after consuming alcohol. On admission in the emergency room she had lactic acid levels of 9.8, blood alcohol level was 0.16.  She was hypertensive tachycardic tachypneic.    Patient is currently on CIWA protocol.     Acute Intractable Nausea and Vomiting  Acute Alcohol Intoxication with Severe Alcohol Abuse and Impending Alcohol Withdrawal  Opiate Use Dependence with Methadone Withdrawal  -CIWA with Ativan and Gabapentin taper  -Multivitamin, folic acid and thiamine  -Will continue clonidine  -CD consulted for chemical dependency treatment recommendations    Hypertension  -Will continue  metoprolol 25 mg twice daily  -As needed hydralazine  -Likely secondary to alcohol withdrawal     Lactic Acidosis: resolved  - Likely secondary to vomiting  - IVF    Hypokalemia  - Likely secondary to decreased PO intake and vomiting  - Electrolyte replacement protocol     Alcoholic Hepatitis  -Slow improvement. Needs outpatient follow up.   -Now off  IVF  - Daily Hepatic Panel     Prolonged QTc  -QTc = 541 now down to 470  -Avoid QT pronlonging medications  -Will hold methadone for now     Elevated Lipase  -Lipase trending down  -Intractable nausea vomiting    Hypertension  - prn hydralazine     Hypokalemia  - replace per protocol    Drop in hemoglobin noted initially  now stable  -no signs of bleeding  -likely dilutional  -Hgb stable     DVT Prophylaxis: Pneumatic Compression Devices  Code Status: Full Code    Disposition: Expected discharge in 2 days.     Interval History   Reviewed chart. Patient seen and examined. She stated that her nausea and vomiting is improving. She denies cough or shortness of breath.  More than 10 point review of systems was carried out was otherwise negative    -Data reviewed today: I reviewed all new labs and imaging reports over the last 24 hours. I personally reviewed no images or EKG's today.    Physical Exam   Temp: 98.1  F (36.7  C) Temp src: Temporal BP: 124/76 Pulse: 72   Resp: 20 SpO2: 98 % O2 Device: None (Room air)    Vitals:    09/30/21 0616 10/01/21 0125 10/02/21 0813   Weight: 79.5 kg (175 lb 3.2 oz) 79.2 kg (174 lb 8 oz) 77.2 kg (170 lb 4.8 oz)     Vital Signs with Ranges  Temp:  [97.2  F (36.2  C)-98.1  F (36.7  C)] 98.1  F (36.7  C)  Pulse:  [57-74] 72  Resp:  [18-20] 20  BP: ()/(58-76) 124/76  SpO2:  [98 %] 98 %  I/O last 3 completed shifts:  In: 750 [P.O.:750]  Out: -       GEN: Sleeping after Ativan.  HEENT:  Normocephalic/atraumatic, no scleral icterus, no nasal discharge, mouth moist.  CV: Tachycardic S1 + S2 noted, no S3 or S4.  LUNGS:  Clear to auscultation bilaterally without rales/rhonchi/wheezing/retractions.  Symmetric chest rise on inhalation noted.  ABD:  Active bowel sounds, soft, non-tender/non-distended.  No rebound/guarding/rigidity.  EXT:  No edema.  No cyanosis.  No joint synovitis noted.  SKIN:  Dry to touch, no exanthems noted in the visualized areas.    Medications       cloNIDine  0.1 mg Oral Q8H     famotidine  10 mg Oral BID     folic acid  1 mg Oral Daily     [START ON 10/4/2021] gabapentin  100 mg Oral Q8H     gabapentin  300 mg Oral Q8H     metoprolol tartrate  25 mg Oral BID     multivitamin w/minerals  1 tablet Oral Daily     Data     No results for input(s): PH, PHV, PO2, PO2V, SAT, PCO2, PCO2V,  HCO3, HCO3V in the last 168 hours.  Recent Labs   Lab 09/30/21 0703 09/29/21 0617 09/28/21  0737   WBC 4.1 4.2 4.4   HGB 10.1* 9.6* 10.5*   HCT 34.4* 32.1* 32.0*   * 109* 103*   PLT 91* 100* 112*     Recent Labs   Lab 10/03/21  0615 10/02/21  0600 10/01/21  0905 09/30/21  0703 09/29/21  0617   NA  --  141 140  --  142   POTASSIUM 4.3 3.9 4.0   < > 3.6   CHLORIDE  --  109 110*  --  112*   CO2  --  24 22  --  19*   ANIONGAP  --  8 8  --  11   GLC  --  122* 143*  --  139*   BUN  --  5* 3*  --  1*   CR  --  0.48* 0.45*  --  0.49*   GFRESTIMATED  --  >90 >90  --  >90   HAZEL  --  8.1* 8.4*  --  7.7*    < > = values in this interval not displayed.     No results for input(s): CULT in the last 168 hours.  No results for input(s): NTBNPI, NTBNP in the last 168 hours.  No results for input(s): CKT in the last 168 hours.    Invalid input(s): CK, CK TOTAL  Recent Labs   Lab 10/02/21  0600 10/01/21  0905 09/29/21  0617   CR 0.48* 0.45* 0.49*     Recent Labs   Lab 10/02/21  0600 10/01/21  0905 09/29/21  0617 09/28/21 0737 09/27/21  1105   * 143* 139* 108* 122*     Recent Labs   Lab 09/30/21 0703 09/29/21 0617 09/28/21  0737   HGB 10.1* 9.6* 10.5*     Recent Labs   Lab 09/29/21 0617 09/28/21 0737 09/27/21  0059   * 647* 750*   ALT 92* 93* 112*   ALKPHOS 162* 148 199*   BILITOTAL 1.0 1.5* 1.3     Recent Labs   Lab 09/28/21  0737   INR 1.03     Recent Labs   Lab 09/28/21  0737 09/27/21  0234 09/27/21  0105   LACT 2.0 7.9* 9.8*     Recent Labs   Lab 09/30/21  0703 09/28/21  0737   LIPASE 316 1,007*     Recent Labs   Lab 09/30/21  0703 09/29/21  0617 09/28/21  0737   PLT 91* 100* 112*     Recent Labs   Lab 10/02/21  0600 10/01/21  0905 09/29/21  0617   BUN 5* 3* 1*   CR 0.48* 0.45* 0.49*     No results for input(s): TSH in the last 168 hours.  Recent Labs   Lab 09/27/21  0059   TROPONIN <0.015     Recent Labs   Lab 09/27/21  1030   COLOR Yellow   APPEARANCE Clear   URINEGLC Negative   URINEBILI Small*    URINEKETONE Trace*   SG 1.022   UBLD Negative   URINEPH 6.5   PROTEIN 20 *   NITRITE Negative   LEUKEST Negative   RBCU 1   WBCU 1       No results found for this or any previous visit (from the past 24 hour(s)).

## 2021-10-03 NOTE — PLAN OF CARE
"Evening RN ([phone])     Patient vital signs are at baseline: Yes  Patient able to ambulate as they were prior to admission or with assist devices provided by therapies during their stay:  Yes  Patient MUST void prior to discharge:  Yes  Patient able to tolerate oral intake:  Yes  Pain has adequate pain control using Oral analgesics:  Yes     Pt A/O x4.  Pt anxious and tearful off and on this shift, mood labile.  Was on the phone with someone and afterwards very tearful saying that they had called her \"worthless and stupid\".  Tried to support pt emotionally, and pt had a female visitor in the room.  See note regarding pt going outside to smoke.  CIWA scored as in flowsheets - pt c/o anxiety, headache, nausea, sweatiness, some tremor noteable.  Pt c/o nausea and dry heaving, not visualized by RN.  Very frequently requests medications.  VSS and afebrile.  Tele in place.  Pain managed adequately with Ativan doses for headache.  CMS intact - baseline tingling in feet/legs.  Skin ok.  Up independently.  Voiding adequately.  Pt continues to report having bowel movements.  Tolerating regular diet well.  Plan is chem dep consult and potential inpatient treatment d/t pt not wanting to return home at this time.  Pt asking about methadone, informed her plan to watch her QT interval concerns. Will continue to monitor.      "

## 2021-10-04 VITALS
DIASTOLIC BLOOD PRESSURE: 70 MMHG | HEIGHT: 66 IN | WEIGHT: 173 LBS | OXYGEN SATURATION: 96 % | SYSTOLIC BLOOD PRESSURE: 125 MMHG | BODY MASS INDEX: 27.8 KG/M2 | TEMPERATURE: 97.4 F | HEART RATE: 75 BPM | RESPIRATION RATE: 18 BRPM

## 2021-10-04 LAB
ANION GAP SERPL CALCULATED.3IONS-SCNC: 8 MMOL/L (ref 3–14)
BUN SERPL-MCNC: 8 MG/DL (ref 7–30)
CALCIUM SERPL-MCNC: 8 MG/DL (ref 8.5–10.1)
CHLORIDE BLD-SCNC: 111 MMOL/L (ref 94–109)
CO2 SERPL-SCNC: 21 MMOL/L (ref 20–32)
CREAT SERPL-MCNC: 0.58 MG/DL (ref 0.52–1.04)
ERYTHROCYTE [DISTWIDTH] IN BLOOD BY AUTOMATED COUNT: 16.9 % (ref 10–15)
GFR SERPL CREATININE-BSD FRML MDRD: >90 ML/MIN/1.73M2
GLUCOSE BLD-MCNC: 166 MG/DL (ref 70–99)
HCT VFR BLD AUTO: 35.8 % (ref 35–47)
HGB BLD-MCNC: 10.8 G/DL (ref 11.7–15.7)
MAGNESIUM SERPL-MCNC: 2.5 MG/DL (ref 1.6–2.3)
MCH RBC QN AUTO: 33 PG (ref 26.5–33)
MCHC RBC AUTO-ENTMCNC: 30.2 G/DL (ref 31.5–36.5)
MCV RBC AUTO: 110 FL (ref 78–100)
PHOSPHATE SERPL-MCNC: 3.1 MG/DL (ref 2.5–4.5)
PLATELET # BLD AUTO: 204 10E3/UL (ref 150–450)
POTASSIUM BLD-SCNC: 3.9 MMOL/L (ref 3.4–5.3)
RBC # BLD AUTO: 3.27 10E6/UL (ref 3.8–5.2)
SODIUM SERPL-SCNC: 140 MMOL/L (ref 133–144)
WBC # BLD AUTO: 3.7 10E3/UL (ref 4–11)

## 2021-10-04 PROCEDURE — 83735 ASSAY OF MAGNESIUM: CPT | Performed by: INTERNAL MEDICINE

## 2021-10-04 PROCEDURE — 250N000013 HC RX MED GY IP 250 OP 250 PS 637: Performed by: INTERNAL MEDICINE

## 2021-10-04 PROCEDURE — 85027 COMPLETE CBC AUTOMATED: CPT | Performed by: INTERNAL MEDICINE

## 2021-10-04 PROCEDURE — 80048 BASIC METABOLIC PNL TOTAL CA: CPT | Performed by: INTERNAL MEDICINE

## 2021-10-04 PROCEDURE — 84100 ASSAY OF PHOSPHORUS: CPT | Performed by: INTERNAL MEDICINE

## 2021-10-04 PROCEDURE — H0001 ALCOHOL AND/OR DRUG ASSESS: HCPCS

## 2021-10-04 PROCEDURE — 99207 PR CDG-CODE INCORRECT PER BILLING BASED ON TIME: CPT | Performed by: INTERNAL MEDICINE

## 2021-10-04 PROCEDURE — 36415 COLL VENOUS BLD VENIPUNCTURE: CPT | Performed by: INTERNAL MEDICINE

## 2021-10-04 PROCEDURE — 99239 HOSP IP/OBS DSCHRG MGMT >30: CPT | Performed by: INTERNAL MEDICINE

## 2021-10-04 RX ORDER — GABAPENTIN 100 MG/1
100 CAPSULE ORAL EVERY 8 HOURS
Qty: 45 CAPSULE | Refills: 0 | Status: ON HOLD | OUTPATIENT
Start: 2021-10-04 | End: 2024-01-25

## 2021-10-04 RX ORDER — CLONIDINE HYDROCHLORIDE 0.1 MG/1
0.1 TABLET ORAL EVERY 8 HOURS PRN
Qty: 20 TABLET | Refills: 0 | Status: ON HOLD | OUTPATIENT
Start: 2021-10-04 | End: 2024-01-25

## 2021-10-04 RX ORDER — LOPERAMIDE HCL 2 MG
2 CAPSULE ORAL 4 TIMES DAILY PRN
Status: DISCONTINUED | OUTPATIENT
Start: 2021-10-04 | End: 2021-10-04 | Stop reason: HOSPADM

## 2021-10-04 RX ORDER — HYDROXYZINE HYDROCHLORIDE 10 MG/1
10 TABLET, FILM COATED ORAL EVERY 6 HOURS PRN
Status: DISCONTINUED | OUTPATIENT
Start: 2021-10-04 | End: 2021-10-04 | Stop reason: HOSPADM

## 2021-10-04 RX ADMIN — GABAPENTIN 300 MG: 300 CAPSULE ORAL at 08:49

## 2021-10-04 RX ADMIN — LORAZEPAM 1 MG: 1 TABLET ORAL at 00:15

## 2021-10-04 RX ADMIN — METOPROLOL TARTRATE 25 MG: 25 TABLET, FILM COATED ORAL at 08:48

## 2021-10-04 RX ADMIN — CLONIDINE HYDROCHLORIDE 0.1 MG: 0.1 TABLET ORAL at 08:49

## 2021-10-04 RX ADMIN — GABAPENTIN 100 MG: 100 CAPSULE ORAL at 16:27

## 2021-10-04 RX ADMIN — HYDROXYZINE HYDROCHLORIDE 10 MG: 10 TABLET ORAL at 16:27

## 2021-10-04 RX ADMIN — LORAZEPAM 2 MG: 1 TABLET ORAL at 01:04

## 2021-10-04 RX ADMIN — GABAPENTIN 300 MG: 300 CAPSULE ORAL at 00:14

## 2021-10-04 RX ADMIN — FOLIC ACID 1 MG: 1 TABLET ORAL at 08:49

## 2021-10-04 RX ADMIN — LORAZEPAM 1 MG: 1 TABLET ORAL at 08:48

## 2021-10-04 RX ADMIN — MULTIPLE VITAMINS W/ MINERALS TAB 1 TABLET: TAB at 08:49

## 2021-10-04 RX ADMIN — LOPERAMIDE HYDROCHLORIDE 2 MG: 2 CAPSULE ORAL at 16:28

## 2021-10-04 RX ADMIN — CLONIDINE HYDROCHLORIDE 0.1 MG: 0.1 TABLET ORAL at 00:14

## 2021-10-04 RX ADMIN — CLONIDINE HYDROCHLORIDE 0.1 MG: 0.1 TABLET ORAL at 16:29

## 2021-10-04 RX ADMIN — HYDROXYZINE HYDROCHLORIDE 10 MG: 10 TABLET ORAL at 10:49

## 2021-10-04 RX ADMIN — LORAZEPAM 2 MG: 1 TABLET ORAL at 05:05

## 2021-10-04 RX ADMIN — FAMOTIDINE 10 MG: 10 TABLET, FILM COATED ORAL at 08:49

## 2021-10-04 ASSESSMENT — ACTIVITIES OF DAILY LIVING (ADL)
ADLS_ACUITY_SCORE: 5

## 2021-10-04 ASSESSMENT — MIFFLIN-ST. JEOR: SCORE: 1441.47

## 2021-10-04 NOTE — CONSULTS
10/4/2021    Pt has been interviewed via [employer] and CD Consult has been completed.     Recommendations:      Pt reports she would be interested in attending inpatient CD treatment. Pt is recommended to:     1. Abstain from all non-prescribed mood-altering substances  2. Take all medications as prescribed  3. Enter and complete a residential or inpatient treatment program  4. Increase sober support, attend AA meetings at least one time weekly  5.  Follow all recommendations of your medical providers        Referrals:  [facility]  [facility]  [address] [place] [address]  Phone: [phone]  Fax: [phone]     [facility] Team for Referrals  Phone: [phone]  Fax: [phone]     [facility]  Phone: [phone]  Fax: [phone] ATTN: INTAKE         MANAS consult completed by: KEVIN Neal  Phone Number: [phone]  E-mail Address: [email or web address] Mental Health and Addiction Services Evaluation Department  [address] [place] [address]

## 2021-10-04 NOTE — DISCHARGE SUMMARY
[facility]    Discharge Summary  Hospitalist    Date of Admission:  9/27/2021  Date of Discharge:  10/4/2021  Discharging Provider: Radha Murphy MD  Date of Service (when I saw the patient): 10/04/21    Discharge Diagnoses     Acute Intractable Nausea and Vomiting  Acute Alcohol Intoxication with Severe Alcohol Abuse and Impending Alcohol Withdrawal  Opiate Use Dependence with Methadone Withdrawal     Hypertension     Lactic Acidosis: resolved     Hypokalemia     Alcoholic Hepatitis     Prolonged QTc     Elevated Lipase     Hypertension     Hypokalemia     Drop in hemoglobin noted initially now stable    History of Present Illness   Danielle Tadeo is an 46 year old female who presented with acute intractable nausea and vomiting. Please see hospital course for details.     Hospital Course   Summary of Stay: Danielle Tadeo is a 46 year old female who was admitted on 9/27/2021 for intractable nausea vomiting acute alcohol intoxication with severe alcohol abuse and impending alcohol withdrawal.  Opioid use disorder with methadone withdrawal.      Patient with known history of alcohol abuse opioid disorder on methadone, hypertension prolonged QTC recent hospitalization for acute alcohol intoxication discharged on 9/13/2021.     She once again presented with nausea and vomiting after consuming alcohol. On admission in the emergency room she had lactic acid levels of 9.8, blood alcohol level was 0.16.  She was hypertensive tachycardic tachypneic.     She was treated with CIWA protocol.      Acute Intractable Nausea and Vomiting  Acute Alcohol Intoxication with Severe Alcohol Abuse and Impending Alcohol Withdrawal  Opiate Use Dependence with Methadone Withdrawal  -She was put on CIWA with Ativan and Gabapentin taper  -Treated with multivitamin, folic acid and thiamine  -She was was given gabapentin and clonidine for her anxiety. She was advised to avoid alcohol. She has not been on  methadone for more than 2 weeks. She has prolonged QTC on EKG. Methadone was discontinued. Patient also doesn't want to be back on it.      Hypertension  -Treated with  metoprolol 25 mg twice daily  -As needed hydralazine  -Likely secondary to alcohol withdrawal  -Her Toprol Xl was resumed on discharge    Lactic Acidosis: resolved  - Likely secondary to vomiting  - IVF     Hypokalemia  - Likely secondary to decreased PO intake and vomiting  - Electrolyte replacement protocol     Alcoholic Hepatitis  -Slow improvement. Needs outpatient follow up.   -Now off  IVF  -Daily Hepatic Panel     Prolonged QTc  -QTc = 541 now down to 470  -Avoid QT pronlonging medications  -discontinued methadone     Elevated Lipase  -Lipase trending down  -Intractable nausea vomiting     Hypokalemia  - replaced per protocol     Patient discharged home in a stable condition. She was discharged home        Significant Results and Procedures   Results for orders placed or performed during the hospital encounter of 09/11/21   US Abdomen Limited (RUQ)    Narrative    US ABDOMEN LIMITED 9/11/2021 10:12 AM    CLINICAL HISTORY: Right upper quadrant pain, alcohol use.    TECHNIQUE: Limited abdominal ultrasound.    COMPARISON: None.    FINDINGS:    GALLBLADDER: The gallbladder is normal. No gallstones, wall  thickening, or pericholecystic fluid. Negative sonographic Lopez's  sign.    BILE DUCTS: There is no biliary dilatation. The common duct measures 5  mm.    LIVER: Liver is enlarged at 22.5 cm in length and diffusely increased  in echogenicity. No focal liver lesions are evident.    RIGHT KIDNEY: No hydronephrosis.    PANCREAS: Visualized portions of the pancreas are unremarkable.  Superior to the pancreas, there is a hypoechoic lesion with  questionable posterior acoustic enhancement that measures 3.4 x 2.1 x  2.5 cm.    No ascites.      Impression    IMPRESSION:  1.  Enlarged, fatty infiltrated liver.  2.  No evidence of gallbladder disease.  3.   Hypoechoic, possibly cystic lesion above the head of the pancreas  could represent a cystic pancreatic lesion or pseudocyst. Consider  contrast-enhanced CT of the abdomen and pelvis for further evaluation.      ADRY CHURCHILL MD         SYSTEM ID:  [id number]   XR Chest 2 Views    Narrative    CHEST TWO VIEWS 9/11/2021 10:16 AM     HISTORY: Chest pain.    COMPARISON: 10/5/2013.    FINDINGS: Heart size and pulmonary vascularity are within normal  limits. The lungs are clear. No pneumothorax or pleural effusion.       Impression    IMPRESSION: Normal two views of the chest.      ADRY CHURCHILL MD         SYSTEM ID:  [id number]   CT Abdomen Pelvis w Contrast    Narrative    CT ABDOMEN PELVIS WITH CONTRAST 9/11/2021 12:38 PM    CLINICAL HISTORY: Abdominal distention.    TECHNIQUE: CT scan of the abdomen and pelvis was performed following  injection of IV contrast. Multiplanar reformats were obtained. Dose  reduction techniques were used.  CONTRAST: 73mL Isovue-370    COMPARISON: None.    FINDINGS:   LOWER CHEST: Small hiatal hernia.    HEPATOBILIARY: Marked fatty infiltration of the liver without focal  lesion. Gallbladder unremarkable.    PANCREAS: Normal.    SPLEEN: Normal.    ADRENAL GLANDS: Normal.    KIDNEYS/BLADDER: Normal.    BOWEL: Previous gastric bypass surgery. There are a few colonic  diverticula but no evidence of diverticulitis. Normal appendix. No  bowel obstruction or inflammatory change. No free air.    PELVIC ORGANS: Uterus not visualized.    ADDITIONAL FINDINGS: No ascites or lymphadenopathy.    MUSCULOSKELETAL: No destructive bone lesions.      Impression    IMPRESSION: No acute cause for abdominal distention demonstrated.     ADRY CHURCHILL MD         SYSTEM ID:  [id number]         Pending Results   None  Code Status   Full Code       Primary Care Physician   Fuentes Akbar        Discharge Disposition   Discharged to home  Condition at discharge: Stable    Consultations This Hospital Stay  "  CARE MANAGEMENT / SOCIAL WORK IP CONSULT  NUTRITION SERVICES ADULT IP CONSULT  SOCIAL WORK IP CONSULT  CHEMICAL DEPENDENCY IP CONSULT    Time Spent on this Encounter   I, Radha Murphy MD, MD, personally saw the patient today and spent greater than 30 minutes discharging this patient.    Discharge Orders      Reason for your hospital stay    Alcohol withdrawal     Follow-up and recommended labs and tests     Follow up with primary care provider, Fuentes Akbar, within 7 days     Activity    Your activity upon discharge: activity as tolerated     Diet    Follow this diet upon discharge: Orders Placed This Encounter      Regular Diet Adult     Discharge Medications   Current Discharge Medication List      START taking these medications    Details   cloNIDine (CATAPRES) 0.1 MG tablet Take 1 tablet (0.1 mg) by mouth every 8 hours as needed (anxiety)  Qty: 20 tablet, Refills: 0    Associated Diagnoses: Alcohol withdrawal syndrome without complication (H)      gabapentin (NEURONTIN) 100 MG capsule Take 1 capsule (100 mg) by mouth every 8 hours  Qty: 45 capsule, Refills: 0    Associated Diagnoses: Alcohol withdrawal syndrome without complication (H)         CONTINUE these medications which have NOT CHANGED    Details   lisinopril (ZESTRIL) 20 MG tablet Take 1 tablet (20 mg) by mouth daily  Qty: 90 tablet, Refills: 1    Associated Diagnoses: Hypertension goal BP (blood pressure) < 140/90      metoprolol succinate ER (TOPROL-XL) 50 MG 24 hr tablet Take 50 mg by mouth daily         STOP taking these medications       methadone (DOLOPHINE-INTENSOL) 10 MG/ML (HIGH CONC) solution Comments:   Reason for Stopping:               Allergies   Allergies   Allergen Reactions     Zofran [Ondansetron] Other (See Comments)     \"heart stopped\" \"Long QT\"     Data   Most Recent 3 CBC's:Recent Labs   Lab Test 10/04/21  0654 09/30/21  0703 09/29/21  0617   WBC 3.7* 4.1 4.2   HGB 10.8* 10.1* 9.6*   * 112* 109*   PLT " 204 91* 100*      Most Recent 3 BMP's:  Recent Labs   Lab Test 10/04/21  0654 10/03/21  0615 10/02/21  0600 10/01/21  0905 10/01/21  0905     --  141  --  140   POTASSIUM 3.9 4.3 3.9   < > 4.0   CHLORIDE 111*  --  109  --  110*   CO2 21  --  24  --  22   BUN 8  --  5*  --  3*   CR 0.58  --  0.48*  --  0.45*   ANIONGAP 8  --  8  --  8   HAZEL 8.0*  --  8.1*  --  8.4*   *  --  122*  --  143*    < > = values in this interval not displayed.     Most Recent 2 LFT's:  Recent Labs   Lab Test 09/29/21  0617 09/28/21  0737   * 647*   ALT 92* 93*   ALKPHOS 162* 148   BILITOTAL 1.0 1.5*     Most Recent INR's and Anticoagulation Dosing History:  Anticoagulation Dose History     Recent Dosing and Labs Latest Ref Rng & Units 5/7/2008 9/28/2021    INR 0.85 - 1.15 0.87 1.03        Most Recent 3 Troponin's:  Recent Labs   Lab Test 09/27/21  0059 09/11/21  0910 10/05/13  2222   TROPONIN <0.015 <0.015 0.00     Most Recent Cholesterol Panel:No lab results found.  Most Recent 6 Bacteria Isolates From Any Culture (See EPIC Reports for Culture Details):  Recent Labs   Lab Test 10/05/13  2215   CULT No Beta Streptococcus isolated     Most Recent TSH, T4 and A1c Labs:  Recent Labs   Lab Test 03/08/14  1854   TSH 1.10

## 2021-10-04 NOTE — PLAN OF CARE
Pt discharge instructions and medications were reviewed with pt.All personal belongings were packed by pt.Medications were signed for and uplifted by pt in sealed envelope.Left unit via wheelchair accompanied by NA.Family downstairs to take pt home.

## 2021-10-04 NOTE — PLAN OF CARE
A/O x 4.  Anxious and tearful on and off throughout shift.  VSS, afebrile.  Remote telemetry monitoring.  CIWA 10 and 6, given 1 mg ativan.  CIWA and ativan discontinued and started on PRN atarax for anxiety.  Denies pain.  Up independently in room.  CMS intact, baseline neuropathy to BLE.  Voiding adequately.  BM this shift.  Tolerating regular diet, despite complaining of nausea.  CD consulted, looking for possible inpatient treatment facilities.  Plan is to discharge to home this afternoon after another dose of atarax.  Will continue to monitor.

## 2021-10-04 NOTE — PROGRESS NOTES
CLINICAL NUTRITION SERVICES - REASSESSMENT NOTE      Malnutrition: (10/04/2021)  % Weight Loss:None noted  % Intake:Decreased intake does not meet criteria for malnutrition   Subcutaneous Fat Loss: none observed   Muscle Loss: None observed   Fluid Retention: None noted     Malnutrition Diagnosis: Patient does not meet two of the above criteria necessary for diagnosing malnutrition        EVALUATION OF PROGRESS TOWARD GOALS   Diet: Regular, Ensure with meals prn    Intake/Tolerance:  Diet advanced to regular 9/28.  Variable PO intakes of 0-100% since last RD assessment.  Consistently ordering/receiving meals TID, no use of Ensure.  No vomiting over the past >48 hrs per documentation, confirmed with patient.  She hopes to discharge today.    Barriers to PO intakes including: Previous N/V.    ASSESSED NUTRITION NEEDS (PER APPROVED PRACTICE GUIDELINES, Dosing weight: 77 kg):  Estimated Energy Needs: 25-30 kcal per kg  Justification: minimum maintenance while hospitalized   Estimated Protein Needs: >/=1.2 grams per kg  Justification: preservation of lean body mass  Estimated Fluid Needs: per provider       NEW FINDINGS:   - Medications reviewed including:     cloNIDine  0.1 mg Oral Q8H     famotidine  10 mg Oral BID     folic acid  1 mg Oral Daily     gabapentin  100 mg Oral Q8H     metoprolol tartrate  25 mg Oral BID     multivitamin w/minerals  1 tablet Oral Daily         - Labs reviewed including:  Electrolytes  Potassium (mmol/L)   Date Value   10/04/2021 3.9   10/03/2021 4.3   10/02/2021 3.9   01/16/2019 4.8   12/22/2017 3.8   10/17/2017 4.0     Phosphorus (mg/dL)   Date Value   10/04/2021 3.1   10/03/2021 4.2   10/02/2021 4.0   10/01/2021 3.2   09/30/2021 3.7    Blood Glucose  Glucose (mg/dL)   Date Value   10/04/2021 166 (H)   10/02/2021 122 (H)   10/01/2021 143 (H)   09/29/2021 139 (H)   09/28/2021 108 (H)   01/16/2019 98   12/22/2017 79   10/17/2017 98   07/01/2015 76   03/08/2014 81    Inflammatory  Markers  WBC (10e9/L)   Date Value   01/16/2019 8.8   01/04/2018 10.9   10/17/2017 14.0 (H)     WBC Count (10e3/uL)   Date Value   10/04/2021 3.7 (L)   09/30/2021 4.1   09/29/2021 4.2     Albumin (g/dL)   Date Value   09/29/2021 2.2 (L)   09/28/2021 2.1 (L)   09/27/2021 2.9 (L)   02/26/2019 3.7   01/16/2019 3.2 (L)   01/16/2008 4.2      Magnesium (mg/dL)   Date Value   10/04/2021 2.5 (H)   10/03/2021 2.5 (H)   10/02/2021 2.4 (H)   03/08/2014 2.2     Sodium (mmol/L)   Date Value   10/04/2021 140   10/02/2021 141   10/01/2021 140   01/16/2019 139   12/22/2017 140   10/17/2017 138    Renal  Urea Nitrogen (mg/dL)   Date Value   10/04/2021 8   10/02/2021 5 (L)   10/01/2021 3 (L)   01/16/2019 13   12/22/2017 16   10/17/2017 14     Creatinine (mg/dL)   Date Value   10/04/2021 0.58   10/02/2021 0.48 (L)   10/01/2021 0.45 (L)   01/16/2019 0.68   12/22/2017 0.69   10/17/2017 0.72     Additional  Triglycerides (mg/dL)   Date Value   08/08/2006 199 (H)     Ketones Urine (mg/dL)   Date Value   09/27/2021 Trace (A)   07/08/2015 Negative        -  Weight trending reviewed and suspect that admit wt of 165# may be reported.  Wt trends since have been closer to 170-174# and no documented edema:  Vitals:    09/29/21 0413 09/30/21 0616 10/01/21 0125 10/02/21 0813   Weight: 80.7 kg (177 lb 14.4 oz) 79.5 kg (175 lb 3.2 oz) 79.2 kg (174 lb 8 oz) 77.2 kg (170 lb 4.8 oz)    10/04/21 0548   Weight: 78.5 kg (173 lb)     - Stooling patterns reviewed.      Previous Goals:   Diet >/= full liquids within 72 hours  Evaluation: Met    Previous Nutrition Diagnosis:   Inadequate nutrient intake (protein energy) related to nausea and vomiting with ETOH withdrawal as evidenced by clear liquid diet, minimal PO x 3 days  Evaluation: Completed      CURRENT NUTRITION DIAGNOSIS  Predicted suboptimal nutrient intake (energy/protein) related to potential for decline in PO intakes pending clinical course.    INTERVENTIONS  Recommendations / Nutrition  Prescription  Diet per MD.  Self-selection of small/frequent meals as needed.    Discontinue Ensure per patient request.    Continue daily MVI/M.    Implementation  Medical Food Supplement: As above.    Nutrition Education: As above.    Goals  Patient to consume at least 75% of meals TID.      MONITORING AND EVALUATION:  Progress towards goals will be monitored and evaluated per protocol and Practice Guidelines      Dayan Sage RDN, LD  Clinical Dietitian  3rd floor/ICU: [phone]  All other floors: [phone]  Weekend/holiday: [phone]

## 2021-10-04 NOTE — PLAN OF CARE
Evening RN ([phone])     Patient vital signs are at baseline: Yes  Patient able to ambulate as they were prior to admission or with assist devices provided by therapies during their stay:  Yes  Patient MUST void prior to discharge:  Yes  Patient able to tolerate oral intake:  Yes  Pain has adequate pain control using Oral analgesics:  Yes     Pt A/O x4.  Pt anxious and tearful off and on this shift, mood labile.    CIWA scored as in flowsheets - pt c/o anxiety, headache, nausea, sweatiness, some tremor noteable.  Pt c/o nausea and dry heaving, not visualized by RN.  Very frequently requests medications.  VSS and afebrile.  Tele in place.  Pain managed adequately with Ativan doses for headache.  CMS intact - baseline tingling in feet/legs.  Skin ok.  Up independently.  Voiding adequately.  Pt continues to report having bowel movements.  Tolerating regular diet well.  Plan is chem dep consult and potential inpatient treatment d/t pt not wanting to return home at this time.
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
## 2021-10-06 ENCOUNTER — PATIENT OUTREACH (OUTPATIENT)
Dept: CARE COORDINATION | Facility: CLINIC | Age: 46
End: 2021-10-06

## 2021-10-06 NOTE — PROGRESS NOTES
Clinic Care Coordination Contact  [facility]/Voicemail    Referral Source: IP Report  Clinical Data: Care Coordinator Outreach  Outreach attempted x 1.  Left message on patient's voicemail with call back information and requested return call.  Plan: Care Coordinator will send care coordination introduction letter with care coordinator contact information and explanation of care coordination services via mail. Care Coordinator will try to reach patient again in 1-2 business days.    CATHERINE Garg   Care Coordination Team  [phone]

## 2021-10-08 ENCOUNTER — TRANSFERRED RECORDS (OUTPATIENT)
Dept: HEALTH INFORMATION MANAGEMENT | Facility: CLINIC | Age: 46
End: 2021-10-08

## 2021-10-08 ENCOUNTER — PATIENT OUTREACH (OUTPATIENT)
Dept: CARE COORDINATION | Facility: CLINIC | Age: 46
End: 2021-10-08

## 2021-10-08 NOTE — LETTER
[facility] CARE COORDINATION  [facility]   October 8, 2021    Danielle Tadeo  [address]  [facility] [address]      Dear Danielle,    I am a clinic care coordinator who works with Fuentes Akbar MD at [facility] . I have been trying to reach you recently to introduce Clinic Care Coordination and to see if there was anything I could assist you with.  Below is a description of clinic care coordination and how I can further assist you.      The clinic care coordination team is made up of a registered nurse,  and community health worker who understand the health care system. The goal of clinic care coordination is to help you manage your health and improve access to the health care system in the most efficient manner. The team can assist you in meeting your health care goals by providing education, coordinating services, strengthening the communication among your providers and supporting you with any resource needs.    Please feel free to contact me at [phone] with any questions or concerns. We are focused on providing you with the highest-quality healthcare experience possible and that all starts with you.     Sincerely,     CATEHRINE Garg   Care Coordination Team  [phone]

## 2021-10-08 NOTE — PROGRESS NOTES
Clinic Care Coordination Contact  [facility]/Voicemail    Referral Source: IP Report  Clinical Data: Care Coordinator Outreach  Outreach attempted x 2.  Left message on patient's voicemail with call back information and requested return call.  Plan: Care Coordinator sent care coordination introduction letter on 10/8/21 via mail. Care Coordinator will do no further outreaches at this time.    CATHERINE Garg   Care Coordination Team  [phone]

## 2022-02-12 VITALS
HEART RATE: 80 BPM | DIASTOLIC BLOOD PRESSURE: 86 MMHG | SYSTOLIC BLOOD PRESSURE: 130 MMHG | HEIGHT: 66 IN | WEIGHT: 173.6 LBS | TEMPERATURE: 99.6 F | BODY MASS INDEX: 27.9 KG/M2

## 2022-02-16 NOTE — CARE COORDINATION
Pt appears on  [facility] chronic disease panel as out of parameters for smoking.   Patient has not responded to requests for appointment and no showed for an appointment awhile ago.  I am not sure if we are still her PCP clinic.     Called and left a message for her to call me back.  Number to my desk was given.

## 2022-02-16 NOTE — CARE COORDINATION
Pt appears on  [facility] chronic disease panel as out for HTN and smoking cessation.   She is due for annual exam this monrt and RTC set.  WIll discuss at annual exam.    Notes states that she only smokes 1 a day.  Address at next visit.

## 2022-02-16 NOTE — PROGRESS NOTES
Patient:   CHAU ABAD            MRN: [id number]            FIN: [id number]               Age:   41 years     Sex:  Female     :  1975   Associated Diagnoses:   Acute UTI   Author:   Dawood Irwin MD      Subjective   Chief complaint 2017 11:39 AM CST    Possible UTI again x 4 days  .  Additional information 4 days of urinary frequency and urgency.   some lower discomfort. no nausea or emesis. no flank pain  no diarrhea.        Health Status   Allergies:    Allergic Reactions (Selected)  No Known Medication Allergies   Medications:  (Selected)   Prescriptions  Prescribed  Flexeril 10 mg oral tablet: 1 tab(s) ( 10 mg ), PO, tid, PRN: for spasm, # 90 tab(s), 0 Refill(s), Type: Maintenance, [facility]: [facility], 1 tab(s) po tid,PRN:for spasm  LORazepam 1 mg oral tablet: ( 1 mg ), po, tid, Instructions: up to 3 a day as needed, PRN: as needed for anxiety, # 60 tab(s), 0 Refill(s), Type: Maintenance  Qvar 80 mcg/inh inhalation aerosol: See Instructions, Instructions: 4 puff(s) inh bid x1 week then 2 puffs bid x1 week then taper if doing well.   use with spacer chamber, # 1 EA, 0 Refill(s), Type: Maintenance, [facility], 4 puff(s) inh bid x1 week then 2 puffs bi...  Septra  mg-160 mg oral tablet: 1 tab(s), PO, BID, # 20 tab(s), 0 Refill(s), Type: Maintenance, Pharmacy: [facility], 1 tab(s) po bid,x10 day(s)  ZyrTEC 10 mg oral tablet: 1 tab(s) ( 10 mg ), po, daily, # 30 tab(s), 5 Refill(s), Type: Maintenance, Pharmacy: [facility], 1 tab(s) po daily,x30 day(s)  albuterol CFC free 90 mcg/inh inhalation aerosol: 2 puff(s), inh, qid, Instructions: dispense with Valved Chamber please May dispense spacer sep if doesnt need inhaler now)  use with spacer chamber, PRN: as needed for wheezing, # 18 gm, 1 Refill(s), Type: Maintenance  buPROPion 150 mg/12 hours (SR) oral tablet, extended release: 1 tab(s) ( 150 mg ), PO, BID, # 60 tab(s), 5 Refill(s),  Type: Maintenance, Pharmacy: [facility], 1 tab(s) po bid  ferrous sulfate 325 mg (65 mg elemental iron) oral delayed release tablet: 1 tab(s) ( 325 mg ), PO, Daily, Instructions: may take with food to minimize abdominal discomfort, # 30 tab(s), 1 Refill(s), Type: Maintenance, Pharmacy: [facility], 1 tab(s) po daily,Instr:may take with food to minimize abdominal discomfort...  hydrochlorothiazide-lisinopril 12.5 mg-10 mg oral tablet: 1 tab(s), PO, Daily, # 90 tab(s), 0 Refill(s), Type: Maintenance, Pharmacy: [facility], 1 tab(s) po daily  oxyCODONE 5 mg oral capsule: 1 cap(s) ( 5 mg ), po, q4-6 hrs, # 12 cap(s), 0 Refill(s), Type: Maintenance, Pharmacy: [facility], 1 cap(s) po q4-6 hrs   Problem list:    All Problems (Selected)  Tobacco user / [id number] / Probable  Anxiety, generalized / [id number] / Confirmed  Seasonal allergies / 147101923 / Confirmed  HTN (hypertension) / [id number] / Confirmed      Objective   Vital Signs   1/9/2017 11:39 AM CST Temperature Tympanic 99.6 DegF    Peripheral Pulse Rate 80 bpm    Pulse Site Radial artery    HR Method Manual    Systolic Blood Pressure 130 mmHg    Diastolic Blood Pressure 86 mmHg    Mean Arterial Pressure 101 mmHg    BP Site Right arm    BP Method Manual      Measurements from flowsheet : Measurements   1/9/2017 11:39 AM CST Height Measured 66 in    Weight Measured 173.6 lb    BSA 1.91 m2    Body Mass Index 28.02 kg/m2      General:  Alert and oriented, No acute distress.    Integumentary:  Warm.    Psychiatric:  Cooperative, Appropriate mood & affect.       Results Review   Results review   Lab results   1/9/2017 12:09 PM CST UA Color YELLOW    UA Appear CLOUDY    UA pH < OR = 5.0    UA Specific [place] 1.011    UA Glucose NEGATIVE    UA Bilirubin NEGATIVE    UA Ketones NEGATIVE    UA Blood NEGATIVE    UA Protein NEGATIVE    UA Nitrite NEGATIVE    UA Leukocyte Esterase 2+    UA WBC 10-20 /HPF    UA RBC 0-2 /HPF    UA  Squamous Epithelial Cells 0-5 /HPF    UA Bacteria FEW /HPF    UA Hyaline Cast NONE SEEN /LPF         Impression and Plan   Assessment and Plan:          Diagnosis: Acute UTI (IJQ75-SP N39.0).    Orders      (Selected)   Prescriptions  Prescribed  Septra  mg-160 mg oral tablet: 1 tab(s), PO, BID, # 20 tab(s), 0 Refill(s), Type: Maintenance, Pharmacy: [facility], 1 tab(s) po bid,x10 day(s)  oxyCODONE 5 mg oral capsule: 1 cap(s) ( 5 mg ), po, q4-6 hrs, # 12 cap(s), 0 Refill(s), Type: Maintenance, Pharmacy: [facility], 1 cap(s) po q4-6 hrs.     .

## 2023-08-26 NOTE — TELEPHONE ENCOUNTER
Labs on 10/20/2017 have  and a letter was sent on 2017. Patient has not followed up. Routing to team to address.    [facility]  
pts WBC were elevated and Sergio FRANCOIS wanted to repeat in 1 week     Called # [phone] (home)      advised pt on the information above - pt made lab visit       Stefania Cook RN, BSN  [place] [facility]         
DISPLAY PLAN FREE TEXT

## 2024-01-23 ENCOUNTER — APPOINTMENT (OUTPATIENT)
Dept: CARDIOLOGY | Facility: CLINIC | Age: 49
End: 2024-01-23
Attending: ANESTHESIOLOGY
Payer: COMMERCIAL

## 2024-01-23 ENCOUNTER — APPOINTMENT (OUTPATIENT)
Dept: CT IMAGING | Facility: CLINIC | Age: 49
End: 2024-01-23
Attending: STUDENT IN AN ORGANIZED HEALTH CARE EDUCATION/TRAINING PROGRAM
Payer: COMMERCIAL

## 2024-01-23 ENCOUNTER — HOSPITAL ENCOUNTER (INPATIENT)
Facility: CLINIC | Age: 49
LOS: 2 days | Discharge: ANOTHER HOSPITAL | End: 2024-01-25
Attending: STUDENT IN AN ORGANIZED HEALTH CARE EDUCATION/TRAINING PROGRAM | Admitting: INTERNAL MEDICINE
Payer: COMMERCIAL

## 2024-01-23 ENCOUNTER — APPOINTMENT (OUTPATIENT)
Dept: GENERAL RADIOLOGY | Facility: CLINIC | Age: 49
End: 2024-01-23
Attending: STUDENT IN AN ORGANIZED HEALTH CARE EDUCATION/TRAINING PROGRAM
Payer: COMMERCIAL

## 2024-01-23 DIAGNOSIS — T40.2X1A OPIOID OVERDOSE, ACCIDENTAL OR UNINTENTIONAL, INITIAL ENCOUNTER (H): ICD-10-CM

## 2024-01-23 DIAGNOSIS — I46.9 CARDIAC ARREST (H): Primary | ICD-10-CM

## 2024-01-23 DIAGNOSIS — J96.02 ACUTE RESPIRATORY FAILURE WITH HYPOXIA AND HYPERCAPNIA (H): ICD-10-CM

## 2024-01-23 DIAGNOSIS — E87.20 LACTIC ACIDOSIS: ICD-10-CM

## 2024-01-23 DIAGNOSIS — J96.01 ACUTE RESPIRATORY FAILURE WITH HYPOXIA AND HYPERCAPNIA (H): ICD-10-CM

## 2024-01-23 PROBLEM — R68.84 CHRONIC JAW PAIN: Status: ACTIVE | Noted: 2024-01-23

## 2024-01-23 PROBLEM — F19.94 SUBSTANCE INDUCED MOOD DISORDER (H): Status: ACTIVE | Noted: 2019-02-14

## 2024-01-23 PROBLEM — G47.00 PERSISTENT INSOMNIA: Status: ACTIVE | Noted: 2019-02-14

## 2024-01-23 PROBLEM — T40.411A: Status: ACTIVE | Noted: 2024-01-23

## 2024-01-23 PROBLEM — R45.851 SUICIDAL IDEATION: Status: ACTIVE | Noted: 2019-02-14

## 2024-01-23 PROBLEM — F06.31 ORGANIC MOOD DISORDER OF DEPRESSED TYPE: Status: ACTIVE | Noted: 2019-02-14

## 2024-01-23 PROBLEM — G89.29 CHRONIC JAW PAIN: Status: ACTIVE | Noted: 2024-01-23

## 2024-01-23 PROBLEM — F33.2 MAJOR DEPRESSIVE DISORDER, RECURRENT SEVERE WITHOUT PSYCHOTIC FEATURES (H): Status: ACTIVE | Noted: 2024-01-23

## 2024-01-23 PROBLEM — F10.10 ALCOHOL ABUSE: Status: ACTIVE | Noted: 2019-02-14

## 2024-01-23 PROBLEM — F17.210 CIGARETTE NICOTINE DEPENDENCE WITHOUT COMPLICATION: Status: ACTIVE | Noted: 2024-01-23

## 2024-01-23 PROBLEM — J30.2 SEASONAL ALLERGIC RHINITIS: Status: ACTIVE | Noted: 2024-01-23

## 2024-01-23 LAB
ALBUMIN SERPL BCG-MCNC: 3.7 G/DL (ref 3.5–5.2)
ALLEN'S TEST: ABNORMAL
ALLEN'S TEST: ABNORMAL
ALP SERPL-CCNC: 207 U/L (ref 40–150)
ALT SERPL W P-5'-P-CCNC: 44 U/L (ref 0–50)
AMPHETAMINES UR QL SCN: ABNORMAL
ANION GAP BLD CALCULATED.3IONS-SCNC: 20 MMOL/L (ref 3–14)
ANION GAP SERPL CALCULATED.3IONS-SCNC: 12 MMOL/L (ref 7–15)
ANION GAP SERPL CALCULATED.3IONS-SCNC: 16 MMOL/L (ref 7–15)
APTT PPP: 30 SECONDS (ref 22–38)
AST SERPL W P-5'-P-CCNC: 163 U/L (ref 0–45)
BARBITURATES UR QL SCN: ABNORMAL
BASE EXCESS BLDA CALC-SCNC: -1.9 MMOL/L (ref -3–3)
BASE EXCESS BLDA CALC-SCNC: -2.8 MMOL/L (ref -3–3)
BASOPHILS # BLD AUTO: 0 10E3/UL (ref 0–0.2)
BASOPHILS NFR BLD AUTO: 0 %
BENZODIAZ UR QL SCN: ABNORMAL
BILIRUB SERPL-MCNC: 0.4 MG/DL
BUN SERPL-MCNC: 11.8 MG/DL (ref 6–20)
BUN SERPL-MCNC: 13 MG/DL (ref 7–30)
BUN SERPL-MCNC: 15.6 MG/DL (ref 6–20)
BZE UR QL SCN: ABNORMAL
CA-I BLD-MCNC: 4.2 MG/DL (ref 4.4–5.2)
CALCIUM SERPL-MCNC: 7.6 MG/DL (ref 8.6–10)
CALCIUM SERPL-MCNC: 8.2 MG/DL (ref 8.6–10)
CANNABINOIDS UR QL SCN: ABNORMAL
CHLORIDE BLD-SCNC: 104 MMOL/L (ref 94–109)
CHLORIDE SERPL-SCNC: 103 MMOL/L (ref 98–107)
CHLORIDE SERPL-SCNC: 105 MMOL/L (ref 98–107)
CO2 BLD-SCNC: 22 MMOL/L (ref 20–32)
COHGB MFR BLD: 94.4 % (ref 96–97)
COHGB MFR BLD: 98.7 % (ref 96–97)
CREAT BLD-MCNC: 0.9 MG/DL (ref 0.5–1)
CREAT SERPL-MCNC: 0.51 MG/DL (ref 0.51–0.95)
CREAT SERPL-MCNC: 0.83 MG/DL (ref 0.51–0.95)
DEPRECATED HCO3 PLAS-SCNC: 19 MMOL/L (ref 22–29)
DEPRECATED HCO3 PLAS-SCNC: 20 MMOL/L (ref 22–29)
EGFRCR SERPLBLD CKD-EPI 2021: 86 ML/MIN/1.73M2
EGFRCR SERPLBLD CKD-EPI 2021: >90 ML/MIN/1.73M2
EOSINOPHIL # BLD AUTO: 0 10E3/UL (ref 0–0.7)
EOSINOPHIL NFR BLD AUTO: 0 %
ERYTHROCYTE [DISTWIDTH] IN BLOOD BY AUTOMATED COUNT: 13.3 % (ref 10–15)
FENTANYL UR QL: ABNORMAL
FIBRINOGEN PPP-MCNC: 251 MG/DL (ref 170–490)
GLUCOSE BLD-MCNC: 283 MG/DL (ref 70–99)
GLUCOSE BLDC GLUCOMTR-MCNC: 130 MG/DL (ref 70–99)
GLUCOSE BLDC GLUCOMTR-MCNC: 143 MG/DL (ref 70–99)
GLUCOSE BLDC GLUCOMTR-MCNC: 147 MG/DL (ref 70–99)
GLUCOSE BLDC GLUCOMTR-MCNC: 157 MG/DL (ref 70–99)
GLUCOSE BLDC GLUCOMTR-MCNC: 158 MG/DL (ref 70–99)
GLUCOSE BLDC GLUCOMTR-MCNC: 291 MG/DL (ref 70–99)
GLUCOSE SERPL-MCNC: 156 MG/DL (ref 70–99)
GLUCOSE SERPL-MCNC: 281 MG/DL (ref 70–99)
HBA1C MFR BLD: 4.8 %
HCG SERPL QL: NEGATIVE
HCO3 BLD-SCNC: 24 MMOL/L (ref 21–28)
HCO3 BLD-SCNC: 24 MMOL/L (ref 21–28)
HCO3 BLDV-SCNC: 21 MMOL/L (ref 21–28)
HCT VFR BLD AUTO: 43.6 % (ref 35–47)
HCT VFR BLD CALC: 43 % (ref 35–47)
HGB BLD-MCNC: 14.1 G/DL (ref 11.7–15.7)
HGB BLD-MCNC: 14.6 G/DL (ref 11.7–15.7)
HOLD SPECIMEN: NORMAL
IMM GRANULOCYTES # BLD: 0.4 10E3/UL
IMM GRANULOCYTES NFR BLD: 2 %
INR PPP: 1.05 (ref 0.85–1.15)
LACTATE BLD-SCNC: 6.4 MMOL/L
LACTATE SERPL-SCNC: 0.8 MMOL/L (ref 0.7–2)
LACTATE SERPL-SCNC: 0.9 MMOL/L (ref 0.7–2)
LVEF ECHO: NORMAL
LYMPHOCYTES # BLD AUTO: 3.2 10E3/UL (ref 0.8–5.3)
LYMPHOCYTES NFR BLD AUTO: 20 %
MAGNESIUM SERPL-MCNC: 1.8 MG/DL (ref 1.7–2.3)
MAGNESIUM SERPL-MCNC: 2.1 MG/DL (ref 1.7–2.3)
MAGNESIUM SERPL-MCNC: 2.5 MG/DL (ref 1.7–2.3)
MCH RBC QN AUTO: 29.3 PG (ref 26.5–33)
MCHC RBC AUTO-ENTMCNC: 32.3 G/DL (ref 31.5–36.5)
MCV RBC AUTO: 91 FL (ref 78–100)
MONOCYTES # BLD AUTO: 0.3 10E3/UL (ref 0–1.3)
MONOCYTES NFR BLD AUTO: 2 %
MRSA DNA SPEC QL NAA+PROBE: NEGATIVE
NEUTROPHILS # BLD AUTO: 12 10E3/UL (ref 1.6–8.3)
NEUTROPHILS NFR BLD AUTO: 76 %
NRBC # BLD AUTO: 0 10E3/UL
NRBC BLD AUTO-RTO: 0 /100
O2/TOTAL GAS SETTING VFR VENT: 100 %
O2/TOTAL GAS SETTING VFR VENT: 60 %
OPIATES UR QL SCN: ABNORMAL
PCO2 BLD: 44 MM HG (ref 35–45)
PCO2 BLD: 46 MM HG (ref 35–45)
PCO2 BLDV: 52 MM HG (ref 40–50)
PCP QUAL URINE (ROCHE): ABNORMAL
PEEP: 5 CM H2O
PH BLD: 7.32 [PH] (ref 7.35–7.45)
PH BLD: 7.35 [PH] (ref 7.35–7.45)
PH BLDV: 7.21 [PH] (ref 7.32–7.43)
PHOSPHATE SERPL-MCNC: 3 MG/DL (ref 2.5–4.5)
PHOSPHATE SERPL-MCNC: 3.4 MG/DL (ref 2.5–4.5)
PLATELET # BLD AUTO: 224 10E3/UL (ref 150–450)
PO2 BLD: 222 MM HG (ref 80–105)
PO2 BLD: 81 MM HG (ref 80–105)
PO2 BLDV: 53 MM HG (ref 25–47)
POTASSIUM BLD-SCNC: 3.4 MMOL/L (ref 3.4–5.3)
POTASSIUM SERPL-SCNC: 3.3 MMOL/L (ref 3.4–5.3)
POTASSIUM SERPL-SCNC: 3.4 MMOL/L (ref 3.4–5.3)
POTASSIUM SERPL-SCNC: 3.9 MMOL/L (ref 3.4–5.3)
POTASSIUM SERPL-SCNC: 3.9 MMOL/L (ref 3.4–5.3)
PROT SERPL-MCNC: 5.8 G/DL (ref 6.4–8.3)
RBC # BLD AUTO: 4.82 10E6/UL (ref 3.8–5.2)
SA TARGET DNA: POSITIVE
SAO2 % BLDA: 93 % (ref 92–100)
SAO2 % BLDA: 96 % (ref 92–100)
SAO2 % BLDV: 79 % (ref 70–75)
SARS-COV-2 RNA RESP QL NAA+PROBE: NEGATIVE
SODIUM BLD-SCNC: 142 MMOL/L (ref 135–145)
SODIUM SERPL-SCNC: 136 MMOL/L (ref 135–145)
SODIUM SERPL-SCNC: 139 MMOL/L (ref 135–145)
WBC # BLD AUTO: 16 10E3/UL (ref 4–11)

## 2024-01-23 PROCEDURE — 70450 CT HEAD/BRAIN W/O DYE: CPT

## 2024-01-23 PROCEDURE — 94002 VENT MGMT INPAT INIT DAY: CPT

## 2024-01-23 PROCEDURE — 83036 HEMOGLOBIN GLYCOSYLATED A1C: CPT | Performed by: INTERNAL MEDICINE

## 2024-01-23 PROCEDURE — 250N000011 HC RX IP 250 OP 636

## 2024-01-23 PROCEDURE — 250N000011 HC RX IP 250 OP 636: Mod: JZ | Performed by: ANESTHESIOLOGY

## 2024-01-23 PROCEDURE — 272N000017 HC KIT MONITOR CVP

## 2024-01-23 PROCEDURE — 83735 ASSAY OF MAGNESIUM: CPT | Performed by: INTERNAL MEDICINE

## 2024-01-23 PROCEDURE — 999N000185 HC STATISTIC TRANSPORT TIME EA 15 MIN

## 2024-01-23 PROCEDURE — 200N000001 HC R&B ICU

## 2024-01-23 PROCEDURE — 80053 COMPREHEN METABOLIC PANEL: CPT | Performed by: STUDENT IN AN ORGANIZED HEALTH CARE EDUCATION/TRAINING PROGRAM

## 2024-01-23 PROCEDURE — 250N000011 HC RX IP 250 OP 636: Performed by: INTERNAL MEDICINE

## 2024-01-23 PROCEDURE — 80307 DRUG TEST PRSMV CHEM ANLYZR: CPT | Performed by: INTERNAL MEDICINE

## 2024-01-23 PROCEDURE — 93325 DOPPLER ECHO COLOR FLOW MAPG: CPT | Mod: 26 | Performed by: INTERNAL MEDICINE

## 2024-01-23 PROCEDURE — 82805 BLOOD GASES W/O2 SATURATION: CPT | Performed by: ANESTHESIOLOGY

## 2024-01-23 PROCEDURE — 99291 CRITICAL CARE FIRST HOUR: CPT | Performed by: ANESTHESIOLOGY

## 2024-01-23 PROCEDURE — 80047 BASIC METABLC PNL IONIZED CA: CPT

## 2024-01-23 PROCEDURE — 93321 DOPPLER ECHO F-UP/LMTD STD: CPT | Mod: 26 | Performed by: INTERNAL MEDICINE

## 2024-01-23 PROCEDURE — 250N000011 HC RX IP 250 OP 636: Performed by: STUDENT IN AN ORGANIZED HEALTH CARE EDUCATION/TRAINING PROGRAM

## 2024-01-23 PROCEDURE — 5A1945Z RESPIRATORY VENTILATION, 24-96 CONSECUTIVE HOURS: ICD-10-PCS | Performed by: INTERNAL MEDICINE

## 2024-01-23 PROCEDURE — 84100 ASSAY OF PHOSPHORUS: CPT | Performed by: INTERNAL MEDICINE

## 2024-01-23 PROCEDURE — 82803 BLOOD GASES ANY COMBINATION: CPT

## 2024-01-23 PROCEDURE — 85025 COMPLETE CBC W/AUTO DIFF WBC: CPT | Performed by: STUDENT IN AN ORGANIZED HEALTH CARE EDUCATION/TRAINING PROGRAM

## 2024-01-23 PROCEDURE — 250N000009 HC RX 250: Performed by: INTERNAL MEDICINE

## 2024-01-23 PROCEDURE — 87641 MR-STAPH DNA AMP PROBE: CPT | Performed by: INTERNAL MEDICINE

## 2024-01-23 PROCEDURE — 258N000003 HC RX IP 258 OP 636: Performed by: ANESTHESIOLOGY

## 2024-01-23 PROCEDURE — 84132 ASSAY OF SERUM POTASSIUM: CPT | Performed by: INTERNAL MEDICINE

## 2024-01-23 PROCEDURE — 999N000065 XR CHEST PORT 1 VIEW

## 2024-01-23 PROCEDURE — 3E033XZ INTRODUCTION OF VASOPRESSOR INTO PERIPHERAL VEIN, PERCUTANEOUS APPROACH: ICD-10-PCS | Performed by: INTERNAL MEDICINE

## 2024-01-23 PROCEDURE — 84703 CHORIONIC GONADOTROPIN ASSAY: CPT | Performed by: STUDENT IN AN ORGANIZED HEALTH CARE EDUCATION/TRAINING PROGRAM

## 2024-01-23 PROCEDURE — 83605 ASSAY OF LACTIC ACID: CPT | Performed by: INTERNAL MEDICINE

## 2024-01-23 PROCEDURE — 85730 THROMBOPLASTIN TIME PARTIAL: CPT | Performed by: ANESTHESIOLOGY

## 2024-01-23 PROCEDURE — 36556 INSERT NON-TUNNEL CV CATH: CPT

## 2024-01-23 PROCEDURE — 93005 ELECTROCARDIOGRAM TRACING: CPT

## 2024-01-23 PROCEDURE — 93010 ELECTROCARDIOGRAM REPORT: CPT | Mod: 76 | Performed by: INTERNAL MEDICINE

## 2024-01-23 PROCEDURE — 82805 BLOOD GASES W/O2 SATURATION: CPT | Performed by: STUDENT IN AN ORGANIZED HEALTH CARE EDUCATION/TRAINING PROGRAM

## 2024-01-23 PROCEDURE — 99223 1ST HOSP IP/OBS HIGH 75: CPT | Performed by: INTERNAL MEDICINE

## 2024-01-23 PROCEDURE — 258N000003 HC RX IP 258 OP 636: Performed by: INTERNAL MEDICINE

## 2024-01-23 PROCEDURE — 99292 CRITICAL CARE ADDL 30 MIN: CPT

## 2024-01-23 PROCEDURE — 999N000157 HC STATISTIC RCP TIME EA 10 MIN

## 2024-01-23 PROCEDURE — 272N000007 HC KIT ART LINE INSERTION

## 2024-01-23 PROCEDURE — 36620 INSERTION CATHETER ARTERY: CPT

## 2024-01-23 PROCEDURE — 83605 ASSAY OF LACTIC ACID: CPT

## 2024-01-23 PROCEDURE — 36415 COLL VENOUS BLD VENIPUNCTURE: CPT

## 2024-01-23 PROCEDURE — 87635 SARS-COV-2 COVID-19 AMP PRB: CPT | Performed by: INTERNAL MEDICINE

## 2024-01-23 PROCEDURE — 99291 CRITICAL CARE FIRST HOUR: CPT | Mod: 25

## 2024-01-23 PROCEDURE — 250N000009 HC RX 250: Performed by: STUDENT IN AN ORGANIZED HEALTH CARE EDUCATION/TRAINING PROGRAM

## 2024-01-23 PROCEDURE — 93325 DOPPLER ECHO COLOR FLOW MAPG: CPT

## 2024-01-23 PROCEDURE — 36415 COLL VENOUS BLD VENIPUNCTURE: CPT | Performed by: STUDENT IN AN ORGANIZED HEALTH CARE EDUCATION/TRAINING PROGRAM

## 2024-01-23 PROCEDURE — 85384 FIBRINOGEN ACTIVITY: CPT | Performed by: ANESTHESIOLOGY

## 2024-01-23 PROCEDURE — 83735 ASSAY OF MAGNESIUM: CPT | Performed by: STUDENT IN AN ORGANIZED HEALTH CARE EDUCATION/TRAINING PROGRAM

## 2024-01-23 PROCEDURE — 82962 GLUCOSE BLOOD TEST: CPT

## 2024-01-23 PROCEDURE — 6A4Z0ZZ HYPOTHERMIA, SINGLE: ICD-10-PCS | Performed by: INTERNAL MEDICINE

## 2024-01-23 PROCEDURE — 85610 PROTHROMBIN TIME: CPT | Performed by: ANESTHESIOLOGY

## 2024-01-23 PROCEDURE — 258N000003 HC RX IP 258 OP 636: Performed by: STUDENT IN AN ORGANIZED HEALTH CARE EDUCATION/TRAINING PROGRAM

## 2024-01-23 PROCEDURE — 93308 TTE F-UP OR LMTD: CPT | Mod: 26 | Performed by: INTERNAL MEDICINE

## 2024-01-23 RX ORDER — NICOTINE POLACRILEX 4 MG
15-30 LOZENGE BUCCAL
Status: DISCONTINUED | OUTPATIENT
Start: 2024-01-23 | End: 2024-01-25 | Stop reason: HOSPADM

## 2024-01-23 RX ORDER — AMPICILLIN AND SULBACTAM 2; 1 G/1; G/1
3 INJECTION, POWDER, FOR SOLUTION INTRAMUSCULAR; INTRAVENOUS EVERY 6 HOURS
Status: DISCONTINUED | OUTPATIENT
Start: 2024-01-23 | End: 2024-01-25 | Stop reason: HOSPADM

## 2024-01-23 RX ORDER — MAGNESIUM SULFATE HEPTAHYDRATE 40 MG/ML
2 INJECTION, SOLUTION INTRAVENOUS ONCE
Status: COMPLETED | OUTPATIENT
Start: 2024-01-23 | End: 2024-01-23

## 2024-01-23 RX ORDER — HYDRALAZINE HYDROCHLORIDE 20 MG/ML
20 INJECTION INTRAMUSCULAR; INTRAVENOUS EVERY 4 HOURS PRN
Status: DISCONTINUED | OUTPATIENT
Start: 2024-01-23 | End: 2024-01-25 | Stop reason: HOSPADM

## 2024-01-23 RX ORDER — VECURONIUM BROMIDE 1 MG/ML
1 INJECTION, POWDER, LYOPHILIZED, FOR SOLUTION INTRAVENOUS
Status: DISCONTINUED | OUTPATIENT
Start: 2024-01-23 | End: 2024-01-25 | Stop reason: HOSPADM

## 2024-01-23 RX ORDER — PROPOFOL 10 MG/ML
5-75 INJECTION, EMULSION INTRAVENOUS CONTINUOUS
Status: DISCONTINUED | OUTPATIENT
Start: 2024-01-23 | End: 2024-01-25 | Stop reason: HOSPADM

## 2024-01-23 RX ORDER — NALOXONE HYDROCHLORIDE 0.4 MG/ML
0.4 INJECTION, SOLUTION INTRAMUSCULAR; INTRAVENOUS; SUBCUTANEOUS
Status: DISCONTINUED | OUTPATIENT
Start: 2024-01-23 | End: 2024-01-25 | Stop reason: HOSPADM

## 2024-01-23 RX ORDER — SODIUM CHLORIDE, SODIUM LACTATE, POTASSIUM CHLORIDE, CALCIUM CHLORIDE 600; 310; 30; 20 MG/100ML; MG/100ML; MG/100ML; MG/100ML
INJECTION, SOLUTION INTRAVENOUS CONTINUOUS
Status: DISCONTINUED | OUTPATIENT
Start: 2024-01-23 | End: 2024-01-25 | Stop reason: HOSPADM

## 2024-01-23 RX ORDER — BISACODYL 10 MG
10 SUPPOSITORY, RECTAL RECTAL DAILY PRN
Status: DISCONTINUED | OUTPATIENT
Start: 2024-01-23 | End: 2024-01-25 | Stop reason: HOSPADM

## 2024-01-23 RX ORDER — ACETAMINOPHEN 325 MG/10.15ML
650 LIQUID ORAL EVERY 4 HOURS PRN
Status: DISCONTINUED | OUTPATIENT
Start: 2024-01-23 | End: 2024-01-25 | Stop reason: HOSPADM

## 2024-01-23 RX ORDER — PROCHLORPERAZINE MALEATE 5 MG
10 TABLET ORAL EVERY 6 HOURS PRN
Status: DISCONTINUED | OUTPATIENT
Start: 2024-01-23 | End: 2024-01-25 | Stop reason: HOSPADM

## 2024-01-23 RX ORDER — PROPOFOL 10 MG/ML
5-75 INJECTION, EMULSION INTRAVENOUS CONTINUOUS
Status: DISCONTINUED | OUTPATIENT
Start: 2024-01-23 | End: 2024-01-23

## 2024-01-23 RX ORDER — LIDOCAINE HYDROCHLORIDE 20 MG/ML
JELLY TOPICAL
Status: DISCONTINUED | OUTPATIENT
Start: 2024-01-23 | End: 2024-01-25 | Stop reason: HOSPADM

## 2024-01-23 RX ORDER — DEXTROSE MONOHYDRATE 25 G/50ML
25-50 INJECTION, SOLUTION INTRAVENOUS
Status: DISCONTINUED | OUTPATIENT
Start: 2024-01-23 | End: 2024-01-25 | Stop reason: HOSPADM

## 2024-01-23 RX ORDER — MEPERIDINE HYDROCHLORIDE 25 MG/ML
25-50 INJECTION INTRAMUSCULAR; INTRAVENOUS; SUBCUTANEOUS
Status: ACTIVE | OUTPATIENT
Start: 2024-01-23 | End: 2024-01-24

## 2024-01-23 RX ORDER — FENTANYL CITRATE 50 UG/ML
50 INJECTION, SOLUTION INTRAMUSCULAR; INTRAVENOUS EVERY 30 MIN PRN
Status: DISCONTINUED | OUTPATIENT
Start: 2024-01-23 | End: 2024-01-25 | Stop reason: HOSPADM

## 2024-01-23 RX ORDER — METOPROLOL TARTRATE 1 MG/ML
5 INJECTION, SOLUTION INTRAVENOUS EVERY 4 HOURS PRN
Status: DISCONTINUED | OUTPATIENT
Start: 2024-01-23 | End: 2024-01-25 | Stop reason: HOSPADM

## 2024-01-23 RX ORDER — VECURONIUM BROMIDE 1 MG/ML
1 INJECTION, POWDER, LYOPHILIZED, FOR SOLUTION INTRAVENOUS EVERY 30 MIN PRN
Status: DISCONTINUED | OUTPATIENT
Start: 2024-01-23 | End: 2024-01-25 | Stop reason: HOSPADM

## 2024-01-23 RX ORDER — ACETAMINOPHEN 325 MG/1
650 TABLET ORAL EVERY 4 HOURS PRN
Status: DISCONTINUED | OUTPATIENT
Start: 2024-01-23 | End: 2024-01-25 | Stop reason: HOSPADM

## 2024-01-23 RX ORDER — ROPIVACAINE IN 0.9% SOD CHL/PF 0.1 %
.03-.125 PLASTIC BAG, INJECTION (ML) EPIDURAL CONTINUOUS
Status: DISCONTINUED | OUTPATIENT
Start: 2024-01-23 | End: 2024-01-23

## 2024-01-23 RX ORDER — NALOXONE HYDROCHLORIDE 0.4 MG/ML
0.2 INJECTION, SOLUTION INTRAMUSCULAR; INTRAVENOUS; SUBCUTANEOUS
Status: DISCONTINUED | OUTPATIENT
Start: 2024-01-23 | End: 2024-01-25 | Stop reason: HOSPADM

## 2024-01-23 RX ORDER — LABETALOL HYDROCHLORIDE 5 MG/ML
20 INJECTION, SOLUTION INTRAVENOUS EVERY 4 HOURS PRN
Status: DISCONTINUED | OUTPATIENT
Start: 2024-01-23 | End: 2024-01-24

## 2024-01-23 RX ORDER — AMOXICILLIN 250 MG
2 CAPSULE ORAL 2 TIMES DAILY PRN
Status: DISCONTINUED | OUTPATIENT
Start: 2024-01-23 | End: 2024-01-25 | Stop reason: HOSPADM

## 2024-01-23 RX ORDER — POTASSIUM CHLORIDE 29.8 MG/ML
20 INJECTION INTRAVENOUS
Status: COMPLETED | OUTPATIENT
Start: 2024-01-23 | End: 2024-01-24

## 2024-01-23 RX ORDER — CALCIUM GLUCONATE 20 MG/ML
2 INJECTION, SOLUTION INTRAVENOUS ONCE
Status: COMPLETED | OUTPATIENT
Start: 2024-01-23 | End: 2024-01-23

## 2024-01-23 RX ORDER — METOPROLOL TARTRATE 1 MG/ML
2.5 INJECTION, SOLUTION INTRAVENOUS EVERY 6 HOURS
Status: DISCONTINUED | OUTPATIENT
Start: 2024-01-23 | End: 2024-01-23

## 2024-01-23 RX ORDER — LABETALOL HYDROCHLORIDE 5 MG/ML
10 INJECTION, SOLUTION INTRAVENOUS EVERY 6 HOURS PRN
Status: DISCONTINUED | OUTPATIENT
Start: 2024-01-23 | End: 2024-01-23

## 2024-01-23 RX ORDER — AMOXICILLIN 250 MG
1 CAPSULE ORAL 2 TIMES DAILY PRN
Status: DISCONTINUED | OUTPATIENT
Start: 2024-01-23 | End: 2024-01-25 | Stop reason: HOSPADM

## 2024-01-23 RX ORDER — PROCHLORPERAZINE 25 MG
25 SUPPOSITORY, RECTAL RECTAL EVERY 12 HOURS PRN
Status: DISCONTINUED | OUTPATIENT
Start: 2024-01-23 | End: 2024-01-25 | Stop reason: HOSPADM

## 2024-01-23 RX ADMIN — METOPROLOL TARTRATE 2.5 MG: 5 INJECTION INTRAVENOUS at 17:29

## 2024-01-23 RX ADMIN — AMPICILLIN SODIUM AND SULBACTAM SODIUM 3 G: 2; 1 INJECTION, POWDER, FOR SOLUTION INTRAMUSCULAR; INTRAVENOUS at 20:23

## 2024-01-23 RX ADMIN — AMPICILLIN SODIUM AND SULBACTAM SODIUM 3 G: 2; 1 INJECTION, POWDER, FOR SOLUTION INTRAMUSCULAR; INTRAVENOUS at 14:18

## 2024-01-23 RX ADMIN — PROPOFOL 75 MCG/KG/MIN: 10 INJECTION, EMULSION INTRAVENOUS at 14:21

## 2024-01-23 RX ADMIN — Medication 60 MG: at 11:55

## 2024-01-23 RX ADMIN — AMIODARONE HYDROCHLORIDE 1 MG/MIN: 50 INJECTION, SOLUTION INTRAVENOUS at 17:38

## 2024-01-23 RX ADMIN — Medication 60 MG: at 11:59

## 2024-01-23 RX ADMIN — SODIUM CHLORIDE, POTASSIUM CHLORIDE, SODIUM LACTATE AND CALCIUM CHLORIDE: 600; 310; 30; 20 INJECTION, SOLUTION INTRAVENOUS at 16:41

## 2024-01-23 RX ADMIN — Medication 20 MG: at 11:54

## 2024-01-23 RX ADMIN — LABETALOL HYDROCHLORIDE 10 MG: 5 INJECTION, SOLUTION INTRAVENOUS at 22:12

## 2024-01-23 RX ADMIN — NOREPINEPHRINE BITARTRATE 0.1 MCG/KG/MIN: 0.02 INJECTION, SOLUTION INTRAVENOUS at 10:58

## 2024-01-23 RX ADMIN — AMIODARONE HYDROCHLORIDE 150 MG: 1.5 INJECTION, SOLUTION INTRAVENOUS at 17:29

## 2024-01-23 RX ADMIN — POTASSIUM CHLORIDE 20 MEQ: 29.8 INJECTION, SOLUTION INTRAVENOUS at 21:41

## 2024-01-23 RX ADMIN — CALCIUM GLUCONATE 2 G: 20 INJECTION, SOLUTION INTRAVENOUS at 17:30

## 2024-01-23 RX ADMIN — Medication 30 MG: at 11:17

## 2024-01-23 RX ADMIN — PROPOFOL 50 MCG/KG/MIN: 10 INJECTION, EMULSION INTRAVENOUS at 17:33

## 2024-01-23 RX ADMIN — Medication 100 MCG/HR: at 13:51

## 2024-01-23 RX ADMIN — PROPOFOL 55 MCG/KG/MIN: 10 INJECTION, EMULSION INTRAVENOUS at 22:12

## 2024-01-23 RX ADMIN — PROPOFOL 75 MCG/KG/MIN: 10 INJECTION, EMULSION INTRAVENOUS at 15:00

## 2024-01-23 RX ADMIN — MAGNESIUM SULFATE HEPTAHYDRATE 2 G: 2 INJECTION, SOLUTION INTRAVENOUS at 16:58

## 2024-01-23 RX ADMIN — SODIUM CHLORIDE 1000 ML: 9 INJECTION, SOLUTION INTRAVENOUS at 11:03

## 2024-01-23 RX ADMIN — HYDRALAZINE HYDROCHLORIDE 20 MG: 20 INJECTION INTRAMUSCULAR; INTRAVENOUS at 20:23

## 2024-01-23 RX ADMIN — LABETALOL HYDROCHLORIDE 10 MG: 5 INJECTION, SOLUTION INTRAVENOUS at 15:22

## 2024-01-23 RX ADMIN — MIDAZOLAM 4 MG: 1 INJECTION INTRAMUSCULAR; INTRAVENOUS at 11:07

## 2024-01-23 RX ADMIN — POTASSIUM CHLORIDE 20 MEQ: 29.8 INJECTION, SOLUTION INTRAVENOUS at 23:22

## 2024-01-23 RX ADMIN — SODIUM CHLORIDE 1000 ML: 9 INJECTION, SOLUTION INTRAVENOUS at 15:47

## 2024-01-23 RX ADMIN — PROPOFOL 20 MCG/KG/MIN: 10 INJECTION, EMULSION INTRAVENOUS at 11:16

## 2024-01-23 ASSESSMENT — ACTIVITIES OF DAILY LIVING (ADL)
ADLS_ACUITY_SCORE: 35
ADLS_ACUITY_SCORE: 43
ADLS_ACUITY_SCORE: 43
ADLS_ACUITY_SCORE: 35
ADLS_ACUITY_SCORE: 51

## 2024-01-23 NOTE — ED NOTES
"Received call from Chief Flood of the [place] police department.  He states that the pt is a known drug/alcohol abuser and wanted ED staff to know that the fentanyl was tested and \"lit up\".  He wanted to staff to know it was very strong and was snorted by the pt as all of the paraphernalia was in the house.  He states that if anyone has any questions they can call his personal cell phone (chief Radames Flood [phone]).  "

## 2024-01-23 NOTE — PROGRESS NOTES
Critical Care Progress Note      01/23/2024    Name: Danielle Tadeo MRN#: [id number]   Age: 48 year old YOB: 1975     [facility] # 0    [facility] DAY # 0             Problem List:   Active Problems:    Cardiac arrest (H)    Accidental fentanyl overdose (H)    Lactic acidosis           Summary/Hospital Course:   48-year-old female presents to the ICU after fentanyl overdose.  When I examined the patient she is intubated and sedated and unable to provide a history.  My history is taken from review of the chart.  According to the ER the patient was using fentanyl and became unresponsive the patient received on scene CPR and was in asystole after receiving 8 mg of Narcan.  She was treated with ACLS protocol and developed ventricular tachycardia which was defibrillated.  She was intubated in the field with a 7.0 endotracheal tube.  She had an arterial line and cooling catheter placed in the emergency room.  The patient was admitted to the ICU.    She was unable to provide a review of systems or social history    Medical history includes opioid dependence, status post gastric bypass, hypertension, nicotine dependence        Assessment and plan :     Danielle Tadeo IS a 48 year old female admitted on 1/23/2024 for acute hypoxic respiratory failure, status post asystolic cardiac arrest status post ventricular tachycardia and altered mental status secondary to metabolic encephalopathy.  She was also found to have a lactic acidosis.   I have personally reviewed the daily labs, imaging studies, cultures and discussed the case with referring physician and consulting physicians.     My assessment and plan by system for this patient is as follows:    Neurology/Psychiatry:   1.  Admission for fentanyl overdose in the setting of fentanyl dependence  2.  Toxic or metabolic encephalopathy  3.  Therapeutic cooling  Plan  Most likely cause of patient's altered mental status is fentanyl overdose leading to  cardiac arrest.  Given the patient's cardiac arrest and no confirmation of return of neurostatus after return of spontaneous circulation we will therapeutically cooled the patient to 36  C for the next 24 hours as per cooling protocol.  I have reviewed the patient's head CT which shows no acute pathology at this time.  We will obtain neuro critical care consult to follow-up anoxic brain injury.  Recommend BIS monitoring    Cardiovascular:   1.Hemodynamics -normotensive at times hypertensive  2.Rhythm -normal sinus rhythm  3. Ischemia -no signs of ischemia  Plan  Overall the patient is hemodynamically stable at this time.  Etiology of cardiac arrest was most likely hypoxemic secondary to fentanyl overdose.    Given the patient's hypertension we will treat with labetalol.  Patient does have a long QTc so would avoid nicardipine infusion.    ECG as reviewed by myself shows bigeminy and ST changes consistent with anterior lateral ischemia, will obtain trans thoracic echo to evaluate LV function.  Also given patient's marked hypertension would get urine tox to rule out concomitant intoxication such as cocaine or amphetamines.38    Pulmonary/Ventilator Management:   1. Airway intubated for airway protection  2. Oxygenation/ventilation/mechanics on full ventilatory support respiratory rate 16, tidal volume 480 PEEP of 5  Plan  -Patient will remain on full mechanical ventilatory support while undergoing therapeutic cooling.  I personally reviewed patient's chest x-ray which showed a right upper lobe infiltrate most likely secondary to aspiration.  Patient's PF ratio is not affected by this at this time as it is greater than 200.  If patient has increased oxygen requirements or is unable to be weaned from 100% FiO2 will increase PEEP as tolerated.  Goal would be to keep saturation greater than 92%.    GI and Nutrition :   1.  N.p.o. for now  2.  Elevated AST and alkaline phos    Plan  -N.p.o. with therapeutic cooling, if  this appears to be a prolonged intubation we will start tube feeds via nasojejunal tube.  -Concern for shock liver status post cardiac arrest    Renal/Fluids/Electrolytes:   1.  Lactic acidosis causing metabolic acidosis  2.  Mild hypokalemia, hypocalcemia  3.  Creatinine within normal limits no sign of acute kidney injury  4.  Appears hypovolemic  Plan  -Elevated lactic acidosis which is now resolved most likely secondary to cardiac arrest.  -Will continue to monitor renal function and electrolytes during therapeutic hypothermia  -Repeat blood gas  - monitor function and electrolytes as needed with replacement per ICU protocols.  - generally avoid nephrotoxic agents such as NSAID, IV contrast unless specifically required  - adjust medications as needed for renal clearance  - follow I/O's as appropriate.    Infectious Disease:   1.  Concern for aspiration pneumonia  2.  3.  Plan  -Agree with plan for empiric antibiotics at this time    Endocrine:   1.  Concern for stress-induced hyperglycemia    Plan  - ICU insulin protocol, goal sugar <180      Hematology/Oncology:   1.  Leukocytosis  2. no Anemia, no signs, symptoms of active blood loss  3.  Plan  -Leukocytosis could be acute phase reaction versus early aspiration pneumonia        IV/Access:   1. Venous access -right femoral femoral catheter  2. Arterial access -right arterial line femoral  3.  Plan  - central access required and necessary      ICU Prophylaxis:   1. DVT: Mechanical for now while being cooled  2. VAP: HOB 30 degrees, chlorhexidine rinse  3. Stress Ulcer: PPI/H2 blocker  4. Restraints: Nonviolent soft two point restraints required and necessary for patient safety and continued cares and good effect as patient continues to pull at necessary lines, tubes despite education and distraction. Will readdress daily.   5. Wound care - per unit routine   6. Feeding -NPO while being cooled  7. Family Update: I did not update family today  8. Disposition -ICU          Key goals for next 24 hours:   1.  Full mechanical ventilatory support  2.  Therapeutic cooling to 36  3.  Empiric antibiotics given concern for aspiration pneumonia               Interim History:   42 female with known history of fentanyl use status post hypoxic respiratory arrest and asystolic cardiac arrest secondary to fentanyl overdose.  Patient is to be therapeutically cooled.           Key Medications:      ampicillin-sulbactam  3 g Intravenous Q6H      fentaNYL 125 mcg/hr (01/23/24 1434)    norepinephrine Stopped (01/23/24 1111)    propofol 75 mcg/kg/min (01/23/24 1421)               Physical Examination:   Temp:  [96.8  F (36  C)-97.5  F (36.4  C)] 96.8  F (36  C)  Pulse:  [] 92  Resp:  [14] 14  BP: ()/() 181/106  MAP:  [125 mmHg-151 mmHg] 132 mmHg  Arterial Line BP: (188-215)/() 188/96  FiO2 (%):  [40 %-100 %] 80 %  SpO2:  [92 %-99 %] 99 %  No intake or output data in the 24 hours ending 01/23/24 1457  Wt Readings from Last 4 Encounters:   01/23/24 63.2 kg (139 lb 5.3 oz)   10/04/21 78.5 kg (173 lb)   09/11/21 77.4 kg (170 lb 9.6 oz)   08/25/20 66.2 kg (146 lb)     Arterial Line BP: (188-215)/() 188/96  MAP:  [125 mmHg-151 mmHg] 132 mmHg  BP - Mean:  [115-153] 140  Vent Mode: CMV/AC  (Continuous Mandatory Ventilation/ Assist Control)  FiO2 (%): (S) 80 %  Resp Rate (Set): 12 breaths/min  Tidal Volume (Set, mL): 400 mL  PEEP (cm H2O): (S) 8 cmH2O  Resp: 14    Recent Labs   Lab 01/23/24  1345   PH 7.32*   PCO2 46*   PO2 222*   HCO3 24   O2PER 100       GEN: no acute distress, looking older than stated age  HEENT: head ncat, sclera anicteric, OP patent, trachea midline   PULM: unlabored synchronous with vent, slight expiratory wheezing right greater than left  CV/COR: RRR S1S2 no gallop,  No rub, no murmur  ABD: soft nontender, hypoactive bowel sounds, no mass  EXT:  Edema   warm  NEURO: Does not follow commands, consistent with chemical sedation  SKIN: no obvious  "rash  LINES: clean, dry intact         Data:   All data and imaging reviewed     ROUTINE ICU LABS (Last four results)  CMP  Recent Labs   Lab 01/23/24  1109 01/23/24  1106 01/23/24  1104   NA  --  139 142   POTASSIUM  --  3.9 3.4   CHLORIDE  --  103 104   CO2  --  20*  --    ANIONGAP  --  16*  --    * 281* 283*   BUN  --  11.8 13   CR  --  0.83 0.9   GFRESTIMATED  --  86  --    HAZEL  --  7.6*  --    MAG  --  2.1  --    PROTTOTAL  --  5.8*  --    ALBUMIN  --  3.7  --    BILITOTAL  --  0.4  --    ALKPHOS  --  207*  --    AST  --  163*  --    ALT  --  44  --      CBC  Recent Labs   Lab 01/23/24  1106 01/23/24  1104   WBC 16.0*  --    RBC 4.82  --    HGB 14.1 14.6   HCT 43.6 43   MCV 91  --    MCH 29.3  --    MCHC 32.3  --    RDW 13.3  --      --      INRNo lab results found in last 7 days.  Arterial Blood Gas  Recent Labs   Lab 01/23/24  1345   PH 7.32*   PCO2 46*   PO2 222*   HCO3 24   O2PER 100       All cultures:  No results for input(s): \"CULT\" in the last 168 hours.  Recent Results (from the past 24 hour(s))   XR Chest Port 1 View    Narrative    CHEST ONE VIEW  1/23/2024 12:13 PM     HISTORY: Check ETT.    COMPARISON: September 11, 2021.      Impression    IMPRESSION: Endotracheal tube tip 4.2 cm from the nicol. Extensive  right upper lobe consolidation. Remaining lungs clear.    VERONICA ORTIZ MD         SYSTEM ID:  [id number]   CT Head w/o Contrast    Narrative    EXAM: CT HEAD W/O CONTRAST  1/23/2024 12:40 PM     HISTORY:  post arrest       COMPARISON:  No prior similar studies    TECHNIQUE: Using multidetector thin collimation helical acquisition  technique, axial, coronal and sagittal CT images from the skull base  to the vertex were obtained without intravenous contrast.   (topogram) image(s) also obtained and reviewed.    FINDINGS:  No intracranial hemorrhage, mass effect, or midline shift. No acute  loss of gray-white matter differentiation in the cerebral hemispheres.  Ventricles are " proportionate to the cerebral sulci. Clear basal  cisterns.    The bony calvaria and the bones of the skull base are normal.  Scattered paranasal sinus mucosal thickening. Minimal bilateral  mastoid effusions. Grossly normal orbits.       Impression    IMPRESSION: No acute intracranial pathology.     JOSHUA NEWMAN MD         SYSTEM ID:  [id number]         Billing: This patient is critically ill: Yes. Total critical care time today 38   min.

## 2024-01-23 NOTE — Clinical Note
Anesthesia Start and Stop Event Times     Date Time Event    6/6/2023 1413 Ready for Procedure     1428 Anesthesia Start     1623 Anesthesia Stop        Responsible Staff  06/06/23    Name Role Begin End    Ángel Delatorre M.D. Anesth 1428 1531    Wilbert Raza M.D. Anesth 1531 1623        Overtime Reason:  overtime    Comments: Nandini                                                      Check Procedures.

## 2024-01-23 NOTE — H&P
Critical Care  Note      01/23/2024    Name: Danielle Tadeo MRN#: [id number]   Age: 48 year old YOB: 1975     Hospitals in [place] Day# 0  ICU DAY #    MV DAY #             Problem List:   Active Problems:    Cardiac arrest (H)    Accidental fentanyl overdose (H)    Lactic acidosis           Summary/Hospital Course:   Danielle Tadeo is a 48 year old female who presented to [facility] after a fentanyl overdose. Patient reportedly was snorting fentanyl with her significant other and became unresponsive. Her significant other called 911 and started performing CPR. Patient was reportedly down for 3 minutes before EMS arrival. On arrival patient went into asystole 4 times and was subject to AED X4. 2 mg of epi was given over 15 minutes before arrival to ED.  Patient was intubated in the field.     In the ED, patients EKG demonstrated sinus tachycardia with RBBB. Patient presented with lactic acidosis (7.21/52/21) with a lactate of 6.4.  She was briefly hypotensive for which the norepinephrine infusion was started and subsequently stopped.  She was placed on hospital ventilator.  Right femoral art line and right femoral cooling catheter was placed.  Patient was admitted to the ICU for further cares.        Assessment and plan :     Danielle Tadeo IS a 48 year old female admitted on 1/23/2024 for fentanyl overdose and post cardiac arrest care.   I have personally reviewed the daily labs, imaging studies, cultures and discussed the case with referring physician and consulting physicians.     My assessment and plan by system for this patient is as follows:    Neurology/Psychiatry:   1. Fentanyl Overdose- Received 8mg Narcan by EMS  2. Intubated/Sedated- Propofol and fentanyl infusions for sedation  3. Encephalopathy- toxic, metabolic, infectious?- CT negative for intercranial pathology.  4. Substance use disorder- opioids, alcohol?-   Plan  - cooling protocol with cooling catheter.  Protocol ordered  - Urine drug screen  ordered    Cardiovascular:   1.Out of hospital cardiac arrest- 2/2 fentanyl overdose?- AED x4 and 2mg epinephrine in field  2.Hypotension, brief- 2/2 cardiac arrest- hypertension at baseline  3. Hypertension later  4. Possible transient hypertension related ischemia.  Patient developed bigeminy and ST segment elevation in leads V3 through V6 with severe hypertension.  ECG changes improved with IV labetalol and improvement of blood pressure  Plan  - Cooling protocol for post out-of-hospital arrest  - NE was briefly used for hypotension but no longer needed  - monitor vitals   - Has needed as needed IV labetalol for hypertension  -With possible transient ischemia related to severe hypertension could consider some cardiac testing such as stress testing at some point.  - Obtain echocardiogram    Pulmonary/Ventilator Management:   1. Intubated/ventilated- came into hospital intubated by EMS.   2. Respiratory failure- 2/2 cardiac arrest   3. Pneumonia- Aspiration?- CXR demonstrated right upper lobe consolidation  4. Oxygenation/ventilation/mechanics:RR/VT/FiO2/PEEP 12/400/40/5   Plan  - Vent support per intensivist  - Propofol and fentanyl infusions for sedation  - Continue ventilation. Keep O2 >92%  - Unasyn for suspected aspiration pneumonia  - A.m. CBC    GI and Nutrition :   Elevated LFTs- alcohol use? Shock 2/2 cardiac arrest?  Plan  - AM LFTs  -OG for decompression  -NPO for medical reasons. Will address nutrition pending stabilization    Renal/Fluids/Electrolytes:   1. AGMA- mixed respiratory and metabolic acidosis- blood gas: pH/CO2/Bicarb 7.21/52/21 lactate 6.4. Anion gap 20.0  2. Hypocalcemia- Calcium 7.6 iCalcium 4.2, likely related to acidosis.   Plan  - AM BMP  - monitor function and electrolytes as needed with replacement per ICU protocols. - generally avoid nephrotoxic agents such as NSAID, IV contrast unless specifically required  - adjust medications as needed for renal clearance  - follow I/O's as  appropriate.    Infectious Disease:   1. Pneumonia?-  aspiration?- chest XR showed right upper lobe consolidation  Plan  - Blood cultures pending  - Unasyn for suspected aspiration pneumonia    Endocrine:   1. Hyperglycemia-Stress induced hyperglycemia?     Plan  - ICU insulin protocol, goal sugar <180.  Medium resistance NovoLog sliding scale insulin ordered  - Hemoglobin A1c ordered to evaluate for possible new diabetes although stress response seems more likely      Hematology/Oncology:   1. Leukocytosis- reactive?   Plan  - AM CBC     IV/Access:   1. Venous access - 2 PIV, triple lumen CVC  2. Arterial access - Femoral line  Plan  - central access required and necessary      ICU Prophylaxis:   1. DVT: Pneumoboots  2. VAP: HOB 30 degrees, chlorhexidine rinse  3. Stress Ulcer: PPI/H2 blocker  4. Restraints: Nonviolent soft two point restraints required and necessary for patient safety and continued cares and good effect as patient continues to pull at necessary lines, tubes despite education and distraction. Will readdress daily.   5. Wound care  -none needed  6. Feeding - NPO  7. Family Update: Family was not available for update   8. Disposition - ICU cares for now             Medical History:     Past Medical History:   Diagnosis Date    Alcohol dependence     Anxiety     Benign essential hypertension     PIH    CAD     Cervical spondylosis without myelopathy 2015    Continuous opioid dependence     Depression     Seasonal allergies      Past Surgical History:   Procedure Laterality Date    ABDOMINOPLASTY      Arm and thigh lift       SECTION  2004    GASTRIC BYPASS      HYSTERECTOMY      LASIK Bilateral 2001    MAMMOPLASTY REDUCTION       Social History     Socioeconomic History    Marital status:      Spouse name: Not on file    Number of children: Not on file    Years of education: Not on file    Highest education level: Not on file   Occupational History    Not on file   Tobacco Use     "Smoking status: Some Days     Packs/day: 0.50     Years: 5.00     Additional pack years: 0.00     Total pack years: 2.50     Types: Cigarettes    Smokeless tobacco: Never    Tobacco comments:     some   Substance and Sexual Activity    Alcohol use: Not Currently     Comment: stopped  10 days ago    Drug use: No    Sexual activity: Yes     Partners: Male     Birth control/protection: Surgical     Comment:   had vasectomy   Other Topics Concern    Parent/sibling w/ CABG, MI or angioplasty before 65F 55M? Not Asked   Social History Narrative    Not on file     Social Determinants of Health     Financial Resource Strain: Not on file   Food Insecurity: Not on file   Transportation Needs: Not on file   Physical Activity: Not on file   Stress: Not on file   Social Connections: Not on file   Interpersonal Safety: Not on file   Housing Stability: Not on file        Allergies   Allergen Reactions    Zofran [Ondansetron] Other (See Comments)     \"heart stopped\" \"Long QT\"              Key Medications:      ampicillin-sulbactam  3 g Intravenous Q6H      fentaNYL 125 mcg/hr (01/23/24 1434)    norepinephrine Stopped (01/23/24 1111)    propofol 75 mcg/kg/min (01/23/24 1421)        Home Meds  No current facility-administered medications on file prior to encounter.  cloNIDine (CATAPRES) 0.1 MG tablet, Take 1 tablet (0.1 mg) by mouth every 8 hours as needed (anxiety)  gabapentin (NEURONTIN) 100 MG capsule, Take 1 capsule (100 mg) by mouth every 8 hours  lisinopril (ZESTRIL) 20 MG tablet, Take 1 tablet (20 mg) by mouth daily  metoprolol succinate ER (TOPROL-XL) 50 MG 24 hr tablet, Take 50 mg by mouth daily               Physical Examination:   Temp:  [96.8  F (36  C)-97.5  F (36.4  C)] 96.8  F (36  C)  Pulse:  [] 92  Resp:  [14] 14  BP: ()/() 181/106  MAP:  [125 mmHg-151 mmHg] 132 mmHg  Arterial Line BP: (188-215)/() 188/96  FiO2 (%):  [40 %-100 %] 80 %  SpO2:  [92 %-99 %] 99 %  No intake or output " "data in the 24 hours ending 01/23/24 1341  Wt Readings from Last 4 Encounters:   01/23/24 63.2 kg (139 lb 5.3 oz)   10/04/21 78.5 kg (173 lb)   09/11/21 77.4 kg (170 lb 9.6 oz)   08/25/20 66.2 kg (146 lb)     Arterial Line BP: (188-215)/() 188/96  MAP:  [125 mmHg-151 mmHg] 132 mmHg  BP - Mean:  [115-153] 140  Vent Mode: CMV/AC  (Continuous Mandatory Ventilation/ Assist Control)  FiO2 (%): (S) 80 %  Resp Rate (Set): 12 breaths/min  Tidal Volume (Set, mL): 400 mL  PEEP (cm H2O): (S) 8 cmH2O  Resp: 14    Recent Labs   Lab 01/23/24  1345   PH 7.32*   PCO2 46*   PO2 222*   HCO3 24   O2PER 100       GEN: no acute distress   HEENT: head ncat, sclera anicteric, OP patent, trachea midline   PULM: unlabored synchronous with vent, clear anteriorly    CV/COR: RRR S1S2 no gallop,  No rub, no murmur  ABD: soft nontender, hypoactive bowel sounds, no mass  EXT:  equal pulses bilaterally, warm, lesion on right knee  NEURO: grossly intact  SKIN: no obvious rash  LINES: clean, dry intact         Data:   All data and imaging reviewed     ROUTINE ICU LABS (Last four results)  CMP  Recent Labs   Lab 01/23/24  1109 01/23/24  1106 01/23/24  1104   NA  --  139 142   POTASSIUM  --  3.9 3.4   CHLORIDE  --  103 104   CO2  --  20*  --    ANIONGAP  --  16*  --    * 281* 283*   BUN  --  11.8 13   CR  --  0.83 0.9   GFRESTIMATED  --  86  --    HAZLE  --  7.6*  --    MAG  --  2.1  --    PROTTOTAL  --  5.8*  --    ALBUMIN  --  3.7  --    BILITOTAL  --  0.4  --    ALKPHOS  --  207*  --    AST  --  163*  --    ALT  --  44  --      CBC  Recent Labs   Lab 01/23/24  1106 01/23/24  1104   WBC 16.0*  --    RBC 4.82  --    HGB 14.1 14.6   HCT 43.6 43   MCV 91  --    MCH 29.3  --    MCHC 32.3  --    RDW 13.3  --      --      INRNo lab results found in last 7 days.  Arterial Blood Gas  Recent Labs   Lab 01/23/24  1345   PH 7.32*   PCO2 46*   PO2 222*   HCO3 24   O2PER 100       All cultures:  No results for input(s): \"CULT\" in the last 168 " hours.  Recent Results (from the past 24 hour(s))   XR Chest Port 1 View    Narrative    CHEST ONE VIEW  1/23/2024 12:13 PM     HISTORY: Check ETT.    COMPARISON: September 11, 2021.      Impression    IMPRESSION: Endotracheal tube tip 4.2 cm from the nicol. Extensive  right upper lobe consolidation. Remaining lungs clear.    VERONICA ORTIZ MD         SYSTEM ID:  [id number]   CT Head w/o Contrast    Narrative    EXAM: CT HEAD W/O CONTRAST  1/23/2024 12:40 PM     HISTORY:  post arrest       COMPARISON:  No prior similar studies    TECHNIQUE: Using multidetector thin collimation helical acquisition  technique, axial, coronal and sagittal CT images from the skull base  to the vertex were obtained without intravenous contrast.   (topogram) image(s) also obtained and reviewed.    FINDINGS:  No intracranial hemorrhage, mass effect, or midline shift. No acute  loss of gray-white matter differentiation in the cerebral hemispheres.  Ventricles are proportionate to the cerebral sulci. Clear basal  cisterns.    The bony calvaria and the bones of the skull base are normal.  Scattered paranasal sinus mucosal thickening. Minimal bilateral  mastoid effusions. Grossly normal orbits.       Impression    IMPRESSION: No acute intracranial pathology.     JOSHUA NEWMAN MD         SYSTEM ID:  [id number]     I saw patient with student.  We discussed and and her orders together.  I agree with the documentation above contributed to it.  BP (!) 181/106   Pulse 92   Temp 96.8  F (36  C)   Resp 14   Wt 63.2 kg (139 lb 5.3 oz)   LMP 06/07/2015   SpO2 96%   BMI 22.49 kg/m      GENERAL:  Comfortable appearing on vent  PSYCH: No acute distress.  EYES: PERRLA, Normal conjunctiva.  HEART:  Regular rate and rhythm. No JVD. Pulses normal. No edema.  LUNGS:  Clear to auscultation, normal Respiratory effort.  ABDOMEN:  Soft, no hepatosplenomegaly, normal bowel sounds.  EXTREMETIES: No clubbing, cyanosis or ischemia  SKIN:  Dry to touch, No  palomo.    Dawood Cazares MD    Billing: This patient is critically ill: Yes. Total critical care time today 70 min.

## 2024-01-23 NOTE — ED TRIAGE NOTES
Pt brought in by ems after snorting Fentanyl at home the becoming unresponsive. Per ems report patient was down for 3 minutes, the emergency personally gave a total of 8mg of narcan, went into asystole and shocked 4 times, and 2mg of Epi given, hypotensive prior to arrival.  ET tube 7.0 24 @ the teeth & IO in left lower leg placed in the field.     Triage Assessment (Adult)       Row Name 01/23/24 1127          Triage Assessment    Airway WDL airway symptoms     Airway Interventions artificial airway placed

## 2024-01-23 NOTE — Clinical Note
Medicaid [place] paperwork has been faxed to the doctor's office at #[phone].   Please have doctor sign and date Section IV #21-22. If doctor would like to add any additional information, please see Section VI #32.    Once paperwork is filled out, please send back to us at #[phone], Attention: Julianna.    Please update encounter when paperwork has been received and faxed back to us. Thanks!   Pre-calculated contrast dose 333 ml

## 2024-01-23 NOTE — PROGRESS NOTES
"Stat echo was reviewed.  It showed:  The visual ejection fraction is 45-50%.  The RV is not well seen. The TAPSE value for RV function is normal but  visually on limited images the RV may be hypokinetic  The LV is mildly hypokinetic. There is \"septal shudder\" which can be seen with  intraventricular conduction delay, and constrictive pericardial physiology.  However patient is on a ventillator so that affects differential diagnosis.  Hepatic vein doppler which is also used to assess for constriction was not  performed and likely would not be useful on a ventillated patient.  Also the mid septum is mildly hypokinetic and does not follow a classic  coronary distribution. Rarely this can be seen with an LAD \"myocardial bridge\"  or septal disease.      Of note, patient has been having some PVCs and some runs of V. tach.  Magnesium is low and is being replaced.  Potassium and phosphorus replacement protocols have also been ordered.  I started metoprolol 12.5 mg by NG twice daily.  IV metoprolol is available for use as needed.    Discussed patient with Dr. Plasencia of cardiology.  We agreed to start amiodarone drip and continue metoprolol..  Also will give 2 g of calcium gluconate.    I updated patient's , Eleazar.  "

## 2024-01-23 NOTE — PROGRESS NOTES
Desat episode with increased sat needs. PEEP now set to +8 and FiO2 weaned from 100% to 80%. Pt also transported to CT without complications on LTV vent and now stable back in ED. RT will continue to monitor and assess.    Dayan Hayes, RT on 1/23/2024 at 12:51 PM

## 2024-01-23 NOTE — CONSULTS
Care Management Follow Up    Length of Stay (days): 0    Expected Discharge Date: 01/25/2024       Additional Information:  Patient has a high URR of 20%. Patient is intubated. SW will assess if/when appropriate.     MARISELA Luo, SW  Emergency Room   Please contact the SW on the floor in which the patient is staying for any questions or concerns

## 2024-01-23 NOTE — PROGRESS NOTES
[facility] VENTILATOR RESPIRATORY NOTE  Date of Admission: 1/23/24  Date of Intubation (most recent): 1/23/24  Reason for Mechanical Ventilation: arrest  Number of Days on Mechanical Ventilation: 1  Significant Events Today: Pt arrived via ambulance already intubated and placed on LTV in ICU upon admission.    Vent Mode: CMV/AC  (Continuous Mandatory Ventilation/ Assist Control)  FiO2 (%): 40 %  Resp Rate (Set): 12 breaths/min  Tidal Volume (Set, mL): 400 mL  PEEP (cm H2O): 5 cmH2O  Resp: 14    ETT placed at 24 @ the teeth with bilateral BS.    RT will continue to monitor, assess, and adjust as necessary.    Dayan Hayes, RT on 1/23/2024 at 12:00 PM

## 2024-01-23 NOTE — ED PROVIDER NOTES
History   Chief Complaint:  Drug Overdose       HPI:  Danielle Tadeo is a   48 year old female presenting with a fentanyl overdose. Patient was at home with her significant other when she snorted fentanyl and became unresponsive.  Her  started CPR.  Police arrived and gave the patient naloxone.  EMS arrived on-scene and noted she was down for three minutes.  The patient received a total of 8 mg of naloxone.  Initial rhythm was asystole.  Patient was given cardiac dose epinephrine x 2.  Patient also had runs of ventricular tachycardia received defibrillation from the ED 4 times.  The patient was intubated during arrest with a 7.0 tube.  An IO was placed in the left tibia.  Prior to arrival, patient was given 5 mg of morphine and 4 mg of midazolam.    Medical history includes opioid dependence, status post gastric bypass, hypertension, nicotine dependence, among others.    Independent Historian:  Independent history was obtained from EMS. They provided information detailed above in HPI.     Review of External Notes:  None.    I personally reviewed the patient's chart, including available medication list and available past medical history, past surgical history, family history, and social history.    Physical Exam   Patient Vitals for the past 24 hrs:   BP Temp Temp src Pulse Resp SpO2 Weight   01/23/24 1431 -- 96.8  F (36  C) -- 92 -- 99 % --   01/23/24 1430 -- 96.8  F (36  C) -- 92 -- 99 % --   01/23/24 1429 -- 96.8  F (36  C) -- 93 -- 99 % --   01/23/24 1428 -- -- -- 96 -- 98 % --   01/23/24 1427 -- 96.8  F (36  C) -- 96 -- 99 % --   01/23/24 1425 -- 96.8  F (36  C) -- 94 -- 99 % --   01/23/24 1424 -- 96.8  F (36  C) -- 98 -- 99 % --   01/23/24 1423 -- 96.8  F (36  C) -- 95 -- 99 % --   01/23/24 1422 -- -- -- 95 -- 99 % --   01/23/24 1421 -- 96.8  F (36  C) -- 92 -- 99 % --   01/23/24 1419 -- 96.8  F (36  C) -- 93 -- 99 % --   01/23/24 1418 -- 96.8  F (36  C) -- 92 -- 99 % --   01/23/24 1417 -- 96.8  F  (36  C) -- 92 -- 99 % --   01/23/24 1416 -- 96.8  F (36  C) -- 93 -- 99 % --   01/23/24 1415 -- -- -- 93 -- 99 % --   01/23/24 1413 -- 96.8  F (36  C) -- 94 -- 99 % --   01/23/24 1412 -- 96.8  F (36  C) -- 93 -- 99 % --   01/23/24 1411 -- 96.8  F (36  C) -- 92 -- 99 % --   01/23/24 1410 -- 96.8  F (36  C) -- 93 -- 99 % --   01/23/24 1409 -- 96.8  F (36  C) -- 92 -- 99 % --   01/23/24 1407 -- 96.8  F (36  C) -- 92 -- 99 % --   01/23/24 1406 -- 96.8  F (36  C) -- 93 -- 99 % --   01/23/24 1405 -- 96.8  F (36  C) -- 93 -- 99 % --   01/23/24 1404 -- 96.8  F (36  C) -- 94 -- 99 % --   01/23/24 1212 -- -- -- -- -- 96 % --   01/23/24 1145 (!) 181/106 -- -- 100 -- 92 % --   01/23/24 1115 (!) 182/119 -- -- 107 -- 94 % --   01/23/24 1109 (!) 154/117 -- -- 104 -- 96 % --   01/23/24 1106 (!) 122/111 -- -- 109 -- 97 % --   01/23/24 1101 (!) 155/117 -- -- 112 -- -- --   01/23/24 1100 -- -- -- -- -- -- 63.2 kg (139 lb 5.3 oz)   01/23/24 1056 (!) 84/71 97.5  F (36.4  C) Temporal 113 14 99 % --      Physical Exam  Vitals and nursing note reviewed.   Constitutional:       Comments: Obtunded   HENT:      Head: Atraumatic.      Mouth/Throat:      Mouth: Mucous membranes are moist.   Cardiovascular:      Rate and Rhythm: Regular rhythm. Tachycardia present.      Heart sounds: Normal heart sounds.   Pulmonary:      Breath sounds: No wheezing.      Comments: Intubated, being bagged  Abdominal:      Palpations: Abdomen is soft.      Tenderness: There is no guarding.   Genitourinary:     General: Normal vulva.   Musculoskeletal:         General: No deformity or signs of injury.      Cervical back: Neck supple.   Skin:     General: Skin is dry.      Comments: Cool   Neurological:      Mental Status: She is unresponsive.            Emergency Department Course     ECG & Imaging Laboratory:   ECG:  ECG taken at , ECG read at 1100  Sinus Tachycardia with occasional premature ventricular complexes  Right atrial enlargement  Right bundle branch  block  Abnormal ECG   Rate 109 bpm. NH interval 132 ms. QRS duration 124 ms. QT/QTc 432/581 ms. P-R-T axes 69-78-8.   Compared to prior, dated 9/27/2021, increased number of PVCs, more prolonged QT    CT Head w/o Contrast   Final Result   IMPRESSION: No acute intracranial pathology.       JOSHUA NEWMAN MD            SYSTEM ID:  [id number]      XR Chest Port 1 View   Final Result   IMPRESSION: Endotracheal tube tip 4.2 cm from the nicol. Extensive   right upper lobe consolidation. Remaining lungs clear.      VERONICA ORTIZ MD            SYSTEM ID:  [id number]         Report per radiology. Labs Ordered and Resulted from Time of ED Arrival to Time of ED Departure   ISTAT BASIC CHEM ICA HEMATOCRIT POCT - Abnormal       Result Value    Chloride POCT 104      Potassium POCT 3.4      Sodium POCT 142      UREA NITROGEN POCT 13      Calcium, Ionized Whole Blood POCT 4.2 (*)     Glucose Whole Blood POCT 283 (*)     Anion Gap POCT 20.0 (*)     Hemoglobin POCT 14.6      Hematocrit POCT 43      Creatinine POCT 0.9      TOTAL CO2 POCT 22     COMPREHENSIVE METABOLIC PANEL - Abnormal    Sodium 139      Potassium 3.9      Carbon Dioxide (CO2) 20 (*)     Anion Gap 16 (*)     Urea Nitrogen 11.8      Creatinine 0.83      GFR Estimate 86      Calcium 7.6 (*)     Chloride 103      Glucose 281 (*)     Alkaline Phosphatase 207 (*)      (*)     ALT 44      Protein Total 5.8 (*)     Albumin 3.7      Bilirubin Total 0.4     CBC WITH PLATELETS AND DIFFERENTIAL - Abnormal    WBC Count 16.0 (*)     RBC Count 4.82      Hemoglobin 14.1      Hematocrit 43.6      MCV 91      MCH 29.3      MCHC 32.3      RDW 13.3      Platelet Count 224      % Neutrophils 76      % Lymphocytes 20      % Monocytes 2      % Eosinophils 0      % Basophils 0      % Immature Granulocytes 2      NRBCs per 100 WBC 0      Absolute Neutrophils 12.0 (*)     Absolute Lymphocytes 3.2      Absolute Monocytes 0.3      Absolute Eosinophils 0.0      Absolute Basophils 0.0       Absolute Immature Granulocytes 0.4      Absolute NRBCs 0.0     GLUCOSE BY METER - Abnormal    GLUCOSE BY METER POCT 291 (*)    ISTAT GASES LACTATE VENOUS POCT - Abnormal    Lactic Acid POCT 6.4 (*)     Bicarbonate Venous POCT 21      O2 Sat, Venous POCT 79 (*)     pCO2 Venous POCT 52 (*)     pH Venous POCT 7.21 (*)     pO2 Venous POCT 53 (*)    BLOOD GAS ARTERIAL - Abnormal    pH Arterial 7.32 (*)     pCO2 Arterial 46 (*)     pO2 Arterial 222 (*)     FIO2 100      Bicarbonate Arterial 24      Base Excess/Deficit Arterial -2.8      Mehran's Test Artline      Oxyhemoglobin Arterial 96      O2 Sat, Arterial 98.7 (*)    HCG QUALITATIVE PREGNANCY - Normal    hCG Serum Qualitative Negative     MAGNESIUM - Normal    Magnesium 2.1     LACTIC ACID WHOLE BLOOD - Normal    Lactic Acid 0.9           [facility]    -Central Line    Date/Time: 1/23/2024 2:36 PM    Performed by: Todd Rudd MD  Authorized by: Todd Rudd MD    Emergent condition/consent implied      UNIVERSAL PROTOCOL   Site Marked: Yes  Prior Images Obtained and Reviewed:  NA  Required items: Required blood products, implants, devices and special equipment available    Patient identity confirmed:  Arm band and hospital-assigned identification number  NA - No sedation, light sedation, or local anesthesia  Confirmation Checklist:  Patient's identity using two indicators, relevant allergies, procedure was appropriate and matched the consent or emergent situation and correct equipment/implants were available  Time out: Immediately prior to the procedure a time out was called    Universal Protocol: the Joint Commission Universal Protocol was followed    Preparation: Patient was prepped and draped in usual sterile fashion    ESBL (mL):  5    PRE-PROCEDURE DETAILS:     Hand hygiene: Hand hygiene performed prior to insertion      Sterile barrier technique: All elements of maximal sterile technique followed      Skin preparation:  2%  chlorhexidine  PROCEDURE DETAILS:     Location:  R femoral    Patient position:  Flat    Procedural supplies:  Triple lumen    Catheter size:  7 Fr    Landmarks identified: yes      Ultrasound guidance: yes      Sterile ultrasound techniques: Sterile gel and sterile probe covers were used      Number of attempts:  1    Successful placement: yes      POST PROCEDURE DETAILS:      Post-procedure:  Dressing applied and line sutured    Assessment:  Blood return through all ports and free fluid flow    PROCEDURE    Patient Tolerance:  Patient tolerated the procedure well with no immediate complications      Arterial Line Insertion      Procedure: Arterial Line Placement     Indication Critical care blood pressure monitoring    Consent:   Unable/Emergent     Location: Right Femoral     Preparation: Chlorhexidine     Anesthesia/Sedation: Sedation: Propofol infusion    Procedure Detail: Seldinger Technique   The patient was properly positioned. The skin was prepped and draped with sterile technique. Local anesthesia was infiltrated. The artery was identified by (drop down palpation, ultrasound, landmarks). The needle was advanced until a flash of arterial blood confirmed position within the lumen of the artery. The guidewire was then advanced through the needle with ease. The needle was removed, a small incision was made with an 11 blade along the wire, the catheter was passed over the guidewire and the guidewire was removed.  Continuous flow of arterial blood confirmed position within the lumen of the artery.      Pressure tubing was attached to the catheter.  The catheter was secured.  A sterile dressing was placed.    Patient Status: The patient tolerated the procedure well: Yes. There were no complications.       Central Line Placement     Procedure:  Central Venous Catheter Insertion     Indication: Vascular access    Consent:  Unable/Emergent    Risk Discussed: N/A    Universal Protocol: Universal protocol was followed  and time out conducted just prior to starting procedure, confirming patient identity, site/side, procedure, patient position, and availability of correct equipment and implants.     Anesthesia/Sedation: Propofol infusion was running    Procedure Detail:    Central line bundle elements were complete including preparatory hand leansing, donning full barrier precautions, use of a full body drape and chlorhxidine gluconate skin prep.   The Right internal femoral vein was selected as the optimal location for patient s condition.   Ultrasound was utilized for guidance: Yes  A finder needle was used to enter the vein, through which a guidewire was inserted. The finder needle was then removed and the tract was dilated.   A triple lumen central venous catheter was inserted over the guidewire without difficulty. The guidewire was removed and the catheter was sutured in place and covered with an appropriate dressing.   A post-procedure chest radiograph was performed.     Patient Status:  The patient tolerated the procedure well: Yes. There were no complications.    Interventions & Assessments:           Interventions:  Medications   norepinephrine (LEVOPHED) 4 mg in  mL PERIPHERAL infusion (0 mcg/kg/min × 63.2 kg Intravenous Stopped 1/23/24 1111)   propofol (DIPRIVAN) infusion (75 mcg/kg/min × 63.2 kg Intravenous $New Bag 1/23/24 1421)     And   propofol (DIPRIVAN) bolus from bag or syringe pump (60 mg Intravenous $Given 1/23/24 1159)   fentaNYL (SUBLIMAZE) infusion (125 mcg/hr Intravenous Rate/Dose Change 1/23/24 1434)   fentaNYL (SUBLIMAZE) 50 mcg/mL bolus from pump (has no administration in time range)   ampicillin-sulbactam (UNASYN) 3 g vial to attach to  mL bag (3 g Intravenous $New Bag 1/23/24 1418)   midazolam (VERSED) injection 4 mg ( Intravenous Canceled Entry 1/23/24 1403)   sodium chloride 0.9% BOLUS 1,000 mL (0 mLs Intravenous Stopped 1/23/24 1404)        Assessments  Independent  Interpretations  Consultations/Discussion of Management or Tests  ED Course as of 01/23/24 1436   duane Jan 23, 2024   1051 I obtained history and examined the patient as noted above. I performed procedures as noted in chart. Total critical care time is 35 minutes.     1147 I spoke with patient's  regarding patient and updated him with findings.    1151 Updated  [phone]     1152 Patient was experiencing complications, with blood backing up from intubation.    1302 XR Chest Port 1 View  I independently interpreted the patient's chest x-ray; endotracheal tube well-positioned.  No pneumothorax.       1303 CT Head w/o Contrast  I independently interpreted the patient's non-contrast head CT; reassuring against acute intracranial hemorrhage.     1317 I spoke with Dr. Cazares of the hospitalist team regarding the patient, who accepted the patient for admission.         Social Determinants of Health affecting care:   None.      Disposition:  The patient was admitted to the hospital under the care of Dr. Cazares .     Impression & Plan   CMS Diagnoses: The Lactic acid level is elevated due to cardiac arrest, at this time there is no sign of severe sepsis or septic shock.    Medical Decision Making:  Patient presenting with cardiac arrest.  History consistent with acute hypoxic respiratory failure leading to cardiac arrest secondary to opioid overdose.  On arrival, patient appeared hypotensive.  Infusion of norepinephrine was started.  Initial i-STAT persistent with mixed metabolic acidosis and respiratory acidosis, with lactate elevated consistent with cardiac arrest.  Labs were obtained.  Did confirm ETT tube placement with portable chest x-ray.  Placed right femoral vein central line for venous access and thermoregulation.  Also placed right-sided femoral arterial line for close blood pressure monitoring.  Patient became hypotensive after short time.  She had been given a bolus of 1 L of normal saline.  We  started propofol infusion and then added a fentanyl infusion.  Did obtain CT of the head and this was unremarkable.  Patient to be admitted to the ICU for further evaluation and management.    Critical Care time was 35 minutes for this patient excluding procedures.    Critical Care  Critical Care is defined as an illness or injury that acutely impairs one or more vital organ systems such that there is a high probability of imminent or life-threatening deterioration in the patient's condition and that the failure to initiate these interventions on an urgent basis would likely result in sudden, clinically significant or life-threatening deterioration in the patient's condition.    The critical condition was: Cardiac Arrest and Overdose    Critical interventions performed or strongly considered: Arrange Definitive Care: OR/cath lab/neurointervention/IR/endoscopy/dialysis/ICU    Critical care time was 35 minutes exclusive of time spent on separately billable procedures.    For purposes of time:  Procedures included in CC time: interpretation of NICOM, CXR, SpO2, VBG/ABG, interpretation of physiologic data, OG placement, temporary transcutaneous/transvenous pacing, ventilator management  Common separately billable procedures (not included in CC time): CPR, wound repair, endotracheal intubation, central line placement, intraosseous placement, tube thoracostomy, temporary transvenous pacemaker, EKG, electrical cardioversion     Diagnosis:    ICD-10-CM    1. Cardiac arrest (H)  I46.9       2. Opioid overdose, accidental or unintentional, initial encounter (H)  T40.2X1A       3. Acute respiratory failure with hypoxia and hypercapnia (H)  J96.01     J96.02       4. Lactic acidosis  E87.20            Discharge Medications:  New Prescriptions    No medications on file     Scribe Disclosure:  Maya WADDELL, am serving as a scribe at 12:55 PM on 1/23/2024 to document services personally performed by Todd Rudd MD,  based on my observations and the provider's statements to me.       Todd Rudd MD  01/23/24 2879

## 2024-01-23 NOTE — Clinical Note
Temporary pacemaker Rate= 100bpmPaced mA= 102Pacer wire was captured appropriately, Pacer is set, Demand on Standby and Pacing catheter was left in place

## 2024-01-24 ENCOUNTER — APPOINTMENT (OUTPATIENT)
Dept: GENERAL RADIOLOGY | Facility: CLINIC | Age: 49
End: 2024-01-24
Attending: INTERNAL MEDICINE
Payer: COMMERCIAL

## 2024-01-24 ENCOUNTER — RESULTS ONLY (OUTPATIENT)
Facility: CLINIC | Age: 49
End: 2024-01-24

## 2024-01-24 ENCOUNTER — APPOINTMENT (OUTPATIENT)
Dept: GENERAL RADIOLOGY | Facility: CLINIC | Age: 49
End: 2024-01-24
Attending: HOSPITALIST
Payer: COMMERCIAL

## 2024-01-24 LAB
ALBUMIN SERPL BCG-MCNC: 3.7 G/DL (ref 3.5–5.2)
ALLEN'S TEST: ABNORMAL
ALP SERPL-CCNC: 152 U/L (ref 40–150)
ALT SERPL W P-5'-P-CCNC: 50 U/L (ref 0–50)
ANION GAP SERPL CALCULATED.3IONS-SCNC: 10 MMOL/L (ref 7–15)
APTT PPP: 29 SECONDS (ref 22–38)
APTT PPP: 34 SECONDS (ref 22–38)
AST SERPL W P-5'-P-CCNC: 204 U/L (ref 0–45)
ATRIAL RATE - MUSE: 109 BPM
ATRIAL RATE - MUSE: 98 BPM
BASE EXCESS BLDA CALC-SCNC: -3.1 MMOL/L (ref -3–3)
BASE EXCESS BLDV CALC-SCNC: -1.4 MMOL/L (ref -3–3)
BILIRUB SERPL-MCNC: 0.4 MG/DL
BUN SERPL-MCNC: 10.6 MG/DL (ref 6–20)
BUN SERPL-MCNC: 11.9 MG/DL (ref 6–20)
BUN SERPL-MCNC: 9.4 MG/DL (ref 6–20)
CALCIUM SERPL-MCNC: 8 MG/DL (ref 8.6–10)
CHLORIDE SERPL-SCNC: 104 MMOL/L (ref 98–107)
CHLORIDE SERPL-SCNC: 104 MMOL/L (ref 98–107)
CHLORIDE SERPL-SCNC: 108 MMOL/L (ref 98–107)
CK SERPL-CCNC: 3853 U/L (ref 26–192)
CK SERPL-CCNC: 4215 U/L (ref 26–192)
CK SERPL-CCNC: 4490 U/L (ref 26–192)
COHGB MFR BLD: 97.8 % (ref 96–97)
CREAT SERPL-MCNC: 0.39 MG/DL (ref 0.51–0.95)
CREAT SERPL-MCNC: 0.44 MG/DL (ref 0.51–0.95)
CREAT SERPL-MCNC: 0.45 MG/DL (ref 0.51–0.95)
DEPRECATED HCO3 PLAS-SCNC: 19 MMOL/L (ref 22–29)
DEPRECATED HCO3 PLAS-SCNC: 20 MMOL/L (ref 22–29)
DEPRECATED HCO3 PLAS-SCNC: 20 MMOL/L (ref 22–29)
DIASTOLIC BLOOD PRESSURE - MUSE: NORMAL MMHG
DIASTOLIC BLOOD PRESSURE - MUSE: NORMAL MMHG
EGFRCR SERPLBLD CKD-EPI 2021: >90 ML/MIN/1.73M2
ERYTHROCYTE [DISTWIDTH] IN BLOOD BY AUTOMATED COUNT: 13.8 % (ref 10–15)
FIBRINOGEN PPP-MCNC: 302 MG/DL (ref 170–490)
FIBRINOGEN PPP-MCNC: 339 MG/DL (ref 170–490)
GLUCOSE BLDC GLUCOMTR-MCNC: 115 MG/DL (ref 70–99)
GLUCOSE BLDC GLUCOMTR-MCNC: 120 MG/DL (ref 70–99)
GLUCOSE BLDC GLUCOMTR-MCNC: 124 MG/DL (ref 70–99)
GLUCOSE BLDC GLUCOMTR-MCNC: 125 MG/DL (ref 70–99)
GLUCOSE BLDC GLUCOMTR-MCNC: 130 MG/DL (ref 70–99)
GLUCOSE BLDC GLUCOMTR-MCNC: 130 MG/DL (ref 70–99)
GLUCOSE BLDC GLUCOMTR-MCNC: 151 MG/DL (ref 70–99)
GLUCOSE BLDC GLUCOMTR-MCNC: 158 MG/DL (ref 70–99)
GLUCOSE BLDC GLUCOMTR-MCNC: 164 MG/DL (ref 70–99)
GLUCOSE BLDC GLUCOMTR-MCNC: 171 MG/DL (ref 70–99)
GLUCOSE SERPL-MCNC: 102 MG/DL (ref 70–99)
GLUCOSE SERPL-MCNC: 116 MG/DL (ref 70–99)
GLUCOSE SERPL-MCNC: 163 MG/DL (ref 70–99)
HCO3 BLD-SCNC: 21 MMOL/L (ref 21–28)
HCO3 BLDV-SCNC: 23 MMOL/L (ref 21–28)
HCT VFR BLD AUTO: 44 % (ref 35–47)
HGB BLD-MCNC: 15.1 G/DL (ref 11.7–15.7)
INR PPP: 0.99 (ref 0.85–1.15)
INR PPP: 1.07 (ref 0.85–1.15)
INTERPRETATION ECG - MUSE: NORMAL
INTERPRETATION ECG - MUSE: NORMAL
LACTATE SERPL-SCNC: 1.8 MMOL/L (ref 0.7–2)
LACTATE SERPL-SCNC: 1.8 MMOL/L (ref 0.7–2)
MAGNESIUM SERPL-MCNC: 2.1 MG/DL (ref 1.7–2.3)
MAGNESIUM SERPL-MCNC: 2.1 MG/DL (ref 1.7–2.3)
MAGNESIUM SERPL-MCNC: 2.4 MG/DL (ref 1.7–2.3)
MAGNESIUM SERPL-MCNC: 2.5 MG/DL (ref 1.7–2.3)
MCH RBC QN AUTO: 29.5 PG (ref 26.5–33)
MCHC RBC AUTO-ENTMCNC: 34.3 G/DL (ref 31.5–36.5)
MCV RBC AUTO: 86 FL (ref 78–100)
O2/TOTAL GAS SETTING VFR VENT: 40 %
O2/TOTAL GAS SETTING VFR VENT: 40 %
OXYHGB MFR BLDV: 82 % (ref 70–75)
P AXIS - MUSE: 69 DEGREES
P AXIS - MUSE: 77 DEGREES
PCO2 BLD: 32 MM HG (ref 35–45)
PCO2 BLDV: 35 MM HG (ref 40–50)
PEEP: 5 CM H2O
PH BLD: 7.41 [PH] (ref 7.35–7.45)
PH BLDV: 7.42 [PH] (ref 7.32–7.43)
PHOSPHATE SERPL-MCNC: 2.5 MG/DL (ref 2.5–4.5)
PHOSPHATE SERPL-MCNC: 2.8 MG/DL (ref 2.5–4.5)
PHOSPHATE SERPL-MCNC: 3 MG/DL (ref 2.5–4.5)
PLATELET # BLD AUTO: 250 10E3/UL (ref 150–450)
PO2 BLD: 124 MM HG (ref 80–105)
PO2 BLDV: 48 MM HG (ref 25–47)
POTASSIUM SERPL-SCNC: 3.3 MMOL/L (ref 3.4–5.3)
POTASSIUM SERPL-SCNC: 3.8 MMOL/L (ref 3.4–5.3)
POTASSIUM SERPL-SCNC: 4.1 MMOL/L (ref 3.4–5.3)
POTASSIUM SERPL-SCNC: 4.1 MMOL/L (ref 3.4–5.3)
POTASSIUM SERPL-SCNC: 4.6 MMOL/L (ref 3.4–5.3)
POTASSIUM SERPL-SCNC: 4.6 MMOL/L (ref 3.4–5.3)
POTASSIUM SERPL-SCNC: 5.8 MMOL/L (ref 3.4–5.3)
PR INTERVAL - MUSE: 132 MS
PR INTERVAL - MUSE: 136 MS
PROT SERPL-MCNC: 5.8 G/DL (ref 6.4–8.3)
QRS DURATION - MUSE: 124 MS
QRS DURATION - MUSE: 96 MS
QT - MUSE: 432 MS
QT - MUSE: 468 MS
QTC - MUSE: 581 MS
QTC - MUSE: 597 MS
R AXIS - MUSE: 47 DEGREES
R AXIS - MUSE: 78 DEGREES
RBC # BLD AUTO: 5.12 10E6/UL (ref 3.8–5.2)
SAO2 % BLDA: 97 % (ref 92–100)
SAO2 % BLDV: 83.4 % (ref 70–75)
SODIUM SERPL-SCNC: 133 MMOL/L (ref 135–145)
SODIUM SERPL-SCNC: 134 MMOL/L (ref 135–145)
SODIUM SERPL-SCNC: 138 MMOL/L (ref 135–145)
SYSTOLIC BLOOD PRESSURE - MUSE: NORMAL MMHG
SYSTOLIC BLOOD PRESSURE - MUSE: NORMAL MMHG
T AXIS - MUSE: 245 DEGREES
T AXIS - MUSE: 8 DEGREES
TRIGL SERPL-MCNC: 338 MG/DL
TROPONIN T SERPL HS-MCNC: 216 NG/L
TROPONIN T SERPL HS-MCNC: 245 NG/L
TROPONIN T SERPL HS-MCNC: 312 NG/L
VENTRICULAR RATE- MUSE: 109 BPM
VENTRICULAR RATE- MUSE: 98 BPM
WBC # BLD AUTO: 21.7 10E3/UL (ref 4–11)

## 2024-01-24 PROCEDURE — 33210 INSERT ELECTRD/PM CATH SNGL: CPT | Performed by: INTERNAL MEDICINE

## 2024-01-24 PROCEDURE — 83735 ASSAY OF MAGNESIUM: CPT | Performed by: INTERNAL MEDICINE

## 2024-01-24 PROCEDURE — 85730 THROMBOPLASTIN TIME PARTIAL: CPT | Performed by: ANESTHESIOLOGY

## 2024-01-24 PROCEDURE — 5A1223Z PERFORMANCE OF CARDIAC PACING, CONTINUOUS: ICD-10-PCS | Performed by: INTERNAL MEDICINE

## 2024-01-24 PROCEDURE — 80048 BASIC METABOLIC PNL TOTAL CA: CPT | Performed by: INTERNAL MEDICINE

## 2024-01-24 PROCEDURE — C9113 INJ PANTOPRAZOLE SODIUM, VIA: HCPCS | Performed by: ANESTHESIOLOGY

## 2024-01-24 PROCEDURE — 250N000009 HC RX 250: Performed by: INTERNAL MEDICINE

## 2024-01-24 PROCEDURE — 82550 ASSAY OF CK (CPK): CPT | Performed by: STUDENT IN AN ORGANIZED HEALTH CARE EDUCATION/TRAINING PROGRAM

## 2024-01-24 PROCEDURE — 84484 ASSAY OF TROPONIN QUANT: CPT | Performed by: INTERNAL MEDICINE

## 2024-01-24 PROCEDURE — 83605 ASSAY OF LACTIC ACID: CPT | Performed by: INTERNAL MEDICINE

## 2024-01-24 PROCEDURE — 250N000009 HC RX 250: Performed by: STUDENT IN AN ORGANIZED HEALTH CARE EDUCATION/TRAINING PROGRAM

## 2024-01-24 PROCEDURE — 272N000001 HC OR GENERAL SUPPLY STERILE: Performed by: INTERNAL MEDICINE

## 2024-01-24 PROCEDURE — 250N000011 HC RX IP 250 OP 636: Performed by: INTERNAL MEDICINE

## 2024-01-24 PROCEDURE — 94003 VENT MGMT INPAT SUBQ DAY: CPT

## 2024-01-24 PROCEDURE — 83735 ASSAY OF MAGNESIUM: CPT | Performed by: ANESTHESIOLOGY

## 2024-01-24 PROCEDURE — 71045 X-RAY EXAM CHEST 1 VIEW: CPT

## 2024-01-24 PROCEDURE — 80053 COMPREHEN METABOLIC PANEL: CPT | Performed by: INTERNAL MEDICINE

## 2024-01-24 PROCEDURE — 250N000013 HC RX MED GY IP 250 OP 250 PS 637: Performed by: STUDENT IN AN ORGANIZED HEALTH CARE EDUCATION/TRAINING PROGRAM

## 2024-01-24 PROCEDURE — 250N000013 HC RX MED GY IP 250 OP 250 PS 637: Performed by: INTERNAL MEDICINE

## 2024-01-24 PROCEDURE — 85384 FIBRINOGEN ACTIVITY: CPT | Performed by: ANESTHESIOLOGY

## 2024-01-24 PROCEDURE — 250N000009 HC RX 250: Performed by: ANESTHESIOLOGY

## 2024-01-24 PROCEDURE — 82805 BLOOD GASES W/O2 SATURATION: CPT | Performed by: INTERNAL MEDICINE

## 2024-01-24 PROCEDURE — 84132 ASSAY OF SERUM POTASSIUM: CPT | Performed by: INTERNAL MEDICINE

## 2024-01-24 PROCEDURE — 84478 ASSAY OF TRIGLYCERIDES: CPT | Performed by: STUDENT IN AN ORGANIZED HEALTH CARE EDUCATION/TRAINING PROGRAM

## 2024-01-24 PROCEDURE — 250N000011 HC RX IP 250 OP 636: Performed by: ANESTHESIOLOGY

## 2024-01-24 PROCEDURE — 250N000011 HC RX IP 250 OP 636: Performed by: HOSPITALIST

## 2024-01-24 PROCEDURE — 84100 ASSAY OF PHOSPHORUS: CPT | Performed by: INTERNAL MEDICINE

## 2024-01-24 PROCEDURE — 999N000065 XR ABDOMEN PORT 1 VIEW

## 2024-01-24 PROCEDURE — 999N000157 HC STATISTIC RCP TIME EA 10 MIN

## 2024-01-24 PROCEDURE — 93454 CORONARY ARTERY ANGIO S&I: CPT | Performed by: INTERNAL MEDICINE

## 2024-01-24 PROCEDURE — 99223 1ST HOSP IP/OBS HIGH 75: CPT | Mod: 25 | Performed by: INTERNAL MEDICINE

## 2024-01-24 PROCEDURE — 85027 COMPLETE CBC AUTOMATED: CPT | Performed by: INTERNAL MEDICINE

## 2024-01-24 PROCEDURE — 999N000185 HC STATISTIC TRANSPORT TIME EA 15 MIN

## 2024-01-24 PROCEDURE — 93454 CORONARY ARTERY ANGIO S&I: CPT | Mod: 26 | Performed by: INTERNAL MEDICINE

## 2024-01-24 PROCEDURE — 258N000003 HC RX IP 258 OP 636: Performed by: STUDENT IN AN ORGANIZED HEALTH CARE EDUCATION/TRAINING PROGRAM

## 2024-01-24 PROCEDURE — 84100 ASSAY OF PHOSPHORUS: CPT | Performed by: ANESTHESIOLOGY

## 2024-01-24 PROCEDURE — HZ2ZZZZ DETOXIFICATION SERVICES FOR SUBSTANCE ABUSE TREATMENT: ICD-10-PCS | Performed by: INTERNAL MEDICINE

## 2024-01-24 PROCEDURE — 258N000003 HC RX IP 258 OP 636: Performed by: INTERNAL MEDICINE

## 2024-01-24 PROCEDURE — 83605 ASSAY OF LACTIC ACID: CPT | Performed by: ANESTHESIOLOGY

## 2024-01-24 PROCEDURE — 85610 PROTHROMBIN TIME: CPT | Performed by: ANESTHESIOLOGY

## 2024-01-24 PROCEDURE — 82374 ASSAY BLOOD CARBON DIOXIDE: CPT | Performed by: ANESTHESIOLOGY

## 2024-01-24 PROCEDURE — C1730 CATH, EP, 19 OR FEW ELECT: HCPCS | Performed by: INTERNAL MEDICINE

## 2024-01-24 PROCEDURE — 99233 SBSQ HOSP IP/OBS HIGH 50: CPT | Performed by: HOSPITALIST

## 2024-01-24 PROCEDURE — 99291 CRITICAL CARE FIRST HOUR: CPT | Performed by: ANESTHESIOLOGY

## 2024-01-24 PROCEDURE — B2111ZZ FLUOROSCOPY OF MULTIPLE CORONARY ARTERIES USING LOW OSMOLAR CONTRAST: ICD-10-PCS | Performed by: INTERNAL MEDICINE

## 2024-01-24 PROCEDURE — 5A2204Z RESTORATION OF CARDIAC RHYTHM, SINGLE: ICD-10-PCS | Performed by: HOSPITALIST

## 2024-01-24 PROCEDURE — 200N000001 HC R&B ICU

## 2024-01-24 PROCEDURE — 99207 PR APP CREDIT; MD BILLING SHARED VISIT: CPT | Performed by: STUDENT IN AN ORGANIZED HEALTH CARE EDUCATION/TRAINING PROGRAM

## 2024-01-24 PROCEDURE — 99418 PROLNG IP/OBS E/M EA 15 MIN: CPT | Performed by: HOSPITALIST

## 2024-01-24 PROCEDURE — 999N000065 XR CHEST PORT 1 VIEW

## 2024-01-24 PROCEDURE — 250N000013 HC RX MED GY IP 250 OP 250 PS 637: Performed by: ANESTHESIOLOGY

## 2024-01-24 PROCEDURE — C1894 INTRO/SHEATH, NON-LASER: HCPCS | Performed by: INTERNAL MEDICINE

## 2024-01-24 PROCEDURE — 999N000009 HC STATISTIC AIRWAY CARE

## 2024-01-24 PROCEDURE — 93010 ELECTROCARDIOGRAM REPORT: CPT | Mod: 76 | Performed by: INTERNAL MEDICINE

## 2024-01-24 RX ORDER — ASPIRIN 81 MG/1
243 TABLET, CHEWABLE ORAL ONCE
Status: DISCONTINUED | OUTPATIENT
Start: 2024-01-24 | End: 2024-01-24 | Stop reason: HOSPADM

## 2024-01-24 RX ORDER — NALOXONE HYDROCHLORIDE 0.4 MG/ML
0.4 INJECTION, SOLUTION INTRAMUSCULAR; INTRAVENOUS; SUBCUTANEOUS
Status: CANCELLED | OUTPATIENT
Start: 2024-01-24 | End: 2024-01-24

## 2024-01-24 RX ORDER — SODIUM CHLORIDE 9 MG/ML
INJECTION, SOLUTION INTRAVENOUS CONTINUOUS
Status: DISCONTINUED | OUTPATIENT
Start: 2024-01-24 | End: 2024-01-25 | Stop reason: HOSPADM

## 2024-01-24 RX ORDER — OXYCODONE HYDROCHLORIDE 5 MG/1
5 TABLET ORAL EVERY 4 HOURS PRN
Status: CANCELLED | OUTPATIENT
Start: 2024-01-24

## 2024-01-24 RX ORDER — LORAZEPAM 0.5 MG/1
0.5 TABLET ORAL
Status: DISCONTINUED | OUTPATIENT
Start: 2024-01-24 | End: 2024-01-24 | Stop reason: HOSPADM

## 2024-01-24 RX ORDER — NALOXONE HYDROCHLORIDE 0.4 MG/ML
0.2 INJECTION, SOLUTION INTRAMUSCULAR; INTRAVENOUS; SUBCUTANEOUS
Status: CANCELLED | OUTPATIENT
Start: 2024-01-24 | End: 2024-01-24

## 2024-01-24 RX ORDER — POTASSIUM CHLORIDE 1500 MG/1
20 TABLET, EXTENDED RELEASE ORAL
Status: DISCONTINUED | OUTPATIENT
Start: 2024-01-24 | End: 2024-01-24 | Stop reason: HOSPADM

## 2024-01-24 RX ORDER — MAGNESIUM SULFATE HEPTAHYDRATE 40 MG/ML
2 INJECTION, SOLUTION INTRAVENOUS ONCE
Status: COMPLETED | OUTPATIENT
Start: 2024-01-24 | End: 2024-01-24

## 2024-01-24 RX ORDER — LORAZEPAM 2 MG/ML
0.5 INJECTION INTRAMUSCULAR
Status: DISCONTINUED | OUTPATIENT
Start: 2024-01-24 | End: 2024-01-24 | Stop reason: HOSPADM

## 2024-01-24 RX ORDER — ENOXAPARIN SODIUM 100 MG/ML
40 INJECTION SUBCUTANEOUS EVERY 24 HOURS
Status: DISCONTINUED | OUTPATIENT
Start: 2024-01-24 | End: 2024-01-25 | Stop reason: HOSPADM

## 2024-01-24 RX ORDER — POTASSIUM CHLORIDE 29.8 MG/ML
20 INJECTION INTRAVENOUS ONCE
Status: COMPLETED | OUTPATIENT
Start: 2024-01-24 | End: 2024-01-25

## 2024-01-24 RX ORDER — ASPIRIN 325 MG
325 TABLET ORAL ONCE
Status: DISCONTINUED | OUTPATIENT
Start: 2024-01-24 | End: 2024-01-24

## 2024-01-24 RX ORDER — LIDOCAINE 40 MG/G
CREAM TOPICAL
Status: DISCONTINUED | OUTPATIENT
Start: 2024-01-24 | End: 2024-01-24 | Stop reason: HOSPADM

## 2024-01-24 RX ORDER — FENTANYL CITRATE 50 UG/ML
25 INJECTION, SOLUTION INTRAMUSCULAR; INTRAVENOUS
Status: CANCELLED | OUTPATIENT
Start: 2024-01-24

## 2024-01-24 RX ORDER — CAPTOPRIL 12.5 MG/1
12.5 TABLET ORAL EVERY 8 HOURS
Status: DISCONTINUED | OUTPATIENT
Start: 2024-01-24 | End: 2024-01-25 | Stop reason: HOSPADM

## 2024-01-24 RX ORDER — NITROGLYCERIN 20 MG/100ML
INJECTION INTRAVENOUS CONTINUOUS PRN
Status: COMPLETED | OUTPATIENT
Start: 2024-01-24 | End: 2024-01-24

## 2024-01-24 RX ORDER — POTASSIUM CHLORIDE 1500 MG/1
20 TABLET, EXTENDED RELEASE ORAL
Status: DISCONTINUED | OUTPATIENT
Start: 2024-01-24 | End: 2024-01-24

## 2024-01-24 RX ORDER — SODIUM CHLORIDE 9 MG/ML
75 INJECTION, SOLUTION INTRAVENOUS CONTINUOUS
Status: CANCELLED | OUTPATIENT
Start: 2024-01-24 | End: 2024-01-24

## 2024-01-24 RX ORDER — LORAZEPAM 2 MG/ML
0.5 INJECTION INTRAMUSCULAR
Status: DISCONTINUED | OUTPATIENT
Start: 2024-01-24 | End: 2024-01-24

## 2024-01-24 RX ORDER — HYDRALAZINE HYDROCHLORIDE 20 MG/ML
INJECTION INTRAMUSCULAR; INTRAVENOUS
Status: DISCONTINUED | OUTPATIENT
Start: 2024-01-24 | End: 2024-01-24 | Stop reason: HOSPADM

## 2024-01-24 RX ORDER — POTASSIUM CHLORIDE 29.8 MG/ML
20 INJECTION INTRAVENOUS
Status: COMPLETED | OUTPATIENT
Start: 2024-01-24 | End: 2024-01-24

## 2024-01-24 RX ORDER — GABAPENTIN 250 MG/5ML
600 SOLUTION ORAL EVERY 8 HOURS
Status: DISCONTINUED | OUTPATIENT
Start: 2024-01-27 | End: 2024-01-25 | Stop reason: HOSPADM

## 2024-01-24 RX ORDER — SODIUM CHLORIDE 9 MG/ML
INJECTION, SOLUTION INTRAVENOUS CONTINUOUS
Status: DISCONTINUED | OUTPATIENT
Start: 2024-01-24 | End: 2024-01-24 | Stop reason: HOSPADM

## 2024-01-24 RX ORDER — LIDOCAINE 40 MG/G
CREAM TOPICAL
Status: DISCONTINUED | OUTPATIENT
Start: 2024-01-24 | End: 2024-01-24

## 2024-01-24 RX ORDER — LIDOCAINE HYDROCHLORIDE ANHYDROUS AND DEXTROSE MONOHYDRATE .8; 5 G/100ML; G/100ML
1 INJECTION, SOLUTION INTRAVENOUS CONTINUOUS
Status: DISCONTINUED | OUTPATIENT
Start: 2024-01-24 | End: 2024-01-24 | Stop reason: ALTCHOICE

## 2024-01-24 RX ORDER — METOPROLOL TARTRATE 1 MG/ML
5 INJECTION, SOLUTION INTRAVENOUS EVERY 6 HOURS PRN
Status: DISCONTINUED | OUTPATIENT
Start: 2024-01-24 | End: 2024-01-24

## 2024-01-24 RX ORDER — GABAPENTIN 250 MG/5ML
300 SOLUTION ORAL EVERY 8 HOURS
Status: DISCONTINUED | OUTPATIENT
Start: 2024-01-29 | End: 2024-01-25 | Stop reason: HOSPADM

## 2024-01-24 RX ORDER — LORAZEPAM 2 MG/ML
1 INJECTION INTRAMUSCULAR EVERY 6 HOURS
Status: DISCONTINUED | OUTPATIENT
Start: 2024-01-24 | End: 2024-01-25 | Stop reason: HOSPADM

## 2024-01-24 RX ORDER — IOPAMIDOL 755 MG/ML
INJECTION, SOLUTION INTRAVASCULAR
Status: DISCONTINUED | OUTPATIENT
Start: 2024-01-24 | End: 2024-01-24 | Stop reason: HOSPADM

## 2024-01-24 RX ORDER — LIDOCAINE HYDROCHLORIDE 20 MG/ML
100 INJECTION, SOLUTION INFILTRATION; PERINEURAL ONCE
Status: COMPLETED | OUTPATIENT
Start: 2024-01-24 | End: 2024-01-24

## 2024-01-24 RX ORDER — OXYCODONE HYDROCHLORIDE 10 MG/1
10 TABLET ORAL EVERY 4 HOURS PRN
Status: CANCELLED | OUTPATIENT
Start: 2024-01-24

## 2024-01-24 RX ORDER — ACETAMINOPHEN 325 MG/1
650 TABLET ORAL EVERY 4 HOURS PRN
Status: CANCELLED | OUTPATIENT
Start: 2024-01-24

## 2024-01-24 RX ORDER — GUAIFENESIN 600 MG/1
15 TABLET, EXTENDED RELEASE ORAL DAILY
Status: DISCONTINUED | OUTPATIENT
Start: 2024-01-24 | End: 2024-01-25 | Stop reason: HOSPADM

## 2024-01-24 RX ORDER — CLONIDINE HYDROCHLORIDE 0.1 MG/1
0.1 TABLET ORAL EVERY 8 HOURS
Status: DISCONTINUED | OUTPATIENT
Start: 2024-01-24 | End: 2024-01-25 | Stop reason: HOSPADM

## 2024-01-24 RX ORDER — ASPIRIN 325 MG
325 TABLET ORAL ONCE
Status: DISCONTINUED | OUTPATIENT
Start: 2024-01-24 | End: 2024-01-24 | Stop reason: HOSPADM

## 2024-01-24 RX ORDER — GABAPENTIN 250 MG/5ML
900 SOLUTION ORAL EVERY 8 HOURS
Status: DISCONTINUED | OUTPATIENT
Start: 2024-01-24 | End: 2024-01-25 | Stop reason: HOSPADM

## 2024-01-24 RX ORDER — FOLIC ACID 5 MG/ML
1 INJECTION, SOLUTION INTRAMUSCULAR; INTRAVENOUS; SUBCUTANEOUS ONCE
Status: COMPLETED | OUTPATIENT
Start: 2024-01-24 | End: 2024-01-24

## 2024-01-24 RX ORDER — MULTIPLE VITAMINS W/ MINERALS TAB 9MG-400MCG
1 TAB ORAL DAILY
Status: DISCONTINUED | OUTPATIENT
Start: 2024-01-24 | End: 2024-01-24

## 2024-01-24 RX ORDER — GABAPENTIN 250 MG/5ML
100 SOLUTION ORAL EVERY 8 HOURS
Status: DISCONTINUED | OUTPATIENT
Start: 2024-01-31 | End: 2024-01-25 | Stop reason: HOSPADM

## 2024-01-24 RX ORDER — LABETALOL HYDROCHLORIDE 5 MG/ML
10 INJECTION, SOLUTION INTRAVENOUS EVERY 4 HOURS PRN
Status: DISCONTINUED | OUTPATIENT
Start: 2024-01-24 | End: 2024-01-25 | Stop reason: HOSPADM

## 2024-01-24 RX ORDER — GABAPENTIN 250 MG/5ML
1200 SOLUTION ORAL ONCE
Status: COMPLETED | OUTPATIENT
Start: 2024-01-24 | End: 2024-01-24

## 2024-01-24 RX ORDER — LORAZEPAM 0.5 MG/1
0.5 TABLET ORAL
Status: DISCONTINUED | OUTPATIENT
Start: 2024-01-24 | End: 2024-01-24

## 2024-01-24 RX ORDER — FOLIC ACID 1 MG/1
1 TABLET ORAL DAILY
Status: DISCONTINUED | OUTPATIENT
Start: 2024-01-27 | End: 2024-01-25 | Stop reason: HOSPADM

## 2024-01-24 RX ORDER — FOLIC ACID 5 MG/ML
1 INJECTION, SOLUTION INTRAMUSCULAR; INTRAVENOUS; SUBCUTANEOUS DAILY
Status: DISCONTINUED | OUTPATIENT
Start: 2024-01-25 | End: 2024-01-25 | Stop reason: HOSPADM

## 2024-01-24 RX ORDER — ATROPINE SULFATE 0.1 MG/ML
0.5 INJECTION INTRAVENOUS
Status: CANCELLED | OUTPATIENT
Start: 2024-01-24 | End: 2024-01-24

## 2024-01-24 RX ORDER — ASPIRIN 81 MG/1
243 TABLET, CHEWABLE ORAL ONCE
Status: DISCONTINUED | OUTPATIENT
Start: 2024-01-24 | End: 2024-01-24

## 2024-01-24 RX ORDER — NITROGLYCERIN 5 MG/ML
VIAL (ML) INTRAVENOUS
Status: DISCONTINUED | OUTPATIENT
Start: 2024-01-24 | End: 2024-01-24 | Stop reason: HOSPADM

## 2024-01-24 RX ORDER — FLUMAZENIL 0.1 MG/ML
0.2 INJECTION, SOLUTION INTRAVENOUS
Status: DISCONTINUED | OUTPATIENT
Start: 2024-01-24 | End: 2024-01-25 | Stop reason: HOSPADM

## 2024-01-24 RX ORDER — FLUMAZENIL 0.1 MG/ML
0.2 INJECTION, SOLUTION INTRAVENOUS
Status: CANCELLED | OUTPATIENT
Start: 2024-01-24 | End: 2024-01-24

## 2024-01-24 RX ORDER — THIAMINE HYDROCHLORIDE 100 MG/ML
200 INJECTION, SOLUTION INTRAMUSCULAR; INTRAVENOUS 3 TIMES DAILY
Status: DISCONTINUED | OUTPATIENT
Start: 2024-01-24 | End: 2024-01-25 | Stop reason: HOSPADM

## 2024-01-24 RX ADMIN — HYDRALAZINE HYDROCHLORIDE 20 MG: 20 INJECTION INTRAMUSCULAR; INTRAVENOUS at 01:15

## 2024-01-24 RX ADMIN — PROPOFOL 60 MCG/KG/MIN: 10 INJECTION, EMULSION INTRAVENOUS at 03:12

## 2024-01-24 RX ADMIN — THIAMINE HYDROCHLORIDE 200 MG: 100 INJECTION, SOLUTION INTRAMUSCULAR; INTRAVENOUS at 16:23

## 2024-01-24 RX ADMIN — POTASSIUM CHLORIDE 20 MEQ: 400 INJECTION, SOLUTION INTRAVENOUS at 23:16

## 2024-01-24 RX ADMIN — LORAZEPAM 1 MG: 2 INJECTION INTRAMUSCULAR; INTRAVENOUS at 21:35

## 2024-01-24 RX ADMIN — AMPICILLIN SODIUM AND SULBACTAM SODIUM 3 G: 2; 1 INJECTION, POWDER, FOR SOLUTION INTRAMUSCULAR; INTRAVENOUS at 08:06

## 2024-01-24 RX ADMIN — THIAMINE HYDROCHLORIDE 200 MG: 100 INJECTION, SOLUTION INTRAMUSCULAR; INTRAVENOUS at 09:50

## 2024-01-24 RX ADMIN — POTASSIUM CHLORIDE 20 MEQ: 400 INJECTION, SOLUTION INTRAVENOUS at 08:38

## 2024-01-24 RX ADMIN — PROPOFOL 65 MCG/KG/MIN: 10 INJECTION, EMULSION INTRAVENOUS at 11:36

## 2024-01-24 RX ADMIN — Medication 175 MCG/HR: at 21:34

## 2024-01-24 RX ADMIN — Medication 15 ML: at 09:53

## 2024-01-24 RX ADMIN — ENOXAPARIN SODIUM 40 MG: 40 INJECTION SUBCUTANEOUS at 09:56

## 2024-01-24 RX ADMIN — AMPICILLIN SODIUM AND SULBACTAM SODIUM 3 G: 2; 1 INJECTION, POWDER, FOR SOLUTION INTRAMUSCULAR; INTRAVENOUS at 01:24

## 2024-01-24 RX ADMIN — GABAPENTIN 1200 MG: 250 SUSPENSION ORAL at 09:53

## 2024-01-24 RX ADMIN — CLONIDINE HYDROCHLORIDE 0.1 MG: 0.1 TABLET ORAL at 16:26

## 2024-01-24 RX ADMIN — MAGNESIUM SULFATE HEPTAHYDRATE 2 G: 2 INJECTION, SOLUTION INTRAVENOUS at 05:18

## 2024-01-24 RX ADMIN — SODIUM CHLORIDE, POTASSIUM CHLORIDE, SODIUM LACTATE AND CALCIUM CHLORIDE: 600; 310; 30; 20 INJECTION, SOLUTION INTRAVENOUS at 05:45

## 2024-01-24 RX ADMIN — LORAZEPAM 1 MG: 2 INJECTION INTRAMUSCULAR; INTRAVENOUS at 16:23

## 2024-01-24 RX ADMIN — Medication 10 MG: at 02:36

## 2024-01-24 RX ADMIN — MINERAL OIL, WHITE PETROLATUM: .03; .94 OINTMENT OPHTHALMIC at 07:40

## 2024-01-24 RX ADMIN — LORAZEPAM 1 MG: 2 INJECTION INTRAMUSCULAR; INTRAVENOUS at 09:49

## 2024-01-24 RX ADMIN — AMPICILLIN SODIUM AND SULBACTAM SODIUM 3 G: 2; 1 INJECTION, POWDER, FOR SOLUTION INTRAMUSCULAR; INTRAVENOUS at 19:55

## 2024-01-24 RX ADMIN — FOLIC ACID 1 MG: 5 INJECTION, SOLUTION INTRAMUSCULAR; INTRAVENOUS; SUBCUTANEOUS at 09:52

## 2024-01-24 RX ADMIN — GABAPENTIN 900 MG: 250 SUSPENSION ORAL at 16:29

## 2024-01-24 RX ADMIN — PROPOFOL 60 MCG/KG/MIN: 10 INJECTION, EMULSION INTRAVENOUS at 07:37

## 2024-01-24 RX ADMIN — LIDOCAINE HYDROCHLORIDE 100 MG: 20 INJECTION, SOLUTION INFILTRATION; PERINEURAL at 13:28

## 2024-01-24 RX ADMIN — ISOPROTERENOL HYDROCHLORIDE 0.03 MCG/KG/MIN: 0.2 INJECTION, SOLUTION INTRACARDIAC; INTRAMUSCULAR; INTRAVENOUS; SUBCUTANEOUS at 13:56

## 2024-01-24 RX ADMIN — HYDRALAZINE HYDROCHLORIDE 20 MG: 20 INJECTION INTRAMUSCULAR; INTRAVENOUS at 05:41

## 2024-01-24 RX ADMIN — AMPICILLIN SODIUM AND SULBACTAM SODIUM 3 G: 2; 1 INJECTION, POWDER, FOR SOLUTION INTRAMUSCULAR; INTRAVENOUS at 14:05

## 2024-01-24 RX ADMIN — METOPROLOL TARTRATE 12.5 MG: 100 TABLET, FILM COATED ORAL at 08:19

## 2024-01-24 RX ADMIN — THIAMINE HYDROCHLORIDE 200 MG: 100 INJECTION, SOLUTION INTRAMUSCULAR; INTRAVENOUS at 21:36

## 2024-01-24 RX ADMIN — Medication 175 MCG/HR: at 07:10

## 2024-01-24 RX ADMIN — PROPOFOL 60 MCG/KG/MIN: 10 INJECTION, EMULSION INTRAVENOUS at 14:41

## 2024-01-24 RX ADMIN — PANTOPRAZOLE SODIUM 40 MG: 40 INJECTION, POWDER, FOR SOLUTION INTRAVENOUS at 07:42

## 2024-01-24 RX ADMIN — CLONIDINE HYDROCHLORIDE 0.1 MG: 0.1 TABLET ORAL at 09:53

## 2024-01-24 RX ADMIN — POTASSIUM CHLORIDE 20 MEQ: 400 INJECTION, SOLUTION INTRAVENOUS at 07:08

## 2024-01-24 RX ADMIN — CAPTOPRIL 12.5 MG: 12.5 TABLET ORAL at 12:02

## 2024-01-24 RX ADMIN — PROPOFOL 60 MCG/KG/MIN: 10 INJECTION, EMULSION INTRAVENOUS at 19:56

## 2024-01-24 ASSESSMENT — ACTIVITIES OF DAILY LIVING (ADL)
FALL_HISTORY_WITHIN_LAST_SIX_MONTHS: NO
ADLS_ACUITY_SCORE: 51
WALKING_OR_CLIMBING_STAIRS_DIFFICULTY: NO
HEARING_DIFFICULTY_OR_DEAF: NO
ADLS_ACUITY_SCORE: 47
CONCENTRATING,_REMEMBERING_OR_MAKING_DECISIONS_DIFFICULTY: NO
ADLS_ACUITY_SCORE: 30
ADLS_ACUITY_SCORE: 51
ADLS_ACUITY_SCORE: 30
CHANGE_IN_FUNCTIONAL_STATUS_SINCE_ONSET_OF_CURRENT_ILLNESS/INJURY: NO
ADLS_ACUITY_SCORE: 30
DIFFICULTY_EATING/SWALLOWING: NO
ADLS_ACUITY_SCORE: 51
DRESSING/BATHING_DIFFICULTY: NO
ADLS_ACUITY_SCORE: 30
ADLS_ACUITY_SCORE: 30
DIFFICULTY_COMMUNICATING: NO
ADLS_ACUITY_SCORE: 51
TOILETING_ISSUES: NO
ADLS_ACUITY_SCORE: 30
WEAR_GLASSES_OR_BLIND: NO
ADLS_ACUITY_SCORE: 51
DOING_ERRANDS_INDEPENDENTLY_DIFFICULTY: NO

## 2024-01-24 NOTE — PROGRESS NOTES
[facility]    Medicine Progress Note - Hospitalist Service    Date of Admission:  1/23/2024    Assessment & Plan     OOHCA, asystole with likely respiratory arrest following overdose  Subsequent polymorphic ventricular tachycardia  History of TdP  Prolonged QT   Polysubstance Use Disorder    48-year-old admitted yesterday following a fentanyl overdose after which she immediately became unresponsive, her significant other started CPR.  EMS arrived and patient was found to be in asystole with AED x 4 and intubated, was started on cooling protocol.      Overnight had increasing ectopy and nonsustained V. tach, then a CODE BLUE was called for sustained pulseless VT status post 1 round ACLS with ROSC.  Rhythm appeared to be TdP.  This morning had again increasing ectopy and several episodes of torsades.  Amiodarone and metoprolol were discontinued, she was given magnesium and potassium, had a brief response to lidocaine and then was subsequently put on isoproterenol with good response.  She was then taken to Cath Lab to undergo angiogram and temporary pacemaker placement if she fails isoproterenol with overdrive pacing with goal of 100 bpm.  Called the transfer center was placed to [facility] or [facility] for ICU bed with EP support.    Plan:  Goal normothermia  Continue isoproterenol (goal HR >) until temporary pacemaker placed, overdrive pacing per EP  Hold amiodarone and metoprolol  Avoid any QT prolonging agents  K > 4, Mg > 2  Continue propofol, fentanyl, Versed, likelihood of withdrawal and should continue to monitor for symptoms  Cardiology following  Minimal vent requirements    History of gastric bypass  Monitor malnutrition     Diet: NPO for Medical/Clinical Reasons Except for: Meds    DVT Prophylaxis: Enoxaparin (Lovenox) SQ  Gaytan Catheter: PRESENT, indication: Deep Sedation/Paralysis  Lines: PRESENT      CVC Triple Lumen Right Femoral-Site Assessment: WDL  Arterial Line 01/23/24  Femoral-Site Assessment: WDL      Cardiac Monitoring: ACTIVE order. Indication: ICU  Code Status: Full Code      Clinically Significant Risk Factors Present on Admission        # Hypokalemia: Lowest K = 3.3 mmol/L in last 2 days, will replace as needed  # Hyperkalemia: Highest K = 5.8 mmol/L in last 2 days, will monitor as appropriate   # Hypocalcemia: Lowest Ca = 7.6 mg/dL in last 2 days, will monitor and replace as appropriate         # Hypertension: Noted on problem list   # Circulatory Shock: required vasopressors within past 24 hours           # Financial/Environmental Concerns:           Disposition Plan     Expected Discharge Date: 01/25/2024                  Mihaela March DO  Hospitalist Service  [facility]  Securely message with [employer] (more info)  Text page via [employer] Paging/Directory   ______________________________________________________________________        Vent Mode: CMV/AC  (Continuous Mandatory Ventilation/ Assist Control)  FiO2 (%): 40 %  Resp Rate (Set): 16 breaths/min  Tidal Volume (Set, mL): 480 mL  PEEP (cm H2O): 5 cmH2O  Resp: 21    Temp:  [95.5  F (35.3  C)-97.7  F (36.5  C)] 96.8  F (36  C)  Pulse:  [0-123] 73  Resp:  [] 21  BP: ()/() 200/89  MAP:  [23 mmHg-199 mmHg] 110 mmHg  Arterial Line BP: ()/(-) 172/78  FiO2 (%):  [40 %-100 %] 40 %  SpO2:  [92 %-100 %] 99 %    Intake/Output Summary (Last 24 hours) at 1/24/2024 0833  Last data filed at 1/24/2024 0600  Gross per 24 hour   Intake 2809.52 ml   Output 2085 ml   Net 724.52 ml     Net IO Since Admission: 724.52 mL [01/24/24 0833]      Latest Ref Rng & Units 1/23/2024    11:06 AM 1/23/2024     8:01 PM 1/24/2024     5:25 AM   Glucose Values   Glucose 70 - 99 mg/dL 281  156  163      No results found for the last 90 days.      Physical Exam   Vital Signs: Temp: 96.8  F (36  C) Temp src: Esophageal BP: (!) 200/89 Pulse: 73   Resp: 21 SpO2: 99 % O2 Device: Mechanical Ventilator    Weight: 141  lbs 8.57 oz    General: Intubated and sedated  Cardiac: Regular rate and rhythm without murmur.    Respiratory: Normal work of breathing.  Clear to auscultation bilaterally without wheezing, rales, or rhonchi.  GI:  Abdomen soft, nontender, nondistended.  Skin: No rashes or abrasions on exposed skin.  Neurologic: Deeply sedated        Medical Decision Making       70 MINUTES SPENT BY ME on the date of service doing chart review, history, exam, documentation & further activities per the note.      Data     I have personally reviewed the following data over the past 24 hrs:    21.7 (H)  \   15.1   / 250     134 (L) 104 11.9 /  130 (H)   3.3 (L) 20 (L) 0.44 (L) \     ALT: 50 AST: 204 (H) AP: 152 (H) TBILI: 0.4   ALB: 3.7 TOT PROTEIN: 5.8 (L) LIPASE: N/A     Trop: 312 (HH) BNP: N/A     TSH: N/A T4: N/A A1C: 4.8     Procal: N/A CRP: N/A Lactic Acid: 1.8       INR:  0.99 PTT:  29   D-dimer:  N/A Fibrinogen:  302       Imaging results reviewed over the past 24 hrs:   Recent Results (from the past 24 hour(s))   XR Chest Port 1 View    Narrative    CHEST ONE VIEW  1/23/2024 12:13 PM     HISTORY: Check ETT.    COMPARISON: September 11, 2021.      Impression    IMPRESSION: Endotracheal tube tip 4.2 cm from the nicol. Extensive  right upper lobe consolidation. Remaining lungs clear.    VERONICA ORTIZ MD         SYSTEM ID:  [id number]   CT Head w/o Contrast    Narrative    EXAM: CT HEAD W/O CONTRAST  1/23/2024 12:40 PM     HISTORY:  post arrest       COMPARISON:  No prior similar studies    TECHNIQUE: Using multidetector thin collimation helical acquisition  technique, axial, coronal and sagittal CT images from the skull base  to the vertex were obtained without intravenous contrast.   (topogram) image(s) also obtained and reviewed.    FINDINGS:  No intracranial hemorrhage, mass effect, or midline shift. No acute  loss of gray-white matter differentiation in the cerebral hemispheres.  Ventricles are proportionate to the  "cerebral sulci. Clear basal  cisterns.    The bony calvaria and the bones of the skull base are normal.  Scattered paranasal sinus mucosal thickening. Minimal bilateral  mastoid effusions. Grossly normal orbits.       Impression    IMPRESSION: No acute intracranial pathology.     JOSHUA NEWMAN MD         SYSTEM ID:  [id number]   Echo Limited   Result Value    LVEF  45-50%    Narrative    [id number]  [id number]  [id number]  [id number]^BALDOMERO^GENARO     [facility]  Echocardiography Laboratory  [address] [place] [address]     Name: CHAU ABAD  MRN: [id number]  : 1975  Study Date: 2024 03:45 PM  Age: 48 yrs  Gender: Female  Patient Location: [facility]  Reason For Study: Cardiac Arrest  Ordering Physician: GENARO ALEXANDRE  Performed By: Ly Spencer     BSA: 1.7 m2  Height: 64 in  Weight: 140 lb  BP: 172/100 mmHg  ______________________________________________________________________________  Procedure  Limited Portable Echo Adult.  ______________________________________________________________________________  Interpretation Summary     This is a stat limited echo on a patient post arrest intubated and supine.  Limited views obtained  The visual ejection fraction is 45-50%.  The RV is not well seen. The TAPSE value for RV function is normal but  visually on limited images the RV may be hypokinetic  The LV is mildly hypokinetic. There is \"septal shudder\" which can be seen with  intraventricular conduction delay, and constrictive pericardial physiology.  However patient is on a ventillator so that affects differential diagnosis.  Hepatic vein doppler which is also used to assess for constriction was not  performed and likely would not be useful on a ventillated patient.  Also the mid septum is mildly hypokinetic and does not follow a classic  coronary distribution. Rarely this can be seen with an LAD \"myocardial bridge\"  or septal disease.  Report called to " "ICU  ______________________________________________________________________________  Left Ventricle  The left ventricle is normal in size. There is normal left ventricular wall  thickness. The visual ejection fraction is 45-50%. The LV is mildly  hypokinetic. There is \"septal shudder\" which can be seen with intraventricular  conduction delay, and constrictive pericardial physiology. However patient is  on a ventillator so that affects differential diagnosis. Hepatic vein doppler  which is also used to assess for constriction was not performed and likely  would not be useful on a ventillated patient.  Also the mid septum is mildly hypokinetic and does not follow a classic  coronary distribution. Rarely this can be seen with an LAD \"myocardial bridge\"  or septal disease.     Right Ventricle  The RV is not well seen. The TAPSE value for RV function is normal but  visually on limited images the RV may be hypokinetic. The right ventricle is  not well visualized.     Atria  Normal left atrial size. Right atrial size is normal.     Mitral Valve  There is trace mitral regurgitation.     Tricuspid Valve  There is trace to mild tricuspid regurgitation. Doppler findings do not  suggest pulmonary hypertension.     Aortic Valve  The aortic valve is trileaflet.     Pulmonic Valve  The pulmonic valve is not well seen, but is grossly normal.     Vessels  The aortic root is normal size. Unable to assess mean RA pressure given the  patient is on a ventilator.     Pericardium  The pericardium appears normal.     Rhythm  Sinus rhythm was noted.  ______________________________________________________________________________  MMode/2D Measurements & Calculations  IVSd: 0.87 cm  LVIDd: 4.2 cm  LVIDs: 3.2 cm  LVPWd: 0.68 cm  FS: 24.2 %  LV mass(C)d: 98.3 grams  LV mass(C)dI: 58.5 grams/m2  asc Aorta Diam: 3.0 cm  Asc Ao diam index BSA (cm/m2): 1.8  Asc Ao diam index Ht(cm/m): 1.8  RWT: 0.32  TAPSE: 1.8 cm     Doppler Measurements & " Calculations  TR max konrad: 226.2 cm/sec  TR max P.5 mmHg     ______________________________________________________________________________  Report approved by: Deena Cobb 2024 04:25 PM         XR Abdomen Port 1 View    Narrative    EXAM: XR ABDOMEN PORT 1 VIEW  LOCATION: [facility]  DATE: 2024    INDICATION: verify OG tube  COMPARISON: CT 2021      Impression    IMPRESSION: Enteric tube extending below level of the EG junction with its sidehole in the mid gastric body. Right-sided central venous catheter extending to the IVC with its tip in the mid right atrium. Postoperative changes to the bowel in the left mid   abdomen.   XR Chest Port 1 View    Narrative    EXAM: XR CHEST PORT 1 VIEW  LOCATION: [facility]  DATE: 2024    INDICATION: VFib arrest  COMPARISON: 2024.      Impression    IMPRESSION: Endotracheal tube in satisfactory position terminating 4 cm above the nicol. Nasogastric tube tip in the stomach. Opacification of the lower left lung consistent with left lower lobe atelectasis and possible small left pleural effusion.   Right lung clear. Normal heart size and pulmonary vascularity.   XR Chest Port 1 View    Narrative    EXAM: XR CHEST PORTABLE 1 VIEW  LOCATION: [facility]  DATE: 2024    INDICATION: Line placement.  COMPARISON: Earlier today.      Impression    IMPRESSION: Numerous EKG wires and leads and midline defibrillator pad projecting over the chest obscure details. Endotracheal tube tip approximately 3.5 cm from the nicol. Tubing projecting over the inferior right atrium or ventricle may represent an   inferior central line with tip in the right atrium and right ventricle. Clinical correlation recommended. Enteric tube enters stomach. Hazy opacification left mid and lower lung may represent a combination of left pleural effusion and atelectasis or   infiltrate. Osseous structures  grossly intact.

## 2024-01-24 NOTE — PROGRESS NOTES
Cardiology Progress note      She failed lidocaine with recurrent PMVT requiring shock. We have started Isoproterenol drip and will plan urgent coronary angiography with possible intervention, followed by temporary pacemaker with intentions to over drive pace should she fail isoproterenol   We are in hte process of arranging transfer to [facility] so we have availability of ICU bed in tertiary center with EP support. I have explained the indications and risks associated with urgent coronary angiography, possible intervention and temporary pacemaker to the  patient's family/ and they wish for us to proceed. I consider this an emergency procedure    [place]

## 2024-01-24 NOTE — PROGRESS NOTES
SPIRITUAL HEALTH SERVICES  SPIRITUAL ASSESSMENT Progress Note  [facility]. Unit ICU     REFERRAL SOURCE: Response to overhead code blue    Provided emotional support to  Madan and 5 other family members during code event.  Family did not identify needs for spiritual support at this time, and are aware they can request via nursing staff.    No plans for follow up at this time.    Taylor Lacy MDiv [facility]  Staff   Please place consult order for routine Spiritual Health Services referrals.  [facility] available 24/7 for emergent requests either by having the on-call  paged or by entering an ASAP/STAT consult in Epic (this will also page the on-call ).

## 2024-01-24 NOTE — PROGRESS NOTES
Coronary angiogram performed and transvenous pacemaker placed without complication.  Nitroglycerin drip started, isuprel drip stopped and hydralazine give for HTN with SBPs>200.  Increased ectopy and ventricular runs with decreased HR.  Initially, isuprel restarted but then stopped again after PM rate was increased to 100.  Pt's bedside nurse present throughout procedure.  Left femoral venous pacer and left femoral arterial sheath in place.  Arterial sheath to pressurized bag.  Pt returned to PCU; await transfer to [facility].

## 2024-01-24 NOTE — PLAN OF CARE
Notified provider about indwelling st catheter discussed removal or continued need.    Did provider choose to remove indwelling st catheter? NO    Provider's st indication for keeping indwelling st catheter: Indication for continued use: Deep Sedation/Paralysis    Is there an order for indwelling st catheter? YES    *If there is a plan to keep st catheter in place at discharge daily notification with provider is not necessary, but please add a notation in the treatment team sticky note that the patient will be discharging with the catheter.

## 2024-01-24 NOTE — CONSULTS
Cardiology Consultation  High complexity over 2 hours in review of labs, history, consultation with EP service and discussion with ICU MD      Danielle FAJARDO Carlyle MRN# [id number]   YOB: 1975 Age: 48 year old   Date of Admission: 1/23/2024     Reason for consult:            Assessment and Plan:     Prolonged QT interval / Polymorphous VT ( Torsade de Pointes):  This hospitalization is NOT her first event. She has a previous history of  VT/Torsade de Pointes requiring defibrillation/magnesium/amiodarone 8/19/2020 while undergoing acute alcohol withdrawal/on Sertraline ( K was 3.5/Mg 1.7) with -600 ms at that time. At that time it was stated her QTC returned back to 476ms and she was instructed to avoid QT prolonging medications. Her most recent ECG while in NSR  prior to the present hospitalization showed NSR 94 with QTC 507ms. TTE ( which I have personally reviewed shows LVEF 50 to 55% with no obvious regional wall motion abnormality.           Polysubstance use disorder :  tobacco, alcohol, opioids. Urine screen positive for amphetamines, cocaine, fentanyl  ( had received benzodiazepine prior to admission)          Hypertension        Obesity sp gastric bypass        Assessment : She may have inherited QT prolongation, but regardless is obviously at risk for any QT prolonging agents. I agree with taking her off hypothermia protocol, maintaining normal magnesium /potassium levels and holding beta blockers ( bradycardia has been associated with Torsade de Pointes. It appears she is having less ventricular ectopy since discontinuing hypothermia.  I have discussed the use of amiodarone in the setting, and our electrophysiologist Dr. Arvizu has advised against the use of this medication.  He would favor lidocaine for recurrent episodes and pacing/isoproterenol as the second/ third choices.  We may consider coroanry angiography with possible revascularization should she fail lidocaine.     At this time  there are very limited ICU beds available in the [facility] system and I am uncertain whether pacing will be an option at [facility], but we are currently in the process of investigating this option.    Recommendations   Agree with stopping hypothermia protocol   Maintain normal potassium and magnesium levels  Avoid agents which could prolong QT interval/ and or cause bradycardia  Per EP service we will stop amiodarone, try lidocaine and if lidocaine fails, then isoproterenol/ pacing.   Consider coronary angiography/revascularization  if pacing needed        High complexity medical decision making  Chronic illness with severe exacerbation, progression:  Acute illness that poses a threat to life or bodily function:     High complexity data review  Unique tests ordered:   Unique results reviewed: Chest XR from  .Echo reported   Independent interpretation of a test performed by another physician: ECG from   External documents reviewed:              Chief Complaint:   Drug Overdose           History of Present Illness:   Danielle Tadeo, a 48-year-old woman with obesity (status post gastric bypass surgery), hypertension, polysubstance abuse, and hypertension was evaluated in consultation at the request of  for recommendations regarding management of recurrent polymorphous ventricular tachycardia.  On 1/23/2024 the patient was inhaling fentanyl with her significant other and became unresponsive.  CPR was begun immediately and after the police arrived, she received naloxone.  The initial rhythm was asystole for which she was given epinephrine on 2 occasions.  The patient then had runs of ventricular tachycardia that required defibrillation on 4 occasions.  She was intubated and was administered 5 mg of morphine and 4 mg of midazolam prior to arrival.    She was placed on a cooling protocol and monitored in the ICU.  She was given metoprolol, magnesium was replaced, and her potassium level was normal.   "Her initial ECGs showed sinus bradycardia with a very prolonged QT interval.  She then began to have episodes of polymorphic VT and sustained VT for which she received amiodarone.  This morning at 453, the patient episode of polymorphic VT while her right atrial line was being withdrawn.  She was given magnesium, shocked, and restored sinus rhythm.  After discussion with Dr. Handy, we elected to stop the patient's hypothermia protocol and it seemed as if her ventricular ectopy significantly improved, although she had another episode of polymorphic VT requiring shock.  After discussion with our electrophysiologist, we stopped amiodarone and he advised lidocaine should she have recurrent episodes.  The patient then had another episode of polymorphic VT, received lidocaine boluses, but is still had more episodes while awaiting the lidocaine drip.    She has a previous history of polymorphic VT in  in association with alcohol withdrawal, mild hypomagnesemia while on sertraline and was noted to have a prolonged QT at that time.         Physical Exam:   Vitals were reviewed  Blood pressure (!) 141/65, pulse 63, temperature 99.3  F (37.4  C), temperature source Bladder, resp. rate 16, height 1.626 m (5' 4\"), weight 64.2 kg (141 lb 8.6 oz), last menstrual period 2015, SpO2 98%, not currently breastfeeding.  Temperatures:  Current - Temp: 99.3  F (37.4  C); Max - Temp  Av.8  F (36  C)  Min: 95.5  F (35.3  C)  Max: 99.3  F (37.4  C)  Respiration range: Resp  Av.9  Min: 10  Max: 144  Pulse range: Pulse  Av.7  Min: 0  Max: 123  Blood pressure range: Systolic (24hrs), Av , Min:113 , Max:232   ; Diastolic (24hrs), Av, Min:65, Max:120    Pulse oximetry range: SpO2  Av.9 %  Min: 92 %  Max: 100 %    Intake/Output Summary (Last 24 hours) at 2024 1241  Last data filed at 2024 1200  Gross per 24 hour   Intake 3244.49 ml   Output 2620 ml   Net 624.49 ml     Constitutional:   awake, alert, " cooperative, no apparent distress, and appears stated age     Eyes:   Lids and lashes normal, pupils equal, round and reactive to light, extra ocular muscles intact, sclera clear, conjunctiva normal     Neck:   supple, symmetrical, trachea midline,      Back:   symmetric     Lungs:   Clear to ausculatation     Cardiovascular:   Normal S1 and S2     Abdomen:   non-tender     Musculoskeletal:   motor strength is 5 out of 5 all extremities bilaterally     Neurologic:   Grossly nonfocal     Skin:   normal skin color, texture, turgor     Additional findings:                    Past Medical History:   I have reviewed this patient's past medical history  Past Medical History:   Diagnosis Date    Alcohol dependence     Anxiety     Benign essential hypertension     PIH    CAD     Cervical spondylosis without myelopathy 2015    Continuous opioid dependence     Depression     Seasonal allergies              Past Surgical History:   I have reviewed this patient's past surgical history  Past Surgical History:   Procedure Laterality Date    ABDOMINOPLASTY      Arm and thigh lift       SECTION  2004    GASTRIC BYPASS      HYSTERECTOMY      LASIK Bilateral 2001    MAMMOPLASTY REDUCTION                 Social History:   I have reviewed this patient's social history  Social History     Tobacco Use    Smoking status: Some Days     Packs/day: 0.50     Years: 5.00     Additional pack years: 0.00     Total pack years: 2.50     Types: Cigarettes    Smokeless tobacco: Never    Tobacco comments:     some   Substance Use Topics    Alcohol use: Not Currently     Comment: stopped  10 days ago             Family History:   I have reviewed this patient's family history  Family History   Problem Relation Age of Onset    Cancer Maternal Grandmother         lung ca    Hypertension Father     Cancer Father 63        pancreatic cancer    Hypertension Mother     Allergies Mother         asthma    Cancer - colorectal No family hx of      "Breast Cancer No family hx of              Allergies:     Allergies   Allergen Reactions    Zofran [Ondansetron] Other (See Comments)     \"heart stopped\" \"Long QT\"             Medications:   I have reviewed this patient's current medications  Medications Prior to Admission   Medication Sig Dispense Refill Last Dose    cloNIDine (CATAPRES) 0.1 MG tablet Take 1 tablet (0.1 mg) by mouth every 8 hours as needed (anxiety) 20 tablet 0     gabapentin (NEURONTIN) 100 MG capsule Take 1 capsule (100 mg) by mouth every 8 hours 45 capsule 0     lisinopril (ZESTRIL) 20 MG tablet Take 1 tablet (20 mg) by mouth daily 90 tablet 1     metoprolol succinate ER (TOPROL-XL) 50 MG 24 hr tablet Take 50 mg by mouth daily        Current Facility-Administered Medications Ordered in Epic   Medication Dose Route Frequency Last Rate Last Admin    acetaminophen (TYLENOL) tablet 650 mg  650 mg Oral Q4H PRN        Or    acetaminophen (TYLENOL) solution 650 mg  650 mg Per NG tube Q4H PRN        amiodarone (CORDARONE) 1.8 mg/mL in sodium chloride 0.9% 500 mL ADULT STANDARD infusion  0.5 mg/min Intravenous Continuous 16.67 mL/hr at 01/24/24 1100 0.5 mg/min at 01/24/24 1100    ampicillin-sulbactam (UNASYN) 3 g vial to attach to  mL bag  3 g Intravenous Q6H   3 g at 01/24/24 0806    artificial tears ophthalmic ointment   Both Eyes Q8H   Given at 01/24/24 0740    bisacodyl (DULCOLAX) suppository 10 mg  10 mg Rectal Daily PRN        captopril (CAPOTEN) tablet 12.5 mg  12.5 mg Oral Q8H   12.5 mg at 01/24/24 1202    cloNIDine (CATAPRES) tablet 0.1 mg  0.1 mg NG or Feeding tube Q8H   0.1 mg at 01/24/24 0953    glucose gel 15-30 g  15-30 g Oral Q15 Min PRN        Or    dextrose 50 % injection 25-50 mL  25-50 mL Intravenous Q15 Min PRN        Or    glucagon injection 1 mg  1 mg Subcutaneous Q15 Min PRN        enoxaparin ANTICOAGULANT (LOVENOX) injection 40 mg  40 mg Subcutaneous Q24H   40 mg at 01/24/24 0956    fentaNYL (PF) (SUBLIMAZE) injection 50 mcg "  50 mcg Intravenous Q30 Min PRN        fentaNYL (SUBLIMAZE) 50 mcg/mL bolus from pump  25 mcg Intravenous Q1H PRN        fentaNYL (SUBLIMAZE) infusion   mcg/hr Intravenous Continuous 3.5 mL/hr at 01/24/24 1100 175 mcg/hr at 01/24/24 1100    flumazenil (ROMAZICON) injection 0.2 mg  0.2 mg Intravenous q1 min prn        START ON 1/27/2024 for recommendations regarding management folic acid (FOLVITE) tablet 1 mg  1 mg Oral Daily        START ON 1/25/2024 folic acid injection 1 mg  1 mg Intravenous Daily        START ON 1/31/2024 gabapentin (NEURONTIN) solution 100 mg  100 mg NG or Feeding tube Q8H        START ON 1/29/2024 gabapentin (NEURONTIN) solution 300 mg  300 mg NG or Feeding tube Q8H        START ON 1/27/2024 gabapentin (NEURONTIN) solution 600 mg  600 mg NG or Feeding tube Q8H        gabapentin (NEURONTIN) solution 900 mg  900 mg NG or Feeding tube Q8H        hydrALAZINE (APRESOLINE) injection 20 mg  20 mg Intravenous Q4H PRN   20 mg at 01/24/24 0541    insulin aspart (NovoLOG) injection (RAPID ACTING)  1-6 Units Subcutaneous Q4H        Held by provider labetalol (NORMODYNE/TRANDATE) injection 10 mg  10 mg Intravenous Q4H PRN        lactated ringers infusion   Intravenous Continuous 125 mL/hr at 01/24/24 1200 Rate Verify at 01/24/24 1200    lidocaine (XYLOCAINE) 2 % external gel   Urethral Once PRN        LORazepam (ATIVAN) injection 1 mg  1 mg Intravenous Q6H   1 mg at 01/24/24 0949    magnesium hydroxide (MILK OF MAGNESIA) suspension 30 mL  30 mL Oral Daily PRN        propofol (DIPRIVAN) infusion  5-75 mcg/kg/min Intravenous Continuous 22.8 mL/hr at 01/24/24 1222 60 mcg/kg/min at 01/24/24 1222    And    propofol (DIPRIVAN) bolus from bag or syringe pump  10 mg Intravenous Q15 Min PRN   10 mg at 01/24/24 0236    And    Medication Instruction   Does not apply Continuous PRN        medication instruction   Does not apply Continuous PRN        Medication Instructions   Does not apply Continuous PRN         meperidine (DEMEROL) injection 25-50 mg  25-50 mg Intravenous Q1H PRN        metoprolol (LOPRESSOR) injection 5 mg  5 mg Intravenous Q4H PRN        metoprolol (LOPRESSOR) suspension 25 mg  25 mg Oral or NG Tube BID        multivitamins w/minerals liquid 15 mL  15 mL Oral Daily   15 mL at 01/24/24 0953    naloxone (NARCAN) injection 0.2 mg  0.2 mg Intravenous Q2 Min PRN        Or    naloxone (NARCAN) injection 0.4 mg  0.4 mg Intravenous Q2 Min PRN        Or    naloxone (NARCAN) injection 0.2 mg  0.2 mg Intramuscular Q2 Min PRN        Or    naloxone (NARCAN) injection 0.4 mg  0.4 mg Intramuscular Q2 Min PRN        pantoprazole (PROTONIX) 2 mg/mL suspension 40 mg  40 mg Per Feeding Tube QAM AC        Or    pantoprazole (PROTONIX) IV push injection 40 mg  40 mg Intravenous QAM AC   40 mg at 01/24/24 0742    prochlorperazine (COMPAZINE) injection 10 mg  10 mg Intravenous Q6H PRN        Or    prochlorperazine (COMPAZINE) tablet 10 mg  10 mg Oral Q6H PRN        Or    prochlorperazine (COMPAZINE) suppository 25 mg  25 mg Rectal Q12H PRN        senna-docusate (SENOKOT-S/PERICOLACE) 8.6-50 MG per tablet 1 tablet  1 tablet Oral BID PRN        Or    senna-docusate (SENOKOT-S/PERICOLACE) 8.6-50 MG per tablet 2 tablet  2 tablet Oral BID PRN        thiamine (B-1) injection 200 mg  200 mg Intravenous TID   200 mg at 01/24/24 0950    START ON 1/26/2024 thiamine (B-1) tablet 100 mg  100 mg Oral TID        START ON 1/31/2024 thiamine (B-1) tablet 100 mg  100 mg Oral Daily        vecuronium (NORCURON) injection 1 mg  1 mg Intravenous Once PRN        vecuronium (NORCURON) injection 1 mg  1 mg Intravenous Q30 Min PRN         No current Epic-ordered outpatient medications on file.             Review of Systems:     The 10 point Review of Systems is negative other than noted in the HPI            Data:   All laboratory data reviewed  Results for orders placed or performed during the hospital encounter of 01/23/24 (from the past 24 hour(s))    Lactic acid whole blood   Result Value Ref Range    Lactic Acid 0.9 0.7 - 2.0 mmol/L   Blood gas arterial   Result Value Ref Range    pH Arterial 7.32 (L) 7.35 - 7.45    pCO2 Arterial 46 (H) 35 - 45 mm Hg    pO2 Arterial 222 (H) 80 - 105 mm Hg    FIO2 100     Bicarbonate Arterial 24 21 - 28 mmol/L    Base Excess/Deficit Arterial -2.8 -3.0 - 3.0 mmol/L    Mehran's Test Artline     Oxyhemoglobin Arterial 96 92 - 100 %    O2 Sat, Arterial 98.7 (H) 96.0 - 97.0 %    Narrative    In healthy individuals, oxyhemoglobin (O2Hb) and oxygen saturation (SO2) are approximately equal. In the presence of dyshemoglobins, oxyhemoglobin can be considerably lower than oxygen saturation.   -Central Line    Narrative    Todd Rudd MD     1/23/2024  2:37 PM  [facility]    -Central Line    Date/Time: 1/23/2024 2:36 PM    Performed by: Todd Rudd MD  Authorized by: Todd Rudd MD    Emergent condition/consent implied      UNIVERSAL PROTOCOL   Site Marked: Yes  Prior Images Obtained and Reviewed:  NA  Required items: Required blood products, implants, devices and special   equipment available    Patient identity confirmed:  Arm band and hospital-assigned identification   number  NA - No sedation, light sedation, or local anesthesia  Confirmation Checklist:  Patient's identity using two indicators, relevant   allergies, procedure was appropriate and matched the consent or emergent   situation and correct equipment/implants were available  Time out: Immediately prior to the procedure a time out was called    Universal Protocol: the Joint Commission Universal Protocol was followed    Preparation: Patient was prepped and draped in usual sterile fashion    ESBL (mL):  5    PRE-PROCEDURE DETAILS:     Hand hygiene: Hand hygiene performed prior to insertion      Sterile barrier technique: All elements of maximal sterile technique   followed      Skin preparation:  2% chlorhexidine  PROCEDURE DETAILS:      Location:  R femoral    Patient position:  Flat    Procedural supplies:  Triple lumen    Catheter size:  7 Fr    Landmarks identified: yes      Ultrasound guidance: yes      Sterile ultrasound techniques: Sterile gel and sterile probe covers were   used      Number of attempts:  1    Successful placement: yes      POST PROCEDURE DETAILS:      Post-procedure:  Dressing applied and line sutured    Assessment:  Blood return through all ports and free fluid flow    PROCEDURE    Patient Tolerance:  Patient tolerated the procedure well with no immediate   complications   Blood gas arterial   Result Value Ref Range    pH Arterial 7.35 7.35 - 7.45    pCO2 Arterial 44 35 - 45 mm Hg    pO2 Arterial 81 80 - 105 mm Hg    FIO2 60     Bicarbonate Arterial 24 21 - 28 mmol/L    Base Excess/Deficit Arterial -1.9 -3.0 - 3.0 mmol/L    Mehran's Test Artline     Oxyhemoglobin Arterial 93 92 - 100 %    O2 Sat, Arterial 94.4 (L) 96.0 - 97.0 %    Peep 5 cm H2O    Narrative    In healthy individuals, oxyhemoglobin (O2Hb) and oxygen saturation (SO2) are approximately equal. In the presence of dyshemoglobins, oxyhemoglobin can be considerably lower than oxygen saturation.   Magnesium   Result Value Ref Range    Magnesium 1.8 1.7 - 2.3 mg/dL   Potassium   Result Value Ref Range    Potassium 3.4 3.4 - 5.3 mmol/L   Phosphorus   Result Value Ref Range    Phosphorus 3.0 2.5 - 4.5 mg/dL   EKG 12-lead, tracing only   Result Value Ref Range    Systolic Blood Pressure  mmHg    Diastolic Blood Pressure  mmHg    Ventricular Rate 98 BPM    Atrial Rate 98 BPM    AK Interval 136 ms    QRS Duration 96 ms     ms    QTc 597 ms    P Axis 77 degrees    R AXIS 47 degrees    T Axis 245 degrees    Interpretation ECG       Sinus rhythm with frequent Premature ventricular complexes in a pattern of bigeminy  Biatrial enlargement (primarily RA enlargement)  ST & T wave abnormality, consider anterolateral ischemia  Abnormal ECG  When compared with ECG of  23-JAN-2024 10:57, (unconfirmed)  Right bundle branch block is no longer Present  Confirmed by MD MAE STEVEN ([id number]),  SAMIR HERBERT ([id number]) on 1/24/2024 7:23:04 AM     Glucose by meter   Result Value Ref Range    GLUCOSE BY METER POCT 130 (H) 70 - 99 mg/dL   Asymptomatic COVID-19 Virus (Coronavirus) by PCR Nasopharyngeal    Specimen: Nasopharyngeal; Swab   Result Value Ref Range    SARS CoV2 PCR Negative Negative    Narrative    Testing was performed using the Xpert Xpress SARS-CoV-2 Assay on the Cep[employer] Gene-Xpert Instrument Systems. Additional information about this Emergency Use Authorization (EUA) assay can be found via the Lab Guide. This test should be ordered for the detection of SARS-CoV-2 in individuals who meet SARS-CoV-2 clinical and/or epidemiological criteria as well as from individuals without symptoms or other reasons to suspect COVID-19. Test performance for asymptomatic patients has only been established in anterior nasal swab specimens. This test is for in vitro diagnostic use under the FDA EUA for laboratories certified under CLIA to perform high complexity testing. This test has not been FDA cleared or approved. A negative result does not rule out the presence of PCR inhibitors in the specimen or target RNA concentration below the limit of detection for the assay. The possibility of a false negative should be considered if the patient's recent exposure or clinical presentation suggests COVID-19. This test was validated by the [facility]. This laboratory is certified under the Clinical Laboratory Improvement Amendments (CLIA) as qualified to perform high complexity laboratory testing.     Methicillin Resist/Sens S. aureus PCR    Specimen: Nares, Bilateral; Swab   Result Value Ref Range    MRSA Target DNA Negative Negative    SA Target DNA Positive     Narrative    The SDI  Xpert SA Nasal Complete assay performed in the [employer] System is a  "qualitative in vitro diagnostic test designed for rapid detection of Staphylococcus aureus (SA) and methicillin-resistant Staphylococcus aureus (MRSA) from nasal swabs in patients at risk for nasal colonization. The test utilizes automated real-time polymerase chain reaction (PCR) to detect MRSA/SA DNA. The Xpert SA Nasal Complete assay is intended to aid in the prevention and control of MRSA/SA infections in healthcare settings. The assay is not intended to diagnose, guide or monitor treatment for MRSA/SA infections, or provide results of susceptibility to methicillin. A negative result does not preclude MRSA/SA nasal colonization.    Echo Limited   Result Value Ref Range    LVEF  45-50%     [facility]    [id number]  [id number]  [id number]  [id number]     [facility]  Echocardiography Laboratory  [address] [place] [address]     Name: CHAU ABAD  MRN: [id number]  : 1975  Study Date: 2024 03:45 PM  Age: 48 yrs  Gender: Female  Patient Location: [facility]  Reason For Study: Cardiac Arrest  Ordering Physician: GENARO ALEXANDRE  Performed By: Ly Spencer     BSA: 1.7 m2  Height: 64 in  Weight: 140 lb  BP: 172/100 mmHg  ______________________________________________________________________________  Procedure  Limited Portable Echo Adult.  ______________________________________________________________________________  Interpretation Summary     This is a stat limited echo on a patient post arrest intubated and supine.  Limited views obtained  The visual ejection fraction is 45-50%.  The RV is not well seen. The TAPSE value for RV function is normal but  visually on limited images the RV may be hypokinetic  The LV is mildly hypokinetic. There is \"septal shudder\" which can be seen with  intraventricular conduction delay, and constrictive pericardial physiology.  However patient is on a ventillator so that affects differential diagnosis.  Hepatic vein doppler which is also " "used to assess for constriction was not  performed and likely would not be useful on a ventillated patient.  Also the mid septum is mildly hypokinetic and does not follow a classic  coronary distribution. Rarely this can be seen with an LAD \"myocardial bridge\"  or septal disease.  Report called to ICU  ______________________________________________________________________________  Left Ventricle  The left ventricle is normal in size. There is normal left ventricular wall  thickness. The visual ejection fraction is 45-50%. The LV is mildly  hypokinetic. There is \"septal shudder\" which can be seen with intraventricular  conduction delay, and constrictive pericardial physiology. However patient is  on a ventillator so that affects differential diagnosis. Hepatic vein doppler  which is also used to assess for constriction was not performed and likely  would not be useful on a ventillated patient.  Also the mid septum is mildly hypokinetic and does not follow a classic  coronary distribution. Rarely this can be seen with an LAD \"myocardial bridge\"  or septal disease.     Right Ventricle  The RV is not well seen. The TAPSE value for RV function is normal but  visually on limited images the RV may be hypokinetic. The right ventricle is  not well visualized.     Atria  Normal left atrial size. Right atrial size is normal.     Mitral Valve  There is trace mitral regurgitation.     Tricuspid Valve  There is trace to mild tricuspid regurgitation. Doppler findings do not  suggest pulmonary hypertension.     Aortic Valve  The aortic valve is trileaflet.     Pulmonic Valve  The pulmonic valve is not well seen, but is grossly normal.     Vessels  The aortic root is normal size. Unable to assess mean RA pressure given the  patient is on a ventilator.     Pericardium  The pericardium appears normal.     Rhythm  Sinus rhythm was noted.  ______________________________________________________________________________  MMode/2D " Measurements & Calculations  IVSd: 0.87 cm  LVIDd: 4.2 cm  LVIDs: 3.2 cm  LVPWd: 0.68 cm  FS: 24.2 %  LV mass(C)d: 98.3 grams  LV mass(C)dI: 58.5 grams/m2  asc Aorta Diam: 3.0 cm  Asc Ao diam index BSA (cm/m2): 1.8  Asc Ao diam index Ht(cm/m): 1.8  RWT: 0.32  TAPSE: 1.8 cm     Doppler Measurements & Calculations  TR max konrad: 226.2 cm/sec  TR max P.5 mmHg     ______________________________________________________________________________  Report approved by: Deena Cobb 2024 04:25 PM         Lactic acid whole blood   Result Value Ref Range    Lactic Acid 0.8 0.7 - 2.0 mmol/L   EKG 12-lead, tracing only   Result Value Ref Range    Systolic Blood Pressure  mmHg    Diastolic Blood Pressure  mmHg    Ventricular Rate 86 BPM    Atrial Rate 44 BPM    TN Interval 126 ms    QRS Duration 78 ms     ms    QTc 871 ms    P Axis -19 degrees    R AXIS -1 degrees    T Axis -26 degrees    Interpretation ECG       Undetermined rhythm  Nonspecific ST and T wave abnormality  Abnormal ECG     Urine Drug Screen    Narrative    The following orders were created for panel order Urine Drug Screen.  Procedure                               Abnormality         Status                     ---------                               -----------         ------                     Urine Drug Screen Panel[[id number]]      Abnormal            Final result                 Please view results for these tests on the individual orders.   Urine Drug Screen Panel   Result Value Ref Range    Amphetamines Urine Screen Positive (A) Screen Negative    Barbituates Urine Screen Negative Screen Negative    Benzodiazepine Urine Screen Positive (A) Screen Negative    Cannabinoids Urine Screen Negative Screen Negative    Cocaine Urine Screen Positive (A) Screen Negative    Fentanyl Qual Urine Screen Positive (A) Screen Negative    Opiates Urine Screen Positive (A) Screen Negative    PCP Urine Screen Negative Screen Negative   Basic metabolic panel    Result Value Ref Range    Sodium 136 135 - 145 mmol/L    Potassium 3.3 (L) 3.4 - 5.3 mmol/L    Chloride 105 98 - 107 mmol/L    Carbon Dioxide (CO2) 19 (L) 22 - 29 mmol/L    Anion Gap 12 7 - 15 mmol/L    Urea Nitrogen 15.6 6.0 - 20.0 mg/dL    Creatinine 0.51 0.51 - 0.95 mg/dL    GFR Estimate >90 >60 mL/min/1.73m2    Calcium 8.2 (L) 8.6 - 10.0 mg/dL    Glucose 156 (H) 70 - 99 mg/dL   Magnesium   Result Value Ref Range    Magnesium 2.5 (H) 1.7 - 2.3 mg/dL   Phosphorus   Result Value Ref Range    Phosphorus 3.4 2.5 - 4.5 mg/dL   Glucose by meter   Result Value Ref Range    GLUCOSE BY METER POCT 157 (H) 70 - 99 mg/dL   Glucose by meter   Result Value Ref Range    GLUCOSE BY METER POCT 158 (H) 70 - 99 mg/dL   INR   Result Value Ref Range    INR 1.05 0.85 - 1.15   Partial thromboplastin time   Result Value Ref Range    aPTT 30 22 - 38 Seconds   Fibrinogen activity   Result Value Ref Range    Fibrinogen Activity 251 170 - 490 mg/dL   Glucose by meter   Result Value Ref Range    GLUCOSE BY METER POCT 143 (H) 70 - 99 mg/dL   Glucose by meter   Result Value Ref Range    GLUCOSE BY METER POCT 147 (H) 70 - 99 mg/dL   EKG 12-lead, tracing only   Result Value Ref Range    Systolic Blood Pressure  mmHg    Diastolic Blood Pressure  mmHg    Ventricular Rate 55 BPM    Atrial Rate 55 BPM    LA Interval 130 ms    QRS Duration 66 ms     ms    QTc 639 ms    P Axis 71 degrees    R AXIS 54 degrees    T Axis 75 degrees    Interpretation ECG       Sinus bradycardia  Septal infarct , age undetermined  Abnormal ECG  When compared with ECG of 23-JAN-2024 17:10, (unconfirmed)  Previous ECG has undetermined rhythm, needs review  ST no longer depressed in Inferior leads  T wave inversion no longer evident in Inferior leads  QT has shortened     Glucose by meter   Result Value Ref Range    GLUCOSE BY METER POCT 171 (H) 70 - 99 mg/dL   Glucose by meter   Result Value Ref Range    GLUCOSE BY METER POCT 151 (H) 70 - 99 mg/dL   Magnesium    Result Value Ref Range    Magnesium 2.1 1.7 - 2.3 mg/dL   Potassium   Result Value Ref Range    Potassium 5.8 (H) 3.4 - 5.3 mmol/L   Phosphorus   Result Value Ref Range    Phosphorus 3.0 2.5 - 4.5 mg/dL   Potassium   Result Value Ref Range    Potassium 4.1 3.4 - 5.3 mmol/L   Glucose by meter   Result Value Ref Range    GLUCOSE BY METER POCT 158 (H) 70 - 99 mg/dL   XR Abdomen Port 1 View    Narrative    EXAM: XR ABDOMEN PORT 1 VIEW  LOCATION: [facility]  DATE: 1/24/2024    INDICATION: verify OG tube  COMPARISON: CT 09/11/2021      Impression    IMPRESSION: Enteric tube extending below level of the EG junction with its sidehole in the mid gastric body. Right-sided central venous catheter extending to the IVC with its tip in the mid right atrium. Postoperative changes to the bowel in the left mid   abdomen.   XR Chest Port 1 View    Narrative    EXAM: XR CHEST PORT 1 VIEW  LOCATION: [facility]  DATE: 1/24/2024    INDICATION: VFib arrest  COMPARISON: 01/23/2024.      Impression    IMPRESSION: Endotracheal tube in satisfactory position terminating 4 cm above the nicol. Nasogastric tube tip in the stomach. Opacification of the lower left lung consistent with left lower lobe atelectasis and possible small left pleural effusion.   Right lung clear. Normal heart size and pulmonary vascularity.   INR   Result Value Ref Range    INR 0.99 0.85 - 1.15   Partial thromboplastin time   Result Value Ref Range    aPTT 29 22 - 38 Seconds   Fibrinogen activity   Result Value Ref Range    Fibrinogen Activity 302 170 - 490 mg/dL   Phosphorus   Result Value Ref Range    Phosphorus 2.8 2.5 - 4.5 mg/dL   Comprehensive metabolic panel   Result Value Ref Range    Sodium 134 (L) 135 - 145 mmol/L    Potassium 3.3 (L) 3.4 - 5.3 mmol/L    Carbon Dioxide (CO2) 20 (L) 22 - 29 mmol/L    Anion Gap 10 7 - 15 mmol/L    Urea Nitrogen 11.9 6.0 - 20.0 mg/dL    Creatinine 0.44 (L) 0.51 - 0.95 mg/dL    GFR  Estimate >90 >60 mL/min/1.73m2    Calcium 8.0 (L) 8.6 - 10.0 mg/dL    Chloride 104 98 - 107 mmol/L    Glucose 163 (H) 70 - 99 mg/dL    Alkaline Phosphatase 152 (H) 40 - 150 U/L     (H) 0 - 45 U/L    ALT 50 0 - 50 U/L    Protein Total 5.8 (L) 6.4 - 8.3 g/dL    Albumin 3.7 3.5 - 5.2 g/dL    Bilirubin Total 0.4 <=1.2 mg/dL   CBC with platelets   Result Value Ref Range    WBC Count 21.7 (H) 4.0 - 11.0 10e3/uL    RBC Count 5.12 3.80 - 5.20 10e6/uL    Hemoglobin 15.1 11.7 - 15.7 g/dL    Hematocrit 44.0 35.0 - 47.0 %    MCV 86 78 - 100 fL    MCH 29.5 26.5 - 33.0 pg    MCHC 34.3 31.5 - 36.5 g/dL    RDW 13.8 10.0 - 15.0 %    Platelet Count 250 150 - 450 10e3/uL   Lactic acid whole blood   Result Value Ref Range    Lactic Acid 1.8 0.7 - 2.0 mmol/L   Troponin T, High Sensitivity   Result Value Ref Range    Troponin T, High Sensitivity 312 (HH) <=14 ng/L   Magnesium   Result Value Ref Range    Magnesium 2.4 (H) 1.7 - 2.3 mg/dL   Blood gas arterial   Result Value Ref Range    pH Arterial 7.41 7.35 - 7.45    pCO2 Arterial 32 (L) 35 - 45 mm Hg    pO2 Arterial 124 (H) 80 - 105 mm Hg    FIO2 40     Bicarbonate Arterial 21 21 - 28 mmol/L    Base Excess/Deficit Arterial -3.1 (L) -3.0 - 3.0 mmol/L    Mehran's Test Artline     Oxyhemoglobin Arterial 97 92 - 100 %    O2 Sat, Arterial 97.8 (H) 96.0 - 97.0 %    Peep 5 cm H2O    Narrative    In healthy individuals, oxyhemoglobin (O2Hb) and oxygen saturation (SO2) are approximately equal. In the presence of dyshemoglobins, oxyhemoglobin can be considerably lower than oxygen saturation.   XR Chest Port 1 View    Narrative    EXAM: XR CHEST PORTABLE 1 VIEW  LOCATION: [facility]  DATE: 01/24/2024    INDICATION: Line placement.  COMPARISON: Earlier today.      Impression    IMPRESSION: Numerous EKG wires and leads and midline defibrillator pad projecting over the chest obscure details. Endotracheal tube tip approximately 3.5 cm from the nicol. Tubing projecting over  the inferior right atrium or ventricle may represent an   inferior central line with tip in the right atrium and right ventricle. Clinical correlation recommended. Enteric tube enters stomach. Hazy opacification left mid and lower lung may represent a combination of left pleural effusion and atelectasis or   infiltrate. Osseous structures grossly intact.     EKG 12-lead, tracing only   Result Value Ref Range    Systolic Blood Pressure  mmHg    Diastolic Blood Pressure  mmHg    Ventricular Rate 100 BPM    Atrial Rate 100 BPM    AK Interval 128 ms    QRS Duration 70 ms     ms    QTc 423 ms    P Axis 55 degrees    R AXIS 68 degrees    T Axis 43 degrees    Interpretation ECG       ** Poor data quality, interpretation may be adversely affected  Sinus rhythm  ST elevation, consider early repolarization  Nonspecific ST and T wave abnormality  Abnormal ECG  When compared with ECG of 24-JAN-2024 04:46, (unconfirmed)  Significant changes have occurred     EKG 12-lead, tracing only   Result Value Ref Range    Systolic Blood Pressure  mmHg    Diastolic Blood Pressure  mmHg    Ventricular Rate 100 BPM    Atrial Rate 100 BPM    AK Interval 128 ms    QRS Duration 70 ms     ms    QTc 423 ms    P Axis 55 degrees    R AXIS 68 degrees    T Axis 43 degrees    Interpretation ECG       ** Poor data quality, interpretation may be adversely affected  Sinus rhythm  ST elevation, consider early repolarization  Nonspecific ST and T wave abnormality  Abnormal ECG  When compared with ECG of 24-JAN-2024 04:46, (unconfirmed)  Significant changes have occurred     Glucose by meter   Result Value Ref Range    GLUCOSE BY METER POCT 130 (H) 70 - 99 mg/dL   Blood gas venous   Result Value Ref Range    pH Venous 7.42 7.32 - 7.43    pCO2 Venous 35 (L) 40 - 50 mm Hg    pO2 Venous 48 (H) 25 - 47 mm Hg    Bicarbonate Venous 23 21 - 28 mmol/L    Base Excess/Deficit Venous -1.4 -3.0 - 3.0 mmol/L    FIO2 40     Oxyhemoglobin Venous 82 (H) 70 - 75  %    O2 Sat, Venous 83.4 (H) 70.0 - 75.0 %    Narrative    In healthy individuals, oxyhemoglobin (O2Hb) and oxygen saturation (SO2) are approximately equal. In the presence of dyshemoglobins, oxyhemoglobin can be considerably lower than oxygen saturation.   EKG 12-lead, tracing only   Result Value Ref Range    Systolic Blood Pressure  mmHg    Diastolic Blood Pressure  mmHg    Ventricular Rate 60 BPM    Atrial Rate 60 BPM    NM Interval 142 ms    QRS Duration 88 ms     ms    QTc 682 ms    P Axis 73 degrees    R AXIS 39 degrees    T Axis 73 degrees    Interpretation ECG       Sinus rhythm  Prolonged QT  Abnormal ECG  When compared with ECG of 24-JAN-2024 06:32, (unconfirmed)  Vent. rate has decreased BY  40 BPM  ST no longer elevated in Inferior leads  ST no longer elevated in Lateral leads  Nonspecific T wave abnormality no longer evident in Inferior leads  QT has lengthened     Potassium   Result Value Ref Range    Potassium 4.6 3.4 - 5.3 mmol/L   Basic metabolic panel   Result Value Ref Range    Sodium 133 (L) 135 - 145 mmol/L    Potassium 4.6 3.4 - 5.3 mmol/L    Chloride 104 98 - 107 mmol/L    Carbon Dioxide (CO2) 19 (L) 22 - 29 mmol/L    Anion Gap 10 7 - 15 mmol/L    Urea Nitrogen 10.6 6.0 - 20.0 mg/dL    Creatinine 0.39 (L) 0.51 - 0.95 mg/dL    GFR Estimate >90 >60 mL/min/1.73m2    Calcium 8.0 (L) 8.6 - 10.0 mg/dL    Glucose 116 (H) 70 - 99 mg/dL   Lactic acid whole blood   Result Value Ref Range    Lactic Acid 1.8 0.7 - 2.0 mmol/L   Magnesium   Result Value Ref Range    Magnesium 2.5 (H) 1.7 - 2.3 mg/dL   Phosphorus   Result Value Ref Range    Phosphorus 2.5 2.5 - 4.5 mg/dL   Glucose by meter   Result Value Ref Range    GLUCOSE BY METER POCT 120 (H) 70 - 99 mg/dL   Glucose by meter   Result Value Ref Range    GLUCOSE BY METER POCT 124 (H) 70 - 99 mg/dL   CK total   Result Value Ref Range    CK 4,490 (HH) 26 - 192 U/L   Glucose by meter   Result Value Ref Range    GLUCOSE BY METER POCT 164 (H) 70 - 99  mg/dL   Glucose by meter   Result Value Ref Range    GLUCOSE BY METER POCT 130 (H) 70 - 99 mg/dL   Troponin T, High Sensitivity   Result Value Ref Range    Troponin T, High Sensitivity 245 (HH) <=14 ng/L       Lab Results   Component Value Date    CHOL 162 08/08/2006     Lab Results   Component Value Date    HDL 29 08/08/2006     Lab Results   Component Value Date    LDL 93 08/08/2006     Lab Results   Component Value Date    TRIG 199 08/08/2006     Lab Results   Component Value Date    CHOLHDLRATIO 5.6 08/08/2006     TSH   Date Value Ref Range Status   03/08/2014 1.10 0.4 - 5.0 mU/L Final         I have personally reviewed her ECGs, TTE    Clinically Significant Risk Factors Present on Admission        # Hypokalemia: Lowest K = 3.3 mmol/L in last 2 days, will replace as needed  # Hyperkalemia: Highest K = 5.8 mmol/L in last 2 days, will monitor as appropriate   # Hypocalcemia: Lowest Ca = 7.6 mg/dL in last 2 days, will monitor and replace as appropriate         # Hypertension: Noted on problem list          # Financial/Environmental Concerns:              Mart Plasencia MD on 1/24/2024 at 12:42 PM

## 2024-01-24 NOTE — CODE/RAPID RESPONSE
CODE BLUE NOTE    Code blue called on this patient. She had been having issues with ectopy, short runs of NSVT since the daytime. This became worse when drawing blood from her femoral arterial line. She has no internal jugular but also has thermaguard in the right groin. XR earlier showed a line in the right atrium, possible leading to ectopy and ventricular excitability. Was on amiodarone and metoprolol. Seen by cardiology, TTE shows EF 45%.     Plan was to pull this line back and she then developed apparent sustained VT (possibly TdP) and lost pulse. CPR started and code called. Upon my arrival, rhythm was checked and appeared to be TdP. She was given a single shock with restoration of NSR and stable BP. She was then given 150 mg IV amio and 2 g Mg.     CXR performed and this appears to show a line in the R atrium. I discussed the case with Dr Shantanu PENA MD and he is present at bedside, will be pulling the line back and repeat XR. EKG also shows QTc of about 650. Will stop BB and check STAT labs after access obtained (cannot draw labs peripherally while getting cooled). Will give a second dose of 2 g IV mg now as well. Updated tele ICU with these events.    Total CC time 65 min.    Marcelo Gillis, DO  January 24, 2024

## 2024-01-24 NOTE — PLAN OF CARE
ICU End of Shift Summary.  For vital signs and complete assessments, please see documentation flowsheets.      Pertinent assessments:   Neuro: RASS -5. Sedated and cooled.   Cardiac: SR/SB. Frequent PVCs and arrhythmias. Cards consulted.   Resp: vent. LS dim.   GI: BS dim. NPO  : st in place.   Skin: pale, cool.   Lines: CVC, [place], piv,  Drips: fent, prop, amino, LR,     Major Shift Events:   Admit to ICU  Echo  Cards consult following significant EKG changes and multiple runs of V-tach  Calcium gluc. Given  IV mag given  IV metoprolol 2.5 given.   Urine tox sent    Plan (Upcoming Events): continue supportive cares. Cooling target temp reached at 1530.   Discharge/Transfer Needs: TBD     Bedside Shift Report Completed : yes  Bedside Safety Check Completed: yes

## 2024-01-24 NOTE — PROGRESS NOTES
[facility] ICU VENTILATOR RESPIRATORY NOTE  Date of Admission: 1/23/24  Date of Intubation (most recent): 1/23/24  Reason for Mechanical Ventilation: Overdose and arrest  Number of Days on Mechanical Ventilation: 2  Met Criteria for Pressure Support Trial: No  Reason for No Pressure Support Trial: Unstable, cooling  Significant Events Today: Code Blue called on patient. RT arrived with CPR in progress. Pt bagged for 1 round of CPR and 1 shock. Pt placed back on vent after ROSC. Vent Settings:  Vent Mode: CMV/AC  (Continuous Mandatory Ventilation/ Assist Control)  FiO2 (%): 40 %  Resp Rate (Set): 16 breaths/min  Tidal Volume (Set, mL): 480 mL  PEEP (cm H2O): 5 cmH2O  Resp: 18      Paige Escalante RT

## 2024-01-24 NOTE — PROGRESS NOTES
Assessment: Ms. Tadeo is a 48 year old lady with a medical history of substance use disorder, alcohol use disorder who is admitted to the ICU 1/23 s/p cardiac arrest in the setting of reported fentanyl overdose-initial rhythm was asystole, naloxone was administered by EMS, she then developed VT s/p DCCV with ROSC. Today, Ms. Tadeo presents critically ill s/p cardiac arrest with repeat VT arrest early this morning.     I personally reviewed vital signs, medications, labs and imaging.    Active problems and current treatments include:    S/p VT arrest-Etiology unclear, electrolytes repleted and recheck ordered for this morning, ishcemia is on the differential-TTE yesterday revealing LVEF 40-45% however this reduction in EF could be expected in the setting of recent arrest and CPR and does not necessarily indicate an ischemia cardiomyopathy, troponin trended to assess for ischemic etiology of patient's 2 VT arrests. QT prolongation is on the differential as patient takes several potentially QT prolonging medications at home and rhythm was reported to be torsades during this morning's arrest, medications reviewed for QT prolonging risk and ECG repeated this morning. Cardiology consulted-appreciate recommendations. Continue amiodarone infusion. Stop cooling protocol given repeat arrest and concern for worsening of electrolyte disturbances, avoid hyperthermia.  Alcohol Withdrawal-Patient has a reported history of alcohol use disorder and presents with difficult to control hypertension which may be due to alcohol withdrawal-initiate CIWA protocol with thiamine/folate repletion and lorazepam, continue to monitor.  ID-No acute infectious concerns, expect leukocytosis is inflammatory in nature due to cardiac arrest x 2 requiring CPR.  Nutrition-NPO, consider initiation of tube feeding if unable to extubate by tomorrow  Prophylaxis-PPI, enoxaparin     I personally managed the ventilator, hemodynamics, sedation,  analgesia, metabolic abnormalities and nutritional status.      I spent 44 minutes of critical care time exclusive of procedures evaluating and managing this patient, discussing with the consultants, and updating the patient and family.    Umm Handy M.D.

## 2024-01-24 NOTE — PROGRESS NOTES
[facility] ICU VENTILATOR RESPIRATORY NOTE  Date of Admission: 1/23/2024  Date of Intubation (most recent): 1/23/2024  Reason for Mechanical Ventilation: Overdose and arrest  Number of Days on Mechanical Ventilation: 1  Met Criteria for Pressure Support Trial: No  Reason for No Pressure Support Trial: Unstable  Significant Events Today: Pt has had runs of pulseless V-tach and frequent PVC'c.  ABG Results: 15:32 7.35/44/81/24 93% on CMV 16/480/P5 60%  ETT appearance on chest x-ray: Endotracheal tube tip 4.2 cm from the nicol.

## 2024-01-24 NOTE — PLAN OF CARE
Right wrist and Left wrist restraints continued 1/23/2024    Clinical Justification: Pulling lines, pulling tubes, and pulling equipment  Less Restrictive Alternative: Repositioning, Alarm, De-escalation, Reorientation, Disguise equipment  Attending Physician Notified: Yes, Attending Physician's Name: ladonna   New orders placed Yes  Length of Order: 1 Day      Mirela Frey, RN

## 2024-01-24 NOTE — PLAN OF CARE
Shift Events: frequent ectopy with bradycardia, thermoguard paused, episode of v tach at 1317 that was self limiting, amio stopped, started isuprel gtt, went to cath lab for venous pacer, stopped isuprel, resumed thermoguard for elevated temps of 38c    Neuro: sedated RASS -4/-5 with fent and prop, moves head with ETT cares and moves shoulders with suctioning/coughing  CV: curently 100% VVI paced with rate of 100bpm 10mA sensitivity 2.5, BP labile but has been soft since starting pacing  ID: T max 38c, continue unasyn for suspected PNA  Pulm: LS clear, vent 30%/16/5/480  GI: OG used for meds only, BS were hypoactive but normalized after rewarming  : good UO per st  Lines/gtts: 2 18g PIVs, R fem CVC thermoguard, R art line transduced, L fem arterial sheath infusing TKO but not transduced, L fem venous pacer and sheath in place  Skin: chest abrasions from CPR/cardioversion, hands and feet pale/dusky and cool but with cap refill <3 sec  Labs: CK elevated, trop trending down, lytes and glucoses stable    For vital signs and complete assessments, see documentation flowsheets.     Plan: continue transvenous pacing and seek placement at facility with higher level of cardiac care    Right wrist, Left wrist, and soft restraints restraints continued 1/24/2024    Clinical Justification: Pulling lines, pulling tubes, and pulling equipment  Less Restrictive Alternative: 1:1 patient care, Repositioning, Re-evaluate equipment, Disguise equipment, Pain management, Reorientation  Attending Physician Notified: Yes, Attending Physician's Name: Carlin   New orders placed Yes  Length of Order: 1 Day    Notified provider about indwelling st catheter discussed removal or continued need.    Did provider choose to remove indwelling st catheter? NO    Provider's st indication for keeping indwelling st catheter: Indication for continued use: Deep Sedation/Paralysis    Is there an order for indwelling st catheter?  YES    *If there is a plan to keep st catheter in place at discharge daily notification with provider is not necessary, but please add a notation in the treatment team sticky note that the patient will be discharging with the catheter.      Vianca Bell RN

## 2024-01-24 NOTE — PROGRESS NOTES
Brief Update:    Throughout the afternoon Ms. Tadeo developed significant ectopy and did have a brief cardiac arrest with polymorphic VT however no intervention was required and she spontaneous regained a perfusing rhythm. Amiodarone was discontinued due to concerns for worsening bradycardia in the setting of prolonged QT as the most likely etiology of her recurrent torsades. Dr. Plasencia was paged and came to the bedside to help coordinate her care. 100mg of lidocaine bolus was administered however her ectopy did not improve. Isoproterenol was initiated to goal HR>80 with improvement in ectopy. Cath lab was activated and the patient will undergo placement of transvenous pacemaker. Transfer to [facility] is underway.    Umm Handy M.D.  Critical Care Attending

## 2024-01-24 NOTE — PROGRESS NOTES
2015: Tele Hub contacted regarding pt. BP 190s/100s - metoprolol scheduled PO - no abd xray done for placement verification on OG placement. Something IV PRN and metoprolol IV? JAMES Dasilva will rely message to Tele Hub MD.     Orders placed for PRN hydralazine.     0330: Tele Hub contacted regarding abd xray results and verify OG okay to use? HTN continues 2 hours after hydralazine given, pt. Unable to tolerate PRN labetalol. Informed MD of decreased HR and BP, PO hydralazine? JAMES Dasilva will rely message to Tele Hub MD.     Per Tele Hub MD - Continue to give labetalol PRN, OG okay to use for meds only at this time, PO captopril ordered for HTN in addition to PRNs.    0420: Code Blue              Writer scanned in PO captopril when pt. Went into an arrhythmia with PVCs, and turned into  pulseless V Tach. Writer initiated CPR immediately and code blue called @ 0425.              -0426: 1 round epi given       -0428: MD Gillis @ bedside, pulse check done -a single shock given CPR resumed 2g Mag given IV- ROSC with NSR and BP stable.        -0433: 150 mg IV amio given and MD Gillis ordered infusion of 2g mag.     Labs ordered and prior to code discussed CXR with concerns of a line in the atrium with MD Gillis. Frequent ectopy and decreased HR in 40s with lab draws from the a line. Carlin discussed with ED MD to assist with pulling the line back and repeat CXR performed.     Labs drawn with less ectopy and HR not dropping to 40s.

## 2024-01-24 NOTE — PLAN OF CARE
ICU End of Shift Summary.  For vital signs and complete assessments, please see documentation flowsheets.      Pertinent assessments:   Neuro: RASS -4,-5 on prop and fent gtt. Increased due to pt. Biting on tube. Unable to squeeze hands or follow commands at this time.   Cardiac: SB - SR with prolong QT and PVC, PACs with runs of Vtach @ times. HTN - PRN hydralazine given intervention successful. Amio gtt - paused due to low HR - restarted once HR 60s and frequent arrhythmias.  Resp: Vent supported. Fio2 40%. Peep 5. LS dim/coarse. Red-streaked thick inline secretions.  GI: OG, NPO @ this time, BS hypoactive.   : st in place - minimal output  Skin: see flowsheets for details  Lines: R Fem, R Fem a line, 2 PIV  Drips: Prop, Fent, Amio    Major Shift Events:   PRN labetalol half dose 10mg given-  pt. Sensitive to this medication - HR drop to 53s amio paused. BP dropped from 198/99 to 120s/70s. Tele MD aware.   PRN hydralazine given x2, with success. See progress notes for possible PO dose?  Amio paused - HR had very frequent arrhythmias, HR increased 60s - amio restarted.   Weaned fio2  Hemodynamically unstable with attempts to repo.   A line positional- when drawing back on bimal with labs HR drops to 40s-30s. New dressing no change still positional.   MD Gillis after code, suggested ED MD come to bedside to pull thermoguard line a few cm due to abd xray shown line in mid atrium. Possible reason for ectopy? Less ectopy after line pulled back.   See progress notes for details - pt. Went into VTach with no pulse CPR initiated.     Plan (Upcoming Events): Continue plan of care.  Discharge/Transfer Needs: TBD     Bedside Shift Report Completed : Y  Bedside Safety Check Completed: Y    Right wrist and Left wrist restraints continued 1/24/2024    Clinical Justification: Pulling lines, pulling tubes, and pulling equipment  Less Restrictive Alternative: Repositioning, Alarm, De-escalation, Reorientation, Disguise  equipment  Attending Physician Notified: Yes, Attending Physician's Name: ladonna   New orders placed Yes  Length of Order: 1 Day      Elva Fernandez RN

## 2024-01-24 NOTE — PROGRESS NOTES
ICU cross cover:    Majority of night main issue was elevated blood pressure.  Added PRN hydralazine and labetalol which worked, but due to frequency of use, added PO captopril (can be titrated up and eventually transitioned to lisinopril).   Amioadrone ggt was continued and patient's HR remained in 50-60's majority of night. Not much ectopy was really being seen majority of night in telehub.   Bizarrely, patient had a call of code blue with sustained pulseless VT. Tele nurse was not aware of any  alarms in telehub. Hospitalist team made telehub aware after ROSC (1 shock, 1 round of CPR, 1 amiodarone bolus). The cooling catheter was moved back and additional magnesium was given.   Pt is now in stable condition, lactate wnl, renal function stable.  Labs show low K 3.3- on replacement protocol. Troponin elevated- will trend.   Will discuss with day team.       Kristian Leigh MD  [facility],  of Medicine  Pulmonary/Critical Care Medicine  January 24, 2024

## 2024-01-25 ENCOUNTER — HOSPITAL ENCOUNTER (INPATIENT)
Dept: NEUROLOGY | Facility: CLINIC | Age: 49
Discharge: HOME | End: 2024-01-25
Attending: PHYSICIAN ASSISTANT | Admitting: ANESTHESIOLOGY
Payer: COMMERCIAL

## 2024-01-25 ENCOUNTER — APPOINTMENT (OUTPATIENT)
Dept: GENERAL RADIOLOGY | Facility: CLINIC | Age: 49
End: 2024-01-25
Attending: ANESTHESIOLOGY
Payer: COMMERCIAL

## 2024-01-25 ENCOUNTER — HOSPITAL ENCOUNTER (INPATIENT)
Facility: CLINIC | Age: 49
LOS: 6 days | Discharge: LEFT BEFORE DISCHARGE | End: 2024-01-31
Attending: ANESTHESIOLOGY | Admitting: ANESTHESIOLOGY
Payer: COMMERCIAL

## 2024-01-25 ENCOUNTER — HOSPITAL ENCOUNTER (OUTPATIENT)
Dept: NEUROLOGY | Facility: CLINIC | Age: 49
Discharge: HOME | End: 2024-01-25
Attending: ANESTHESIOLOGY | Admitting: ANESTHESIOLOGY
Payer: COMMERCIAL

## 2024-01-25 VITALS
HEIGHT: 64 IN | HEART RATE: 100 BPM | OXYGEN SATURATION: 96 % | RESPIRATION RATE: 15 BRPM | TEMPERATURE: 98.4 F | BODY MASS INDEX: 24.16 KG/M2 | SYSTOLIC BLOOD PRESSURE: 95 MMHG | DIASTOLIC BLOOD PRESSURE: 77 MMHG | WEIGHT: 141.54 LBS

## 2024-01-25 DIAGNOSIS — Z91.89 AT RISK OF PRESSURE INJURY OF SKIN: Primary | ICD-10-CM

## 2024-01-25 DIAGNOSIS — R94.31 LONG QT INTERVAL: ICD-10-CM

## 2024-01-25 DIAGNOSIS — I46.9 CARDIAC ARREST (H): ICD-10-CM

## 2024-01-25 PROBLEM — T50.901A OVERDOSE: Status: ACTIVE | Noted: 2024-01-25

## 2024-01-25 LAB
ALLEN'S TEST: ABNORMAL
ANION GAP SERPL CALCULATED.3IONS-SCNC: 8 MMOL/L (ref 7–15)
ANION GAP SERPL CALCULATED.3IONS-SCNC: 9 MMOL/L (ref 7–15)
BASE EXCESS BLDA CALC-SCNC: -0.5 MMOL/L (ref -3–3)
BUN SERPL-MCNC: 10.4 MG/DL (ref 6–20)
BUN SERPL-MCNC: 9.5 MG/DL (ref 6–20)
CA-I BLD-MCNC: 4.4 MG/DL (ref 4.4–5.2)
CA-I BLD-MCNC: 4.4 MG/DL (ref 4.4–5.2)
CA-I BLD-MCNC: 4.6 MG/DL (ref 4.4–5.2)
CALCIUM SERPL-MCNC: 8.4 MG/DL (ref 8.6–10)
CALCIUM SERPL-MCNC: 8.4 MG/DL (ref 8.6–10)
CHLORIDE SERPL-SCNC: 110 MMOL/L (ref 98–107)
CHLORIDE SERPL-SCNC: 111 MMOL/L (ref 98–107)
COHGB MFR BLD: 96.2 % (ref 96–97)
CREAT SERPL-MCNC: 0.47 MG/DL (ref 0.51–0.95)
CREAT SERPL-MCNC: 0.49 MG/DL (ref 0.51–0.95)
DEPRECATED HCO3 PLAS-SCNC: 23 MMOL/L (ref 22–29)
DEPRECATED HCO3 PLAS-SCNC: 23 MMOL/L (ref 22–29)
EGFRCR SERPLBLD CKD-EPI 2021: >90 ML/MIN/1.73M2
EGFRCR SERPLBLD CKD-EPI 2021: >90 ML/MIN/1.73M2
GLUCOSE BLDC GLUCOMTR-MCNC: 101 MG/DL (ref 70–99)
GLUCOSE BLDC GLUCOMTR-MCNC: 105 MG/DL (ref 70–99)
GLUCOSE BLDC GLUCOMTR-MCNC: 109 MG/DL (ref 70–99)
GLUCOSE BLDC GLUCOMTR-MCNC: 87 MG/DL (ref 70–99)
GLUCOSE BLDC GLUCOMTR-MCNC: 90 MG/DL (ref 70–99)
GLUCOSE BLDC GLUCOMTR-MCNC: 96 MG/DL (ref 70–99)
GLUCOSE BLDC GLUCOMTR-MCNC: 97 MG/DL (ref 70–99)
GLUCOSE SERPL-MCNC: 102 MG/DL (ref 70–99)
GLUCOSE SERPL-MCNC: 98 MG/DL (ref 70–99)
HCO3 BLD-SCNC: 23 MMOL/L (ref 21–28)
LACTATE SERPL-SCNC: 1.3 MMOL/L (ref 0.7–2)
MAGNESIUM SERPL-MCNC: 2 MG/DL (ref 1.7–2.3)
MAGNESIUM SERPL-MCNC: 2 MG/DL (ref 1.7–2.3)
O2/TOTAL GAS SETTING VFR VENT: 30 %
PCO2 BLD: 33 MM HG (ref 35–45)
PH BLD: 7.45 [PH] (ref 7.35–7.45)
PHOSPHATE SERPL-MCNC: 2.4 MG/DL (ref 2.5–4.5)
PHOSPHATE SERPL-MCNC: 2.7 MG/DL (ref 2.5–4.5)
PO2 BLD: 93 MM HG (ref 80–105)
POTASSIUM SERPL-SCNC: 3.7 MMOL/L (ref 3.4–5.3)
POTASSIUM SERPL-SCNC: 3.9 MMOL/L (ref 3.4–5.3)
POTASSIUM SERPL-SCNC: 4.2 MMOL/L (ref 3.4–5.3)
SAO2 % BLDA: 94 % (ref 92–100)
SARS-COV-2 RNA RESP QL NAA+PROBE: NEGATIVE
SODIUM SERPL-SCNC: 142 MMOL/L (ref 135–145)
SODIUM SERPL-SCNC: 142 MMOL/L (ref 135–145)

## 2024-01-25 PROCEDURE — 86316 IMMUNOASSAY TUMOR OTHER: CPT | Performed by: INTERNAL MEDICINE

## 2024-01-25 PROCEDURE — 82805 BLOOD GASES W/O2 SATURATION: CPT | Performed by: ANESTHESIOLOGY

## 2024-01-25 PROCEDURE — C9113 INJ PANTOPRAZOLE SODIUM, VIA: HCPCS | Performed by: ANESTHESIOLOGY

## 2024-01-25 PROCEDURE — G0463 HOSPITAL OUTPT CLINIC VISIT: HCPCS | Mod: 25

## 2024-01-25 PROCEDURE — 99291 CRITICAL CARE FIRST HOUR: CPT | Mod: FS | Performed by: PHYSICIAN ASSISTANT

## 2024-01-25 PROCEDURE — 250N000011 HC RX IP 250 OP 636: Performed by: ANESTHESIOLOGY

## 2024-01-25 PROCEDURE — 87635 SARS-COV-2 COVID-19 AMP PRB: CPT | Performed by: ANESTHESIOLOGY

## 2024-01-25 PROCEDURE — 999N000052 EEG VIDEO 12-26 HR UNMONITORED

## 2024-01-25 PROCEDURE — 250N000011 HC RX IP 250 OP 636

## 2024-01-25 PROCEDURE — 84100 ASSAY OF PHOSPHORUS: CPT | Performed by: INTERNAL MEDICINE

## 2024-01-25 PROCEDURE — 250N000013 HC RX MED GY IP 250 OP 250 PS 637: Performed by: ANESTHESIOLOGY

## 2024-01-25 PROCEDURE — 272N000761 EEG LIMITED CHANNEL 2-12 HOUR

## 2024-01-25 PROCEDURE — 94002 VENT MGMT INPAT INIT DAY: CPT

## 2024-01-25 PROCEDURE — 258N000003 HC RX IP 258 OP 636: Performed by: ANESTHESIOLOGY

## 2024-01-25 PROCEDURE — 99207 PR APP CREDIT; MD BILLING SHARED VISIT: CPT | Performed by: PHYSICIAN ASSISTANT

## 2024-01-25 PROCEDURE — 99207 EEG LIMITED CHANNEL 2-12 HOUR: CPT | Performed by: PSYCHIATRY & NEUROLOGY

## 2024-01-25 PROCEDURE — 80048 BASIC METABOLIC PNL TOTAL CA: CPT | Performed by: INTERNAL MEDICINE

## 2024-01-25 PROCEDURE — 95720 EEG PHY/QHP EA INCR W/VEEG: CPT | Performed by: PSYCHIATRY & NEUROLOGY

## 2024-01-25 PROCEDURE — 200N000001 HC R&B ICU

## 2024-01-25 PROCEDURE — 5A1945Z RESPIRATORY VENTILATION, 24-96 CONSECUTIVE HOURS: ICD-10-PCS | Performed by: ANESTHESIOLOGY

## 2024-01-25 PROCEDURE — 99222 1ST HOSP IP/OBS MODERATE 55: CPT | Performed by: INTERNAL MEDICINE

## 2024-01-25 PROCEDURE — 250N000011 HC RX IP 250 OP 636: Performed by: STUDENT IN AN ORGANIZED HEALTH CARE EDUCATION/TRAINING PROGRAM

## 2024-01-25 PROCEDURE — 83735 ASSAY OF MAGNESIUM: CPT | Performed by: INTERNAL MEDICINE

## 2024-01-25 PROCEDURE — 83605 ASSAY OF LACTIC ACID: CPT | Performed by: ANESTHESIOLOGY

## 2024-01-25 PROCEDURE — 999N000157 HC STATISTIC RCP TIME EA 10 MIN

## 2024-01-25 PROCEDURE — 99291 CRITICAL CARE FIRST HOUR: CPT | Performed by: ANESTHESIOLOGY

## 2024-01-25 PROCEDURE — 250N000011 HC RX IP 250 OP 636: Performed by: INTERNAL MEDICINE

## 2024-01-25 PROCEDURE — 258N000003 HC RX IP 258 OP 636: Performed by: STUDENT IN AN ORGANIZED HEALTH CARE EDUCATION/TRAINING PROGRAM

## 2024-01-25 PROCEDURE — 999N000065 XR CHEST PORT 1 VIEW

## 2024-01-25 PROCEDURE — 82330 ASSAY OF CALCIUM: CPT | Performed by: ANESTHESIOLOGY

## 2024-01-25 PROCEDURE — XX20X89 MONITORING OF BRAIN ELECTRICAL ACTIVITY, COMPUTER-AIDED DETECTION AND NOTIFICATION, NEW TECHNOLOGY GROUP 9: ICD-10-PCS | Performed by: PSYCHIATRY & NEUROLOGY

## 2024-01-25 PROCEDURE — 80048 BASIC METABOLIC PNL TOTAL CA: CPT | Performed by: ANESTHESIOLOGY

## 2024-01-25 PROCEDURE — 999N000009 HC STATISTIC AIRWAY CARE

## 2024-01-25 RX ORDER — AMPICILLIN AND SULBACTAM 2; 1 G/1; G/1
3 INJECTION, POWDER, FOR SOLUTION INTRAMUSCULAR; INTRAVENOUS EVERY 6 HOURS
Status: DISCONTINUED | OUTPATIENT
Start: 2024-01-25 | End: 2024-01-28

## 2024-01-25 RX ORDER — DEXTROSE MONOHYDRATE 25 G/50ML
25-50 INJECTION, SOLUTION INTRAVENOUS
Status: DISCONTINUED | OUTPATIENT
Start: 2024-01-25 | End: 2024-01-31 | Stop reason: HOSPADM

## 2024-01-25 RX ORDER — PROCHLORPERAZINE MALEATE 5 MG
10 TABLET ORAL EVERY 6 HOURS PRN
Status: DISCONTINUED | OUTPATIENT
Start: 2024-01-25 | End: 2024-01-31 | Stop reason: HOSPADM

## 2024-01-25 RX ORDER — BISACODYL 10 MG
10 SUPPOSITORY, RECTAL RECTAL DAILY PRN
Status: DISCONTINUED | OUTPATIENT
Start: 2024-01-25 | End: 2024-01-31 | Stop reason: HOSPADM

## 2024-01-25 RX ORDER — CLONIDINE HYDROCHLORIDE 0.2 MG/1
0.2 TABLET ORAL EVERY 4 HOURS PRN
Status: ON HOLD | COMMUNITY
End: 2024-01-26

## 2024-01-25 RX ORDER — AMOXICILLIN 250 MG
2 CAPSULE ORAL 2 TIMES DAILY PRN
Status: DISCONTINUED | OUTPATIENT
Start: 2024-01-25 | End: 2024-01-31 | Stop reason: HOSPADM

## 2024-01-25 RX ORDER — MAGNESIUM SULFATE HEPTAHYDRATE 40 MG/ML
2 INJECTION, SOLUTION INTRAVENOUS ONCE
Status: COMPLETED | OUTPATIENT
Start: 2024-01-25 | End: 2024-01-25

## 2024-01-25 RX ORDER — CHLORHEXIDINE GLUCONATE ORAL RINSE 1.2 MG/ML
15 SOLUTION DENTAL EVERY 12 HOURS
Status: DISCONTINUED | OUTPATIENT
Start: 2024-01-25 | End: 2024-01-27

## 2024-01-25 RX ORDER — POTASSIUM CHLORIDE 29.8 MG/ML
20 INJECTION INTRAVENOUS ONCE
Status: COMPLETED | OUTPATIENT
Start: 2024-01-25 | End: 2024-01-25

## 2024-01-25 RX ORDER — ACETAMINOPHEN 325 MG/1
650 TABLET ORAL EVERY 4 HOURS PRN
Status: DISCONTINUED | OUTPATIENT
Start: 2024-01-25 | End: 2024-01-31 | Stop reason: HOSPADM

## 2024-01-25 RX ORDER — LABETALOL HYDROCHLORIDE 5 MG/ML
20 INJECTION, SOLUTION INTRAVENOUS EVERY 4 HOURS PRN
Status: DISCONTINUED | OUTPATIENT
Start: 2024-01-25 | End: 2024-01-26

## 2024-01-25 RX ORDER — NALOXONE HYDROCHLORIDE 0.4 MG/ML
0.4 INJECTION, SOLUTION INTRAMUSCULAR; INTRAVENOUS; SUBCUTANEOUS
Status: DISCONTINUED | OUTPATIENT
Start: 2024-01-25 | End: 2024-01-31 | Stop reason: HOSPADM

## 2024-01-25 RX ORDER — PROPOFOL 10 MG/ML
INJECTION, EMULSION INTRAVENOUS
Status: COMPLETED
Start: 2024-01-25 | End: 2024-01-25

## 2024-01-25 RX ORDER — ACETAMINOPHEN 325 MG/10.15ML
650 LIQUID ORAL EVERY 4 HOURS PRN
Status: DISCONTINUED | OUTPATIENT
Start: 2024-01-25 | End: 2024-01-31 | Stop reason: HOSPADM

## 2024-01-25 RX ORDER — PROPOFOL 10 MG/ML
5-75 INJECTION, EMULSION INTRAVENOUS CONTINUOUS
Status: DISCONTINUED | OUTPATIENT
Start: 2024-01-25 | End: 2024-01-27

## 2024-01-25 RX ORDER — MAGNESIUM SULFATE HEPTAHYDRATE 40 MG/ML
2 INJECTION, SOLUTION INTRAVENOUS ONCE
Status: DISCONTINUED | OUTPATIENT
Start: 2024-01-25 | End: 2024-01-25 | Stop reason: HOSPADM

## 2024-01-25 RX ORDER — FENTANYL CITRATE 50 UG/ML
50-100 INJECTION, SOLUTION INTRAMUSCULAR; INTRAVENOUS
Status: DISCONTINUED | OUTPATIENT
Start: 2024-01-25 | End: 2024-01-25

## 2024-01-25 RX ORDER — SODIUM CHLORIDE, SODIUM LACTATE, POTASSIUM CHLORIDE, CALCIUM CHLORIDE 600; 310; 30; 20 MG/100ML; MG/100ML; MG/100ML; MG/100ML
INJECTION, SOLUTION INTRAVENOUS CONTINUOUS
Status: DISCONTINUED | OUTPATIENT
Start: 2024-01-25 | End: 2024-01-30

## 2024-01-25 RX ORDER — PROCHLORPERAZINE 25 MG
25 SUPPOSITORY, RECTAL RECTAL EVERY 12 HOURS PRN
Status: DISCONTINUED | OUTPATIENT
Start: 2024-01-25 | End: 2024-01-31 | Stop reason: HOSPADM

## 2024-01-25 RX ORDER — NALOXONE HYDROCHLORIDE 0.4 MG/ML
0.2 INJECTION, SOLUTION INTRAMUSCULAR; INTRAVENOUS; SUBCUTANEOUS
Status: DISCONTINUED | OUTPATIENT
Start: 2024-01-25 | End: 2024-01-31 | Stop reason: HOSPADM

## 2024-01-25 RX ORDER — NICOTINE POLACRILEX 4 MG
15-30 LOZENGE BUCCAL
Status: DISCONTINUED | OUTPATIENT
Start: 2024-01-25 | End: 2024-01-31 | Stop reason: HOSPADM

## 2024-01-25 RX ORDER — AMOXICILLIN 250 MG
1 CAPSULE ORAL 2 TIMES DAILY PRN
Status: DISCONTINUED | OUTPATIENT
Start: 2024-01-25 | End: 2024-01-31 | Stop reason: HOSPADM

## 2024-01-25 RX ORDER — MIDAZOLAM HCL IN 0.9 % NACL/PF 1 MG/ML
1-8 PLASTIC BAG, INJECTION (ML) INTRAVENOUS CONTINUOUS
Status: DISCONTINUED | OUTPATIENT
Start: 2024-01-25 | End: 2024-01-27

## 2024-01-25 RX ADMIN — CLONIDINE HYDROCHLORIDE 0.1 MG: 0.1 TABLET ORAL at 00:20

## 2024-01-25 RX ADMIN — SODIUM CHLORIDE, POTASSIUM CHLORIDE, SODIUM LACTATE AND CALCIUM CHLORIDE: 600; 310; 30; 20 INJECTION, SOLUTION INTRAVENOUS at 11:23

## 2024-01-25 RX ADMIN — MAGNESIUM SULFATE HEPTAHYDRATE 2 G: 40 INJECTION, SOLUTION INTRAVENOUS at 04:21

## 2024-01-25 RX ADMIN — CHLORHEXIDINE GLUCONATE 0.12% ORAL RINSE 15 ML: 1.2 LIQUID ORAL at 19:48

## 2024-01-25 RX ADMIN — SODIUM CHLORIDE, POTASSIUM CHLORIDE, SODIUM LACTATE AND CALCIUM CHLORIDE: 600; 310; 30; 20 INJECTION, SOLUTION INTRAVENOUS at 00:05

## 2024-01-25 RX ADMIN — PROPOFOL 50 MCG/KG/MIN: 10 INJECTION, EMULSION INTRAVENOUS at 22:41

## 2024-01-25 RX ADMIN — GABAPENTIN 900 MG: 250 SUSPENSION ORAL at 00:06

## 2024-01-25 RX ADMIN — CHLORHEXIDINE GLUCONATE 0.12% ORAL RINSE 15 ML: 1.2 LIQUID ORAL at 09:08

## 2024-01-25 RX ADMIN — LABETALOL HYDROCHLORIDE 20 MG: 5 INJECTION INTRAVENOUS at 21:55

## 2024-01-25 RX ADMIN — POTASSIUM CHLORIDE 20 MEQ: 29.8 INJECTION, SOLUTION INTRAVENOUS at 18:35

## 2024-01-25 RX ADMIN — AMPICILLIN SODIUM AND SULBACTAM SODIUM 3 G: 2; 1 INJECTION, POWDER, FOR SOLUTION INTRAMUSCULAR; INTRAVENOUS at 19:48

## 2024-01-25 RX ADMIN — PROPOFOL 60 MCG/KG/MIN: 10 INJECTION, EMULSION INTRAVENOUS at 09:09

## 2024-01-25 RX ADMIN — PROPOFOL 50 MCG/KG/MIN: 10 INJECTION, EMULSION INTRAVENOUS at 12:31

## 2024-01-25 RX ADMIN — SODIUM CHLORIDE, POTASSIUM CHLORIDE, SODIUM LACTATE AND CALCIUM CHLORIDE: 600; 310; 30; 20 INJECTION, SOLUTION INTRAVENOUS at 06:10

## 2024-01-25 RX ADMIN — PANTOPRAZOLE SODIUM 40 MG: 40 INJECTION, POWDER, FOR SOLUTION INTRAVENOUS at 09:08

## 2024-01-25 RX ADMIN — MAGNESIUM SULFATE HEPTAHYDRATE 2 G: 40 INJECTION, SOLUTION INTRAVENOUS at 15:06

## 2024-01-25 RX ADMIN — LORAZEPAM 1 MG: 2 INJECTION INTRAMUSCULAR; INTRAVENOUS at 03:48

## 2024-01-25 RX ADMIN — AMPICILLIN SODIUM AND SULBACTAM SODIUM 3 G: 2; 1 INJECTION, POWDER, FOR SOLUTION INTRAMUSCULAR; INTRAVENOUS at 02:00

## 2024-01-25 RX ADMIN — Medication 50 MCG: at 17:31

## 2024-01-25 RX ADMIN — LABETALOL HYDROCHLORIDE 20 MG: 5 INJECTION INTRAVENOUS at 17:30

## 2024-01-25 RX ADMIN — PROPOFOL 60 MCG/KG/MIN: 10 INJECTION, EMULSION INTRAVENOUS at 00:06

## 2024-01-25 RX ADMIN — AMPICILLIN SODIUM AND SULBACTAM SODIUM 3 G: 2; 1 INJECTION, POWDER, FOR SOLUTION INTRAMUSCULAR; INTRAVENOUS at 09:07

## 2024-01-25 RX ADMIN — MIDAZOLAM HYDROCHLORIDE 1 MG/HR: 1 INJECTION, SOLUTION INTRAVENOUS at 06:33

## 2024-01-25 RX ADMIN — MAGNESIUM SULFATE HEPTAHYDRATE 2 G: 2 INJECTION, SOLUTION INTRAVENOUS at 06:37

## 2024-01-25 RX ADMIN — PROPOFOL 45 MCG/KG/MIN: 10 INJECTION, EMULSION INTRAVENOUS at 18:36

## 2024-01-25 RX ADMIN — Medication 150 MCG/HR: at 12:32

## 2024-01-25 RX ADMIN — AMPICILLIN SODIUM AND SULBACTAM SODIUM 3 G: 2; 1 INJECTION, POWDER, FOR SOLUTION INTRAMUSCULAR; INTRAVENOUS at 13:45

## 2024-01-25 RX ADMIN — PROPOFOL 60 MCG/KG/MIN: 10 INJECTION, EMULSION INTRAVENOUS at 04:07

## 2024-01-25 ASSESSMENT — ACTIVITIES OF DAILY LIVING (ADL)
ADLS_ACUITY_SCORE: 35
ADLS_ACUITY_SCORE: 47
ADLS_ACUITY_SCORE: 35
ADLS_ACUITY_SCORE: 47
ADLS_ACUITY_SCORE: 30
ADLS_ACUITY_SCORE: 30
ADLS_ACUITY_SCORE: 47
ADLS_ACUITY_SCORE: 35
ADLS_ACUITY_SCORE: 47
ADLS_ACUITY_SCORE: 47

## 2024-01-25 NOTE — CONSULTS
[facility]    Electrophysiology Consultation     Date of Admission:  1/25/2024  Date of Consult: 01/25/24    Assessment & Plan   Danielle Tadeo is a 48 year old female who was admitted on 1/25/2024 with fentanyl overdose and cardiac arrest. We were asked to see the patient for VF cardiac arrest.  She had out of hospital cardiac arrest with initial asystole.  She also developed VT requiring defibrillation in the field.  She was intubated in the field and was initially on hypothermia protocol in the hospital.  She had significant QT prolongation, which worsened when her heart rates dropped to the 50's, with recurrent VT/VF.  She ultimately underwent LHC showing no significant CAD and temp wire placement.  She is currently being V paced at 100 bpm.  She remains intubated and sedated.  Underlying rhythm currently sinus with rates in the 80's.    She does have history of prolonged QT on EKG when she was admitted in 2021 with alcohol withdrawal.  She was on several QT prolonging medication during that admission including buprenorphine, trazodone, quetiapine.  She did have a prior EKG from 2008 which also showed borderline prolonged QTc.  QT prolongation and Torsades during this admission likely a combination of baseline prolonged QT, medications, electrolyte abnormalities, and bradycardia.      - Continue temporary pacing for now  - Will turn down rates and repeat EKG to reassess QT interval tomorrow  - Avoid QT prolonging medications, keep K>4 and mag>2  - No indication for permament pacemaker at this time      Pérez Abernathy MD        Code Status    Full Code    Reason for Consult   Reason for consult: Consult performed at the request of the attending physician, Noah Gomez MD, for evaluation of VF cardiac arrest and QT prolongation      Chief Complaint   Fentanyl overdose and cardiac arrest    History is obtained from the EPIC chart.      History of Present Illness   Danielle Tadeo is a  48 year old female with hypertension, obesity s/p gastric bypass, and polysubstance abuse, who presents initially presented to [facility] with fentanyl overdose on 2024.  She had out of hospital cardiac arrest and initial rhythm upon EMS arrival was asystole.  She had defibrillation x4 for VT and was intubated in the field.  She was placed on hypothermia protocol upon arrival to the hospital.  She had recurrent episodes of VT and torsades  in the hospital, amiodarone and metoprolol were stopped, she was rewarmed and IV lidocaine and isoproterenol drips were started.  She had a LHC showed normal coronaries and a temporary pacing wire was placed on .  She is currently V pacing at 100 bpm.  Admission EKG shows sinus tachycardia, rate 100 bpm, RBBB, and prolonged QT.  Repeat EKG from yesterday prior to temp wire placement showed sinus bradycardia with worsened QT prolongation.  The patient remains intubated and sedated.       Past Medical History   I have reviewed this patient's medical history and updated it with pertinent information if needed.   Past Medical History:   Diagnosis Date    Alcohol dependence     Anxiety     Benign essential hypertension     PIH    CAD     Cervical spondylosis without myelopathy 2015    Continuous opioid dependence     Depression     Seasonal allergies        Past Surgical History   I have reviewed this patient's surgical history and updated it with pertinent information if needed.  Past Surgical History:   Procedure Laterality Date    ABDOMINOPLASTY      Arm and thigh lift       SECTION      CV CORONARY ANGIOGRAM N/A 2024    Procedure: Coronary Angiogram;  Surgeon: Jeff Dickerson MD;  Location:  [facility]    CV TEMPORARY PACEMAKER INSERTION N/A 2024    Procedure: Temporary Pacemaker Insertion;  Surgeon: Jeff Dickerson MD;  Location:  HEART [facility]ARD[facility]ATH LAB    GASTRIC BYPASS      HYSTERECTOMY      LASIK Bilateral 2001    MAMMOPLASTY  "REDUCTION         Prior to Admission Medications   Prior to Admission Medications   Prescriptions Last Dose Informant Patient Reported? Taking?   cloNIDine (CATAPRES) 0.2 MG tablet   Yes No   Sig: Take 0.2 mg by mouth every 4 hours as needed (Anxiety)      Facility-Administered Medications: None         Medications    fentaNYL 175 mcg/hr (01/25/24 0634)    lactated ringers 50 mL/hr at 01/25/24 0610    midazolam 2 mg/hr (01/25/24 0807)    propofol        propofol        ampicillin-sulbactam  3 g Intravenous Q6H    chlorhexidine  15 mL Mouth/Throat Q12H    pantoprazole  40 mg Per Feeding Tube QAM AC    Or    pantoprazole  40 mg Intravenous QAM AC       Allergies   Allergies   Allergen Reactions    Zofran [Ondansetron] Other (See Comments)     \"heart stopped\" \"Long QT\"       Social History   Unable to obtain    Family History   Per medical chart, unable to confirm with patient   Family History   Problem Relation Age of Onset    Cancer Maternal Grandmother         lung ca    Hypertension Father     Cancer Father 63        pancreatic cancer    Hypertension Mother     Allergies Mother         asthma    Cancer - colorectal No family hx of     Breast Cancer No family hx of        Review of Systems   Unable to obtain      Physical Exam   Temp: 96.8  F (36  C)   BP: (!) 115/101 Pulse: 100   Resp: 11 SpO2: 96 % O2 Device: Mechanical Ventilator    Vital Signs with Ranges  Temp:  [96.3  F (35.7  C)-100  F (37.8  C)] 96.8  F (36  C)  Pulse:  [] 100  Resp:  [11-49] 11  BP: ()/() 115/101  MAP:  [74 mmHg-134 mmHg] 112 mmHg  Arterial Line BP: ()/() 151/88  FiO2 (%):  [30 %-40 %] 30 %  SpO2:  [92 %-100 %] 96 %  146 lbs 2.64 oz    General: Intubated and sedated  Resp: Breathing with vent  Card: Regular    Diagnostics (include):  I personally reviewed the patient's EKG, lab work, and pertinent imaging    Last Comprehensive Metabolic Panel:  Lab Results   Component Value Date     01/25/2024    " "POTASSIUM 3.9 01/25/2024    CHLORIDE 111 (H) 01/25/2024    CO2 23 01/25/2024    ANIONGAP 8 01/25/2024    GLC 98 01/25/2024    BUN 9.5 01/25/2024    CR 0.47 (L) 01/25/2024    GFRESTIMATED >90 01/25/2024    HAZEL 8.4 (L) 01/25/2024     Magnesium   Date Value Ref Range Status   01/25/2024 2.0 1.7 - 2.3 mg/dL Final   03/08/2014 2.2 1.6 - 2.3 mg/dL Final        CBC RESULTS:   Recent Labs   Lab Test 01/24/24  0525   WBC 21.7*   RBC 5.12   HGB 15.1   HCT 44.0   MCV 86   MCH 29.5   MCHC 34.3   RDW 13.8           Telemetry:  V paced at 100 bpm    EKG 1/24/24 - Sinus bradycardia, rate 55 bpm. QTc 640 ms  EKG 1/23/24 - Sinus tachycardia, rate 109 bpm. RBBB with  ms. QTc 580 ms    Echo 1/23/2024  This is a stat limited echo on a patient post arrest intubated and supine.  Limited views obtained  The visual ejection fraction is 45-50%.  The RV is not well seen. The TAPSE value for RV function is normal but  visually on limited images the RV may be hypokinetic  The LV is mildly hypokinetic. There is \"septal shudder\" which can be seen with  intraventricular conduction delay, and constrictive pericardial physiology.  However patient is on a ventillator so that affects differential diagnosis.  Hepatic vein doppler which is also used to assess for constriction was not  performed and likely would not be useful on a ventillated patient.  Also the mid septum is mildly hypokinetic and does not follow a classic  coronary distribution. Rarely this can be seen with an LAD \"myocardial bridge\"  or septal disease.        Results for orders placed or performed during the hospital encounter of 01/25/24 (from the past 24 hour(s))   Ionized Calcium   Result Value Ref Range    Calcium Ionized Whole Blood 4.6 4.4 - 5.2 mg/dL   Basic metabolic panel   Result Value Ref Range    Sodium 142 135 - 145 mmol/L    Potassium 3.9 3.4 - 5.3 mmol/L    Chloride 111 (H) 98 - 107 mmol/L    Carbon Dioxide (CO2) 23 22 - 29 mmol/L    Anion Gap 8 7 - 15 " mmol/L    Urea Nitrogen 9.5 6.0 - 20.0 mg/dL    Creatinine 0.47 (L) 0.51 - 0.95 mg/dL    GFR Estimate >90 >60 mL/min/1.73m2    Calcium 8.4 (L) 8.6 - 10.0 mg/dL    Glucose 98 70 - 99 mg/dL   Lactic acid whole blood   Result Value Ref Range    Lactic Acid 1.3 0.7 - 2.0 mmol/L   XR Chest Port 1 View    Narrative    EXAM: XR CHEST PORT 1 VIEW  LOCATION: [facility]  DATE: 1/25/2024    INDICATION: ETT placement  COMPARISON: 01/24/2024.    FINDINGS: ET tube approximately 2 cm above the nicol. NG tube passes into the stomach. There is no pneumothorax. The heart size is normal. There is again a left basilar infiltrate. The lungs are otherwise clear.      Impression    IMPRESSION: ET tube 2 cm above the nicol. Left basilar infiltrate.   Asymptomatic COVID-19 Virus (Coronavirus) by PCR Nose    Specimen: Nose; Swab   Result Value Ref Range    SARS CoV2 PCR Negative Negative    Narrative    Testing was performed using the Xpert Xpress SARS-CoV-2 Assay on the Cepheid Gene-Xpert Instrument Systems. Additional information about this Emergency Use Authorization (EUA) assay can be found via the Lab Guide. This test should be ordered for the detection of SARS-CoV-2 in individuals who meet SARS-CoV-2 clinical and/or epidemiological criteria as well as from individuals without symptoms or other reasons to suspect COVID-19. Test performance for asymptomatic patients has only been established in anterior nasal swab specimens. This test is for in vitro diagnostic use under the FDA EUA for laboratories certified under CLIA to perform high complexity testing. This test has not been FDA cleared or approved. A negative result does not rule out the presence of PCR inhibitors in the specimen or target RNA concentration below the limit of detection for the assay. The possibility of a false negative should be considered if the patient's recent exposure or clinical presentation suggests COVID-19. This test was validated  by the [facility]. This laboratory is certified under the Clinical Laboratory Improvement Amendments (CLIA) as qualified to perform high complexity laboratory testing.

## 2024-01-25 NOTE — CONSULTS
"Neurology Consult Note  [facility]       [2024]                                                                                       Admission Date: 2024  Hospital Day: 2      Patient: Danielle Tadeo      : 1975  MRN:  [id number]     CC:      Chief Complaint   Patient presents with     Drug Overdose       Consult Request:  Referring Provider:  No admitting provider for patient encounter.  Primary Care Provider:  No primary care provider on file. MD        HPI:  Danielle Tadeo is a 48 year old yo female was admitted on 2024 after out of hospital cardiac arrest after fentanyl overdose.  The history is obtained from chart.  Patient is on propofol and fentanyl currently.  She was snorting fentanyl with her significant other at home when she became unresponsive.  Her significant other performed CPR.  Patient was down for 3 minutes before EMS arrival.  Patient was started on cooling protocol.  She has episodes of ectopy and nonsustained V. tach.  She underwent an angiogram and had a temporary pacemaker placement.           A complete review of symptoms was performed including vascular, infectious, cardiovascular, pulmonary, gastrointestinal, endocrinological, hematologic, dermatologic, musculoskeletal, and neurological. All were normal except as above.    PAST MEDICAL HISTORY:  ALLERGIES:   Allergies   Allergen Reactions     Zofran [Ondansetron] Other (See Comments)     \"heart stopped\" \"Long QT\"     Tobacco:    History   Smoking Status     Some Days     Packs/day: 0.50     Years: 5.00     Types: Cigarettes   Smokeless Tobacco     Never     Comment: some     Alcohol:  Social History    Substance and Sexual Activity      Alcohol use: Not Currently        Comment: stopped  10 days ago    MEDICATIONS:       CURRENTLY SCHEDULED MEDICATIONS     ampicillin-sulbactam  3 g Intravenous Q6H     captopril  12.5 mg Oral Q8H     cloNIDine  0.1 mg NG or Feeding " tube Q8H     enoxaparin ANTICOAGULANT  40 mg Subcutaneous Q24H     [START ON 2024] folic acid  1 mg Oral Daily     [START ON 2024] folic acid  1 mg Intravenous Daily     [START ON 2024] gabapentin  100 mg NG or Feeding tube Q8H     [START ON 2024] gabapentin  300 mg NG or Feeding tube Q8H     [START ON 2024] gabapentin  600 mg NG or Feeding tube Q8H     gabapentin  900 mg NG or Feeding tube Q8H     insulin aspart  1-6 Units Subcutaneous Q4H     LORazepam  1 mg Intravenous Q6H     metoprolol  25 mg Oral or NG Tube BID     multivitamins w/minerals  15 mL Oral Daily     pantoprazole  40 mg Per Feeding Tube QAM AC    Or     pantoprazole  40 mg Intravenous QAM AC     thiamine  200 mg Intravenous TID     [START ON 2024] thiamine  100 mg Oral TID     [START ON 2024] thiamine  100 mg Oral Daily          HOME MEDICATIONS  Medications Prior to Admission   Medication Sig Dispense Refill Last Dose     cloNIDine (CATAPRES) 0.1 MG tablet Take 1 tablet (0.1 mg) by mouth every 8 hours as needed (anxiety) 20 tablet 0      gabapentin (NEURONTIN) 100 MG capsule Take 1 capsule (100 mg) by mouth every 8 hours 45 capsule 0      lisinopril (ZESTRIL) 20 MG tablet Take 1 tablet (20 mg) by mouth daily 90 tablet 1      metoprolol succinate ER (TOPROL-XL) 50 MG 24 hr tablet Take 50 mg by mouth daily        MEDICAL HISTORY  Past Medical History:   Diagnosis Date     Alcohol dependence      Anxiety      Benign essential hypertension     PIH     CAD      Cervical spondylosis without myelopathy 2015     Continuous opioid dependence      Depression      Seasonal allergies      SURGICAL HISTORY  Past Surgical History:   Procedure Laterality Date     ABDOMINOPLASTY       Arm and thigh lift        SECTION       CV CORONARY ANGIOGRAM N/A 2024    Procedure: Coronary Angiogram;  Surgeon: Jeff Dickerson MD;  [facility]:  [facility]     CV TEMPORARY PACEMAKER INSERTION N/A 2024  "   Procedure: Temporary Pacemaker Insertion;  Surgeon: Jeff Dickerson MD;  Location:  [facility]     GASTRIC BYPASS       HYSTERECTOMY       LASIK Bilateral      MAMMOPLASTY REDUCTION       FAMILY HISTORY    Family History   Problem Relation Age of Onset     Cancer Maternal Grandmother         lung ca     Hypertension Father      Cancer Father 63        pancreatic cancer     Hypertension Mother      Allergies Mother         asthma     Cancer - colorectal No family hx of      Breast Cancer No family hx of      SOCIAL HISTORY  Social History     Socioeconomic History     Marital status:    Tobacco Use     Smoking status: Some Days     Packs/day: 0.50     Years: 5.00     Additional pack years: 0.00     Total pack years: 2.50     Types: Cigarettes     Smokeless tobacco: Never     Tobacco comments:     some   Substance and Sexual Activity     Alcohol use: Not Currently     Comment: stopped  10 days ago     Drug use: No     Sexual activity: Yes     Partners: Male     Birth control/protection: Surgical     Comment:   had vasectomy            Height: 162.6 cm (5' 4\")     Temp: 98.8  F (37.1  C)   Weight: 64.2 kg (141 lb 8.6 oz)    Temp src: Skin         BP: 96/72         Estimated body mass index is 24.29 kg/m  as calculated from the following:    Height as of this encounter: 1.626 m (5' 4\").    Weight as of this encounter: 64.2 kg (141 lb 8.6 oz).    Resp: 16   SpO2: 97 %   O2 Device: Mechanical Ventilator     Blood Pressure:   BP Readings from Last 3 Encounters:   24 96/72   10/04/21 125/70   21 (!) 140/77     T24 : Temp (24hrs), Av.8  F (36.6  C), Min:96.1  F (35.6  C), Max:100  F (37.8  C)       GENERAL EXAMINATION:  Her exam was very limited due to sedation.  Patient would need repeat neuroexam when she is off the sedation.    .  Review of Diagnostics:     I personally reviewed and interpreted the following:    Cardiac Catheterization    Result Date: " 1/24/2024  Normal coronary arteries Temporary transvenous pacing wire insertion     XR Chest Port 1 View    Result Date: 1/24/2024  EXAM: XR CHEST PORTABLE 1 VIEW LOCATION: [facility] DATE: 01/24/2024 INDICATION: Line placement. COMPARISON: Earlier today.     IMPRESSION: Numerous EKG wires and leads and midline defibrillator pad projecting over the chest obscure details. Endotracheal tube tip approximately 3.5 cm from the nicol. Tubing projecting over the inferior right atrium or ventricle may represent an inferior central line with tip in the right atrium and right ventricle. Clinical correlation recommended. Enteric tube enters stomach. Hazy opacification left mid and lower lung may represent a combination of left pleural effusion and atelectasis or infiltrate. Osseous structures grossly intact.     XR Chest Port 1 View    Result Date: 1/24/2024  EXAM: XR CHEST PORT 1 VIEW LOCATION: [facility] DATE: 1/24/2024 INDICATION: VFib arrest COMPARISON: 01/23/2024.     IMPRESSION: Endotracheal tube in satisfactory position terminating 4 cm above the nicol. Nasogastric tube tip in the stomach. Opacification of the lower left lung consistent with left lower lobe atelectasis and possible small left pleural effusion. Right lung clear. Normal heart size and pulmonary vascularity.    XR Abdomen Port 1 View    Result Date: 1/24/2024  EXAM: XR ABDOMEN PORT 1 VIEW LOCATION: [facility] DATE: 1/24/2024 INDICATION: verify OG tube COMPARISON: CT 09/11/2021     IMPRESSION: Enteric tube extending below level of the EG junction with its sidehole in the mid gastric body. Right-sided central venous catheter extending to the IVC with its tip in the mid right atrium. Postoperative changes to the bowel in the left mid  abdomen.    Echo Limited    Result Date: 1/23/2024  [id number]^BALDOMERO^[facility] [address]  "[place] [address]  Name: CHAU ABAD MRN: [id number] : 1975 Study Date: 2024 03:45 PM Age: 48 yrs Gender: Female Patient Location: [facility] Reason For Study: Cardiac Arrest Ordering Physician: GENARO ALEXANDRE Performed By: Ly Spencer  BSA: 1.7 m2 Height: 64 in Weight: 140 lb BP: 172/100 mmHg ______________________________________________________________________________ Procedure Limited Portable Echo Adult. ______________________________________________________________________________ Interpretation Summary  This is a stat limited echo on a patient post arrest intubated and supine. Limited views obtained The visual ejection fraction is 45-50%. The RV is not well seen. The TAPSE value for RV function is normal but visually on limited images the RV may be hypokinetic The LV is mildly hypokinetic. There is \"septal shudder\" which can be seen with intraventricular conduction delay, and constrictive pericardial physiology. However patient is on a ventillator so that affects differential diagnosis. Hepatic vein doppler which is also used to assess for constriction was not performed and likely would not be useful on a ventillated patient. Also the mid septum is mildly hypokinetic and does not follow a classic coronary distribution. Rarely this can be seen with an LAD \"myocardial bridge\" or septal disease. Report called to ICU ______________________________________________________________________________ Left Ventricle The left ventricle is normal in size. There is normal left ventricular wall thickness. The visual ejection fraction is 45-50%. The LV is mildly hypokinetic. There is \"septal shudder\" which can be seen with intraventricular conduction delay, and constrictive pericardial physiology. However patient is on a ventillator so that affects differential diagnosis. Hepatic vein doppler which is also used to assess for constriction was not performed and likely would not be useful " "on a ventillated patient. Also the mid septum is mildly hypokinetic and does not follow a classic coronary distribution. Rarely this can be seen with an LAD \"myocardial bridge\" or septal disease.  Right Ventricle The RV is not well seen. The TAPSE value for RV function is normal but visually on limited images the RV may be hypokinetic. The right ventricle is not well visualized.  Atria Normal left atrial size. Right atrial size is normal.  Mitral Valve There is trace mitral regurgitation.  Tricuspid Valve There is trace to mild tricuspid regurgitation. Doppler findings do not suggest pulmonary hypertension.  Aortic Valve The aortic valve is trileaflet.  Pulmonic Valve The pulmonic valve is not well seen, but is grossly normal.  Vessels The aortic root is normal size. Unable to assess mean RA pressure given the patient is on a ventilator.  Pericardium The pericardium appears normal.  Rhythm Sinus rhythm was noted. ______________________________________________________________________________ MMode/2D Measurements & Calculations IVSd: 0.87 cm LVIDd: 4.2 cm LVIDs: 3.2 cm LVPWd: 0.68 cm FS: 24.2 % LV mass(C)d: 98.3 grams LV mass(C)dI: 58.5 grams/m2 asc Aorta Diam: 3.0 cm Asc Ao diam index BSA (cm/m2): 1.8 Asc Ao diam index Ht(cm/m): 1.8 RWT: 0.32 TAPSE: 1.8 cm  Doppler Measurements & Calculations TR max konrad: 226.2 cm/sec TR max P.5 mmHg  ______________________________________________________________________________ Report approved by: Deena Cobb 2024 04:25 PM       CT Head w/o Contrast    Result Date: 2024  EXAM: CT HEAD W/O CONTRAST  2024 12:40 PM HISTORY:  post arrest   COMPARISON:  No prior similar studies TECHNIQUE: Using multidetector thin collimation helical acquisition technique, axial, coronal and sagittal CT images from the skull base to the vertex were obtained without intravenous contrast.  (topogram) image(s) also obtained and reviewed. FINDINGS: No intracranial " hemorrhage, mass effect, or midline shift. No acute loss of gray-white matter differentiation in the cerebral hemispheres. Ventricles are proportionate to the cerebral sulci. Clear basal cisterns. The bony calvaria and the bones of the skull base are normal. Scattered paranasal sinus mucosal thickening. Minimal bilateral mastoid effusions. Grossly normal orbits.     IMPRESSION: No acute intracranial pathology. JOSHUA NEWMAN MD   SYSTEM ID:  [id number]    XR Chest Port 1 View    Result Date: 1/23/2024  CHEST ONE VIEW  1/23/2024 12:13 PM HISTORY: Check ETT. COMPARISON: September 11, 2021.     IMPRESSION: Endotracheal tube tip 4.2 cm from the nicol. Extensive right upper lobe consolidation. Remaining lungs clear. VERONICA ORTIZ MD   SYSTEM ID:  [id number]        Vitamin B12:   Lab Results   Component Value Date    B12 638 07/01/2015         Complete Blood Count:    Recent Labs   Lab Test 01/24/24  0525 01/23/24  1106 01/23/24  1104 10/04/21  0654 09/30/21  0703 09/28/21  0737 09/27/21  0105   WBC 21.7* 16.0*  --  3.7* 4.1   < > 10.9   RBC 5.12 4.82  --  3.27* 3.07*   < > 4.29   HGB 15.1 14.1 14.6 10.8* 10.1*   < > 14.5   HCT 44.0 43.6 43 35.8 34.4*   < > 41.7    224  --  204 91*   < > 278   LYMPH  --  20  --   --  48  --  16    < > = values in this interval not displayed.        HgA1c:   Lab Results   Component Value Date    A1C 4.8 01/23/2024        Thyroid Stimulating Hormone:   Lab Results   Component Value Date    TSH 1.10 03/08/2014        Recent Labs   Lab Test 01/24/24  1409 01/24/24  0525 01/23/24  2216 01/23/24  1106   WBC  --  21.7*  --  16.0*   HGB  --  15.1  --  14.1   MCV  --  86  --  91   PLT  --  250  --  224   INR 1.07 0.99   < >  --     < > = values in this interval not displayed.      Recent Labs   Lab Test 01/24/24  1610 01/24/24  1332 01/24/24  0958 01/24/24  0907   NA  --  138  --  133*   POTASSIUM  --  4.1  --  4.6  4.6   CHLORIDE  --  108*  --  104   CO2  --  20*  --  19*   BUN  --  9.4   --  10.6   CR  --  0.45*  --  0.39*   ANIONGAP  --  10  --  10   HAZEL  --  8.0*  --  8.0*   * 102*   < > 120*  116*    < > = values in this interval not displayed.     CMP:  Recent Labs   Lab 01/24/24  0907 01/24/24  0525 01/24/24  0123 01/23/24 2001 01/23/24  1532 01/23/24  1106   PHOS 2.5 2.8 3.0 3.4   < >  --    PROTTOTAL  --  5.8*  --   --   --  5.8*   ALBUMIN  --  3.7  --   --   --  3.7   ALKPHOS  --  152*  --   --   --  207*   AST  --  204*  --   --   --  163*   ALT  --  50  --   --   --  44   BILITOTAL  --  0.4  --   --   --  0.4    < > = values in this interval not displayed.       _____________________________________________________________________________      _____________________________________________________________________________          ASSESSMENT     1. Anoxic encephalopathy: Patient had out of hospital cardiac arrest and had multiple episodes of V. tach in the hospital.  Her prognosis is guarded.  Her neurological status cannot be evaluated currently due to significant sedation.        RECOMMENDATIONS   1. Repeat neuroevaluation in the next few days when patient is off the sedation.         Total consult time 60 minutes with the time spent before, during and after the visit.

## 2024-01-25 NOTE — PROGRESS NOTES
Arrived at 0510- rass -4 on prop, fentanyl. IV mag sulfate had not been administered. MD ordered to administer. Hooked up to thermogard. Gaytan/ esophageal temp sensor. Had rhythmic jerking movements. MD notified-cerebel applied and versed gtt started in addition to propofol at 60. V paced 100% at rate 100, 10 mA and 2.5 v sensitivity. Seizure pads placed on bed.

## 2024-01-25 NOTE — PROGRESS NOTES
2045: Contacted Tele Hub regarding pt. Normaltensive with pacing - PO captopril and metoprolol - hold med?     Tele Hub MD Gomez in Hub - verbal and orders placed to hold for now.

## 2024-01-25 NOTE — PROGRESS NOTES
[facility] ICU VENTILATOR RESPIRATORY NOTE  Date of Admission: 1/23/24  Date of Intubation (most recent): 1/23/24  Reason for Mechanical Ventilation: Overdose and arrest  Number of Days on Mechanical Ventilation: 2  Met Criteria for Pressure Support Trial: No  Reason for No Pressure Support Trial: rewarming and also on pacing.  Significant Events Today: Pt transported to Cath lab for Pacer placement.   Vent Mode: CMV/AC  (Continuous Mandatory Ventilation/ Assist Control)  FiO2 (%): 30 %  Resp Rate (Set): 16 breaths/min  Tidal Volume (Set, mL): 480 mL  PEEP (cm H2O): 5 cmH2O  Resp: 16  Suctioned for moderate amount of blood tinged secretions.     Sarah Turcios RN on 1/24/2024 at 6:02 PM

## 2024-01-25 NOTE — H&P
Critical Care Progress Note      01/25/2024    Name: Danielle Tadeo MRN#: [id number]   Age: 48 year old YOB: 1975     \[facility] [place] [facility]\""tl Day# 0  ICU DAY # 0    MV DAY # 0             Problem List:   Principal Problem:    Overdose           Summary/Hospital Course:   48-year-old female presents to the ICU after fentanyl overdose.  When I examined the patient she is intubated and sedated and unable to provide a history.  My history is taken from review of the chart.  According to the ER the patient was using fentanyl and became unresponsive the patient received on scene CPR and was in asystole after receiving 8 mg of Narcan.  She was treated with ACLS protocol and developed ventricular tachycardia which was defibrillated.  She was intubated in the field with a 7.0 endotracheal tube.  She had an arterial line and cooling catheter placed in the emergency room.  The patient was admitted to the ICU.    She was unable to provide a review of systems or social history    Medical history includes opioid dependence, status post gastric bypass, hypertension, nicotine dependence    Interim history:  Patient is known to me from my consultation at [facility].  In the interim since I last saw the patient she remains intubated on full ventilatory support.  She developed further episodes of ventricular fibrillation and has had 2 cardiac arrests requiring defibrillation.  She was also found to have prolonged QTc and concern for torsades.  As per chart review the patient did have a history of torsades in the past when being treated for alcohol dependence.  Given the patient's continued hemodynamic instability and cardiac arrhythmias, cardiology was consulted and the patient had a transvenous pacer placed to override her underlying heart rate since she had the most episodes of arrhythmias when bradycardic.  Amiodarone was stopped.  When I examined the patient at [facility] she is being paced at 100 and had no episodes of  arrhythmias.  She is no longer being cooled for her arrest and is not requiring any pressors/  Transferred here for pacemaker eval    She is still unable to provide review of system or history secondary to his sedation.        Assessment and plan :     Danielle Tadeo IS a 48 year old female admitted on 1/23/2024 for acute hypoxic respiratory failure, status post asystolic cardiac arrest status post ventricular tachycardia and altered mental status secondary to metabolic encephalopathy.  She was also found to have a lactic acidosis.   I have personally reviewed the daily labs, imaging studies, cultures and discussed the case with referring physician and consulting physicians.     My assessment and plan by system for this patient is as follows:    Neurology/Psychiatry:   1.  Admission for fentanyl overdose in the setting of fentanyl dependence  2.  Toxic or metabolic encephalopathy  3.  Therapeutic cooling stopped  Plan  Most likely cause of patient's altered mental status is fentanyl overdose leading to cardiac arrest.   therapeutic cooling stop secondary to arrhythmias  I have reviewed the patient's head CT which shows no acute pathology at this time.  We will obtain neuro critical care consult to follow-up anoxic brain injury.  Questionable concern of patient having alcohol withdrawal seizures.  Will start Versed infusion and placed cerebral device on patient.  This could also be rhythmic motion secondary to shivering as the patient is being rewarmed.        Cardiovascular:   1.Hemodynamics -normotensive at times hypertensive  2.Rhythm -normal sinus rhythm  3. Ischemia -no signs of ischemia  Plan  Overall the patient is hemodynamically stable at this time.  Etiology of cardiac arrest was most likely hypoxemic secondary to fentanyl overdose.    Given the patient's hypertension we will treat with labetalol.  Patient does have a long QTc so would avoid nicardipine infusion and other torsades inducing agents    ECG  as reviewed by myself shows bigeminy and ST changes consistent with anterior lateral ischemia, will obtain trans thoracic echo to evaluate LV function.  Also given patient's marked hypertension would get urine tox to rule out concomitant intoxication such as cocaine or amphetamines.    Events of last 24 hours noted including patient being transferred to [facility] for possible pacemaker.  Patient had worsening arrhythmias including to arrest secondary to ventricular fibrillation and possible torsades.  She has received magnesium and has been successfully cardioverted.  Appreciate cardiology input and the patient has a transvenous pacer noted to overdrive her rhythm.  She is most susceptible to her cardiac arrhythmias when she is bradycardic.  Because of this amiodarone was stopped.  Awaiting cardiology input regarding permanent pacer.      Pulmonary/Ventilator Management:   1. Airway intubated for airway protection  2. Oxygenation/ventilation/mechanics on full ventilatory support respiratory rate 16, tidal volume 480 PEEP of 5  Plan  -Patient will remain on full mechanical ventilatory support while awaiting pacemaker roel;l.  I personally reviewed patient's chest x-ray which showed a right upper lobe infiltrate most likely secondary to aspiration.Continue current vent settings at this time.  Patient has been successfully weaned to 30% FiO2.      Continue current vent settings at this time.  Patient has been successfully weaned to 30% FiO2.  GI and Nutrition :   1.  N.p.o. for now  2.  Elevated AST and alkaline phos    Plan  -N.p.o.  if this appears to be a prolonged intubation we will start tube feeds via nasojejunal tube.  -Concern for shock liver status post cardiac arrest    Renal/Fluids/Electrolytes:   1.  Lactic acidosis causing metabolic acidosis resolved  2.  Mild hypokalemia, hypocalcemia resolved  3.  Creatinine within normal limits no sign of acute kidney injury  4.  Appears  hypovolemic  Plan  -Elevated lactic acidosis which is now resolved most likely secondary to cardiac arrest.  -Will continue to monitor renal function and electrolytes during therapeutic hypothermia  -Repeat blood gas  - monitor function and electrolytes as needed with replacement per ICU protocols.  - generally avoid nephrotoxic agents such as NSAID, IV contrast unless specifically required  - adjust medications as needed for renal clearance  - follow I/O's as appropriate.    Infectious Disease:   1.  Concern for aspiration pneumonia  2.  3.  Plan  -Agree with plan for empiric antibiotics at this time    Endocrine:   1.  Concern for stress-induced hyperglycemia    Plan  - ICU insulin protocol, goal sugar <180      Hematology/Oncology:   1.  Leukocytosis  2. no Anemia, no signs, symptoms of active blood loss  3.  Plan  -Leukocytosis could be acute phase reaction versus early aspiration pneumonia        IV/Access:   1. Venous access -right femoral femoral catheter  2. Arterial access -right arterial line femoral  3.  Plan  - central access required and necessary      ICU Prophylaxis:   1. DVT: Mechanical for now while being cooled  2. VAP: HOB 30 degrees, chlorhexidine rinse  3. Stress Ulcer: PPI/H2 blocker  4. Restraints: Nonviolent soft two point restraints required and necessary for patient safety and continued cares and good effect as patient continues to pull at necessary lines, tubes despite education and distraction. Will readdress daily.   5. Wound care - per unit routine   6. Feeding -NPO   7. Family Update: I did not update family today  8. Disposition -ICU         Key goals for next 24 hours:   1.  Full mechanical ventilatory support  2.  Continue pacing transvenous  3.  Empiric antibiotics given concern for aspiration pneumonia               Interim History:   S/p transvenous pacemaker           Key Medications:      ampicillin-sulbactam  3 g Intravenous Q6H    chlorhexidine  15 mL Mouth/Throat Q12H     magnesium sulfate  2 g Intravenous Once    pantoprazole  40 mg Per Feeding Tube QAM AC    Or    pantoprazole  40 mg Intravenous QAM AC      fentaNYL      lactated ringers      midazolam                 Physical Examination:   Temp:  [96.3  F (35.7  C)-100  F (37.8  C)] 96.8  F (36  C)  Pulse:  [] 100  Resp:  [11-49] 11  BP: ()/() 115/101  MAP:  [74 mmHg-134 mmHg] 112 mmHg  Arterial Line BP: ()/() 151/88  FiO2 (%):  [30 %-40 %] 30 %  SpO2:  [92 %-100 %] 96 %  No intake or output data in the 24 hours ending 01/23/24 1457  Wt Readings from Last 4 Encounters:   01/23/24 64.2 kg (141 lb 8.6 oz)   10/04/21 78.5 kg (173 lb)   09/11/21 77.4 kg (170 lb 9.6 oz)   08/25/20 66.2 kg (146 lb)     Arterial Line BP: ()/() 151/88  MAP:  [74 mmHg-134 mmHg] 112 mmHg  BP - Mean:  [] 108  Vent Mode: CMV/AC  (Continuous Mandatory Ventilation/ Assist Control)  FiO2 (%): 30 %  Resp Rate (Set): 16 breaths/min  Tidal Volume (Set, mL): 480 mL  PEEP (cm H2O): 5 cmH2O  Resp: 11    Recent Labs   Lab 01/24/24  0810 01/24/24  0525 01/23/24  1532 01/23/24  1345   PH  --  7.41 7.35 7.32*   PCO2  --  32* 44 46*   PO2  --  124* 81 222*   HCO3  --  21 24 24   O2PER 40 40 60 100       GEN: no acute distress, looking older than stated age  HEENT: head ncat, sclera anicteric, OP patent, trachea midline   PULM: unlabored synchronous with vent, coarse b/l  CV/COR: paced at 100 S1S2 no gallop,  No rub, no murmur  ABD: soft nontender, hypoactive bowel sounds, no mass  EXT:  Edema   warm  NEURO: Does not follow commands, consistent with chemical sedation, grimaces to pain  SKIN: no obvious rash  LINES: clean, dry intact         Data:   All data and imaging reviewed     ROUTINE ICU LABS (Last four results)  CMP  Recent Labs   Lab 01/25/24  0536 01/25/24  0336 01/25/24  0332 01/25/24  0027 01/24/24  2211 01/24/24  2058 01/24/24  1610 01/24/24  1332 01/24/24  0958 01/24/24  0907 01/24/24  0809 01/24/24  0525  01/24/24  0302 01/24/24  0123 01/23/24 2006 01/23/24 2001 01/23/24  1109 01/23/24  1106   NA  --   --   --   --   --   --   --  138  --  133*  --  134*  --   --   --  136  --  139   POTASSIUM  --   --  4.2  --  3.8  --   --  4.1  --  4.6  4.6  --  3.3*   < > 5.8*  --  3.3*   < > 3.9  3.9   CHLORIDE  --   --   --   --   --   --   --  108*  --  104  --  104  --   --   --  105  --  103   CO2  --   --   --   --   --   --   --  20*  --  19*  --  20*  --   --   --  19*  --  20*   ANIONGAP  --   --   --   --   --   --   --  10  --  10  --  10  --   --   --  12  --  16*   GLC 87 90  --  109*  --  125*   < > 102*   < > 120*  116*   < > 163*   < >  --    < > 156*   < > 281*   BUN  --   --   --   --   --   --   --  9.4  --  10.6  --  11.9  --   --   --  15.6  --  11.8   CR  --   --   --   --   --   --   --  0.45*  --  0.39*  --  0.44*  --   --   --  0.51  --  0.83   GFRESTIMATED  --   --   --   --   --   --   --  >90  --  >90  --  >90  --   --   --  >90  --  86   HAZEL  --   --   --   --   --   --   --  8.0*  --  8.0*  --  8.0*  --   --   --  8.2*  --  7.6*   MAG  --   --  2.0  --  2.1  --   --   --   --  2.5*  --  2.4*  --  2.1  --  2.5*   < > 2.1   PHOS  --   --  2.4*  --   --   --   --   --   --  2.5  --  2.8  --  3.0  --  3.4   < >  --    PROTTOTAL  --   --   --   --   --   --   --   --   --   --   --  5.8*  --   --   --   --   --  5.8*   ALBUMIN  --   --   --   --   --   --   --   --   --   --   --  3.7  --   --   --   --   --  3.7   BILITOTAL  --   --   --   --   --   --   --   --   --   --   --  0.4  --   --   --   --   --  0.4   ALKPHOS  --   --   --   --   --   --   --   --   --   --   --  152*  --   --   --   --   --  207*   AST  --   --   --   --   --   --   --   --   --   --   --  204*  --   --   --   --   --  163*   ALT  --   --   --   --   --   --   --   --   --   --   --  50  --   --   --   --   --  44    < > = values in this interval not displayed.     CBC  Recent Labs   Lab 01/24/24  0525 01/23/24  1102  "01/23/24  1104   WBC 21.7* 16.0*  --    RBC 5.12 4.82  --    HGB 15.1 14.1 14.6   HCT 44.0 43.6 43   MCV 86 91  --    MCH 29.5 29.3  --    MCHC 34.3 32.3  --    RDW 13.8 13.3  --     224  --      INR  Recent Labs   Lab 01/24/24  1409 01/24/24  0525 01/23/24  2216   INR 1.07 0.99 1.05     Arterial Blood Gas  Recent Labs   Lab 01/24/24  0810 01/24/24  0525 01/23/24  1532 01/23/24  1345   PH  --  7.41 7.35 7.32*   PCO2  --  32* 44 46*   PO2  --  124* 81 222*   HCO3  --  21 24 24   O2PER 40 40 60 100       All cultures:  No results for input(s): \"CULT\" in the last 168 hours.  Recent Results (from the past 24 hour(s))   Cardiac Catheterization    Narrative    Normal coronary arteries   Temporary transvenous pacing wire insertion           Billing: This patient is critically ill: Yes. Total critical care time today 33   min.         "

## 2024-01-25 NOTE — PROGRESS NOTES
[facility] ICU RESPIRATORY NOTE        Date of Admission: 1/25/2024    Date of Intubation (most recent): 1/25/24    Reason for Mechanical Ventilation: AW protection    Number of Days on Mechanical Ventilation: 1    Met Criteria for Spontaneous Breathing Trial: No    Reason for No Spontaneous Breathing Trial: Per MD    Significant Events Today: None    ABG Results:   Recent Labs   Lab 01/25/24  1350 01/24/24  0810 01/24/24  0525 01/23/24  1532 01/23/24  1345   PH 7.45  --  7.41 7.35 7.32*   PCO2 33*  --  32* 44 46*   PO2 93  --  124* 81 222*   HCO3 23  --  21 24 24   O2PER 30 40 40 60 100         Current Vent Settings: Vent Mode: CMV/AC  (Continuous Mandatory Ventilation/ Assist Control)  FiO2 (%): 30 %  Resp Rate (Set): 16 breaths/min  Tidal Volume (Set, mL): 480 mL  PEEP (cm H2O): 5 cmH2O  Resp: 16      Skin Assessment: Intact    Plan: Pt to remain on full vent support overnight    Ander Gaxiola, RT

## 2024-01-25 NOTE — CONSULTS
"      [facility]    Stroke Consult Note    Reason for Consult:  \"s/p arrest\"    Chief Complaint: No chief complaint on file.       HPI  Danielle Tadeo is a 48 year old female who first presented to the hospital on January 23 after a drug overdose.  She was with her significant other at home snorting fentanyl and became unresponsive.  Her  started CPR.  Police came and gave Narcan and said she was down for 8 minutes.  Initially she was in asystole.  She was given epinephrine and she was defibrillated at least 4 times for vtach and then intubated.  She was admitted at [facility] initially and was cooled.    When she was at [facility] her rhythm has been extremely difficult to control..  She developed further episodes of cardiac arrest and V-fib at [facility] and was defibrillated twice more there.  Also concern for torsades and she has had a transvenous pacer placed and ultimately was transferred here to [facility] for pacemaker evaluation.    Thus far neurologic workup has only included a head CT which was unremarkable.  EEG (ceribell) did not show any obvious seizures but obviously the evaluation is limited with this as compared to conventional EEG.  She has been heavily sedated and intubated and not following any commands.  She has been on fentanyl, Versed, and propofol which have been held for the exam.      Impression  #Encephalopathy secondary to sedative medications, possible concern for anoxic injury as a component as well.  #Cardiac arrest  #VDRF    Plan  -Stat continuous video EEG for more detailed evaluation regarding any seizure  -MRI brain when able.  Not possible right now because of her temporary pacing, could consider doing this after her pacemaker is in however expect there would be some lag time between it being placed and being a for MRI.  Hopefully she wakes up before then and then it is not really as important  -Continue to hold sedative medication as able, ideally document " "neurologic exam every 2 hours.  Seems to be doing okay off of Versed so might as well try to keep it off    Patient Follow-up    - final recommendation pending work-up    Thank you for this consult. We will continue to follow.     Vianca Negrete PA-C  Vascular Neurology    To page me or covering stroke neurology team member, click here: [facility]  Choose \"On Call\" tab at top, then select \"NEUROLOGY/ALL SITES\" from middle drop-down box, press Enter, then look for \"stroke\" or \"telestroke\" for your site.  _____________________________________________________    Clinically Significant Risk Factors Present on Admission        # Hypokalemia: Lowest K = 3.3 mmol/L in last 2 days, will replace as needed  # Hyperkalemia: Highest K = 5.8 mmol/L in last 2 days, will monitor as appropriate   # Hypocalcemia: Lowest Ca = 8 mg/dL in last 2 days, will monitor and replace as appropriate         # Hypertension: Noted on problem list      # Overweight: Estimated body mass index is 25.09 kg/m  as calculated from the following:    Height as of 1/23/24: 1.626 m (5' 4\").    Weight as of this encounter: 66.3 kg (146 lb 2.6 oz).       # Financial/Environmental Concerns:        # Coma: based on GCS score of <8       Past Medical History    Past Medical History:   Diagnosis Date    Alcohol dependence     Anxiety     Benign essential hypertension     PIH    CAD     Cervical spondylosis without myelopathy 06/25/2015    Continuous opioid dependence     Depression     Seasonal allergies      Medications   Home Meds  Prior to Admission medications    Medication Sig Start Date End Date Taking? Authorizing Provider   cloNIDine (CATAPRES) 0.2 MG tablet Take 0.2 mg by mouth every 4 hours as needed (Anxiety)    Unknown, Entered By History       Scheduled Meds   ampicillin-sulbactam  3 g Intravenous Q6H    chlorhexidine  15 mL Mouth/Throat Q12H    pantoprazole  40 mg Per Feeding Tube QAM AC    Or    pantoprazole  40 mg Intravenous QAM AC       Infusion " Meds   fentaNYL 150 mcg/hr (01/25/24 1232)    lactated ringers 50 mL/hr at 01/25/24 1123    midazolam Stopped (01/25/24 1115)    propofol 40 mcg/kg/min (01/25/24 1242)       Allergies   Allergies   Allergen Reactions    Zofran [Ondansetron] Other (See Comments)     QTc Prolongation          PHYSICAL EXAMINATION   Temp:  [96.3  F (35.7  C)-100  F (37.8  C)] 98.4  F (36.9  C)  Pulse:  [] 98  Resp:  [11-49] 16  BP: ()/() 115/101  MAP:  [73 mmHg-134 mmHg] 130 mmHg  Arterial Line BP: ()/() 183/102  FiO2 (%):  [30 %-40 %] 30 %  SpO2:  [92 %-100 %] 96 %    General: Intubated, in no acute distress    Neurologic exam:    Mental status: Sedated, intubated, does not follow any commands    Cranial nerves: Pupils equal round and reactive to light, gaze symmetric when eyes are forcibly opened, grimace symmetric, does not protrude tongue    Motor: No movement in the arms to noxious.  In the legs she appears to have appropriate, though delayed, response to noxious.    Sensation: As above to noxious, unable to test in any more detail than that    Coordination: Unable to test because she is unresponsive     Gait: unable to test because she is unresponsive        Imaging  I personally reviewed all imaging; relevant findings per HPI.    Labs Data   CBC  Recent Labs   Lab 01/24/24  0525 01/23/24  1106 01/23/24  1104   WBC 21.7* 16.0*  --    RBC 5.12 4.82  --    HGB 15.1 14.1 14.6   HCT 44.0 43.6 43    224  --      Basic Metabolic Panel   Recent Labs   Lab 01/25/24  1142 01/25/24  0915 01/25/24  0624 01/25/24  0336 01/25/24  0332 01/25/24  0027 01/24/24  2211 01/24/24  1610 01/24/24  1332 01/24/24  0958 01/24/24  0907   NA  --   --  142  --   --   --   --   --  138  --  133*   POTASSIUM  --   --  3.9  --  4.2  --  3.8  --  4.1  --  4.6  4.6   CHLORIDE  --   --  111*  --   --   --   --   --  108*  --  104   CO2  --   --  23  --   --   --   --   --  20*  --  19*   BUN  --   --  9.5  --   --   --   --    --  9.4  --  10.6   CR  --   --  0.47*  --   --   --   --   --  0.45*  --  0.39*   * 96 98   < >  --    < >  --    < > 102*   < > 120*  116*   HAZEL  --   --  8.4*  --   --   --   --   --  8.0*  --  8.0*    < > = values in this interval not displayed.     Liver Panel  Recent Labs   Lab 01/24/24  0525 01/23/24  1106   PROTTOTAL 5.8* 5.8*   ALBUMIN 3.7 3.7   BILITOTAL 0.4 0.4   ALKPHOS 152* 207*   * 163*   ALT 50 44            Stroke Consult Data Data   This was a non-emergent, non-telestroke consult.  I personally examined and evaluated the patient today. At the time of my evaluation and management the patient was in critical condition today due to unresponsiveness. I personally managed neuro exam. Key decisions made today included ordered eeg. I spent a total of 60 minutes providing critical care services, evaluating the patient, directing care and reviewing laboratory values and radiologic reports.

## 2024-01-25 NOTE — PLAN OF CARE
ICU End of Shift Summary.  For vital signs and complete assessments, please see documentation flowsheets.      Pertinent assessments:   Neuro: RASS -4,-5 on Prop and Fent gtt for sedation. With oral cares, moving lips. Temp controlled @ 37 with thermoguard.   Cardiac: 100% VVI paced with rate of 100, 10mA sensitivity 2.5, MAPs greater than 65, BP labile.  Resp: Vent Supported. Fio2 30%, Peep 5. LS clear.  GI: OG in place - meds only. BS hypoactive.   : st in place - good UO  Skin: see flowsheets for details.   Lines: R Fem CVC thermoguard, R Fem A line              L Fem arterial sheath infusing TKO, L Fem sheath in place and venous pacer.              2x PIVs  Drips: Prop, Fent    Major Shift Events:   EMS arrival @ 0345 & depatured @ 0430 to [facility].    Plan (Upcoming Events): Continue plan of care  Discharge/Transfer Needs: [facility] ICU     Bedside Shift Report Completed : Y  Bedside Safety Check Completed: Y       Right wrist and Left wrist restraints continued 1/25/2024    Clinical Justification: Pulling lines, pulling tubes, and pulling equipment  Less Restrictive Alternative: 1:1 patient care, Repositioning, Re-evaluate equipment, Disguise equipment, Pain management, Reorientation  Attending Physician Notified: Yes, Attending Physician's Name: Carlin   New orders placed Yes  Length of Order: 1 Day      Elva Fernandez, RN

## 2024-01-25 NOTE — CONSULTS
[facility]  WOC Nurse Inpatient Assessment     Consulted for: Sacrum    Summary: Patient transferred from [facility] to [facility] on 1/25/24. RN noted nonblanchable redness to coccyx/sacrum on admission to [facility]. Morning RN reports she also noted this area to be nonblanchable. Patient has been offloaded appropriately and on WOC consult, all skin to sacrum/coccyx is blanchable    Patient History (according to provider note(s):      48-year-old female presents to the ICU after fentanyl overdose. When I examined the patient she is intubated and sedated and unable to provide a history. My history is taken from review of the chart. According to the ER the patient was using fentanyl and became unresponsive the patient received on scene CPR and was in asystole after receiving 8 mg of Narcan. She was treated with ACLS protocol and developed ventricular tachycardia which was defibrillated. She was intubated in the field with a 7.0 endotracheal tube. She had an arterial line and cooling catheter placed in the emergency room. The patient was admitted to the ICU.     Assessment:      Areas visualized during today's visit: Sacrum/coccyx    Pressure Injury Location: Sacrum    Last photo: 1/25/24    Wound type: no wound at this time    Wound history/plan of care:   Patient transferred from [facility] to [facility] on 1/25/24. RN noted nonblanchable redness to coccyx/sacrum on admission to [facility]. Morning RN reports she also noted this area to be nonblanchable. Patient has been offloaded appropriately and on WOC consult, all skin to sacrum/coccyx is blanchable    Wound base: All areas to sacrum and coccyx are blanchable. Dried tissue directly over coccyx and 0.5cm x 0.5cm area of darker dried tissue to right of coccyx     Palpation of the wound bed: normal      Drainage: none     Description of drainage: none     Measurements (length x width x depth, in cm) pink within a 5cm x 3cm area     Tunneling N/A     Undermining N/A  Periwound  "skin: Intact      Color: normal and consistent with surrounding tissue      Temperature: normal   Odor: none  Pain: no grimacing or signs of discomfort,  Pain intervention prior to dressing change: patient tolerated well  Treatment goal: Protection  STATUS: initial assessment  Supplies ordered: supplies stored on unit and discussed with RN    My PI Risk Assessment     Sensory Perception: 1 - Completely Limited     Moisture: 3 - Occasionally moist      Activity: 1 - Bedfast      Mobility: 1 - Completely immobile      Nutrition: 2 - Probably inadequate      Friction/Shear: 2 - Potential problem      TOTAL: 10       Treatment Plan:     Sacrum Skin Prevention: Every 5 days and PRN if soiled.  1. Clean with saline , pat dry  2. Press a Mepilex Sacral to the area, making sure to conform nicely to skin curvatures.   3. Time and date dressing change  4. Follow PIP orders    Pressure Injury Prevention (PIP) Plan:  If patient is declining pressure injury prevention interventions: Explore reason why and address patient's concerns, Educate on pressure injury risk and prevention intervention(s), If patient is still declining, document \"informed refusal\" , and Ensure Care team is aware ( provider, charge nurse, etc)  Mattress: Follow bed algorithm, reassess daily and order specialty mattress, if indicated.  HOB: Maintain at or below 30 degrees, unless contraindicated  Repositioning in bed: Every 1-2 hours , Left/right positioning; avoid supine, and Raise foot of bed prior to raising head of bed, to reduce patient sliding down (shear)  Heels: Keep elevated off mattress and Pillows under calves  Protective Dressing: Sacral Mepilex for prevention (#990886),  especially for the agitated patient   Positioning Equipment: None  Chair positioning: Chair cushion (#[id number])  and Assist patient to reposition hourly   If patient has a buttock pressure injury, or high risk for PI use chair cushion or SPS.  Moisture Management: Perineal " cleansing /protection: Follow Incontinence Protocol, Avoid brief in bed, Clean and dry skin folds with bathing , Consider InterDry (#016131) between folds, and Moisturize dry skin  Under Devices: Inspect skin under all medical devices during skin inspection , Ensure tubes are stabilized without tension, and Ensure patient is not lying on medical devices or equipment when repositioned  Ask provider to discontinue device when no longer needed.       Orders: Written    RECOMMEND PRIMARY TEAM ORDER: None, at this time  Education provided: importance of repositioning, plan of care, and Off-loading pressure  Discussed plan of care with: Nurse  WOC nurse follow-up plan: signing off  Notify WOC if wound(s) deteriorate.  Nursing to notify the Provider(s) and re-consult the WOC Nurse if new skin concern.    DATA:     Current support surface: Standard  Low air loss (EDILBERTO pump, Isolibrium, Pulsate, skin guard, etc)  Containment of urine/stool: Indwelling catheter  BMI: Body mass index is 25.09 kg/m .   Active diet order: Orders Placed This Encounter      NPO for Medical/Clinical Reasons Except for: Meds     Output: I/O last 3 completed shifts:  In: -   Out: 150 [Urine:150]     Labs:   Recent Labs   Lab 01/24/24  1409 01/24/24  0525 01/23/24  2216 01/23/24  1106   ALBUMIN  --  3.7  --  3.7   HGB  --  15.1  --  14.1   INR 1.07 0.99   < >  --    WBC  --  21.7*  --  16.0*   A1C  --   --   --  4.8    < > = values in this interval not displayed.     Pressure injury risk assessment:   Sensory Perception: 1-->completely limited  Moisture: 3-->occasionally moist  Activity: 1-->bedfast  Mobility: 1-->completely immobile  Nutrition: 2-->probably inadequate  Friction and Shear: 2-->potential problem  Yves Score: 10    Rose GARCIAOCN   Dept. Vocera- Contact WO Nurse Rafael) via  Vocera   Dept. Office Number: [phone]

## 2024-01-25 NOTE — PROGRESS NOTES
EEG CLINICAL NEUROPHYSIOLOGY PRELIMINARY REPORT    Limited channel ambulatory EEG from approximately 6 AM to approximately 9:30 AM reviewed.  As best as can be determined from electronic medical record, patient treated with propofol 60 mcg/kg/min and fentanyl 175 mcg/h.    Myogenic artifact occasionally obscures.  When EEG interpretable, 20  V theta and runs alternates with periods of significant attenuation.  Occasional brief runs of delta activity.  No seizures.    Findings consistent with moderate to severe diffuse encephalopathy.  Findings may be related to sedative drips employed.  Extent of underlying cerebral dysfunction separate from anesthetic effects cannot be determined with information available.  No electrographic seizures.    Only ten channels recording from fronto temporal regions were utilized so no comments can be made regarding electrocerebral activity emerging from other areas of the brain. Please note that this technology was not intended for EEG monitoring lasting more than 6 hours or so and is not a substitute for long term monitoring. Longer recording with from standard complement of electrodes should be pursued if clinically indicated.    Jorge Luis Leo MD  Contact information for physicians covering Epilepsy and EEG is available on [facility].  Click search, enter neurology adult/[facility] in group name box, click on neurology adult/[facility], then click Staff Epilepsy and EEG.

## 2024-01-25 NOTE — CONSULTS
ICU order set consult received - Registered Dietitian to order TF per Medical Nutrition Therapy Guidelines   Pt transferred from [facility] this morning  Diet: NPO  Pt on vent  Per rounds - no plan to start EN at this time  Will continue to follow daily and be available for EN orders when/if needed    Danielle Romero RD, LD  Clinical Dietitian - [facility]   Pager - [phone]

## 2024-01-26 ENCOUNTER — HOSPITAL ENCOUNTER (INPATIENT)
Dept: NEUROLOGY | Facility: CLINIC | Age: 49
Discharge: HOME | End: 2024-01-26
Attending: PHYSICIAN ASSISTANT | Admitting: ANESTHESIOLOGY
Payer: COMMERCIAL

## 2024-01-26 ENCOUNTER — APPOINTMENT (OUTPATIENT)
Dept: CT IMAGING | Facility: CLINIC | Age: 49
End: 2024-01-26
Attending: NURSE PRACTITIONER
Payer: COMMERCIAL

## 2024-01-26 LAB
ALLEN'S TEST: ABNORMAL
ANION GAP SERPL CALCULATED.3IONS-SCNC: 9 MMOL/L (ref 7–15)
ANION GAP SERPL CALCULATED.3IONS-SCNC: 9 MMOL/L (ref 7–15)
ATRIAL RATE - MUSE: 100 BPM
ATRIAL RATE - MUSE: 44 BPM
ATRIAL RATE - MUSE: 55 BPM
ATRIAL RATE - MUSE: 60 BPM
ATRIAL RATE - MUSE: 64 BPM
BASE EXCESS BLDA CALC-SCNC: -1.4 MMOL/L (ref -3–3)
BASE EXCESS BLDA CALC-SCNC: 0.8 MMOL/L (ref -3–3)
BASE EXCESS BLDA CALC-SCNC: 0.9 MMOL/L (ref -3–3)
BASE EXCESS BLDA CALC-SCNC: 1.1 MMOL/L (ref -3–3)
BASE EXCESS BLDA CALC-SCNC: 1.3 MMOL/L (ref -3–3)
BASOPHILS # BLD AUTO: 0 10E3/UL (ref 0–0.2)
BASOPHILS NFR BLD AUTO: 0 %
BUN SERPL-MCNC: 10 MG/DL (ref 6–20)
BUN SERPL-MCNC: 10.1 MG/DL (ref 6–20)
CA-I BLD-MCNC: 4.5 MG/DL (ref 4.4–5.2)
CA-I BLD-MCNC: 4.6 MG/DL (ref 4.4–5.2)
CA-I BLD-MCNC: 4.7 MG/DL (ref 4.4–5.2)
CALCIUM SERPL-MCNC: 7.9 MG/DL (ref 8.6–10)
CALCIUM SERPL-MCNC: 8.3 MG/DL (ref 8.6–10)
CHLORIDE SERPL-SCNC: 108 MMOL/L (ref 98–107)
CHLORIDE SERPL-SCNC: 111 MMOL/L (ref 98–107)
COHGB MFR BLD: 92.7 % (ref 96–97)
COHGB MFR BLD: 94 % (ref 96–97)
COHGB MFR BLD: 97 % (ref 96–97)
COHGB MFR BLD: 97.1 % (ref 96–97)
COHGB MFR BLD: 98.2 % (ref 96–97)
CREAT SERPL-MCNC: 0.47 MG/DL (ref 0.51–0.95)
CREAT SERPL-MCNC: 0.56 MG/DL (ref 0.51–0.95)
DEPRECATED HCO3 PLAS-SCNC: 22 MMOL/L (ref 22–29)
DEPRECATED HCO3 PLAS-SCNC: 23 MMOL/L (ref 22–29)
DIASTOLIC BLOOD PRESSURE - MUSE: NORMAL MMHG
EGFRCR SERPLBLD CKD-EPI 2021: >90 ML/MIN/1.73M2
EGFRCR SERPLBLD CKD-EPI 2021: >90 ML/MIN/1.73M2
EOSINOPHIL # BLD AUTO: 0 10E3/UL (ref 0–0.7)
EOSINOPHIL NFR BLD AUTO: 0 %
ERYTHROCYTE [DISTWIDTH] IN BLOOD BY AUTOMATED COUNT: 14.9 % (ref 10–15)
GLUCOSE BLDC GLUCOMTR-MCNC: 90 MG/DL (ref 70–99)
GLUCOSE BLDC GLUCOMTR-MCNC: 91 MG/DL (ref 70–99)
GLUCOSE SERPL-MCNC: 88 MG/DL (ref 70–99)
GLUCOSE SERPL-MCNC: 95 MG/DL (ref 70–99)
HCO3 BLD-SCNC: 22 MMOL/L (ref 21–28)
HCO3 BLD-SCNC: 24 MMOL/L (ref 21–28)
HCT VFR BLD AUTO: 34.3 % (ref 35–47)
HGB BLD-MCNC: 11.2 G/DL (ref 11.7–15.7)
HOLD SPECIMEN: NORMAL
IMM GRANULOCYTES # BLD: 0 10E3/UL
IMM GRANULOCYTES NFR BLD: 0 %
INTERPRETATION ECG - MUSE: NORMAL
LACTATE SERPL-SCNC: 1.1 MMOL/L (ref 0.7–2)
LYMPHOCYTES # BLD AUTO: 2.2 10E3/UL (ref 0.8–5.3)
LYMPHOCYTES NFR BLD AUTO: 23 %
MAGNESIUM SERPL-MCNC: 1.9 MG/DL (ref 1.7–2.3)
MAGNESIUM SERPL-MCNC: 1.9 MG/DL (ref 1.7–2.3)
MAGNESIUM SERPL-MCNC: 2 MG/DL (ref 1.7–2.3)
MCH RBC QN AUTO: 28.8 PG (ref 26.5–33)
MCHC RBC AUTO-ENTMCNC: 32.7 G/DL (ref 31.5–36.5)
MCV RBC AUTO: 88 FL (ref 78–100)
MONOCYTES # BLD AUTO: 0.5 10E3/UL (ref 0–1.3)
MONOCYTES NFR BLD AUTO: 5 %
NEUTROPHILS # BLD AUTO: 6.8 10E3/UL (ref 1.6–8.3)
NEUTROPHILS NFR BLD AUTO: 72 %
NRBC # BLD AUTO: 0 10E3/UL
NRBC BLD AUTO-RTO: 0 /100
O2/TOTAL GAS SETTING VFR VENT: 30 %
O2/TOTAL GAS SETTING VFR VENT: 40 %
O2/TOTAL GAS SETTING VFR VENT: 40 %
P AXIS - MUSE: -19 DEGREES
P AXIS - MUSE: 55 DEGREES
P AXIS - MUSE: 69 DEGREES
P AXIS - MUSE: 71 DEGREES
P AXIS - MUSE: 73 DEGREES
PCO2 BLD: 30 MM HG (ref 35–45)
PCO2 BLD: 31 MM HG (ref 35–45)
PCO2 BLD: 32 MM HG (ref 35–45)
PCO2 BLD: 32 MM HG (ref 35–45)
PCO2 BLD: 33 MM HG (ref 35–45)
PEEP: 5 CM H2O
PH BLD: 7.45 [PH] (ref 7.35–7.45)
PH BLD: 7.47 [PH] (ref 7.35–7.45)
PH BLD: 7.49 [PH] (ref 7.35–7.45)
PH BLD: 7.5 [PH] (ref 7.35–7.45)
PH BLD: 7.5 [PH] (ref 7.35–7.45)
PLATELET # BLD AUTO: 131 10E3/UL (ref 150–450)
PO2 BLD: 101 MM HG (ref 80–105)
PO2 BLD: 111 MM HG (ref 80–105)
PO2 BLD: 63 MM HG (ref 80–105)
PO2 BLD: 69 MM HG (ref 80–105)
PO2 BLD: 92 MM HG (ref 80–105)
POTASSIUM SERPL-SCNC: 3.2 MMOL/L (ref 3.4–5.3)
POTASSIUM SERPL-SCNC: 3.4 MMOL/L (ref 3.4–5.3)
POTASSIUM SERPL-SCNC: 3.7 MMOL/L (ref 3.4–5.3)
PR INTERVAL - MUSE: 126 MS
PR INTERVAL - MUSE: 128 MS
PR INTERVAL - MUSE: 130 MS
PR INTERVAL - MUSE: 138 MS
PR INTERVAL - MUSE: 142 MS
QRS DURATION - MUSE: 66 MS
QRS DURATION - MUSE: 70 MS
QRS DURATION - MUSE: 78 MS
QRS DURATION - MUSE: 88 MS
QRS DURATION - MUSE: 94 MS
QT - MUSE: 328 MS
QT - MUSE: 636 MS
QT - MUSE: 668 MS
QT - MUSE: 682 MS
QT - MUSE: 728 MS
QTC - MUSE: 423 MS
QTC - MUSE: 639 MS
QTC - MUSE: 656 MS
QTC - MUSE: 682 MS
QTC - MUSE: 871 MS
R AXIS - MUSE: -1 DEGREES
R AXIS - MUSE: 39 DEGREES
R AXIS - MUSE: 54 DEGREES
R AXIS - MUSE: 55 DEGREES
R AXIS - MUSE: 68 DEGREES
RBC # BLD AUTO: 3.89 10E6/UL (ref 3.8–5.2)
SAO2 % BLDA: 91 % (ref 92–100)
SAO2 % BLDA: 92 % (ref 92–100)
SAO2 % BLDA: 95 % (ref 92–100)
SAO2 % BLDA: 95 % (ref 92–100)
SAO2 % BLDA: 96 % (ref 92–100)
SODIUM SERPL-SCNC: 139 MMOL/L (ref 135–145)
SODIUM SERPL-SCNC: 143 MMOL/L (ref 135–145)
SYSTOLIC BLOOD PRESSURE - MUSE: NORMAL MMHG
T AXIS - MUSE: -26 DEGREES
T AXIS - MUSE: 43 DEGREES
T AXIS - MUSE: 70 DEGREES
T AXIS - MUSE: 73 DEGREES
T AXIS - MUSE: 75 DEGREES
TRIGL SERPL-MCNC: 193 MG/DL
VENTRICULAR RATE- MUSE: 100 BPM
VENTRICULAR RATE- MUSE: 55 BPM
VENTRICULAR RATE- MUSE: 60 BPM
VENTRICULAR RATE- MUSE: 64 BPM
VENTRICULAR RATE- MUSE: 86 BPM
WBC # BLD AUTO: 9.5 10E3/UL (ref 4–11)

## 2024-01-26 PROCEDURE — 80048 BASIC METABOLIC PNL TOTAL CA: CPT | Performed by: ANESTHESIOLOGY

## 2024-01-26 PROCEDURE — 82805 BLOOD GASES W/O2 SATURATION: CPT | Performed by: ANESTHESIOLOGY

## 2024-01-26 PROCEDURE — 93010 ELECTROCARDIOGRAM REPORT: CPT | Performed by: INTERNAL MEDICINE

## 2024-01-26 PROCEDURE — 250N000011 HC RX IP 250 OP 636: Performed by: INTERNAL MEDICINE

## 2024-01-26 PROCEDURE — 999N000157 HC STATISTIC RCP TIME EA 10 MIN

## 2024-01-26 PROCEDURE — 99291 CRITICAL CARE FIRST HOUR: CPT | Mod: GC | Performed by: INTERNAL MEDICINE

## 2024-01-26 PROCEDURE — 200N000001 HC R&B ICU

## 2024-01-26 PROCEDURE — 36569 INSJ PICC 5 YR+ W/O IMAGING: CPT

## 2024-01-26 PROCEDURE — 83605 ASSAY OF LACTIC ACID: CPT | Performed by: ANESTHESIOLOGY

## 2024-01-26 PROCEDURE — 82330 ASSAY OF CALCIUM: CPT | Performed by: ANESTHESIOLOGY

## 2024-01-26 PROCEDURE — 99207 EEG VIDEO 12-26 HR UNMONITORED: CPT | Performed by: PSYCHIATRY & NEUROLOGY

## 2024-01-26 PROCEDURE — 250N000013 HC RX MED GY IP 250 OP 250 PS 637: Performed by: TRANSPLANT SURGERY

## 2024-01-26 PROCEDURE — 94003 VENT MGMT INPAT SUBQ DAY: CPT

## 2024-01-26 PROCEDURE — 99233 SBSQ HOSP IP/OBS HIGH 50: CPT | Mod: 25 | Performed by: INTERNAL MEDICINE

## 2024-01-26 PROCEDURE — 99291 CRITICAL CARE FIRST HOUR: CPT | Mod: FS | Performed by: NURSE PRACTITIONER

## 2024-01-26 PROCEDURE — 272N000452 HC KIT SHRLOCK 5FR POWER PICC TRIPLE LUMEN

## 2024-01-26 PROCEDURE — 82805 BLOOD GASES W/O2 SATURATION: CPT | Performed by: TRANSPLANT SURGERY

## 2024-01-26 PROCEDURE — 250N000011 HC RX IP 250 OP 636: Performed by: ANESTHESIOLOGY

## 2024-01-26 PROCEDURE — 84478 ASSAY OF TRIGLYCERIDES: CPT | Performed by: ANESTHESIOLOGY

## 2024-01-26 PROCEDURE — 85025 COMPLETE CBC W/AUTO DIFF WBC: CPT | Performed by: TRANSPLANT SURGERY

## 2024-01-26 PROCEDURE — 83735 ASSAY OF MAGNESIUM: CPT | Performed by: TRANSPLANT SURGERY

## 2024-01-26 PROCEDURE — 93005 ELECTROCARDIOGRAM TRACING: CPT

## 2024-01-26 PROCEDURE — 80048 BASIC METABOLIC PNL TOTAL CA: CPT | Performed by: TRANSPLANT SURGERY

## 2024-01-26 PROCEDURE — 82805 BLOOD GASES W/O2 SATURATION: CPT | Performed by: INTERNAL MEDICINE

## 2024-01-26 PROCEDURE — 258N000003 HC RX IP 258 OP 636: Performed by: ANESTHESIOLOGY

## 2024-01-26 PROCEDURE — 250N000013 HC RX MED GY IP 250 OP 250 PS 637: Performed by: ANESTHESIOLOGY

## 2024-01-26 PROCEDURE — 83735 ASSAY OF MAGNESIUM: CPT | Performed by: INTERNAL MEDICINE

## 2024-01-26 PROCEDURE — 250N000011 HC RX IP 250 OP 636: Performed by: TRANSPLANT SURGERY

## 2024-01-26 PROCEDURE — 250N000009 HC RX 250: Performed by: TRANSPLANT SURGERY

## 2024-01-26 PROCEDURE — 999N000052 EEG VIDEO 12-26 HR UNMONITORED

## 2024-01-26 PROCEDURE — 95720 EEG PHY/QHP EA INCR W/VEEG: CPT | Performed by: PSYCHIATRY & NEUROLOGY

## 2024-01-26 PROCEDURE — 70450 CT HEAD/BRAIN W/O DYE: CPT

## 2024-01-26 PROCEDURE — 999N000009 HC STATISTIC AIRWAY CARE

## 2024-01-26 RX ORDER — DEXMEDETOMIDINE HYDROCHLORIDE 4 UG/ML
.1-1.2 INJECTION, SOLUTION INTRAVENOUS CONTINUOUS
Status: DISCONTINUED | OUTPATIENT
Start: 2024-01-26 | End: 2024-01-30

## 2024-01-26 RX ORDER — MAGNESIUM SULFATE HEPTAHYDRATE 40 MG/ML
2 INJECTION, SOLUTION INTRAVENOUS ONCE
Status: COMPLETED | OUTPATIENT
Start: 2024-01-26 | End: 2024-01-26

## 2024-01-26 RX ORDER — POTASSIUM CHLORIDE 20MEQ/15ML
20 LIQUID (ML) ORAL ONCE
Status: COMPLETED | OUTPATIENT
Start: 2024-01-26 | End: 2024-01-26

## 2024-01-26 RX ORDER — POTASSIUM CHLORIDE 20MEQ/15ML
40 LIQUID (ML) ORAL ONCE
Status: COMPLETED | OUTPATIENT
Start: 2024-01-26 | End: 2024-01-26

## 2024-01-26 RX ORDER — LIDOCAINE 40 MG/G
CREAM TOPICAL
Status: ACTIVE | OUTPATIENT
Start: 2024-01-26 | End: 2024-01-29

## 2024-01-26 RX ORDER — HYDRALAZINE HYDROCHLORIDE 20 MG/ML
10-20 INJECTION INTRAMUSCULAR; INTRAVENOUS EVERY 6 HOURS PRN
Status: DISCONTINUED | OUTPATIENT
Start: 2024-01-26 | End: 2024-01-27

## 2024-01-26 RX ORDER — MAGNESIUM SULFATE HEPTAHYDRATE 40 MG/ML
2 INJECTION, SOLUTION INTRAVENOUS ONCE
Status: COMPLETED | OUTPATIENT
Start: 2024-01-26 | End: 2024-01-27

## 2024-01-26 RX ORDER — IBUPROFEN 200 MG
1 CAPSULE ORAL 2 TIMES DAILY
COMMUNITY
End: 2024-02-03

## 2024-01-26 RX ORDER — MAGNESIUM 200 MG
1000 TABLET ORAL DAILY
Status: ON HOLD | COMMUNITY
End: 2024-01-26

## 2024-01-26 RX ORDER — MULTIPLE VITAMINS W/ MINERALS TAB 9MG-400MCG
2 TAB ORAL DAILY
Status: ON HOLD | COMMUNITY
End: 2024-01-26

## 2024-01-26 RX ORDER — HYDRALAZINE HYDROCHLORIDE 20 MG/ML
10 INJECTION INTRAMUSCULAR; INTRAVENOUS EVERY 6 HOURS PRN
Status: DISCONTINUED | OUTPATIENT
Start: 2024-01-26 | End: 2024-01-26

## 2024-01-26 RX ADMIN — PROPOFOL 20 MCG/KG/MIN: 10 INJECTION, EMULSION INTRAVENOUS at 19:29

## 2024-01-26 RX ADMIN — ACETAMINOPHEN 650 MG: 325 SUSPENSION ORAL at 13:34

## 2024-01-26 RX ADMIN — AMPICILLIN SODIUM AND SULBACTAM SODIUM 3 G: 2; 1 INJECTION, POWDER, FOR SOLUTION INTRAMUSCULAR; INTRAVENOUS at 02:22

## 2024-01-26 RX ADMIN — Medication 125 MCG/HR: at 08:13

## 2024-01-26 RX ADMIN — PROPOFOL 50 MCG/KG/MIN: 10 INJECTION, EMULSION INTRAVENOUS at 08:57

## 2024-01-26 RX ADMIN — SODIUM CHLORIDE, POTASSIUM CHLORIDE, SODIUM LACTATE AND CALCIUM CHLORIDE: 600; 310; 30; 20 INJECTION, SOLUTION INTRAVENOUS at 23:38

## 2024-01-26 RX ADMIN — POTASSIUM CHLORIDE 40 MEQ: 20 SOLUTION ORAL at 22:18

## 2024-01-26 RX ADMIN — POTASSIUM CHLORIDE 20 MEQ: 20 SOLUTION ORAL at 06:35

## 2024-01-26 RX ADMIN — HYDRALAZINE HYDROCHLORIDE 10 MG: 20 INJECTION INTRAMUSCULAR; INTRAVENOUS at 08:58

## 2024-01-26 RX ADMIN — PROPOFOL 50 MCG/KG/MIN: 10 INJECTION, EMULSION INTRAVENOUS at 03:32

## 2024-01-26 RX ADMIN — LABETALOL HYDROCHLORIDE 20 MG: 5 INJECTION INTRAVENOUS at 05:03

## 2024-01-26 RX ADMIN — SODIUM CHLORIDE, POTASSIUM CHLORIDE, SODIUM LACTATE AND CALCIUM CHLORIDE: 600; 310; 30; 20 INJECTION, SOLUTION INTRAVENOUS at 06:07

## 2024-01-26 RX ADMIN — DEXMEDETOMIDINE HYDROCHLORIDE 0.2 MCG/KG/HR: 400 INJECTION INTRAVENOUS at 14:34

## 2024-01-26 RX ADMIN — DEXMEDETOMIDINE HYDROCHLORIDE 1.1 MCG/KG/HR: 400 INJECTION INTRAVENOUS at 20:43

## 2024-01-26 RX ADMIN — AMPICILLIN SODIUM AND SULBACTAM SODIUM 3 G: 2; 1 INJECTION, POWDER, FOR SOLUTION INTRAMUSCULAR; INTRAVENOUS at 08:05

## 2024-01-26 RX ADMIN — LIDOCAINE HYDROCHLORIDE 2 ML: 10 INJECTION, SOLUTION EPIDURAL; INFILTRATION; INTRACAUDAL; PERINEURAL at 16:15

## 2024-01-26 RX ADMIN — CHLORHEXIDINE GLUCONATE 0.12% ORAL RINSE 15 ML: 1.2 LIQUID ORAL at 07:56

## 2024-01-26 RX ADMIN — MAGNESIUM SULFATE HEPTAHYDRATE 2 G: 40 INJECTION, SOLUTION INTRAVENOUS at 06:35

## 2024-01-26 RX ADMIN — POTASSIUM CHLORIDE 40 MEQ: 20 SOLUTION ORAL at 17:44

## 2024-01-26 RX ADMIN — HYDRALAZINE HYDROCHLORIDE 20 MG: 20 INJECTION INTRAMUSCULAR; INTRAVENOUS at 09:48

## 2024-01-26 RX ADMIN — ACETAMINOPHEN 650 MG: 325 SUSPENSION ORAL at 17:47

## 2024-01-26 RX ADMIN — MAGNESIUM SULFATE HEPTAHYDRATE 2 G: 40 INJECTION, SOLUTION INTRAVENOUS at 23:34

## 2024-01-26 RX ADMIN — AMPICILLIN SODIUM AND SULBACTAM SODIUM 3 G: 2; 1 INJECTION, POWDER, FOR SOLUTION INTRAMUSCULAR; INTRAVENOUS at 20:39

## 2024-01-26 RX ADMIN — PROPOFOL 40 MCG/KG/MIN: 10 INJECTION, EMULSION INTRAVENOUS at 13:57

## 2024-01-26 RX ADMIN — AMPICILLIN SODIUM AND SULBACTAM SODIUM 3 G: 2; 1 INJECTION, POWDER, FOR SOLUTION INTRAMUSCULAR; INTRAVENOUS at 13:34

## 2024-01-26 RX ADMIN — HYDRALAZINE HYDROCHLORIDE 10 MG: 20 INJECTION INTRAMUSCULAR; INTRAVENOUS at 12:33

## 2024-01-26 RX ADMIN — CHLORHEXIDINE GLUCONATE 0.12% ORAL RINSE 15 ML: 1.2 LIQUID ORAL at 20:45

## 2024-01-26 RX ADMIN — Medication 40 MG: at 07:56

## 2024-01-26 ASSESSMENT — ACTIVITIES OF DAILY LIVING (ADL)
ADLS_ACUITY_SCORE: 47
ADLS_ACUITY_SCORE: 51
ADLS_ACUITY_SCORE: 47
ADLS_ACUITY_SCORE: 51
ADLS_ACUITY_SCORE: 51
ADLS_ACUITY_SCORE: 47

## 2024-01-26 NOTE — PROGRESS NOTES
Spontaneous breathing trial initiated 1/26 1250 PS 10/ +5 40%. Patient tolerating well. RT will continue to follow.

## 2024-01-26 NOTE — PROGRESS NOTES
Critical Care  Note      01/26/2024    Name: Danielle Tadeo MRN#: [id number]   Age: 48 year old YOB: 1975     [facility]    [facility] DAY #             Problem List:   Principal Problem:    Overdose           Summary/Hospital Course:     48-year-old female presents to the ICU after fentanyl overdose. According to the ER the patient was using fentanyl and became unresponsive the patient received on scene CPR and was in asystole after receiving 8 mg of Narcan.  She was treated with ACLS protocol and developed ventricular tachycardia which was defibrillated. She was intubated in the field with a 7.0 endotracheal tube.  Arterial line and cooling catheter placed in the emergency room.  The patient was admitted to the ICU.    Medical history includes opioid dependence, status post gastric bypass, hypertension, nicotine dependence     Interim history:  Patient had episodes of ventricular fibrillation and has had 2 cardiac arrests requiring defibrillation.  She was also found to have prolonged QTc and concern for torsades.  As per chart review the patient did have a history of torsades in the past when being treated for alcohol dependence.  Given the patient's continued hemodynamic instability and cardiac arrhythmias, cardiology was consulted and the patient had a transvenous pacer placed to override her underlying heart rate since she had the most episodes of arrhythmias when bradycardic.       Interval/overnight events:  Overnight  no major events. Afebrile stable hemodynamically. Patient no longer being cooled for her arrest and is not requiring any pressors.   EEG reviewed and findings consistent with moderate to severe diffuse encephalopathy. No electrographic seizures. As per cardiology no indication for permament pacemaker at this time.      Assessment and plan :     48 year old female admitted on 1/25/2024 for Drug over dose, intubated S/P Cardiac Arrest x2. Paced with transveous pacer at  "100. Currently off pacer and maintaining her heart rate.    My assessment and plan for this patient is as follows:      - Wean sedation  - Continue IV Antibiotics  - Continue Vent Support  - CT Head per Neurology  - Continue GI prophylaxis  - Follow up with Neurology  - Continue EEG monitoring  - Monitor Electrolytes K and Mag   - May need NJ tube for Tube Feed   - Continue follow up with cardiology    Neurology/Psychiatry:   Intubated and sedated  Propofol for sedation  Fentanyl for pain    Cardiovascular:   Off Pressors  Stable hemodynamically    Pulmonary/Ventilator Management:   Intubated and sedated  Velia propofol  Wean Fentanyl    GI and Nutrition :   NPO  Nutrition Consult  Abdomen soft, non distended, +ve BS    Renal/Fluids/Electrolytes:   BUN/Cr 10.0/0.4  K 3.7  Phos 1.9   Lactic Acid 1.1    Infectious Disease:   WBC 9.5  Afebrile  Continue Unasyn    Endocrine:   BG 95  Target BG as per ICU protocol    Hematology/Oncology:   WBC 9.5  Anemia, no signs, symptoms of active blood loss     IV/Access:   1. Venous access - Right Groin Femoral Line  2. Arterial access - Right Groin A-Line  3.  Plan  - Central Access Required and Necessary      ICU Prophylaxis:   1. DVT: Hep Subq/ LMWH/mechanical  2. VAP: HOB 30 degrees, chlorhexidine rinse  3. Stress Ulcer: PPI/H2 blocker  4. Restraints: Nonviolent soft two point restraints required and necessary for patient safety and continued cares and good effect as patient continues to pull at necessary lines, tubes despite education and distraction. Will readdress daily.   5. Wound care  -   6. Feeding - NPO  7. Family Update: Family updated  8. Disposition - Continue ICU Care    Clinically Significant Risk Factors                  # Hypertension: Noted on problem list        # Overweight: Estimated body mass index is 25.09 kg/m  as calculated from the following:    Height as of 1/23/24: 1.626 m (5' 4\").    Weight as of this encounter: 66.3 kg (146 lb 2.6 oz)., PRESENT ON " ADMISSION     # Financial/Environmental Concerns:                   Key Medications:      ampicillin-sulbactam  3 g Intravenous Q6H    chlorhexidine  15 mL Mouth/Throat Q12H    pantoprazole  40 mg Per Feeding Tube QAM AC    Or    pantoprazole  40 mg Intravenous QAM AC      fentaNYL 125 mcg/hr (01/25/24 1354)    lactated ringers 50 mL/hr at 01/26/24 0607    midazolam Stopped (01/25/24 1115)    propofol 50 mcg/kg/min (01/26/24 0332)            Physical Examination:   Temp:  [97.7  F (36.5  C)-98.8  F (37.1  C)] 98.1  F (36.7  C)  Pulse:  [] 100  Resp:  [8-21] 11  MAP:  [73 mmHg-130 mmHg] 98 mmHg  Arterial Line BP: (101-185)/() 130/77  FiO2 (%):  [30 %] 30 %  SpO2:  [94 %-98 %] 98 %    Intake/Output Summary (Last 24 hours) at 1/26/2024 0756  Last data filed at 1/26/2024 0600  Gross per 24 hour   Intake 1437.51 ml   Output 920 ml   Net 517.51 ml     Wt Readings from Last 4 Encounters:   01/25/24 66.3 kg (146 lb 2.6 oz)   01/23/24 64.2 kg (141 lb 8.6 oz)   10/04/21 78.5 kg (173 lb)   09/11/21 77.4 kg (170 lb 9.6 oz)     Arterial Line BP: (101-185)/() 130/77  MAP:  [73 mmHg-130 mmHg] 98 mmHg  Vent Mode: CMV/AC  (Continuous Mandatory Ventilation/ Assist Control)  FiO2 (%): 30 %  Resp Rate (Set): 16 breaths/min  Tidal Volume (Set, mL): 480 mL  PEEP (cm H2O): 5 cmH2O  Resp: 11    Recent Labs   Lab 01/26/24  0525 01/25/24  1350 01/24/24  0810 01/24/24  0525 01/23/24  1532   PH 7.45 7.45  --  7.41 7.35   PCO2 32* 33*  --  32* 44   PO2 92 93  --  124* 81   HCO3 22 23  --  21 24   O2PER 30 30 40 40 60       GEN: No Acute distress   HEENT: Sclera Anicteric, Trachea midline   PULM: Unlabored synchronous with vent, clear anteriorly    CV/COR: RRR S1S2 no gallop,  No rub, no murmur  ABD: soft non distended, hypoactive bowel sounds, no mass  EXT:  Warm and Well perfused x4  NEURO: PERRL, No Obvious deficits  SKIN: No Obvious Rash  LINES: Clean, Dry intact         Data:   All data and imaging reviewed     ROUTINE ICU  LABS (Last four results)  CMP  Recent Labs   Lab 01/26/24  0520 01/26/24  0519 01/25/24  2356 01/25/24  1959 01/25/24  1734 01/25/24  0915 01/25/24  0624 01/25/24  0336 01/25/24  0332 01/25/24  0027 01/24/24  2211 01/24/24  1610 01/24/24  1332 01/24/24  0958 01/24/24  0907 01/24/24  0809 01/24/24  0525 01/23/24  1109 01/23/24  1106     --   --   --  142  --  142  --   --   --   --   --  138  --  133*  --  134*   < > 139   POTASSIUM 3.7  --   --   --  3.7  --  3.9  --  4.2  --  3.8  --  4.1  --  4.6  4.6  --  3.3*   < > 3.9  3.9   CHLORIDE 108*  --   --   --  110*  --  111*  --   --   --   --   --  108*  --  104  --  104   < > 103   CO2 22  --   --   --  23  --  23  --   --   --   --   --  20*  --  19*  --  20*   < > 20*   ANIONGAP 9  --   --   --  9  --  8  --   --   --   --   --  10  --  10  --  10   < > 16*   GLC 95 90 91 105* 102*   < > 98   < >  --    < >  --    < > 102*   < > 120*  116*   < > 163*   < > 281*   BUN 10.0  --   --   --  10.4  --  9.5  --   --   --   --   --  9.4  --  10.6  --  11.9   < > 11.8   CR 0.47*  --   --   --  0.49*  --  0.47*  --   --   --   --   --  0.45*  --  0.39*  --  0.44*   < > 0.83   GFRESTIMATED >90  --   --   --  >90  --  >90  --   --   --   --   --  >90  --  >90  --  >90   < > 86   HAZEL 8.3*  --   --   --  8.4*  --  8.4*  --   --   --   --   --  8.0*  --  8.0*  --  8.0*   < > 7.6*   MAG 1.9  --   --   --   --   --  2.0  --  2.0  --  2.1  --   --   --  2.5*  --  2.4*   < > 2.1   PHOS  --   --   --   --   --   --  2.7  --  2.4*  --   --   --   --   --  2.5  --  2.8   < >  --    PROTTOTAL  --   --   --   --   --   --   --   --   --   --   --   --   --   --   --   --  5.8*  --  5.8*   ALBUMIN  --   --   --   --   --   --   --   --   --   --   --   --   --   --   --   --  3.7  --  3.7   BILITOTAL  --   --   --   --   --   --   --   --   --   --   --   --   --   --   --   --  0.4  --  0.4   ALKPHOS  --   --   --   --   --   --   --   --   --   --   --   --   --   --   --   --   "152*  --  207*   AST  --   --   --   --   --   --   --   --   --   --   --   --   --   --   --   --  204*  --  163*   ALT  --   --   --   --   --   --   --   --   --   --   --   --   --   --   --   --  50  --  44    < > = values in this interval not displayed.     CBC  Recent Labs   Lab 01/24/24  0525 01/23/24  1106 01/23/24  1104   WBC 21.7* 16.0*  --    RBC 5.12 4.82  --    HGB 15.1 14.1 14.6   HCT 44.0 43.6 43   MCV 86 91  --    MCH 29.5 29.3  --    MCHC 34.3 32.3  --    RDW 13.8 13.3  --     224  --      INR  Recent Labs   Lab 01/24/24  1409 01/24/24  0525 01/23/24  2216   INR 1.07 0.99 1.05     Arterial Blood Gas  Recent Labs   Lab 01/26/24  0525 01/25/24  1350 01/24/24  0810 01/24/24  0525 01/23/24  1532   PH 7.45 7.45  --  7.41 7.35   PCO2 32* 33*  --  32* 44   PO2 92 93  --  124* 81   HCO3 22 23  --  21 24   O2PER 30 30 40 40 60       All cultures:  No results for input(s): \"CULT\" in the last 168 hours.  No results found for this or any previous visit (from the past 24 hour(s)).      Billing: This patient is critically ill: Yes.     Patient seen and discussed with on call ICU Attending MD Yojana Blackburn MD, A  Surgical Intensive Critical Care, Fellow                        "

## 2024-01-26 NOTE — PROGRESS NOTES
EEG CLINICAL NEUROPHYSIOLOGY PRELIMINARY REPORT    Video EEG monitoring through approximately 10 AM today reviewed.  As best as can be gathered from electronic medical record, propofol 45 to 50 mcg/kg/min or fentanyl 125 mcg/h are administered intermittently during the study.    Largely continuous record.  During some portions of the recording, 30  V diffuse theta predominates.  During other portions of the recording 30 to 50  V rhythmic 2 to 3 Hz delta predominates.  EEG is reactive; stimulations associated with cares consistently elicit faster and higher amplitude activity.  Normal rhythms are not seen however.  No electrographic seizures.    Abnormal.  Findings consistent with moderate to severe diffuse encephalopathy.  Findings are within the range seen in people treated with propofol and fentanyl in the doses reported.  Extent of underlying cerebral dysfunction separate from anesthetic effects difficult to determine; however, malignant patterns not seen.  No seizures.    Full report to follow.    Jorge Luis Leo MD  Contact information for physicians covering Epilepsy and EEG is available on [facility].  Click search, enter neurology adult/ummc in group name box, click on neurology adult/ummc, then click Staff Epilepsy and EEG.

## 2024-01-26 NOTE — PLAN OF CARE
Problem: Adult Inpatient Plan of Care  Goal: Plan of Care Review  Description: The Plan of Care Review/Shift note should be completed every shift.  The Outcome Evaluation is a brief statement about your assessment that the patient is improving, declining, or no change.  This information will be displayed automatically on your shift  note.  Outcome: Progressing  Flowsheets (Taken 1/26/2024 0457)  Outcome Evaluation: labetalol effective for hypertension. opens eyes to voice. does not follow commands. W/D on all 4 and moves BUE spontaneously at times. not purposeful.  Plan of Care Reviewed With: patient  Overall Patient Progress: no change     Problem: Targeted Temperature Management  Goal: Target Body Temperature Maintained  Outcome: Met  Intervention: Maintain Target Body Temperature  Recent Flowsheet Documentation  Taken 1/26/2024 0400 by Lorin Driver RN  Thermoregulation Maintenance: (thermogard) other (see comments)  Taken 1/26/2024 0000 by Lorin Driver RN  Pain Management Interventions:   pain pump in use   repositioned  Thermoregulation Maintenance: (thermogard) other (see comments)  Taken 1/25/2024 2000 by Lorin Driver RN  Thermoregulation Maintenance: (thermogard) other (see comments)  Goal: Stable Cardiac Rate and Rhythm  Outcome: Met  Intervention: Monitor and Manage Cardiac Rhythm Effect  Recent Flowsheet Documentation  Taken 1/26/2024 0400 by Lorin Driver RN  Dysrhythmia Management: pacing wires maintained  Taken 1/26/2024 0000 by Lorin Driver RN  Dysrhythmia Management: pacing wires maintained  Taken 1/25/2024 2000 by Lorin Driver RN  Dysrhythmia Management: pacing wires maintained   Goal Outcome Evaluation:      Plan of Care Reviewed With: patient    Overall Patient Progress: no changeOverall Patient Progress: no change    Outcome Evaluation: labetalol effective for hypertension. opens eyes to voice. does not follow commands. W/D on all 4 and moves  BUE spontaneously at times. not purposeful.        Neuro: does not follow. Sedated on propofol and fentanyl IV. RASS -3 to -4. Some tremors noted on BLE intermittent. EEG continued.     CV labetalol for HTN as ordered. Administered twice. Replaced k, mg  100% v paced via transvenous pacer.    Pulm: vent settings unchanged. Respirations regular.    GI: BS hypoactice, ng in place to LIS     st with adequate output. Cares performed as ordered    Skin, blanchable redness on coccyx-mepilex in place and skin intact.   Called MD regarding right hand discoloration. Baseline dusky, however increased. Pulses 2+ and itnact on all extremities. MD assessed at bedside and ordered to continue to monitor.

## 2024-01-26 NOTE — PROCEDURES
[facility]    Triple Lumen PICC Placement    Date/Time: 1/26/2024 5:06 PM    Performed by: Agus Curry RN  Authorized by: Yojana Renteria MD  Indications: to remove femoral line.      UNIVERSAL PROTOCOL   Site Marked: Yes  Prior Images Obtained and Reviewed:  NA  Required items: Required blood products, implants, devices and special equipment available    Patient identity confirmed:  Arm band and hospital-assigned identification number  NA - No sedation, light sedation, or local anesthesia  Confirmation Checklist:  Patient's identity using two indicators, relevant allergies, procedure was appropriate and matched the consent or emergent situation and correct equipment/implants were available  Time out: Immediately prior to the procedure a time out was called    Universal Protocol: the Joint Commission Universal Protocol was followed    Preparation: Patient was prepped and draped in usual sterile fashion    ESBL (mL):  1     ANESTHESIA    Local Anesthetic:  Lidocaine 1% without epinephrine  Anesthetic Total (mL):  2      SEDATION  Patient Sedated: Yes    : Intubated.  Sedation:  Propofol  Vital signs: Vital signs monitored during sedation        Preparation: skin prepped with 2% chlorhexidine  Skin prep agent: skin prep agent completely dried prior to procedure  Sterile barriers: maximum sterile barriers were used: cap, mask, sterile gown, sterile gloves, and large sterile sheet  Hand hygiene: hand hygiene performed prior to central venous catheter insertion  Type of line used: PICC  Catheter type: triple lumen  Lumen type: valved  Lumen Identification: Purple, Gray and White  Catheter size: 5 Fr  Brand: Bard  Lot number: [id number]  Placement method: venipuncture, MST, ultrasound and tip navigation system  Number of attempts: 1  Difficulty threading catheter: no  Successful placement: yes  Orientation: left    Location: basilic vein  Tip Location: SVC  Arm circumference: adults 10  cm  Extremity circumference: 26  Visible catheter length: 0  Total catheter length: 45  Dressing and securement: adhesive securement device, blood cleaned with CHG, blood removed, chlorhexidine disc applied, dressing applied, line secured, occlusive dressing applied, statlock, securement device, sterile dressing applied and transparent securement dressing  Post procedure assessment: blood return through all ports and placement verified by 3CG technology  PROCEDURE   Patient Tolerance:  Patient tolerated the procedure well with no immediate complicationsDescribe Procedure: 3L PICC line placed successfully without comps. Lines flushed without resistance and good blood return. Tip in SVC as confirmed by 3CG.  Disposal: sharps and needle count correct at the end of procedure, needles and guidewire disposed in sharps container

## 2024-01-26 NOTE — PROGRESS NOTES
EP Progress Note          Assessment and Plan:     Danielle Tadeo is a 48 year old female with hypertension, obesity s/p gastric bypass, and polysubstance abuse, who was admitted to [facility] on 1/23/2024 with fentanyl overdose and VT/VF cardiac arrest.  She had out of hospital cardiac arrest with asystole as the initial rhythm.  She also had VT requiring defibrillation in the field.  She was initially on hypothermia protocol in the hospital.  She had significant QT prolongation, which worsened when her heart rates dropped to the 50's, with recurrent VT/VF.  She ultimately underwent LHC showing no significant CAD and temp wire placement on 1/24/24.  She is currently being V paced at 100 bpm.  She remains intubated and sedated.      She does have history of prolonged QT on EKG when she was admitted in 2021 with alcohol withdrawal.  She was on several QT prolonging medication during that admission including buprenorphine, trazodone, quetiapine.  She did have a prior EKG from 2008 which also showed borderline prolonged QTc.  QT prolongation and Torsades during this admission likely a combination of baseline prolonged QT, medications, electrolyte abnormalities, and bradycardia.    Pacer turned down again this morning.  Underlying rhythm still sinus with rate in the 70-80's.          - Pacer turned down, will repeat EKG to reassess QT interval -- Repeat EKG shows sinus rhythm, rate 100 bpm, QTc 570 ms, will leave temp wire in and leave back up pacing rate at 70 bpm for now  - Avoid QT prolonging medications, keep K>4 and mag>2  - Stopped PRN labetalol in case it contributes to bradycardia, started PRN hydralazine for BP control  - No indication for permament pacemaker at this time       Pérez Abernathy MD              Interval History:     Patient remains intubated and sedated. Opens eyes, but does not follow commands         Medications:       Current Facility-Administered Medications Ordered in Epic   Medication Dose Route  Frequency Last Rate Last Admin    acetaminophen (TYLENOL) tablet 650 mg  650 mg Oral Q4H PRN        Or    acetaminophen (TYLENOL) solution 650 mg  650 mg Per NG tube Q4H PRN        ampicillin-sulbactam (UNASYN) 3 g vial to attach to  mL bag  3 g Intravenous Q6H 200 mL/hr at 01/26/24 0805 3 g at 01/26/24 0805    bisacodyl (DULCOLAX) suppository 10 mg  10 mg Rectal Daily PRN        chlorhexidine (PERIDEX) 0.12 % solution 15 mL  15 mL Mouth/Throat Q12H   15 mL at 01/26/24 0756    glucose gel 15-30 g  15-30 g Oral Q15 Min PRN        Or    dextrose 50 % injection 25-50 mL  25-50 mL Intravenous Q15 Min PRN        Or    glucagon injection 1 mg  1 mg Subcutaneous Q15 Min PRN        fentaNYL (SUBLIMAZE) 50 mcg/mL bolus from pump  50 mcg Intravenous Q1H PRN   50 mcg at 01/25/24 1731    fentaNYL (SUBLIMAZE) infusion   mcg/hr Intravenous Continuous 2.5 mL/hr at 01/26/24 0813 125 mcg/hr at 01/26/24 0813    labetalol (NORMODYNE/TRANDATE) injection 20 mg  20 mg Intravenous Q4H PRN   20 mg at 01/26/24 0503    lactated ringers infusion   Intravenous Continuous 50 mL/hr at 01/26/24 0607 New Bag at 01/26/24 0607    magnesium hydroxide (MILK OF MAGNESIA) suspension 30 mL  30 mL Oral Daily PRN        midazolam (VERSED) 1 mg/mL bolus from syringe/bag pump ADULT  1 mg Intravenous Q30 Min PRN        midazolam (VERSED) 100mg/100mL NS infusion - ADULT  1-8 mg/hr Intravenous Continuous   Stopped at 01/25/24 1115    naloxone (NARCAN) injection 0.2 mg  0.2 mg Intravenous Q2 Min PRN        Or    naloxone (NARCAN) injection 0.4 mg  0.4 mg Intravenous Q2 Min PRN        Or    naloxone (NARCAN) injection 0.2 mg  0.2 mg Intramuscular Q2 Min PRN        Or    naloxone (NARCAN) injection 0.4 mg  0.4 mg Intramuscular Q2 Min PRN        pantoprazole (PROTONIX) 2 mg/mL suspension 40 mg  40 mg Per Feeding Tube QAM AC   40 mg at 01/26/24 0756    Or    pantoprazole (PROTONIX) IV push injection 40 mg  40 mg Intravenous QAM AC   40 mg at 01/25/24  0908    prochlorperazine (COMPAZINE) injection 10 mg  10 mg Intravenous Q6H PRN        Or    prochlorperazine (COMPAZINE) tablet 10 mg  10 mg Oral Q6H PRN        Or    prochlorperazine (COMPAZINE) suppository 25 mg  25 mg Rectal Q12H PRN        propofol (DIPRIVAN) infusion  5-75 mcg/kg/min Intravenous Continuous 19.9 mL/hr at 24 0332 50 mcg/kg/min at 24 0332    senna-docusate (SENOKOT-S/PERICOLACE) 8.6-50 MG per tablet 1 tablet  1 tablet Oral BID PRN        Or    senna-docusate (SENOKOT-S/PERICOLACE) 8.6-50 MG per tablet 2 tablet  2 tablet Oral BID PRN         No current [facility]-ordered outpatient medications on file.             Review of Systems: If done, described below  Review of systems is not obtainable due to patient factors - intubation and sedation             Physical Exam:   BP (!) 115/101   Pulse 100   Temp 98.8  F (37.1  C)   Resp 16   Wt 66.3 kg (146 lb 2.6 oz)   LMP 2015   SpO2 97%   BMI 25.09 kg/m        Vital Sign Ranges  Temperature Temp  Av.3  F (36.8  C)  Min: 97.7  F (36.5  C)  Max: 98.8  F (37.1  C)   Blood pressure No data recorded.       No data recorded.      Pulse Pulse  Av.9  Min: 98  Max: 100   Respirations Resp  Avg: 15.9  Min: 8  Max: 21   Pulse oximetry SpO2  Av.8 %  Min: 94 %  Max: 98 %         Intake/Output Summary (Last 24 hours) at 2024 0832  Last data filed at 2024 0800  Gross per 24 hour   Intake 1774.37 ml   Output 995 ml   Net 779.37 ml         Constitutional: Sedated   Respiratory: intubated, breathing with vent  Cardiovascular:  Regular rate and rhythm             Data:     Last Comprehensive Metabolic Panel:  Lab Results   Component Value Date     2024    POTASSIUM 3.7 2024    CHLORIDE 108 (H) 2024    CO2 22 2024    ANIONGAP 9 2024    GLC 95 2024    BUN 10.0 2024    CR 0.47 (L) 2024    GFRESTIMATED >90 2024    HAZEL 8.3 (L) 2024       CBC RESULTS:   Recent Labs    Lab Test 01/24/24  0525   WBC 21.7*   RBC 5.12   HGB 15.1   HCT 44.0   MCV 86   MCH 29.5   MCHC 34.3   RDW 13.8

## 2024-01-26 NOTE — PROGRESS NOTES
[facility] ICU RESPIRATORY NOTE           Date of Admission: 1/25/2024     Date of Intubation (most recent): 1/25/24     Reason for Mechanical Ventilation: AW protection     Number of Days on Mechanical Ventilation: 2     Met Criteria for Spontaneous Breathing Trial: No     Reason for No Spontaneous Breathing Trial: Per MD     Significant Events Today: None     ABG Results:   Recent Labs   Lab 01/25/24  1350 01/24/24  0810 01/24/24  0525 01/23/24  1532 01/23/24  1345   PH 7.45  --  7.41 7.35 7.32*   PCO2 33*  --  32* 44 46*   PO2 93  --  124* 81 222*   HCO3 23  --  21 24 24   O2PER 30 40 40 60 100     Vent Mode: CMV/AC  (Continuous Mandatory Ventilation/ Assist Control)  FiO2 (%): 30 %  Resp Rate (Set): 16 breaths/min  Tidal Volume (Set, mL): 480 mL  PEEP (cm H2O): 5 cmH2O  Resp: 16    SHEA Good, RRT

## 2024-01-26 NOTE — PROGRESS NOTES
[facility]    Stroke Progress Note    Interval Events  - No acute events overnight. Continues on fentanyl 125 mcg/hr and propofol 50 mcg/kg/min.   - No evidence of seizures on EEG thus far    HPI Summary   48 year old female who first presented to the hospital on January 23 after a drug overdose.  She was with her significant other at home snorting fentanyl and became unresponsive.  Her  started CPR.  Police came and gave Narcan and said she was down for 8 minutes.  Initially she was in asystole.  She was given epinephrine and she was defibrillated at least 4 times for vtach and then intubated.  She was admitted at [facility] initially and was cooled.     When she was at [facility] her rhythm has been extremely difficult to control..  She developed further episodes of cardiac arrest and V-fib at [facility] and was defibrillated twice more there.  Also concern for torsades and she has had a transvenous pacer placed and ultimately was transferred here to [facility] for pacemaker evaluation    Evaluation Summarized    MRI/Head CT CTH 1/26: Diffuse mild blurring of gray-white matter  differentiation. However, there is no sulcal or basilar cistern effacement compared to previous CT. This is favored artifactual.  CTH 1/23: no acute pathology   EEG 1/26: no seizures, moderate/diffuse encephalopathy  1/25: no seizures, moderate/diffuse encephalopathy     Impression   1. Encephalopathy in the setting of recent cardiac arrest. Remains on high doses of sedative medications. Undergoing evaluation for seizure, anoxic injury.    Plan  - cEEG  - Repeat CTH today. Unable to obtain MRI at this time due to pacing wires, can reevaluate need in the coming days  - Wean sedating medications, as able    Patient Follow-up    - final recommendation pending work-up    We will continue to follow.     Celine Brunner, CNP  Vascular Neurology    To page me or covering stroke neurology team member, click here:  "AMCOM  Choose \"On Call\" tab at top, then select \"NEUROLOGY/ALL SITES\" from middle drop-down box, press Enter, then look for \"stroke\" or \"telestroke\" for your site.  ______________________________________________________    Clinically Significant Risk Factors                  # Hypertension: Noted on problem list        # Overweight: Estimated body mass index is 25.09 kg/m  as calculated from the following:    Height as of 1/23/24: 1.626 m (5' 4\").    Weight as of this encounter: 66.3 kg (146 lb 2.6 oz)., PRESENT ON ADMISSION     # Financial/Environmental Concerns:             Medications   Scheduled Meds   ampicillin-sulbactam  3 g Intravenous Q6H    chlorhexidine  15 mL Mouth/Throat Q12H    pantoprazole  40 mg Per Feeding Tube QAM AC    Or    pantoprazole  40 mg Intravenous QAM AC       Infusion Meds   fentaNYL 125 mcg/hr (01/26/24 0813)    lactated ringers 50 mL/hr at 01/26/24 0607    midazolam Stopped (01/25/24 1115)    propofol 50 mcg/kg/min (01/26/24 1200)       PRN Meds  acetaminophen **OR** acetaminophen, bisacodyl, glucose **OR** dextrose **OR** glucagon, fentaNYL, hydrALAZINE, magnesium hydroxide, midazolam, naloxone **OR** naloxone **OR** naloxone **OR** naloxone, prochlorperazine **OR** prochlorperazine **OR** prochlorperazine, senna-docusate **OR** senna-docusate       PHYSICAL EXAMINATION  Temp:  [97.7  F (36.5  C)-99.1  F (37.3  C)] 99.1  F (37.3  C)  Pulse:  [] 105  Resp:  [8-26] 25  BP: (152-168)/(67-78) 152/67  MAP:  [83 mmHg-130 mmHg] 112 mmHg  Arterial Line BP: (108-185)/() 170/77  FiO2 (%):  [30 %] 30 %  SpO2:  [91 %-98 %] 93 %      Neurologic  Mental Status:   eyes open spontaneously, turns head to voice, appears to attend to examiner, does  not follow commands  Cranial Nerves:   face appears grossly symmetric, no gaze preference, pupils equal  Motor:  normal muscle tone and bulk, no abnormal movements, moves hands and feet spontaneously   Reflexes:   toes mute  Sensory:   see " motor  Coordination:   unable to participate in formal exam  Station/Gait:  deferred    Imaging  I personally reviewed all imaging; relevant findings per HPI.     Lab Results Data   CBC  Recent Labs   Lab 01/26/24  0809 01/24/24  0525 01/23/24  1106   WBC 9.5 21.7* 16.0*   RBC 3.89 5.12 4.82   HGB 11.2* 15.1 14.1   HCT 34.3* 44.0 43.6   * 250 224     Basic Metabolic Panel    Recent Labs   Lab 01/26/24  0520 01/26/24  0519 01/25/24  2356 01/25/24  1959 01/25/24  1734 01/25/24  0915 01/25/24  0624     --   --   --  142  --  142   POTASSIUM 3.7  --   --   --  3.7  --  3.9   CHLORIDE 108*  --   --   --  110*  --  111*   CO2 22  --   --   --  23  --  23   BUN 10.0  --   --   --  10.4  --  9.5   CR 0.47*  --   --   --  0.49*  --  0.47*   GLC 95 90 91   < > 102*   < > 98   HAZEL 8.3*  --   --   --  8.4*  --  8.4*    < > = values in this interval not displayed.     Liver Panel  Recent Labs   Lab 01/24/24  0525 01/23/24  1106   PROTTOTAL 5.8* 5.8*   ALBUMIN 3.7 3.7   BILITOTAL 0.4 0.4   ALKPHOS 152* 207*   * 163*   ALT 50 44     INR    Recent Labs   Lab Test 01/24/24  1409 01/24/24  0525 01/23/24  2216   INR 1.07 0.99 1.05      Lipid Profile    Recent Labs   Lab Test 01/24/24  1055   TRIG 338*     A1C    Recent Labs   Lab Test 01/23/24  1106   A1C 4.8     Troponin    Recent Labs   Lab 01/24/24  1816 01/24/24  1201 01/24/24  0525   CTROPT 216* 245* 312*          Data   I personally examined and evaluated the patient today. At the time of my evaluation and management the patient was in critical condition today due to encephalopathy. I personally managed examination. Key decisions made today included CT head. I spent a total of 30 minutes providing critical care services, evaluating the patient, directing care and reviewing laboratory values and radiologic reports.

## 2024-01-26 NOTE — PROGRESS NOTES
EEG CLINICAL NEUROPHYSIOLOGY PRELIMINARY REPORT    First 6 hours of video EEG reviewed.  As best as can be determined from electronic medical record, patient treated with fentanyl 125 to 50 mcg/h and propofol 40 to 60 mcg/kg/min throughout.  40 to 50  V diffuse bifrontal alpha predominates.  This pattern is largely continuous.  Stimulation associated with cares occasionally induces high amplitude semirhythmic symmetrical delta.  This indicates some degree of reactivity.  No electrographic seizures.    Findings consistent with moderate to severe diffuse encephalopathy.  Findings within the range typically seen in patients treated with sedative drips at the doses reported.  Extent of underlying cerebral dysfunction separate from anesthetic effects difficult to ascertain; however, malignant patterns not seen.  No electrographic seizures.  Propofol at doses reported has significant anticonvulsant effects so seizures could potentially emerge as this treatment is reduced.    Will continue video EEG monitoring.  Full report to follow.    Jorge Luis Leo MD  Contact information for physicians covering Epilepsy and EEG is available on [facility].  Click search, enter neurology adult/[facility] in group name box, click on neurology adult/[facility], then click Staff Epilepsy and EEG.

## 2024-01-27 LAB
ALLEN'S TEST: ABNORMAL
ANION GAP SERPL CALCULATED.3IONS-SCNC: 7 MMOL/L (ref 7–15)
BASE EXCESS BLDA CALC-SCNC: -1.2 MMOL/L (ref -3–3)
BASOPHILS # BLD AUTO: 0 10E3/UL (ref 0–0.2)
BASOPHILS NFR BLD AUTO: 0 %
BUN SERPL-MCNC: 10.9 MG/DL (ref 6–20)
CA-I BLD-MCNC: 4.5 MG/DL (ref 4.4–5.2)
CA-I BLD-MCNC: 4.5 MG/DL (ref 4.4–5.2)
CA-I BLD-MCNC: 4.6 MG/DL (ref 4.4–5.2)
CALCIUM SERPL-MCNC: 7.9 MG/DL (ref 8.6–10)
CHLORIDE SERPL-SCNC: 112 MMOL/L (ref 98–107)
COHGB MFR BLD: 97.2 % (ref 96–97)
CREAT SERPL-MCNC: 0.5 MG/DL (ref 0.51–0.95)
DEPRECATED HCO3 PLAS-SCNC: 23 MMOL/L (ref 22–29)
EGFRCR SERPLBLD CKD-EPI 2021: >90 ML/MIN/1.73M2
EOSINOPHIL # BLD AUTO: 0 10E3/UL (ref 0–0.7)
EOSINOPHIL NFR BLD AUTO: 0 %
ERYTHROCYTE [DISTWIDTH] IN BLOOD BY AUTOMATED COUNT: 14.6 % (ref 10–15)
GLUCOSE BLDC GLUCOMTR-MCNC: 81 MG/DL (ref 70–99)
GLUCOSE SERPL-MCNC: 96 MG/DL (ref 70–99)
HCO3 BLD-SCNC: 23 MMOL/L (ref 21–28)
HCT VFR BLD AUTO: 27 % (ref 35–47)
HGB BLD-MCNC: 8.6 G/DL (ref 11.7–15.7)
IMM GRANULOCYTES # BLD: 0 10E3/UL
IMM GRANULOCYTES NFR BLD: 1 %
LACTATE SERPL-SCNC: 0.5 MMOL/L (ref 0.7–2)
LYMPHOCYTES # BLD AUTO: 0.9 10E3/UL (ref 0.8–5.3)
LYMPHOCYTES NFR BLD AUTO: 17 %
MAGNESIUM SERPL-MCNC: 2.1 MG/DL (ref 1.7–2.3)
MCH RBC QN AUTO: 28.9 PG (ref 26.5–33)
MCHC RBC AUTO-ENTMCNC: 31.9 G/DL (ref 31.5–36.5)
MCV RBC AUTO: 91 FL (ref 78–100)
MONOCYTES # BLD AUTO: 0.3 10E3/UL (ref 0–1.3)
MONOCYTES NFR BLD AUTO: 6 %
NEUTROPHILS # BLD AUTO: 4.1 10E3/UL (ref 1.6–8.3)
NEUTROPHILS NFR BLD AUTO: 76 %
NRBC # BLD AUTO: 0 10E3/UL
NRBC BLD AUTO-RTO: 0 /100
O2/TOTAL GAS SETTING VFR VENT: 40 %
PCO2 BLD: 37 MM HG (ref 35–45)
PEEP: 5 CM H2O
PH BLD: 7.41 [PH] (ref 7.35–7.45)
PLATELET # BLD AUTO: 105 10E3/UL (ref 150–450)
PO2 BLD: 94 MM HG (ref 80–105)
POTASSIUM SERPL-SCNC: 4 MMOL/L (ref 3.4–5.3)
RBC # BLD AUTO: 2.98 10E6/UL (ref 3.8–5.2)
SAO2 % BLDA: 95 % (ref 92–100)
SODIUM SERPL-SCNC: 142 MMOL/L (ref 135–145)
WBC # BLD AUTO: 5.3 10E3/UL (ref 4–11)

## 2024-01-27 PROCEDURE — 250N000011 HC RX IP 250 OP 636: Performed by: INTERNAL MEDICINE

## 2024-01-27 PROCEDURE — 250N000009 HC RX 250: Performed by: TRANSPLANT SURGERY

## 2024-01-27 PROCEDURE — 250N000013 HC RX MED GY IP 250 OP 250 PS 637: Performed by: ANESTHESIOLOGY

## 2024-01-27 PROCEDURE — 200N000001 HC R&B ICU

## 2024-01-27 PROCEDURE — 258N000003 HC RX IP 258 OP 636: Performed by: ANESTHESIOLOGY

## 2024-01-27 PROCEDURE — 999N000157 HC STATISTIC RCP TIME EA 10 MIN

## 2024-01-27 PROCEDURE — 250N000009 HC RX 250: Performed by: INTERNAL MEDICINE

## 2024-01-27 PROCEDURE — 83605 ASSAY OF LACTIC ACID: CPT | Performed by: ANESTHESIOLOGY

## 2024-01-27 PROCEDURE — 99233 SBSQ HOSP IP/OBS HIGH 50: CPT | Performed by: INTERNAL MEDICINE

## 2024-01-27 PROCEDURE — 82330 ASSAY OF CALCIUM: CPT | Performed by: ANESTHESIOLOGY

## 2024-01-27 PROCEDURE — 99291 CRITICAL CARE FIRST HOUR: CPT | Mod: FS | Performed by: NURSE PRACTITIONER

## 2024-01-27 PROCEDURE — 80048 BASIC METABOLIC PNL TOTAL CA: CPT | Performed by: ANESTHESIOLOGY

## 2024-01-27 PROCEDURE — 85025 COMPLETE CBC W/AUTO DIFF WBC: CPT | Performed by: TRANSPLANT SURGERY

## 2024-01-27 PROCEDURE — 999N000009 HC STATISTIC AIRWAY CARE

## 2024-01-27 PROCEDURE — C9113 INJ PANTOPRAZOLE SODIUM, VIA: HCPCS | Performed by: ANESTHESIOLOGY

## 2024-01-27 PROCEDURE — 94003 VENT MGMT INPAT SUBQ DAY: CPT

## 2024-01-27 PROCEDURE — 82805 BLOOD GASES W/O2 SATURATION: CPT | Performed by: INTERNAL MEDICINE

## 2024-01-27 PROCEDURE — 250N000011 HC RX IP 250 OP 636: Performed by: ANESTHESIOLOGY

## 2024-01-27 PROCEDURE — 83735 ASSAY OF MAGNESIUM: CPT | Performed by: INTERNAL MEDICINE

## 2024-01-27 PROCEDURE — 250N000011 HC RX IP 250 OP 636: Performed by: TRANSPLANT SURGERY

## 2024-01-27 RX ORDER — ENALAPRILAT 1.25 MG/ML
1.25 INJECTION INTRAVENOUS EVERY 6 HOURS
Status: DISCONTINUED | OUTPATIENT
Start: 2024-01-27 | End: 2024-01-28

## 2024-01-27 RX ORDER — HYDRALAZINE HYDROCHLORIDE 20 MG/ML
10-20 INJECTION INTRAMUSCULAR; INTRAVENOUS
Status: DISCONTINUED | OUTPATIENT
Start: 2024-01-27 | End: 2024-01-31 | Stop reason: HOSPADM

## 2024-01-27 RX ADMIN — DEXMEDETOMIDINE HYDROCHLORIDE 1.2 MCG/KG/HR: 400 INJECTION INTRAVENOUS at 11:01

## 2024-01-27 RX ADMIN — HYDRALAZINE HYDROCHLORIDE 20 MG: 20 INJECTION INTRAMUSCULAR; INTRAVENOUS at 20:56

## 2024-01-27 RX ADMIN — Medication 25 MCG/HR: at 17:36

## 2024-01-27 RX ADMIN — AMPICILLIN SODIUM AND SULBACTAM SODIUM 3 G: 2; 1 INJECTION, POWDER, FOR SOLUTION INTRAMUSCULAR; INTRAVENOUS at 14:21

## 2024-01-27 RX ADMIN — DEXMEDETOMIDINE HYDROCHLORIDE 1.2 MCG/KG/HR: 400 INJECTION INTRAVENOUS at 06:08

## 2024-01-27 RX ADMIN — HYDRALAZINE HYDROCHLORIDE 20 MG: 20 INJECTION INTRAMUSCULAR; INTRAVENOUS at 11:00

## 2024-01-27 RX ADMIN — PANTOPRAZOLE SODIUM 40 MG: 40 INJECTION, POWDER, FOR SOLUTION INTRAVENOUS at 06:43

## 2024-01-27 RX ADMIN — SODIUM CHLORIDE, POTASSIUM CHLORIDE, SODIUM LACTATE AND CALCIUM CHLORIDE: 600; 310; 30; 20 INJECTION, SOLUTION INTRAVENOUS at 19:30

## 2024-01-27 RX ADMIN — DEXMEDETOMIDINE HYDROCHLORIDE 1.2 MCG/KG/HR: 400 INJECTION INTRAVENOUS at 20:43

## 2024-01-27 RX ADMIN — ENALAPRILAT 1.25 MG: 1.25 INJECTION INTRAVENOUS at 20:04

## 2024-01-27 RX ADMIN — CHLORHEXIDINE GLUCONATE 0.12% ORAL RINSE 15 ML: 1.2 LIQUID ORAL at 07:31

## 2024-01-27 RX ADMIN — AMPICILLIN SODIUM AND SULBACTAM SODIUM 3 G: 2; 1 INJECTION, POWDER, FOR SOLUTION INTRAMUSCULAR; INTRAVENOUS at 07:35

## 2024-01-27 RX ADMIN — DEXMEDETOMIDINE HYDROCHLORIDE 1.2 MCG/KG/HR: 400 INJECTION INTRAVENOUS at 01:16

## 2024-01-27 RX ADMIN — Medication 50 MCG: at 19:53

## 2024-01-27 RX ADMIN — HYDRALAZINE HYDROCHLORIDE 20 MG: 20 INJECTION INTRAMUSCULAR; INTRAVENOUS at 17:32

## 2024-01-27 RX ADMIN — Medication 50 MCG: at 17:06

## 2024-01-27 RX ADMIN — AMPICILLIN SODIUM AND SULBACTAM SODIUM 3 G: 2; 1 INJECTION, POWDER, FOR SOLUTION INTRAMUSCULAR; INTRAVENOUS at 01:16

## 2024-01-27 RX ADMIN — DEXMEDETOMIDINE HYDROCHLORIDE 1.2 MCG/KG/HR: 400 INJECTION INTRAVENOUS at 15:23

## 2024-01-27 RX ADMIN — ENALAPRILAT 1.25 MG: 1.25 INJECTION INTRAVENOUS at 16:16

## 2024-01-27 RX ADMIN — HYDRALAZINE HYDROCHLORIDE 20 MG: 20 INJECTION INTRAMUSCULAR; INTRAVENOUS at 14:23

## 2024-01-27 RX ADMIN — AMPICILLIN SODIUM AND SULBACTAM SODIUM 3 G: 2; 1 INJECTION, POWDER, FOR SOLUTION INTRAMUSCULAR; INTRAVENOUS at 20:05

## 2024-01-27 ASSESSMENT — ACTIVITIES OF DAILY LIVING (ADL)
ADLS_ACUITY_SCORE: 51
ADLS_ACUITY_SCORE: 48
ADLS_ACUITY_SCORE: 51
ADLS_ACUITY_SCORE: 48
ADLS_ACUITY_SCORE: 51
ADLS_ACUITY_SCORE: 48
ADLS_ACUITY_SCORE: 51

## 2024-01-27 NOTE — PROGRESS NOTES
Patient extubated to 4L oxymask @ 1210. Well tolerated. Coarse breath sounds. Productive cough.    JHONNY MADRIGAL, RRT

## 2024-01-27 NOTE — PROGRESS NOTES
[facility]    Stroke Progress Note    Interval Events  - No acute events overnight. Propofol weaned. Now on Precedex and fentanyl. More alert on examination today.  - No evidence of seizures on EEG.    HPI Summary   48 year old female who first presented to the hospital on January 23 after a drug overdose.  She was with her significant other at home snorting fentanyl and became unresponsive.  Her  started CPR.  Police came and gave Narcan and said she was down for 8 minutes.  Initially she was in asystole.  She was given epinephrine and she was defibrillated at least 4 times for vtach and then intubated.  She was admitted at [facility] initially and was cooled.     When she was at [facility] her rhythm has been extremely difficult to control..  She developed further episodes of cardiac arrest and V-fib at [facility] and was defibrillated twice more there.  Also concern for torsades and she has had a transvenous pacer placed and ultimately was transferred here to [facility] for pacemaker evaluation    Evaluation Summarized    MRI/Head CT CTH 1/26: Diffuse mild blurring of gray-white matter  differentiation. However, there is no sulcal or basilar cistern effacement compared to previous CT. This is favored artifactual.  CTH 1/23: no acute pathology   EEG 1/27: no seizures or epileptiform discharges  1/26: no seizures, moderate/diffuse encephalopathy  1/25: no seizures, moderate/diffuse encephalopathy     Impression   1. Encephalopathy in the setting of recent cardiac arrest, now improving. EEG without evidence of seizures. Unable to obtain MRI brain due to pacing wires.     Plan  - Discontinue EEG  - Unable to obtain MRI at this time due to pacing wires, can reevaluate need in the coming days  - Continue to limit sedation, as able    Patient Follow-up    - final recommendation pending work-up    We will continue to follow.     Celine Brunner, CNP  Vascular Neurology    To page me or covering  "stroke neurology team member, click here: AMCOM  Choose \"On Call\" tab at top, then select \"NEUROLOGY/ALL SITES\" from middle drop-down box, press Enter, then look for \"stroke\" or \"telestroke\" for your site.  ______________________________________________________    Clinically Significant Risk Factors        # Hypokalemia: Lowest K = 3.2 mmol/L in last 2 days, will replace as needed         # Thrombocytopenia: Lowest platelets = 105 in last 2 days, will monitor for bleeding   # Hypertension: Noted on problem list        # Overweight: Estimated body mass index is 25.09 kg/m  as calculated from the following:    Height as of 1/23/24: 1.626 m (5' 4\").    Weight as of this encounter: 66.3 kg (146 lb 2.6 oz)., PRESENT ON ADMISSION       # Financial/Environmental Concerns:             Medications   Scheduled Meds   ampicillin-sulbactam  3 g Intravenous Q6H    chlorhexidine  15 mL Mouth/Throat Q12H    pantoprazole  40 mg Per Feeding Tube QAM AC    Or    pantoprazole  40 mg Intravenous QAM AC       Infusion Meds   dexmedeTOMIDine 1.2 mcg/kg/hr (01/27/24 0608)    fentaNYL 75 mcg/hr (01/26/24 1625)    lactated ringers 50 mL/hr at 01/27/24 0742    midazolam Stopped (01/25/24 1115)    propofol Stopped (01/27/24 0515)       PRN Meds  acetaminophen **OR** acetaminophen, bisacodyl, glucose **OR** dextrose **OR** glucagon, fentaNYL, hydrALAZINE, lidocaine 4%, lidocaine (buffered or not buffered), magnesium hydroxide, midazolam, naloxone **OR** naloxone **OR** naloxone **OR** naloxone, prochlorperazine **OR** prochlorperazine **OR** prochlorperazine, senna-docusate **OR** senna-docusate       PHYSICAL EXAMINATION  Temp:  [99.1  F (37.3  C)-100.6  F (38.1  C)] 99.5  F (37.5  C)  Pulse:  [] 77  Resp:  [10-37] 22  BP: (178-191)/(78-87) 178/78  MAP:  [62 mmHg-128 mmHg] 91 mmHg  Arterial Line BP: ()/(46-90) 135/63  FiO2 (%):  [30 %-40 %] 40 %  SpO2:  [92 %-98 %] 97 %      Neurologic  Mental Status:  eyes open spontaneously, " attends to examiner, follows most commands (lifts head, opens mouth, moves hands/feet)  Cranial Nerves:   face appears grossly symmetric, no gaze preference, pupils equal, EOMs intact  Motor:  normal muscle tone and bulk, no abnormal movements, moves hands and feet spontaneously, no obvious focal weakness   Reflexes:   toes mute  Sensory:   see motor  Coordination:   deferred  Station/Gait:  deferred    Imaging  I personally reviewed all imaging; relevant findings per HPI.     Lab Results Data   CBC  Recent Labs   Lab 01/27/24  0546 01/26/24  0809 01/24/24  0525   WBC 5.3 9.5 21.7*   RBC 2.98* 3.89 5.12   HGB 8.6* 11.2* 15.1   HCT 27.0* 34.3* 44.0   * 131* 250     Basic Metabolic Panel    Recent Labs   Lab 01/27/24  0546 01/26/24  2121 01/26/24  1514 01/26/24  0520    143  --  139   POTASSIUM 4.0 3.4 3.2* 3.7   CHLORIDE 112* 111*  --  108*   CO2 23 23  --  22   BUN 10.9 10.1  --  10.0   CR 0.50* 0.56  --  0.47*   GLC 96 88  --  95   HAZEL 7.9* 7.9*  --  8.3*     Liver Panel  Recent Labs   Lab 01/24/24  0525 01/23/24  1106   PROTTOTAL 5.8* 5.8*   ALBUMIN 3.7 3.7   BILITOTAL 0.4 0.4   ALKPHOS 152* 207*   * 163*   ALT 50 44     INR    Recent Labs   Lab Test 01/24/24  1409 01/24/24  0525 01/23/24  2216   INR 1.07 0.99 1.05      Lipid Profile    Recent Labs   Lab Test 01/26/24  0520 01/24/24  1055   TRIG 193* 338*     A1C    Recent Labs   Lab Test 01/23/24  1106   A1C 4.8     Troponin    Recent Labs   Lab 01/24/24  1816 01/24/24  1201 01/24/24  0525   CTROPT 216* 245* 312*          Data   I personally examined and evaluated the patient today. At the time of my evaluation and management the patient was in critical condition today due to encephalopathy. I personally managed examination. Key decisions made today included EEG head. I spent a total of 30 minutes providing critical care services, evaluating the patient, directing care and reviewing laboratory values and radiologic reports.

## 2024-01-27 NOTE — PLAN OF CARE
"  Problem: Adult Inpatient Plan of Care  Goal: Plan of Care Review  Description: The Plan of Care Review/Shift note should be completed every shift.  The Outcome Evaluation is a brief statement about your assessment that the patient is improving, declining, or no change.  This information will be displayed automatically on your shift  note.  Outcome: Not Progressing  Flowsheets (Taken 1/27/2024 0656)  Outcome Evaluation: Remains vented, possible extubation today. Propofol weaned off, Dex infusing. No PRNs for pain. Turned and repositoned. Safe while in restraints. Restless/agitated at times. Will continue to monitor.  Plan of Care Reviewed With: patient  Goal: Patient-Specific Goal (Individualized)  Description: You can add care plan individualizations to a care plan. Examples of Individualization might be:  \"Parent requests to be called daily at 9am for status\", \"I have a hard time hearing out of my right ear\", or \"Do not touch me to wake me up as it startles  me\".  Outcome: Not Progressing  Goal: Absence of Hospital-Acquired Illness or Injury  Outcome: Progressing  Intervention: Identify and Manage Fall Risk  Recent Flowsheet Documentation  Taken 1/27/2024 0400 by Blade Malone, RN  Safety Promotion/Fall Prevention:   activity supervised   treat reversible contributory factors   treat underlying cause  Taken 1/27/2024 0000 by Blade Malone, RN  Safety Promotion/Fall Prevention:   activity supervised   treat reversible contributory factors   treat underlying cause  Taken 1/26/2024 2000 by Blade Malone, RN  Safety Promotion/Fall Prevention:   activity supervised   treat reversible contributory factors   treat underlying cause  Intervention: Prevent Skin Injury  Recent Flowsheet Documentation  Taken 1/27/2024 0600 by Blade Malone, RN  Body Position:   turned   right   upper extremity elevated   lower extremity elevated  Taken 1/27/2024 0400 by Blade Malone, RN  Body Position:   turned   left   upper " extremity elevated   heels elevated  Skin Protection:   incontinence pads utilized   silicone foam dressing in place  Device Skin Pressure Protection: tubing/devices free from skin contact  Taken 1/27/2024 0200 by Blade Malone RN  Body Position:   turned   right   upper extremity elevated   lower extremity elevated  Taken 1/27/2024 0000 by Blade Malone RN  Body Position:   turned   left   upper extremity elevated   heels elevated  Skin Protection:   incontinence pads utilized   silicone foam dressing in place  Device Skin Pressure Protection: tubing/devices free from skin contact  Taken 1/26/2024 2145 by Blade Malone RN  Body Position:   turned   right   upper extremity elevated   lower extremity elevated  Taken 1/26/2024 2000 by Blade Malone RN  Body Position:   turned   left   upper extremity elevated   heels elevated  Skin Protection:   incontinence pads utilized   silicone foam dressing in place  Device Skin Pressure Protection: tubing/devices free from skin contact  Intervention: Prevent and Manage VTE (Venous Thromboembolism) Risk  Recent Flowsheet Documentation  Taken 1/27/2024 0400 by Blade Malone RN  VTE Prevention/Management: SCDs (sequential compression devices) on  Taken 1/27/2024 0000 by Blade Malone RN  VTE Prevention/Management: SCDs (sequential compression devices) on  Taken 1/26/2024 2000 by Blade Malone RN  VTE Prevention/Management: SCDs (sequential compression devices) on  Intervention: Prevent Infection  Recent Flowsheet Documentation  Taken 1/27/2024 0400 by Blade Malone RN  Infection Prevention:   visitors restricted/screened   single patient room provided   rest/sleep promoted   personal protective equipment utilized   hand hygiene promoted  Taken 1/27/2024 0000 by Blade Malone RN  Infection Prevention:   visitors restricted/screened   single patient room provided   rest/sleep promoted   personal protective equipment utilized   hand hygiene promoted  Taken  1/26/2024 2000 by Blade Malone, RN  Infection Prevention:   visitors restricted/screened   single patient room provided   rest/sleep promoted   personal protective equipment utilized   hand hygiene promoted  Goal: Optimal Comfort and Wellbeing  Outcome: Not Progressing  Intervention: Provide Person-Centered Care  Recent Flowsheet Documentation  Taken 1/27/2024 0400 by Blade Malone, RN  Trust Relationship/Rapport:   care explained   reassurance provided  Taken 1/27/2024 0000 by Blade Malone RN  Trust Relationship/Rapport:   care explained   reassurance provided  Taken 1/26/2024 2000 by Blade Malone, RN  Trust Relationship/Rapport:   care explained   reassurance provided  Goal: Readiness for Transition of Care  Outcome: Not Progressing   Goal Outcome Evaluation:      Plan of Care Reviewed With: patient          Outcome Evaluation: Remains vented, possible extubation today. Propofol weaned off, Dex infusing. No PRNs for pain. Turned and repositoned. Safe while in restraints. Restless/agitated at times. Will continue to monitor.

## 2024-01-27 NOTE — PLAN OF CARE
Problem: Adult Inpatient Plan of Care  Goal: Plan of Care Review  Description: The Plan of Care Review/Shift note should be completed every shift.  The Outcome Evaluation is a brief statement about your assessment that the patient is improving, declining, or no change.  This information will be displayed automatically on your shift  note.  Outcome: Progressing  Flowsheets (Taken 1/27/2024 1648)  Outcome Evaluation: extubated, now on room air. dex gtt infusing. denies pain. anxious, whisper and stuttering, fearful of staff.  Plan of Care Reviewed With:   patient   spouse   family     Problem: Adult Inpatient Plan of Care  Goal: Absence of Hospital-Acquired Illness or Injury  Intervention: Identify and Manage Fall Risk  Recent Flowsheet Documentation  Taken 1/27/2024 0800 by Domi Langley RN  Safety Promotion/Fall Prevention:   activity supervised   treat reversible contributory factors   treat underlying cause     Problem: Adult Inpatient Plan of Care  Goal: Absence of Hospital-Acquired Illness or Injury  Intervention: Prevent Skin Injury  Recent Flowsheet Documentation  Taken 1/27/2024 1600 by Domi Langley RN  Body Position: turned  Taken 1/27/2024 1000 by Domi Langley RN  Body Position: turned  Taken 1/27/2024 0800 by Domi Langley RN  Body Position: turned  Skin Protection:   incontinence pads utilized   silicone foam dressing in place  Device Skin Pressure Protection: tubing/devices free from skin contact   Goal Outcome Evaluation:      Plan of Care Reviewed With: patient, spouse, family          Outcome Evaluation: extubated, now on room air. dex and fentanyl gtt infusing. denies pain. anxious, whisper and stuttering, fearful of staff. hypertensive, md notified, added PRNs and fentanyl bolus. Temp pacer wires.

## 2024-01-27 NOTE — PROGRESS NOTES
Critical Care  Note      01/28/2024    Name: Danielle Tadeo MRN#: [id number]   Age: 48 year old YOB: 1975     [facility]                 Problem List:   Principal Problem:    Overdose           Summary/Hospital Course:     48-year-old female presents to the ICU after fentanyl overdose. According to the ER the patient was using fentanyl and became unresponsive the patient received on scene CPR and was in asystole after receiving 8 mg of Narcan.  She was treated with ACLS protocol and developed ventricular tachycardia which was defibrillated. She was intubated in the field with a 7.0 endotracheal tube.  Arterial line and cooling catheter placed in the emergency room.  The patient was admitted to the ICU.    Medical history includes opioid dependence, status post gastric bypass, hypertension, nicotine dependence. Her  Eleazar has recognized her using substances, snorting opioids daily, she has had problems with EtOH in the past but now doesn't drink she is adamant about abstinence from alcohol. She has been prescribed antihypertensives and not taking, she has had gastric bypass surgery but doesn't take prescribed vitamins. She had some teeth problems that her  believes were related to vitamin deficiencies.      Interim history:  Patient had episodes of ventricular fibrillation and has had 2 cardiac arrests requiring defibrillation.  She was also found to have prolonged QTc and concern for torsades.  As per chart review the patient did have a history of torsades in the past when being treated for alcohol dependence.  Given the patient's continued hemodynamic instability and cardiac arrhythmias, cardiology was consulted and the patient had a transvenous pacer placed to override her underlying heart rate since she had the most episodes of arrhythmias when bradycardic.       Interval/overnight events:  Overnight  no major events. Afebrile stable hemodynamically. Patient no longer  being cooled for her arrest and is not requiring any pressors.   EEG reviewed and findings consistent with moderate to severe diffuse encephalopathy. No electrographic seizures. As per cardiology no indication for permament pacemaker at this time.      Assessment and plan :     48 year old female admitted on 1/25/2024 for Drug over dose, intubated S/P Cardiac Arrest x2. Paced with transveous pacer at 100. Currently off pacer and maintaining her heart rate.    My assessment and plan for this patient is as follows:    Neurology/Psychiatry:   Extubated, anxious  No structural neurologic disease  She has a history of anxiety and depression, not taking meds as her prior therapist stopped working and her  believes that poor executive functioning and depression are contributing to medication noncompliance. Furthermore, she has a history of prolonged QT/ Torsades which needs to be considered when making medication selections. She does use opioids that are not prescribed and her  found a lot at home since she has been hospitalized  Fentanyl still on at low dose    Cardiovascular:   Prior issue with cardiac arrest related to Zofran and prolonged QT  Had VF/?Torsades per EMS and at [facility] prior to transfer to [facility]  Transferred to [facility] with PSVT, VT, need for temp venous pacing but hasn't used overnight  She has not really been evaluated for electrophysiologic etiology of prolonged QT/Torsades. She needs medications for mental health so will need to be considered in the context of that  Apparently no EP available today, not able to get transvenous pacer discontinued today.    Pulmonary/Ventilator Management:   Uncomplicated gas exchange today    GI and Nutrition :   History of gastric bypass, she doesn't follow best practices in terms of nutrition and vitamins according to her .       Renal/Fluids/Electrolytes:   BUN/Cr 10.0/0.4  K 3.7  Phos 1.9   Lactic Acid 1.1    Infectious Disease:   WBC  "9.5  Afebrile, Unasyn x 5 days for ?aspiration    Endocrine:   BG 95  Target BG as per ICU protocol    Hematology/Oncology:   WBC 9.5  Anemia, no signs, symptoms of active blood loss     IV/Access:   1. Venous access - Right Groin Femoral Line  2. Arterial access - Right Groin A-Line  3.  Plan  - Central Access Required and Necessary      ICU Prophylaxis:   1. DVT: Hep Subq/ LMWH/mechanical  2. VAP: HOB 30 degrees, chlorhexidine rinse  3. Stress Ulcer: PPI/H2 blocker  4. Restraints: Nonviolent soft two point restraints required and necessary for patient safety and continued cares and good effect as patient continues to pull at necessary lines, tubes despite education and distraction. Will readdress daily.   5. Wound care  -   6. Feeding - NPO  7. Family Update: Family updated  8. Disposition - Continue ICU Care    Clinically Significant Risk Factors        # Hypokalemia: Lowest K = 3.2 mmol/L in last 2 days, will replace as needed         # Thrombocytopenia: Lowest platelets = 105 in last 2 days, will monitor for bleeding   # Hypertension: Noted on problem list        # Overweight: Estimated body mass index is 25.09 kg/m  as calculated from the following:    Height as of 1/23/24: 1.626 m (5' 4\").    Weight as of this encounter: 66.3 kg (146 lb 2.6 oz)., PRESENT ON ADMISSION       # Financial/Environmental Concerns:                   Key Medications:      ampicillin-sulbactam  3 g Intravenous Q6H    chlorhexidine  15 mL Mouth/Throat Q12H    pantoprazole  40 mg Per Feeding Tube QAM AC    Or    pantoprazole  40 mg Intravenous QAM AC      dexmedeTOMIDine 1.2 mcg/kg/hr (01/27/24 1101)    fentaNYL 25 mcg/hr (01/27/24 1209)    lactated ringers 50 mL/hr at 01/27/24 0742    midazolam Stopped (01/25/24 1115)    propofol Stopped (01/27/24 0515)            Physical Examination:   Temp:  [99.1  F (37.3  C)-100.6  F (38.1  C)] 99.3  F (37.4  C)  Pulse:  [] 104  Resp:  [10-38] 32  MAP:  [62 mmHg-132 mmHg] 118 mmHg  Arterial " Line BP: ()/(46-93) 172/82  FiO2 (%):  [40 %] 40 %  SpO2:  [92 %-99 %] 94 %      Intake/Output Summary (Last 24 hours) at 1/27/2024 1424  Last data filed at 1/27/2024 1200  Gross per 24 hour   Intake 1774.83 ml   Output 1080 ml   Net 694.83 ml        Wt Readings from Last 4 Encounters:   01/25/24 66.3 kg (146 lb 2.6 oz)   01/23/24 64.2 kg (141 lb 8.6 oz)   10/04/21 78.5 kg (173 lb)   09/11/21 77.4 kg (170 lb 9.6 oz)     Arterial Line BP: ()/(46-93) 172/82  MAP:  [62 mmHg-132 mmHg] 118 mmHg  Vent Mode: CPAP/PS  (Continuous positive airway pressure with Pressure Support)  FiO2 (%): 40 %  Resp Rate (Set): 14 breaths/min  Tidal Volume (Set, mL): 480 mL  PEEP (cm H2O): 5 cmH2O  Pressure Support (cm H2O): 5 cmH2O  Inspiratory Pressure (cm H2O) (Drager Aleisha): 0.9  Resp: (!) 32    Recent Labs   Lab 01/27/24  0546 01/26/24 2121 01/26/24  1546 01/26/24  1221   PH 7.41 7.50* 7.47* 7.50*   PCO2 37 30* 33* 31*   PO2 94 111* 101 69*   HCO3 23 24 24 24   O2PER 40 40 40 30       GEN: No Acute distress   HEENT: Sclera Anicteric, Trachea midline   PULM: Unlabored synchronous with vent, clear anteriorly    CV/COR: RRR S1S2 no gallop,  No rub, no murmur  ABD: soft non distended, hypoactive bowel sounds, no mass  EXT:  Warm and Well perfused x4  NEURO: PERRL, No Obvious deficits  SKIN: No Obvious Rash  LINES: Clean, Dry intact         Data:   All data and imaging reviewed     ROUTINE ICU LABS (Last four results)  CMP  Recent Labs   Lab 01/27/24  0546 01/26/24 2121 01/26/24  1514 01/26/24  0520 01/26/24  0519 01/25/24  1959 01/25/24  1734 01/25/24  0915 01/25/24  0624 01/25/24  0336 01/25/24  0332 01/24/24  0958 01/24/24  0907 01/24/24  0809 01/24/24  0525 01/23/24  1109 01/23/24  1106    143  --  139  --   --  142  --  142  --   --    < > 133*  --  134*   < > 139   POTASSIUM 4.0 3.4 3.2* 3.7  --   --  3.7  --  3.9  --  4.2   < > 4.6  4.6  --  3.3*   < > 3.9  3.9   CHLORIDE 112* 111*  --  108*  --   --  110*  --   111*  --   --    < > 104  --  104   < > 103   CO2 23 23  --  22  --   --  23  --  23  --   --    < > 19*  --  20*   < > 20*   ANIONGAP 7 9  --  9  --   --  9  --  8  --   --    < > 10  --  10   < > 16*   GLC 96 88  --  95 90   < > 102*   < > 98   < >  --    < > 120*  116*   < > 163*   < > 281*   BUN 10.9 10.1  --  10.0  --   --  10.4  --  9.5  --   --    < > 10.6  --  11.9   < > 11.8   CR 0.50* 0.56  --  0.47*  --   --  0.49*  --  0.47*  --   --    < > 0.39*  --  0.44*   < > 0.83   GFRESTIMATED >90 >90  --  >90  --   --  >90  --  >90  --   --    < > >90  --  >90   < > 86   HAZEL 7.9* 7.9*  --  8.3*  --   --  8.4*  --  8.4*  --   --    < > 8.0*  --  8.0*   < > 7.6*   MAG 2.1 1.9 2.0 1.9  --   --   --   --  2.0  --  2.0   < > 2.5*  --  2.4*   < > 2.1   PHOS  --   --   --   --   --   --   --   --  2.7  --  2.4*  --  2.5  --  2.8   < >  --    PROTTOTAL  --   --   --   --   --   --   --   --   --   --   --   --   --   --  5.8*  --  5.8*   ALBUMIN  --   --   --   --   --   --   --   --   --   --   --   --   --   --  3.7  --  3.7   BILITOTAL  --   --   --   --   --   --   --   --   --   --   --   --   --   --  0.4  --  0.4   ALKPHOS  --   --   --   --   --   --   --   --   --   --   --   --   --   --  152*  --  207*   AST  --   --   --   --   --   --   --   --   --   --   --   --   --   --  204*  --  163*   ALT  --   --   --   --   --   --   --   --   --   --   --   --   --   --  50  --  44    < > = values in this interval not displayed.     CBC  Recent Labs   Lab 01/27/24  0546 01/26/24  0809 01/24/24  0525 01/23/24  1106   WBC 5.3 9.5 21.7* 16.0*   RBC 2.98* 3.89 5.12 4.82   HGB 8.6* 11.2* 15.1 14.1   HCT 27.0* 34.3* 44.0 43.6   MCV 91 88 86 91   MCH 28.9 28.8 29.5 29.3   MCHC 31.9 32.7 34.3 32.3   RDW 14.6 14.9 13.8 13.3   * 131* 250 224     INR  Recent Labs   Lab 01/24/24  1409 01/24/24  0525 01/23/24  2216   INR 1.07 0.99 1.05     Arterial Blood Gas  Recent Labs   Lab 01/27/24  0546 01/26/24  2121 01/26/24  1546  "01/26/24  1221   PH 7.41 7.50* 7.47* 7.50*   PCO2 37 30* 33* 31*   PO2 94 111* 101 69*   HCO3 23 24 24 24   O2PER 40 40 40 30       All cultures:  No results for input(s): \"CULT\" in the last 168 hours.  No results found for this or any previous visit (from the past 24 hour(s)).                              "

## 2024-01-27 NOTE — PHARMACY-ADMISSION MEDICATION HISTORY
Pharmacist Admission Medication History    Admission medication history is complete. The information provided in this note is only as accurate as the sources available at the time of the update.    Information Source(s): Family member and CareEverywhere/SureScripts via in-person    Pertinent Information: Patient is intubated and unable to be interviewed. Per spouse the patient has not taken Rx meds in a long time. She also has not been taking vitamins that she was supposed to be taking after gastric bypass.  He remembers iron and vitamins for her bones.    Changes made to PTA medication list:  Added: None  Deleted: None  Changed: changed meds to not taking.       Allergies reviewed with patient and updates made in EHR: yes    Medication History Completed By: Butch Marinelli RPH 1/26/2024 8:41 PM  Prior to Admission Medications   Prescriptions Last Dose Informant Patient Reported? Taking?   Vitamin D3 (CHOLECALCIFEROL) 125 MCG (5000 UT) tablet Not Taking  Yes No   Sig: Take 1 tablet by mouth daily   Patient not taking: Reported on 1/26/2024   calcium citrate (CITRACAL) 950 (200 Ca) MG tablet Not Taking  Yes No   Sig: Take 1 tablet by mouth 2 times daily   Patient not taking: Reported on 1/26/2024   ferrous fumarate 65 mg, [place]. FE,-Vitamin C 125 mg (VITRON C)  MG TABS tablet Not Taking  Yes No   Sig: Take 1 tablet by mouth daily   Patient not taking: Reported on 1/26/2024      Facility-Administered Medications: None

## 2024-01-27 NOTE — PROGRESS NOTES
EEG CLINICAL NEUROPHYSIOLOGY PRELIMINARY REPORT    Video EEG through approximately 9 AM today reviewed.  As best as can be gathered from electronic medical record, varying doses of sedative drips (fentanyl 0 to 175 mcg/h, propofol 0 to 60 mcg/kg/min, dexmedetomidine 0.4-1.2 mcg per kilogram per hour) throughout.    Diffuse 15  V rhythmic delta, largely continuous during much of the recording.  At other times diffuse 5 Hz lower amplitude theta.  Following midnight, there are periods of desynchronized background with eye blinks and some diffuse theta.  This last pattern seems to coincide with treatment only with dexmedetomidine.  Posterior dominant rhythm not seen at any point.  Epileptiform discharges or electrographic seizures not seen at any point.    EEG demonstrates varying degrees of encephalopathy which seem to correspond to varying sedative drip treatments.  At best, background consistent with mild diffuse encephalopathy.  No epileptiform discharges or seizures.  Full report to follow.    Jorge Luis Leo MD  Contact information for physicians covering Epilepsy and EEG is available on [facility].  Click search, enter neurology adult/[facility] in group name box, click on neurology adult/[facility], then click Staff Epilepsy and EEG.

## 2024-01-28 LAB
GLUCOSE BLDC GLUCOMTR-MCNC: 94 MG/DL (ref 70–99)
HOLD SPECIMEN: NORMAL
MAGNESIUM SERPL-MCNC: 1.7 MG/DL (ref 1.7–2.3)
NSE SERPL IA-MCNC: 20.9 NG/ML
POTASSIUM SERPL-SCNC: 3.4 MMOL/L (ref 3.4–5.3)
POTASSIUM SERPL-SCNC: 3.6 MMOL/L (ref 3.4–5.3)

## 2024-01-28 PROCEDURE — 83735 ASSAY OF MAGNESIUM: CPT | Performed by: INTERNAL MEDICINE

## 2024-01-28 PROCEDURE — 93005 ELECTROCARDIOGRAM TRACING: CPT

## 2024-01-28 PROCEDURE — C9113 INJ PANTOPRAZOLE SODIUM, VIA: HCPCS | Performed by: ANESTHESIOLOGY

## 2024-01-28 PROCEDURE — 84132 ASSAY OF SERUM POTASSIUM: CPT | Performed by: INTERNAL MEDICINE

## 2024-01-28 PROCEDURE — 200N000001 HC R&B ICU

## 2024-01-28 PROCEDURE — 99291 CRITICAL CARE FIRST HOUR: CPT | Mod: FS | Performed by: NURSE PRACTITIONER

## 2024-01-28 PROCEDURE — 258N000003 HC RX IP 258 OP 636: Performed by: ANESTHESIOLOGY

## 2024-01-28 PROCEDURE — 250N000013 HC RX MED GY IP 250 OP 250 PS 637: Performed by: NURSE PRACTITIONER

## 2024-01-28 PROCEDURE — 250N000011 HC RX IP 250 OP 636: Performed by: STUDENT IN AN ORGANIZED HEALTH CARE EDUCATION/TRAINING PROGRAM

## 2024-01-28 PROCEDURE — 93010 ELECTROCARDIOGRAM REPORT: CPT | Performed by: INTERNAL MEDICINE

## 2024-01-28 PROCEDURE — 250N000009 HC RX 250: Performed by: TRANSPLANT SURGERY

## 2024-01-28 PROCEDURE — 250N000011 HC RX IP 250 OP 636: Performed by: ANESTHESIOLOGY

## 2024-01-28 PROCEDURE — 250N000011 HC RX IP 250 OP 636: Performed by: INTERNAL MEDICINE

## 2024-01-28 PROCEDURE — 250N000009 HC RX 250: Performed by: INTERNAL MEDICINE

## 2024-01-28 RX ORDER — POTASSIUM CHLORIDE 29.8 MG/ML
20 INJECTION INTRAVENOUS
Status: COMPLETED | OUTPATIENT
Start: 2024-01-28 | End: 2024-01-28

## 2024-01-28 RX ORDER — POTASSIUM CHLORIDE 29.8 MG/ML
20 INJECTION INTRAVENOUS ONCE
Status: COMPLETED | OUTPATIENT
Start: 2024-01-28 | End: 2024-01-28

## 2024-01-28 RX ORDER — MAGNESIUM SULFATE HEPTAHYDRATE 40 MG/ML
2 INJECTION, SOLUTION INTRAVENOUS ONCE
Status: COMPLETED | OUTPATIENT
Start: 2024-01-28 | End: 2024-01-28

## 2024-01-28 RX ORDER — AMPICILLIN AND SULBACTAM 2; 1 G/1; G/1
3 INJECTION, POWDER, FOR SOLUTION INTRAMUSCULAR; INTRAVENOUS EVERY 6 HOURS
Status: COMPLETED | OUTPATIENT
Start: 2024-01-28 | End: 2024-01-28

## 2024-01-28 RX ORDER — ENALAPRILAT 1.25 MG/ML
1.25 INJECTION INTRAVENOUS ONCE
Status: COMPLETED | OUTPATIENT
Start: 2024-01-28 | End: 2024-01-28

## 2024-01-28 RX ORDER — GABAPENTIN 300 MG/1
300 CAPSULE ORAL 3 TIMES DAILY
Status: DISCONTINUED | OUTPATIENT
Start: 2024-01-28 | End: 2024-01-31 | Stop reason: HOSPADM

## 2024-01-28 RX ORDER — ENALAPRILAT 1.25 MG/ML
2.5 INJECTION INTRAVENOUS EVERY 6 HOURS
Status: DISCONTINUED | OUTPATIENT
Start: 2024-01-28 | End: 2024-01-31 | Stop reason: HOSPADM

## 2024-01-28 RX ORDER — GABAPENTIN 300 MG/1
300 CAPSULE ORAL
Status: DISCONTINUED | OUTPATIENT
Start: 2024-01-28 | End: 2024-01-31 | Stop reason: HOSPADM

## 2024-01-28 RX ORDER — POTASSIUM CHLORIDE 29.8 MG/ML
20 INJECTION INTRAVENOUS
Status: DISPENSED | OUTPATIENT
Start: 2024-01-28 | End: 2024-01-28

## 2024-01-28 RX ADMIN — Medication 50 MCG: at 05:10

## 2024-01-28 RX ADMIN — ENALAPRILAT 1.25 MG: 1.25 INJECTION INTRAVENOUS at 09:16

## 2024-01-28 RX ADMIN — ENALAPRILAT 2.5 MG: 1.25 INJECTION INTRAVENOUS at 21:41

## 2024-01-28 RX ADMIN — Medication 50 MCG: at 00:50

## 2024-01-28 RX ADMIN — GABAPENTIN 300 MG: 300 CAPSULE ORAL at 12:30

## 2024-01-28 RX ADMIN — POTASSIUM CHLORIDE 20 MEQ: 29.8 INJECTION, SOLUTION INTRAVENOUS at 11:04

## 2024-01-28 RX ADMIN — AMPICILLIN SODIUM AND SULBACTAM SODIUM 3 G: 2; 1 INJECTION, POWDER, FOR SOLUTION INTRAMUSCULAR; INTRAVENOUS at 19:36

## 2024-01-28 RX ADMIN — PANTOPRAZOLE SODIUM 40 MG: 40 INJECTION, POWDER, FOR SOLUTION INTRAVENOUS at 07:57

## 2024-01-28 RX ADMIN — GABAPENTIN 300 MG: 300 CAPSULE ORAL at 19:36

## 2024-01-28 RX ADMIN — HYDRALAZINE HYDROCHLORIDE 20 MG: 20 INJECTION INTRAMUSCULAR; INTRAVENOUS at 16:31

## 2024-01-28 RX ADMIN — DEXMEDETOMIDINE HYDROCHLORIDE 1.2 MCG/KG/HR: 400 INJECTION INTRAVENOUS at 06:26

## 2024-01-28 RX ADMIN — AMPICILLIN SODIUM AND SULBACTAM SODIUM 3 G: 2; 1 INJECTION, POWDER, FOR SOLUTION INTRAMUSCULAR; INTRAVENOUS at 14:07

## 2024-01-28 RX ADMIN — HYDRALAZINE HYDROCHLORIDE 20 MG: 20 INJECTION INTRAMUSCULAR; INTRAVENOUS at 07:02

## 2024-01-28 RX ADMIN — GABAPENTIN 300 MG: 300 CAPSULE ORAL at 19:55

## 2024-01-28 RX ADMIN — DEXMEDETOMIDINE HYDROCHLORIDE 1.2 MCG/KG/HR: 400 INJECTION INTRAVENOUS at 01:28

## 2024-01-28 RX ADMIN — SODIUM CHLORIDE, POTASSIUM CHLORIDE, SODIUM LACTATE AND CALCIUM CHLORIDE: 600; 310; 30; 20 INJECTION, SOLUTION INTRAVENOUS at 14:23

## 2024-01-28 RX ADMIN — POTASSIUM CHLORIDE 20 MEQ: 29.8 INJECTION, SOLUTION INTRAVENOUS at 15:51

## 2024-01-28 RX ADMIN — ENALAPRILAT 1.25 MG: 1.25 INJECTION INTRAVENOUS at 08:00

## 2024-01-28 RX ADMIN — AMPICILLIN SODIUM AND SULBACTAM SODIUM 3 G: 2; 1 INJECTION, POWDER, FOR SOLUTION INTRAMUSCULAR; INTRAVENOUS at 08:00

## 2024-01-28 RX ADMIN — Medication 50 MCG: at 03:02

## 2024-01-28 RX ADMIN — POTASSIUM CHLORIDE 20 MEQ: 29.8 INJECTION, SOLUTION INTRAVENOUS at 10:01

## 2024-01-28 RX ADMIN — ENALAPRILAT 2.5 MG: 1.25 INJECTION INTRAVENOUS at 14:10

## 2024-01-28 RX ADMIN — HYDRALAZINE HYDROCHLORIDE 20 MG: 20 INJECTION INTRAMUSCULAR; INTRAVENOUS at 01:12

## 2024-01-28 RX ADMIN — MAGNESIUM SULFATE HEPTAHYDRATE 2 G: 40 INJECTION, SOLUTION INTRAVENOUS at 08:44

## 2024-01-28 RX ADMIN — AMPICILLIN SODIUM AND SULBACTAM SODIUM 3 G: 2; 1 INJECTION, POWDER, FOR SOLUTION INTRAMUSCULAR; INTRAVENOUS at 01:16

## 2024-01-28 RX ADMIN — HYDRALAZINE HYDROCHLORIDE 20 MG: 20 INJECTION INTRAMUSCULAR; INTRAVENOUS at 19:35

## 2024-01-28 RX ADMIN — ENALAPRILAT 1.25 MG: 1.25 INJECTION INTRAVENOUS at 03:02

## 2024-01-28 ASSESSMENT — ACTIVITIES OF DAILY LIVING (ADL)
ADLS_ACUITY_SCORE: 48

## 2024-01-28 NOTE — CONSULTS
[facility]  Consult Note - Hospitalist Service  Date of Admission:  1/25/2024  Consult Requested by: Dr. Ken   Reason for Consult: Transfer of Care     Assessment & Plan   Danielle Tadeo is a 48 year old female admitted on 1/25/2024. She initially presented to [facility] after a fentanyl overdose. She was reportedly was snorting fentanyl with her significant other and then became unresponsive. CPR was started immediately. She was defibrillated x4 and intubated in the field. She was admitted for [facility] ICU.     While in the ICU she developed further episodes of ventricular fibrillation and required additional defibrillation. She was also found to have prolonged QTc with concern for torsades. Cardiology was consulted and placed a transvenous pacer to override her underlying heart rate.     Cardiac arrest   QT prolongation   Polymorphous VT (Torsades de Pointes)   Pt presented to the hospital in cardiac arrest in the setting of fentanyl overdose (see below).  * She has a previous history of VT/Torsade de Pointes requiring defibrillation/magnesium/amiodarone 8/19/2020 while undergoing acute alcohol withdrawal/on Sertraline.   * Had recurrent Torsades while in the ICU this admission in the setting of significant ectopy/bradycardia while on the hypothermia protocol. She ultimately required transcutaneous pacemaker placement to override her underlying hear rate.   * Pt has not required pacing since 1/27, transvenous pace pulled on 12/30.   - Cardiology and EP following.   - Cardiac monitoring   - Maintain normal potassium and magnesium levels   - Avoid QT prolonging medications   - Avoid bradycardia   - Per cardiology no indication for permanent pacemaker   - Started metoprolol tartrate 25mg BID per cardiology   - Daily EKG for QTc monitoring   - Return to clinic in follow-up with EP in approximately 1 month (orders placed)    Acute Toxic Metabolic Encephalopathy, concern for anoxic injury  2/2  "above. Pt is awake, but confused, oriented to self only.   CT scan on 1/26 showed diffuse mild blurring of gray-white matter differentiation. EEG 1/25, 1/26, 1/27 no seizures - moderate/diffuse encephalopathy  - Neurology following, will re-consult when patient transfers out of ICU  - Planning for MRI    - PT/OT     Fentanyl Overdose  Polysubstance use disorder  Anxiety and depression   Pt presented after a fentanyl overdose. This was reportedly unintentional.   See psychiatry consult for substance use history.   * Pt had been maintained on a fentanyl drip while in the ICU until 1/30.   - Continue clonidine 0.2mg Q6H   - Continue Gabapentin   - Psychiatry consulted appreciate recommendations    HTN  BPs persistently elevated, but improving. Currently on Metoprolol tartrate 25mg BID (started 1/30), enalaprit 2.5mg Q6H, and clonidine 0.2mg Q6H.   - Continue current regimen without changes today.     Acute Anemia  Hemoglobin down to 10.7 from 14.6 on admission. - Suspect related to critical illness, frequent blood draws, etc. No reports of bleeding  - Monitor daily CBC       Aspiration pneumonia  - Completed a course of Unasyn (1/23-1/28)     Hx of gastric bypass   Noted      Clinically Significant Risk Factors        # Hypokalemia: Lowest K = 3.2 mmol/L in last 2 days, will replace as needed         # Thrombocytopenia: Lowest platelets = 105 in last 2 days, will monitor for bleeding   # Hypertension: Noted on problem list        # Overweight: Estimated body mass index is 26.15 kg/m  as calculated from the following:    Height as of 1/23/24: 1.626 m (5' 4\").    Weight as of this encounter: 69.1 kg (152 lb 5.4 oz)., PRESENT ON ADMISSION     # Financial/Environmental Concerns:           Estrella Dsouza MD  Hospitalist Service  Securely message with [employer] (more info)  Text page via [facility] Paging/Directory   ______________________________________________________________________    Chief Complaint   Fentanyl overdose. "     History is obtained from the patient    History of Present Illness   Danielle Tadeo is a 48 year old female who is admitted following a cardiac arrest in the setting of fentanyl overdose. She is awake but somewhat sleepy. She does not provide much history. She reports a little back pain. She wants to sit up, but just had her sheath removed.     Past Medical History    Past Medical History:   Diagnosis Date    Alcohol dependence     Anxiety     Benign essential hypertension     PIH    CAD     Cervical spondylosis without myelopathy 2015    Continuous opioid dependence     Depression     Seasonal allergies        Past Surgical History   Past Surgical History:   Procedure Laterality Date    ABDOMINOPLASTY      Arm and thigh lift       SECTION      CV CORONARY ANGIOGRAM N/A 2024    Procedure: Coronary Angiogram;  Surgeon: Jeff Dickerson MD;  Location:  HEART CARDI[facility] CATH LAB    CV TEMPORARY PACEMAKER INSERTION N/A 2024    Procedure: Temporary Pacemaker Insertion;  Surgeon: Jeff Dickerson MD;  Location:  HEART CARDIAC CATH LAB    GASTRIC BYPASS      HYSTERECTOMY      LASIK Bilateral     MAMMOPLASTY REDUCTION         Medications   I have reviewed this patient's current medications       Review of Systems    The 10 point Review of Systems is negative other than noted in the HPI or here.     Physical Exam   Vital Signs: Temp: 99  F (37.2  C) Temp src: Bladder BP: (!) 203/120 Pulse: 105   Resp: 17 SpO2: 97 % O2 Device: None (Room air) Oxygen Delivery: 3 LPM  Weight: 152 lbs 5.41 oz    Constitutional: Awake, alert, cooperative, no apparent distress.  Eyes: Conjunctiva and pupils examined and normal.  HEENT: Moist mucous membranes, normal dentition.  Respiratory: Clear to auscultation bilaterally, no crackles or wheezing.  Cardiovascular: Regular rate and rhythm, normal S1 and S2, and no murmur noted.  GI: Soft, non-distended, non-tender, normal bowel sounds.  Skin: No rashes,  no cyanosis, no edema.  Musculoskeletal: No joint swelling, erythema or tenderness.  Neurologic: Cranial nerves 2-12 intact,moves all extremities   Psychiatric: Alert, oriented to person    Medical Decision Making       75 MINUTES SPENT BY ME on the date of service doing chart review, history, exam, documentation & further activities per the note.      Data     I have personally reviewed the following data over the past 24 hrs:    8.3  \   10.7 (L)   / 202     141 109 (H) 6.4 /  105 (H)   3.6; 3.7 24 0.45 (L) \       Imaging results reviewed over the past 24 hrs:   No results found for this or any previous visit (from the past 24 hour(s)).

## 2024-01-28 NOTE — PLAN OF CARE
"  Plan of Care Reviewed With: patient    Overall Patient Progress: no changeOverall Patient Progress: no change    Outcome Evaluation: Remains on room air. Precedex infusing. Fentanyl infusion and boluses for pain/ PRN Hydralazine and scheduled Vasotec for BP. Continues to be anxious at times, whispering, anbd stuttering. Will continue to monitor.          Problem: Adult Inpatient Plan of Care  Goal: Plan of Care Review  Description: The Plan of Care Review/Shift note should be completed every shift.  The Outcome Evaluation is a brief statement about your assessment that the patient is improving, declining, or no change.  This information will be displayed automatically on your shift  note.  Outcome: Progressing  Flowsheets (Taken 1/28/2024 0425)  Outcome Evaluation: Remains on room air. Precedex infusing. Fentanyl infusion and boluses for pain/ PRN Hydralazine and scheduled Vasotec for BP. Continues to be anxious at times, whispering, anbd stuttering. Will continue to monitor.  Plan of Care Reviewed With: patient  Overall Patient Progress: no change  Goal: Patient-Specific Goal (Individualized)  Description: You can add care plan individualizations to a care plan. Examples of Individualization might be:  \"Parent requests to be called daily at 9am for status\", \"I have a hard time hearing out of my right ear\", or \"Do not touch me to wake me up as it startles  me\".  Outcome: Progressing  Goal: Absence of Hospital-Acquired Illness or Injury  Outcome: Progressing  Intervention: Identify and Manage Fall Risk  Recent Flowsheet Documentation  Taken 1/28/2024 0000 by Blade Malone, RN  Safety Promotion/Fall Prevention:   activity supervised   treat reversible contributory factors   treat underlying cause  Taken 1/27/2024 2000 by Blade Malone, RN  Safety Promotion/Fall Prevention:   activity supervised   treat reversible contributory factors   treat underlying cause  Intervention: Prevent Skin Injury  Recent Flowsheet " Documentation  Taken 1/28/2024 0200 by Blade Malone RN  Body Position:   turned   right   upper extremity elevated   lower extremity elevated  Taken 1/28/2024 0000 by Blade Malone RN  Body Position:   turned   left   upper extremity elevated   lower extremity elevated  Taken 1/27/2024 2200 by Blade Malone RN  Body Position:   turned   right   upper extremity elevated   lower extremity elevated  Taken 1/27/2024 2000 by Blade Malone RN  Body Position:   turned   left   upper extremity elevated   lower extremity elevated  Intervention: Prevent Infection  Recent Flowsheet Documentation  Taken 1/28/2024 0000 by Blade Malone RN  Infection Prevention:   single patient room provided   rest/sleep promoted   hand hygiene promoted   equipment surfaces disinfected  Taken 1/27/2024 2000 by Blade Malone RN  Infection Prevention:   single patient room provided   rest/sleep promoted   hand hygiene promoted   equipment surfaces disinfected  Goal: Optimal Comfort and Wellbeing  Outcome: Not Progressing  Intervention: Provide Person-Centered Care  Recent Flowsheet Documentation  Taken 1/28/2024 0000 by Blade Malone RN  Trust Relationship/Rapport:   care explained   choices provided   emotional support provided  Taken 1/27/2024 2000 by Blade Malone RN  Trust Relationship/Rapport:   care explained   choices provided   emotional support provided  Goal: Readiness for Transition of Care  Outcome: Not Progressing

## 2024-01-28 NOTE — PROGRESS NOTES
[facility]    Stroke Progress Note    Interval Events  Extubated yesterday afternoon. Very anxious on exam today. Continues on Precedex and fentanyl.     HPI Summary   48 year old female who first presented to the hospital on January 23 after a drug overdose.  She was with her significant other at home snorting fentanyl and became unresponsive.  Her  started CPR.  Police came and gave Narcan and said she was down for 8 minutes.  Initially she was in asystole.  She was given epinephrine and she was defibrillated at least 4 times for vtach and then intubated.  She was admitted at [facility] initially and was cooled.     When she was at [facility] her rhythm has been extremely difficult to control..  She developed further episodes of cardiac arrest and V-fib at [facility] and was defibrillated twice more there.  Also concern for torsades and she has had a transvenous pacer placed and ultimately was transferred here to [facility] for pacemaker evaluation    Evaluation Summarized    MRI/Head CT CTH 1/26: Diffuse mild blurring of gray-white matter  differentiation. However, there is no sulcal or basilar cistern effacement compared to previous CT. This is favored artifactual.  CTH 1/23: no acute pathology   EEG 1/27: no seizures or epileptiform discharges  1/26: no seizures, moderate/diffuse encephalopathy  1/25: no seizures, moderate/diffuse encephalopathy     Impression   1. Encephalopathy in the setting of cardiac arrest x2 and fentanyl overdose, now improving. EEG without evidence of seizures. Unable to obtain MRI brain due to pacing wires.     Plan  - Unable to obtain MRI at this time due to pacing wires, can reevaluate need if she does not continue to improve.  - Trial gabapentin for anxiety. Recommend 300 mg AM, 300 mg noon, and 600 mg at bedtime.   - Will hold off on antipsychotics now, repeat EKG ordered to check QTc  - Consider psychiatry consult     Patient Follow-up    - final  "recommendation pending work-up    We will continue to follow.     Celine Brunner, CNP  Vascular Neurology    To page me or covering stroke neurology team member, click here: [facility]  Choose \"On Call\" tab at top, then select \"NEUROLOGY/ALL SITES\" from middle drop-down box, press Enter, then look for \"stroke\" or \"telestroke\" for your site.  ______________________________________________________    Clinically Significant Risk Factors        # Hypokalemia: Lowest K = 3.2 mmol/L in last 2 days, will replace as needed         # Thrombocytopenia: Lowest platelets = 105 in last 2 days, will monitor for bleeding   # Hypertension: Noted on problem list        # Overweight: Estimated body mass index is 26.15 kg/m  as calculated from the following:    Height as of 1/23/24: 1.626 m (5' 4\").    Weight as of this encounter: 69.1 kg (152 lb 5.4 oz)., PRESENT ON ADMISSION       # Financial/Environmental Concerns:             Medications   Scheduled Meds   ampicillin-sulbactam  3 g Intravenous Q6H    enalaprilat  1.25 mg Intravenous Once    enalaprilat  2.5 mg Intravenous Q6H    magnesium sulfate  2 g Intravenous Once    pantoprazole  40 mg Per Feeding Tube QAM AC    Or    pantoprazole  40 mg Intravenous QAM AC    potassium chloride  20 mEq Intravenous Q1H       Infusion Meds   dexmedeTOMIDine 1.2 mcg/kg/hr (01/28/24 0626)    fentaNYL 25 mcg/hr (01/27/24 1736)    lactated ringers 50 mL/hr at 01/27/24 1930       PRN Meds  acetaminophen **OR** acetaminophen, bisacodyl, glucose **OR** dextrose **OR** glucagon, fentaNYL, hydrALAZINE, lidocaine 4%, lidocaine (buffered or not buffered), magnesium hydroxide, naloxone **OR** naloxone **OR** naloxone **OR** naloxone, prochlorperazine **OR** prochlorperazine **OR** prochlorperazine, senna-docusate **OR** senna-docusate       PHYSICAL EXAMINATION  Temp:  [98.8  F (37.1  C)-99.5  F (37.5  C)] 99.1  F (37.3  C)  Pulse:  [] 90  Resp:  [13-43] 16  BP: (140-181)/() 171/101  MAP:  [95 " mmHg-163 mmHg] 121 mmHg  Arterial Line BP: (139-235)/() 172/86  SpO2:  [92 %-99 %] 97 %      Neurologic  Mental Status:  alert, highly anxious, states name, oriented to place but cannot name month/year, follows commands, speech stuttering but no obvious word finding difficulty  Cranial Nerves:  face symmetric, tongue protrusion midline, EOMs intact, hearing intact to conversation, no dysarthria  Motor:  no abnormal movements, no drift in upper extremities, able to move both legs in bed but not antigravity  Reflexes:   toes mute  Sensory:  sensation intact in all extremities  Coordination:   deferred  Station/Gait:  deferred    Imaging  I personally reviewed all imaging; relevant findings per HPI.     Lab Results Data   CBC  Recent Labs   Lab 01/27/24  0546 01/26/24  0809 01/24/24  0525   WBC 5.3 9.5 21.7*   RBC 2.98* 3.89 5.12   HGB 8.6* 11.2* 15.1   HCT 27.0* 34.3* 44.0   * 131* 250     Basic Metabolic Panel    Recent Labs   Lab 01/28/24  0506 01/27/24  1829 01/27/24  0546 01/26/24  2121 01/26/24  1514 01/26/24  0520   NA  --   --  142 143  --  139   POTASSIUM 3.4  --  4.0 3.4   < > 3.7   CHLORIDE  --   --  112* 111*  --  108*   CO2  --   --  23 23  --  22   BUN  --   --  10.9 10.1  --  10.0   CR  --   --  0.50* 0.56  --  0.47*   GLC  --  81 96 88  --  95   HAZEL  --   --  7.9* 7.9*  --  8.3*    < > = values in this interval not displayed.     Liver Panel  Recent Labs   Lab 01/24/24  0525 01/23/24  1106   PROTTOTAL 5.8* 5.8*   ALBUMIN 3.7 3.7   BILITOTAL 0.4 0.4   ALKPHOS 152* 207*   * 163*   ALT 50 44     INR    Recent Labs   Lab Test 01/24/24  1409 01/24/24  0525 01/23/24  2216   INR 1.07 0.99 1.05      Lipid Profile    Recent Labs   Lab Test 01/26/24  0520 01/24/24  1055   TRIG 193* 338*     A1C    Recent Labs   Lab Test 01/23/24  1106   A1C 4.8     Troponin    Recent Labs   Lab 01/24/24  1816 01/24/24  1201 01/24/24  0525   CTROPT 216* 245* 312*          Data   I personally examined and  evaluated the patient today. At the time of my evaluation and management the patient was in critical condition today due to encephalopathy. I personally managed examination. Key decisions made today included imaging. I spent a total of 35 minutes providing critical care services, evaluating the patient, directing care and reviewing laboratory values and radiologic reports.

## 2024-01-28 NOTE — PLAN OF CARE
"  Problem: Adult Inpatient Plan of Care  Goal: Patient-Specific Goal (Individualized)  Description: You can add care plan individualizations to a care plan. Examples of Individualization might be:  \"Parent requests to be called daily at 9am for status\", \"I have a hard time hearing out of my right ear\", or \"Do not touch me to wake me up as it startles  me\".  Outcome: Progressing     Problem: Adult Inpatient Plan of Care  Goal: Absence of Hospital-Acquired Illness or Injury  Intervention: Identify and Manage Fall Risk  Recent Flowsheet Documentation  Taken 1/28/2024 1200 by Domi Langley RN  Safety Promotion/Fall Prevention:   activity supervised   treat reversible contributory factors   treat underlying cause  Taken 1/28/2024 0800 by Domi Langley RN  Safety Promotion/Fall Prevention:   activity supervised   treat reversible contributory factors   treat underlying cause  Intervention: Prevent Skin Injury  Recent Flowsheet Documentation  Taken 1/28/2024 1400 by Domi Langley RN  Body Position: turned  Taken 1/28/2024 1200 by Domi Langley RN  Body Position: turned  Taken 1/28/2024 1000 by Domi Langley RN  Body Position: turned  Taken 1/28/2024 0800 by Domi Langley RN  Body Position: turned  Intervention: Prevent Infection  Recent Flowsheet Documentation  Taken 1/28/2024 1200 by Domi Langley RN  Infection Prevention:   single patient room provided   rest/sleep promoted   hand hygiene promoted   equipment surfaces disinfected  Taken 1/28/2024 0800 by Domi Langley RN  Infection Prevention:   single patient room provided   rest/sleep promoted   hand hygiene promoted   equipment surfaces disinfected   Goal Outcome Evaluation:      Plan of Care Reviewed With: patient, spouse, family        Anxious and tearful, asking to go home. Family at bedside. Oriented to self. Temp pacer in place but not using. Hypertensive, PRNs given. Denies pain.       "

## 2024-01-29 LAB
ALBUMIN SERPL BCG-MCNC: 3.3 G/DL (ref 3.5–5.2)
ALP SERPL-CCNC: 89 U/L (ref 40–150)
ALT SERPL W P-5'-P-CCNC: 23 U/L (ref 0–50)
ANION GAP SERPL CALCULATED.3IONS-SCNC: 15 MMOL/L (ref 7–15)
AST SERPL W P-5'-P-CCNC: 41 U/L (ref 0–45)
BILIRUB SERPL-MCNC: 0.6 MG/DL
BUN SERPL-MCNC: 6.2 MG/DL (ref 6–20)
CALCIUM SERPL-MCNC: 8.3 MG/DL (ref 8.6–10)
CALCIUM SERPL-MCNC: 8.3 MG/DL (ref 8.6–10)
CHLORIDE SERPL-SCNC: 104 MMOL/L (ref 98–107)
CREAT SERPL-MCNC: 0.38 MG/DL (ref 0.51–0.95)
DEPRECATED HCO3 PLAS-SCNC: 20 MMOL/L (ref 22–29)
EGFRCR SERPLBLD CKD-EPI 2021: >90 ML/MIN/1.73M2
ERYTHROCYTE [DISTWIDTH] IN BLOOD BY AUTOMATED COUNT: 14 % (ref 10–15)
GLUCOSE BLDC GLUCOMTR-MCNC: 108 MG/DL (ref 70–99)
GLUCOSE SERPL-MCNC: 110 MG/DL (ref 70–99)
HCT VFR BLD AUTO: 34.2 % (ref 35–47)
HGB BLD-MCNC: 11.7 G/DL (ref 11.7–15.7)
MAGNESIUM SERPL-MCNC: 1.5 MG/DL (ref 1.7–2.3)
MAGNESIUM SERPL-MCNC: 2.2 MG/DL (ref 1.7–2.3)
MCH RBC QN AUTO: 29.5 PG (ref 26.5–33)
MCHC RBC AUTO-ENTMCNC: 34.2 G/DL (ref 31.5–36.5)
MCV RBC AUTO: 86 FL (ref 78–100)
PHOSPHATE SERPL-MCNC: 2.5 MG/DL (ref 2.5–4.5)
PLATELET # BLD AUTO: 219 10E3/UL (ref 150–450)
POTASSIUM SERPL-SCNC: 3 MMOL/L (ref 3.4–5.3)
POTASSIUM SERPL-SCNC: 3.1 MMOL/L (ref 3.4–5.3)
POTASSIUM SERPL-SCNC: 3.7 MMOL/L (ref 3.4–5.3)
PROT SERPL-MCNC: 5.6 G/DL (ref 6.4–8.3)
RBC # BLD AUTO: 3.96 10E6/UL (ref 3.8–5.2)
SODIUM SERPL-SCNC: 139 MMOL/L (ref 135–145)
WBC # BLD AUTO: 12.3 10E3/UL (ref 4–11)

## 2024-01-29 PROCEDURE — 250N000013 HC RX MED GY IP 250 OP 250 PS 637: Performed by: INTERNAL MEDICINE

## 2024-01-29 PROCEDURE — 99233 SBSQ HOSP IP/OBS HIGH 50: CPT | Mod: GC | Performed by: INTERNAL MEDICINE

## 2024-01-29 PROCEDURE — 258N000003 HC RX IP 258 OP 636: Performed by: ANESTHESIOLOGY

## 2024-01-29 PROCEDURE — 250N000011 HC RX IP 250 OP 636: Performed by: INTERNAL MEDICINE

## 2024-01-29 PROCEDURE — 250N000009 HC RX 250: Performed by: INTERNAL MEDICINE

## 2024-01-29 PROCEDURE — 250N000011 HC RX IP 250 OP 636: Performed by: ANESTHESIOLOGY

## 2024-01-29 PROCEDURE — C9113 INJ PANTOPRAZOLE SODIUM, VIA: HCPCS | Performed by: ANESTHESIOLOGY

## 2024-01-29 PROCEDURE — 85027 COMPLETE CBC AUTOMATED: CPT | Performed by: TRANSPLANT SURGERY

## 2024-01-29 PROCEDURE — 250N000013 HC RX MED GY IP 250 OP 250 PS 637: Performed by: TRANSPLANT SURGERY

## 2024-01-29 PROCEDURE — 250N000009 HC RX 250: Performed by: TRANSPLANT SURGERY

## 2024-01-29 PROCEDURE — 84132 ASSAY OF SERUM POTASSIUM: CPT | Performed by: INTERNAL MEDICINE

## 2024-01-29 PROCEDURE — 82310 ASSAY OF CALCIUM: CPT | Performed by: TRANSPLANT SURGERY

## 2024-01-29 PROCEDURE — 84132 ASSAY OF SERUM POTASSIUM: CPT | Performed by: STUDENT IN AN ORGANIZED HEALTH CARE EDUCATION/TRAINING PROGRAM

## 2024-01-29 PROCEDURE — 200N000001 HC R&B ICU

## 2024-01-29 PROCEDURE — 250N000013 HC RX MED GY IP 250 OP 250 PS 637: Performed by: ANESTHESIOLOGY

## 2024-01-29 PROCEDURE — 99291 CRITICAL CARE FIRST HOUR: CPT | Performed by: PHYSICIAN ASSISTANT

## 2024-01-29 PROCEDURE — 80053 COMPREHEN METABOLIC PANEL: CPT | Performed by: TRANSPLANT SURGERY

## 2024-01-29 PROCEDURE — 250N000013 HC RX MED GY IP 250 OP 250 PS 637: Performed by: NURSE PRACTITIONER

## 2024-01-29 PROCEDURE — 83735 ASSAY OF MAGNESIUM: CPT | Performed by: INTERNAL MEDICINE

## 2024-01-29 PROCEDURE — 84100 ASSAY OF PHOSPHORUS: CPT | Performed by: TRANSPLANT SURGERY

## 2024-01-29 RX ORDER — POTASSIUM CHLORIDE 1500 MG/1
20 TABLET, EXTENDED RELEASE ORAL ONCE
Status: COMPLETED | OUTPATIENT
Start: 2024-01-29 | End: 2024-01-29

## 2024-01-29 RX ORDER — MAGNESIUM SULFATE HEPTAHYDRATE 40 MG/ML
4 INJECTION, SOLUTION INTRAVENOUS ONCE
Status: COMPLETED | OUTPATIENT
Start: 2024-01-29 | End: 2024-01-29

## 2024-01-29 RX ORDER — POTASSIUM CHLORIDE 20MEQ/15ML
40 LIQUID (ML) ORAL ONCE
Status: COMPLETED | OUTPATIENT
Start: 2024-01-29 | End: 2024-01-29

## 2024-01-29 RX ORDER — CLONIDINE HYDROCHLORIDE 0.1 MG/1
0.2 TABLET ORAL EVERY 6 HOURS
Status: DISCONTINUED | OUTPATIENT
Start: 2024-01-29 | End: 2024-01-31 | Stop reason: HOSPADM

## 2024-01-29 RX ORDER — POTASSIUM CHLORIDE 20MEQ/15ML
20 LIQUID (ML) ORAL ONCE
Status: DISCONTINUED | OUTPATIENT
Start: 2024-01-29 | End: 2024-01-29 | Stop reason: ALTCHOICE

## 2024-01-29 RX ORDER — POTASSIUM CHLORIDE 29.8 MG/ML
20 INJECTION INTRAVENOUS ONCE
Status: COMPLETED | OUTPATIENT
Start: 2024-01-29 | End: 2024-01-29

## 2024-01-29 RX ADMIN — DEXMEDETOMIDINE HYDROCHLORIDE 0.8 MCG/KG/HR: 400 INJECTION INTRAVENOUS at 11:23

## 2024-01-29 RX ADMIN — CLONIDINE HYDROCHLORIDE 0.2 MG: 0.1 TABLET ORAL at 18:01

## 2024-01-29 RX ADMIN — HYDRALAZINE HYDROCHLORIDE 20 MG: 20 INJECTION INTRAMUSCULAR; INTRAVENOUS at 02:42

## 2024-01-29 RX ADMIN — MAGNESIUM SULFATE IN WATER 4 G: 40 INJECTION, SOLUTION INTRAVENOUS at 06:40

## 2024-01-29 RX ADMIN — GABAPENTIN 300 MG: 300 CAPSULE ORAL at 21:11

## 2024-01-29 RX ADMIN — GABAPENTIN 300 MG: 300 CAPSULE ORAL at 08:18

## 2024-01-29 RX ADMIN — ENALAPRILAT 2.5 MG: 1.25 INJECTION INTRAVENOUS at 15:27

## 2024-01-29 RX ADMIN — DEXMEDETOMIDINE HYDROCHLORIDE 0.6 MCG/KG/HR: 400 INJECTION INTRAVENOUS at 03:37

## 2024-01-29 RX ADMIN — MELATONIN 5 MG TABLET 5 MG: at 21:07

## 2024-01-29 RX ADMIN — CLONIDINE HYDROCHLORIDE 0.2 MG: 0.1 TABLET ORAL at 12:28

## 2024-01-29 RX ADMIN — SODIUM CHLORIDE, POTASSIUM CHLORIDE, SODIUM LACTATE AND CALCIUM CHLORIDE: 600; 310; 30; 20 INJECTION, SOLUTION INTRAVENOUS at 10:04

## 2024-01-29 RX ADMIN — ENALAPRILAT 2.5 MG: 1.25 INJECTION INTRAVENOUS at 03:37

## 2024-01-29 RX ADMIN — ENALAPRILAT 2.5 MG: 1.25 INJECTION INTRAVENOUS at 08:12

## 2024-01-29 RX ADMIN — POTASSIUM CHLORIDE 20 MEQ: 1500 TABLET, EXTENDED RELEASE ORAL at 08:25

## 2024-01-29 RX ADMIN — ACETAMINOPHEN 650 MG: 325 TABLET, FILM COATED ORAL at 18:01

## 2024-01-29 RX ADMIN — GABAPENTIN 300 MG: 300 CAPSULE ORAL at 21:08

## 2024-01-29 RX ADMIN — GABAPENTIN 300 MG: 300 CAPSULE ORAL at 12:28

## 2024-01-29 RX ADMIN — ENALAPRILAT 2.5 MG: 1.25 INJECTION INTRAVENOUS at 21:08

## 2024-01-29 RX ADMIN — PANTOPRAZOLE SODIUM 40 MG: 40 INJECTION, POWDER, FOR SOLUTION INTRAVENOUS at 08:12

## 2024-01-29 RX ADMIN — HYDRALAZINE HYDROCHLORIDE 20 MG: 20 INJECTION INTRAMUSCULAR; INTRAVENOUS at 08:25

## 2024-01-29 RX ADMIN — POTASSIUM CHLORIDE 40 MEQ: 20 SOLUTION ORAL at 06:22

## 2024-01-29 RX ADMIN — POTASSIUM CHLORIDE 20 MEQ: 29.8 INJECTION, SOLUTION INTRAVENOUS at 15:26

## 2024-01-29 RX ADMIN — HYDRALAZINE HYDROCHLORIDE 20 MG: 20 INJECTION INTRAMUSCULAR; INTRAVENOUS at 21:48

## 2024-01-29 ASSESSMENT — ACTIVITIES OF DAILY LIVING (ADL)
ADLS_ACUITY_SCORE: 44
ADLS_ACUITY_SCORE: 44
ADLS_ACUITY_SCORE: 48
ADLS_ACUITY_SCORE: 44
ADLS_ACUITY_SCORE: 48
ADLS_ACUITY_SCORE: 48
ADLS_ACUITY_SCORE: 44
ADLS_ACUITY_SCORE: 48

## 2024-01-29 NOTE — PLAN OF CARE
"  Problem: Adult Inpatient Plan of Care  Goal: Plan of Care Review  Description: The Plan of Care Review/Shift note should be completed every shift.  The Outcome Evaluation is a brief statement about your assessment that the patient is improving, declining, or no change.  This information will be displayed automatically on your shift  note.  Outcome: Not Progressing  Goal: Patient-Specific Goal (Individualized)  Description: You can add care plan individualizations to a care plan. Examples of Individualization might be:  \"Parent requests to be called daily at 9am for status\", \"I have a hard time hearing out of my right ear\", or \"Do not touch me to wake me up as it startles  me\".  Outcome: Not Progressing  Goal: Absence of Hospital-Acquired Illness or Injury  Outcome: Not Progressing  Intervention: Identify and Manage Fall Risk  Recent Flowsheet Documentation  Taken 1/29/2024 0400 by Laxmi Samson RN  Safety Promotion/Fall Prevention:   activity supervised   treat reversible contributory factors   treat underlying cause  Taken 1/29/2024 0000 by Laxmi Samson RN  Safety Promotion/Fall Prevention:   activity supervised   treat reversible contributory factors   treat underlying cause  Taken 1/28/2024 2000 by Laxmi Samson RN  Safety Promotion/Fall Prevention:   activity supervised   treat reversible contributory factors   treat underlying cause  Intervention: Prevent Skin Injury  Recent Flowsheet Documentation  Taken 1/29/2024 0600 by Laxmi Samson RN  Body Position:   turned   heels elevated   foot of bed elevated   legs elevated  Taken 1/29/2024 0400 by Laxmi aSmson RN  Body Position:   turned   heels elevated   foot of bed elevated   legs elevated  Taken 1/29/2024 0200 by Laxmi Samson RN  Body Position:   turned   heels elevated   foot of bed elevated   legs elevated  Taken 1/29/2024 0000 by Laxmi Samson RN  Body Position:   turned   heels elevated   foot of bed elevated   legs " elevated  Taken 1/28/2024 2200 by Laxmi Samson RN  Body Position:   turned   heels elevated   foot of bed elevated   legs elevated  Taken 1/28/2024 2000 by Laxmi Samson RN  Body Position:   turned   heels elevated   foot of bed elevated   legs elevated  Intervention: Prevent Infection  Recent Flowsheet Documentation  Taken 1/29/2024 0400 by Laxmi Samson RN  Infection Prevention:   single patient room provided   rest/sleep promoted   hand hygiene promoted   equipment surfaces disinfected  Taken 1/29/2024 0000 by Laxmi Samson RN  Infection Prevention:   single patient room provided   rest/sleep promoted   hand hygiene promoted   equipment surfaces disinfected  Taken 1/28/2024 2000 by Laxmi Samson RN  Infection Prevention:   single patient room provided   rest/sleep promoted   hand hygiene promoted   equipment surfaces disinfected  Goal: Optimal Comfort and Wellbeing  Outcome: Not Progressing  Intervention: Monitor Pain and Promote Comfort  Recent Flowsheet Documentation  Taken 1/29/2024 0400 by Laxmi Samson RN  Pain Management Interventions:   pain pump in use   repositioned  Intervention: Provide Person-Centered Care  Recent Flowsheet Documentation  Taken 1/29/2024 0400 by Laxmi Samson RN  Trust Relationship/Rapport:   care explained   choices provided   emotional support provided  Taken 1/29/2024 0000 by Laxmi Samson RN  Trust Relationship/Rapport:   care explained   choices provided   emotional support provided  Taken 1/28/2024 2000 by Laxmi Samson RN  Trust Relationship/Rapport:   care explained   choices provided   emotional support provided  Goal: Readiness for Transition of Care  Outcome: Not Progressing   Goal Outcome Evaluation: Pt remains anxious, alert to self and place, repeated requests to go home, temporary pacer still in place, Dex gtts titrated for agitation. AUO. Gaytan in place. Remains on bedrest.

## 2024-01-29 NOTE — PROGRESS NOTES
Critical Care  Note      01/29/2024    Name: Danielle Tadeo MRN#: [id number]   Age: 48 year old YOB: 1975     [facility]    [facility] DAY #             Problem List:   Principal Problem:    Overdose    Summary/Hospital Course:      48-year-old female presents to the ICU after fentanyl overdose. According to the ER the patient was using fentanyl and became unresponsive the patient received on scene CPR and was in asystole after receiving 8 mg of Narcan.  She was treated with ACLS protocol and developed ventricular tachycardia which was defibrillated. She was intubated in the field with a 7.0 endotracheal tube.  Arterial line and cooling catheter placed in the emergency room.  The patient was admitted to the ICU.     Medical history includes opioid dependence, status post gastric bypass, hypertension, nicotine dependence     Interim history:  Patient had episodes of ventricular fibrillation and has had 2 cardiac arrests requiring defibrillation.  She was also found to have prolonged QTc and concern for torsades.  As per chart review the patient did have a history of torsades in the past when being treated for alcohol dependence.  Given the patient's continued hemodynamic instability and cardiac arrhythmias, cardiology was consulted and the patient had a transvenous pacer placed to override her underlying heart rate since she had the most episodes of arrhythmias when bradycardic.       Interval/overnight events:  Overnight  no major events. Afebrile stable hemodynamically. Patient no longer being cooled for her arrest and is not requiring any pressors. This Am awake, but confuse. Oriented to her self, on low dose Precedex for agitation.         Assessment and plan :      48 year old female admitted on 1/25/2024 for drug over dose, intubated S/P Cardiac Arrest x2. Paced with transveous pacer at 100. Currently off pacer and maintaining her heart rate.     My assessment and plan for this patient is  as follows:       - Wean Precedex  - Avoid bradycardia   - Continue IV Antibiotics  - Continue GI prophylaxis  - Follow up with Neurology  - Continue Waldemar for anxiety  - Monitor Electrolytes K and Mag   - Avoid QT prolonging medications   - Continue follow up with EP cardiology  - Monitor electrolytes and replace as needed     Neurology/Psychiatry:   Awake, confuse  Precedex for agitation  Fentanyl for drug overdose/withdrawal     Cardiovascular:   Off Pressors  Stable hemodynamically     Pulmonary/Ventilator Management:   Not in any distress  B/L Breath Breath sounds on auscultation, clear anteriorly  Wean Fentanyl  Wean Precedex     GI and Nutrition :   Abdomen soft, non distended, +ve BS  Tolerating Po diet    Renal/Fluids/Electrolytes:   BUN/Cr 6.2/0.38  K 3.0, replaced  Mg 1.5, replaced  Phos 2.5    Infectious Disease:   WBC 12.3  Afebrile  Continue Unasyn     Endocrine:     Target BG as per ICU protocol     Hematology/Oncology:   WBC 12.3  H/H 11.7/34.2  Anemia, no signs, symptoms of active blood loss      IV/Access:   1. Venous access - Left Arm PICC line  2. Arterial access - Left femoral A-line  3.  Plan  - Central Access Required and Necessary     ICU Prophylaxis:   1. DVT: Hep Subq/ LMWH/mechanical  2. VAP: HOB 30 degrees, chlorhexidine rinse  3. Stress Ulcer: PPI/H2 blocker  4. Restraints: Nonviolent soft two point restraints required and necessary for patient safety and continued cares and good effect as patient continues to pull at necessary lines, tubes despite education and distraction. Will readdress daily.   5. Wound care  -   6. Feeding - NPO  7. Family Update: Family updated  8. Disposition - Continue ICU Care    Clinically Significant Risk Factors        # Hypokalemia: Lowest K = 3 mmol/L in last 2 days, will replace as needed     # Hypomagnesemia: Lowest Mg = 1.5 mg/dL in last 2 days, will replace as needed       # Hypertension: Noted on problem list        # Overweight: Estimated body  "mass index is 26.15 kg/m  as calculated from the following:    Height as of 1/23/24: 1.626 m (5' 4\").    Weight as of this encounter: 69.1 kg (152 lb 5.4 oz).      # Financial/Environmental Concerns:                   Key Medications:      enalaprilat  2.5 mg Intravenous Q6H    gabapentin  300 mg Oral TID    gabapentin  300 mg Oral Daily at 8 pm    magnesium sulfate  4 g Intravenous Once    pantoprazole  40 mg Per Feeding Tube QAM AC    Or    pantoprazole  40 mg Intravenous QAM AC    potassium chloride  20 mEq Oral or Feeding Tube Once      dexmedeTOMIDine 0.8 mcg/kg/hr (01/29/24 0718)    fentaNYL 25 mcg/hr (01/28/24 1958)    lactated ringers 50 mL/hr at 01/28/24 1958          Physical Examination:   Temp:  [98.8  F (37.1  C)-100.4  F (38  C)] 100.4  F (38  C)  Pulse:  [] 101  Resp:  [10-40] 26  BP: (146-203)/() 183/113  MAP:  [109 mmHg-172 mmHg] 172 mmHg  Arterial Line BP: (158-212)/() 200/161  SpO2:  [92 %-100 %] 94 %    Intake/Output Summary (Last 24 hours) at 1/29/2024 0751  Last data filed at 1/29/2024 0600  Gross per 24 hour   Intake 1895.47 ml   Output 2990 ml   Net -1094.53 ml     Wt Readings from Last 4 Encounters:   01/28/24 69.1 kg (152 lb 5.4 oz)   01/23/24 64.2 kg (141 lb 8.6 oz)   10/04/21 78.5 kg (173 lb)   09/11/21 77.4 kg (170 lb 9.6 oz)     Arterial Line BP: (158-212)/() 200/161  MAP:  [109 mmHg-172 mmHg] 172 mmHg  BP - Mean:  [104-161] 142  Resp: 26    Recent Labs   Lab 01/27/24  0546 01/26/24  2121 01/26/24  1546 01/26/24  1221   PH 7.41 7.50* 7.47* 7.50*   PCO2 37 30* 33* 31*   PO2 94 111* 101 69*   HCO3 23 24 24 24   O2PER 40 40 40 30     GEN: No Acute distress, confuse, oriented to herself  HEENT: Sclera Anicteric, Trachea midline   PULM: B/L Breath sounds on auscultation, clear anteriorly    CV/COR: RRR S1S2 no gallop,  No rub, no murmur  ABD: soft non distended, hypoactive bowel sounds, no mass  EXT:  Warm and Well perfused x4  NEURO: PERRL, confuse, moving all four " extremities.  SKIN: No Obvious Rash  LINES: Clean, Dry intact         Data:   All data and imaging reviewed     ROUTINE ICU LABS (Last four results)  CMP  Recent Labs   Lab 01/29/24  0432 01/29/24  0430 01/28/24 2003 01/28/24  1416 01/28/24  0506 01/27/24  1829 01/27/24  0546 01/26/24  2121 01/26/24  1514 01/26/24  0520 01/25/24  1959 01/25/24  1734 01/25/24  0915 01/25/24  0624 01/25/24  0336 01/25/24  0332 01/24/24  0958 01/24/24  0907 01/24/24  0809 01/24/24  0525 01/23/24  1109 01/23/24  1106   NA  --   --   --   --   --   --  142 143  --  139  --  142  --  142  --   --    < > 133*  --  134*   < > 139   POTASSIUM  --  3.0*  --  3.6 3.4  --  4.0 3.4   < > 3.7  --  3.7  --  3.9  --  4.2   < > 4.6  4.6  --  3.3*   < > 3.9  3.9   CHLORIDE  --   --   --   --   --   --  112* 111*  --  108*  --  110*  --  111*  --   --    < > 104  --  104   < > 103   CO2  --   --   --   --   --   --  23 23  --  22  --  23  --  23  --   --    < > 19*  --  20*   < > 20*   ANIONGAP  --   --   --   --   --   --  7 9  --  9  --  9  --  8  --   --    < > 10  --  10   < > 16*   *  --  94  --   --  81 96 88  --  95   < > 102*   < > 98   < >  --    < > 120*  116*   < > 163*   < > 281*   BUN  --   --   --   --   --   --  10.9 10.1  --  10.0  --  10.4  --  9.5  --   --    < > 10.6  --  11.9   < > 11.8   CR  --   --   --   --   --   --  0.50* 0.56  --  0.47*  --  0.49*  --  0.47*  --   --    < > 0.39*  --  0.44*   < > 0.83   GFRESTIMATED  --   --   --   --   --   --  >90 >90  --  >90  --  >90  --  >90  --   --    < > >90  --  >90   < > 86   HAZEL  --   --   --   --   --   --  7.9* 7.9*  --  8.3*  --  8.4*  --  8.4*  --   --    < > 8.0*  --  8.0*   < > 7.6*   MAG  --  1.5*  --   --  1.7  --  2.1 1.9   < > 1.9  --   --   --  2.0  --  2.0   < > 2.5*  --  2.4*   < > 2.1   PHOS  --   --   --   --   --   --   --   --   --   --   --   --   --  2.7  --  2.4*  --  2.5  --  2.8   < >  --    PROTTOTAL  --   --   --   --   --   --   --   --   --   --  "  --   --   --   --   --   --   --   --   --  5.8*  --  5.8*   ALBUMIN  --   --   --   --   --   --   --   --   --   --   --   --   --   --   --   --   --   --   --  3.7  --  3.7   BILITOTAL  --   --   --   --   --   --   --   --   --   --   --   --   --   --   --   --   --   --   --  0.4  --  0.4   ALKPHOS  --   --   --   --   --   --   --   --   --   --   --   --   --   --   --   --   --   --   --  152*  --  207*   AST  --   --   --   --   --   --   --   --   --   --   --   --   --   --   --   --   --   --   --  204*  --  163*   ALT  --   --   --   --   --   --   --   --   --   --   --   --   --   --   --   --   --   --   --  50  --  44    < > = values in this interval not displayed.     CBC  Recent Labs   Lab 01/27/24  0546 01/26/24  0809 01/24/24  0525 01/23/24  1106   WBC 5.3 9.5 21.7* 16.0*   RBC 2.98* 3.89 5.12 4.82   HGB 8.6* 11.2* 15.1 14.1   HCT 27.0* 34.3* 44.0 43.6   MCV 91 88 86 91   MCH 28.9 28.8 29.5 29.3   MCHC 31.9 32.7 34.3 32.3   RDW 14.6 14.9 13.8 13.3   * 131* 250 224     INR  Recent Labs   Lab 01/24/24  1409 01/24/24  0525 01/23/24  2216   INR 1.07 0.99 1.05     Arterial Blood Gas  Recent Labs   Lab 01/27/24  0546 01/26/24  2121 01/26/24  1546 01/26/24  1221   PH 7.41 7.50* 7.47* 7.50*   PCO2 37 30* 33* 31*   PO2 94 111* 101 69*   HCO3 23 24 24 24   O2PER 40 40 40 30     All cultures:  No results for input(s): \"CULT\" in the last 168 hours.  No results found for this or any previous visit (from the past 24 hour(s)).    Billing: This patient is critically ill: Yes.      Patient seen and discussed with on call ICU Attending MD Yojana Blackburn MD, A  Surgical Intensive Critical Care, Fellow                        "

## 2024-01-29 NOTE — PROGRESS NOTES
[facility]    Stroke Progress Note    Interval Events  Continues on Precedex and fentanyl. Gabapentin 300/300/600 mg started yesterday.    Alert, oriented to hospital, could not recall the month, stuttering speech, difficulty following multi-step commands. No focal weakness.    Max temp 100.4  -203    HPI Summary   48 year old female who first presented to the hospital on January 23 after a drug overdose.  She was with her significant other at home snorting fentanyl and became unresponsive. Her  started CPR. Police came and gave Narcan and said she was down for 8 minutes.  Initially she was in asystole. She was given epinephrine and she was defibrillated at least 4 times for vtach and then intubated. She was admitted at [facility] initially and was cooled.     When she was at [facility] her rhythm has been extremely difficult to control. She developed further episodes of cardiac arrest and V-fib at [facility] and was defibrillated twice more there.  Also concern for torsades and she has had a transvenous pacer placed and ultimately was transferred here to [facility] for pacemaker evaluation    Extubated 1/27    Evaluation Summarized    MRI/Head CT CTH 1/26: Diffuse mild blurring of gray-white matter  differentiation. However, there is no sulcal or basilar cistern effacement compared to previous CT. This is favored artifactual.    CTH 1/23: no acute pathology   EEG 1/27: no seizures or epileptiform discharges  1/26: no seizures, moderate/diffuse encephalopathy  1/25: no seizures, moderate/diffuse encephalopathy     Impression   1. Encephalopathy in the setting of cardiac arrest x2 and fentanyl overdose, now improving. EEG without evidence of seizures. Unable to obtain MRI brain due to pacing wires.     Plan  - Unable to obtain MRI at this time due to pacing wires, gradually improving neuro exam though remains confused.   - Trial gabapentin for anxiety. Continue 300 mg AM, 300 mg noon,  "and 600 mg at bedtime.   - Recommend psychiatry consult       Will follow peripherally while in ICU. Once out of ICU consult general neurology if continued following/management by neurology is needed.    Hortensia Mendoza PA-C  Vascular Neurology      To page me or covering stroke neurology team member, click here: [facility]  Choose \"On Call\" tab at top, then select \"NEUROLOGY/ALL SITES\" from middle drop-down box, press Enter, then look for \"stroke\" or \"telestroke\" for your site.  ______________________________________________________    Clinically Significant Risk Factors        # Hypokalemia: Lowest K = 3 mmol/L in last 2 days, will replace as needed     # Hypomagnesemia: Lowest Mg = 1.5 mg/dL in last 2 days, will replace as needed       # Hypertension: Noted on problem list        # Overweight: Estimated body mass index is 26.15 kg/m  as calculated from the following:    Height as of 1/23/24: 1.626 m (5' 4\").    Weight as of this encounter: 69.1 kg (152 lb 5.4 oz).        # Financial/Environmental Concerns:             Medications   Scheduled Meds   enalaprilat  2.5 mg Intravenous Q6H    gabapentin  300 mg Oral TID    gabapentin  300 mg Oral Daily at 8 pm    magnesium sulfate  4 g Intravenous Once    pantoprazole  40 mg Per Feeding Tube QAM AC    Or    pantoprazole  40 mg Intravenous QAM AC    potassium chloride  20 mEq Oral or Feeding Tube Once       Infusion Meds   dexmedeTOMIDine 0.6 mcg/kg/hr (01/29/24 0337)    fentaNYL 25 mcg/hr (01/28/24 1958)    lactated ringers 50 mL/hr at 01/28/24 1958       PRN Meds  acetaminophen **OR** acetaminophen, bisacodyl, glucose **OR** dextrose **OR** glucagon, fentaNYL, hydrALAZINE, lidocaine 4%, lidocaine (buffered or not buffered), magnesium hydroxide, naloxone **OR** naloxone **OR** naloxone **OR** naloxone, prochlorperazine **OR** prochlorperazine **OR** prochlorperazine, senna-docusate **OR** senna-docusate       PHYSICAL EXAMINATION  Temp:  [98.8  F (37.1  C)-100.4  F (38  C)] " 100.4  F (38  C)  Pulse:  [] 101  Resp:  [10-40] 26  BP: (146-203)/() 183/113  MAP:  [109 mmHg-172 mmHg] 172 mmHg  Arterial Line BP: (158-212)/() 200/161  SpO2:  [92 %-100 %] 94 %      Neurologic  Mental Status:  alert, anxious, states name, oriented to place but cannot name month/year, follows commands, speech stuttering but no obvious word finding difficulty  Cranial Nerves:  face symmetric, EOMs intact, hearing intact to conversation, no dysarthria  Motor:  no abnormal movements, no drift in any extremity  Sensory:  sensation intact to touch in all extremities  Coordination:   deferred  Station/Gait:  deferred    Imaging  I personally reviewed all imaging; relevant findings per HPI.     Lab Results Data   CBC  Recent Labs   Lab 01/27/24  0546 01/26/24  0809 01/24/24  0525   WBC 5.3 9.5 21.7*   RBC 2.98* 3.89 5.12   HGB 8.6* 11.2* 15.1   HCT 27.0* 34.3* 44.0   * 131* 250     Basic Metabolic Panel    Recent Labs   Lab 01/29/24  0432 01/29/24  0430 01/28/24 2003 01/28/24  1416 01/28/24  0506 01/27/24  1829 01/27/24  0546 01/26/24  2121 01/26/24  1514 01/26/24  0520   NA  --   --   --   --   --   --  142 143  --  139   POTASSIUM  --  3.0*  --  3.6 3.4  --  4.0 3.4   < > 3.7   CHLORIDE  --   --   --   --   --   --  112* 111*  --  108*   CO2  --   --   --   --   --   --  23 23  --  22   BUN  --   --   --   --   --   --  10.9 10.1  --  10.0   CR  --   --   --   --   --   --  0.50* 0.56  --  0.47*   *  --  94  --   --  81 96 88  --  95   HAZEL  --   --   --   --   --   --  7.9* 7.9*  --  8.3*    < > = values in this interval not displayed.     Liver Panel  Recent Labs   Lab 01/24/24  0525 01/23/24  1106   PROTTOTAL 5.8* 5.8*   ALBUMIN 3.7 3.7   BILITOTAL 0.4 0.4   ALKPHOS 152* 207*   * 163*   ALT 50 44     INR    Recent Labs   Lab Test 01/24/24  1409 01/24/24  0525 01/23/24  2216   INR 1.07 0.99 1.05      Lipid Profile    Recent Labs   Lab Test 01/26/24  0520 01/24/24  1055   TRIG  193* 338*     A1C    Recent Labs   Lab Test 01/23/24  1106   A1C 4.8     Troponin    Recent Labs   Lab 01/24/24  1816 01/24/24  1201 01/24/24  0525   CTROPT 216* 245* 312*          Data   I personally examined and evaluated the patient today. At the time of my evaluation and management the patient was in critical condition today due to encephalopathy. I personally managed examination. Key decisions made today included imaging. I spent a total of 30 minutes providing critical care services, evaluating the patient, directing care and reviewing laboratory values and radiologic reports.

## 2024-01-30 LAB
ACT BLD: 158 SECONDS (ref 74–150)
ANION GAP SERPL CALCULATED.3IONS-SCNC: 8 MMOL/L (ref 7–15)
ATRIAL RATE - MUSE: 101 BPM
ATRIAL RATE - MUSE: 110 BPM
BUN SERPL-MCNC: 6.4 MG/DL (ref 6–20)
CALCIUM SERPL-MCNC: 8.7 MG/DL (ref 8.6–10)
CHLORIDE SERPL-SCNC: 109 MMOL/L (ref 98–107)
CREAT SERPL-MCNC: 0.45 MG/DL (ref 0.51–0.95)
DEPRECATED HCO3 PLAS-SCNC: 24 MMOL/L (ref 22–29)
DIASTOLIC BLOOD PRESSURE - MUSE: NORMAL MMHG
DIASTOLIC BLOOD PRESSURE - MUSE: NORMAL MMHG
EGFRCR SERPLBLD CKD-EPI 2021: >90 ML/MIN/1.73M2
ERYTHROCYTE [DISTWIDTH] IN BLOOD BY AUTOMATED COUNT: 14.1 % (ref 10–15)
GLUCOSE SERPL-MCNC: 105 MG/DL (ref 70–99)
HCT VFR BLD AUTO: 32.3 % (ref 35–47)
HGB BLD-MCNC: 10.7 G/DL (ref 11.7–15.7)
INTERPRETATION ECG - MUSE: NORMAL
INTERPRETATION ECG - MUSE: NORMAL
ISTAT ACT REQUEST ONLY: NORMAL
MAGNESIUM SERPL-MCNC: 2 MG/DL (ref 1.7–2.3)
MCH RBC QN AUTO: 29.5 PG (ref 26.5–33)
MCHC RBC AUTO-ENTMCNC: 33.1 G/DL (ref 31.5–36.5)
MCV RBC AUTO: 89 FL (ref 78–100)
P AXIS - MUSE: 64 DEGREES
P AXIS - MUSE: 81 DEGREES
PHOSPHATE SERPL-MCNC: 3.6 MG/DL (ref 2.5–4.5)
PLATELET # BLD AUTO: 202 10E3/UL (ref 150–450)
POTASSIUM SERPL-SCNC: 3.6 MMOL/L (ref 3.4–5.3)
POTASSIUM SERPL-SCNC: 3.7 MMOL/L (ref 3.4–5.3)
PR INTERVAL - MUSE: 130 MS
PR INTERVAL - MUSE: 148 MS
QRS DURATION - MUSE: 84 MS
QRS DURATION - MUSE: 92 MS
QT - MUSE: 380 MS
QT - MUSE: 424 MS
QTC - MUSE: 514 MS
QTC - MUSE: 549 MS
R AXIS - MUSE: 13 DEGREES
R AXIS - MUSE: 71 DEGREES
RBC # BLD AUTO: 3.63 10E6/UL (ref 3.8–5.2)
SODIUM SERPL-SCNC: 141 MMOL/L (ref 135–145)
SYSTOLIC BLOOD PRESSURE - MUSE: NORMAL MMHG
SYSTOLIC BLOOD PRESSURE - MUSE: NORMAL MMHG
T AXIS - MUSE: -88 DEGREES
T AXIS - MUSE: 46 DEGREES
VENTRICULAR RATE- MUSE: 101 BPM
VENTRICULAR RATE- MUSE: 110 BPM
WBC # BLD AUTO: 8.3 10E3/UL (ref 4–11)

## 2024-01-30 PROCEDURE — 99232 SBSQ HOSP IP/OBS MODERATE 35: CPT | Mod: FS | Performed by: NURSE PRACTITIONER

## 2024-01-30 PROCEDURE — 250N000013 HC RX MED GY IP 250 OP 250 PS 637: Performed by: STUDENT IN AN ORGANIZED HEALTH CARE EDUCATION/TRAINING PROGRAM

## 2024-01-30 PROCEDURE — 85027 COMPLETE CBC AUTOMATED: CPT | Performed by: NURSE PRACTITIONER

## 2024-01-30 PROCEDURE — 99255 IP/OBS CONSLTJ NEW/EST HI 80: CPT | Performed by: STUDENT IN AN ORGANIZED HEALTH CARE EDUCATION/TRAINING PROGRAM

## 2024-01-30 PROCEDURE — 120N000001 HC R&B MED SURG/OB

## 2024-01-30 PROCEDURE — 250N000011 HC RX IP 250 OP 636: Performed by: ANESTHESIOLOGY

## 2024-01-30 PROCEDURE — 83735 ASSAY OF MAGNESIUM: CPT | Performed by: ANESTHESIOLOGY

## 2024-01-30 PROCEDURE — 250N000009 HC RX 250: Performed by: INTERNAL MEDICINE

## 2024-01-30 PROCEDURE — 250N000013 HC RX MED GY IP 250 OP 250 PS 637: Performed by: NURSE PRACTITIONER

## 2024-01-30 PROCEDURE — 93005 ELECTROCARDIOGRAM TRACING: CPT

## 2024-01-30 PROCEDURE — 80048 BASIC METABOLIC PNL TOTAL CA: CPT | Performed by: ANESTHESIOLOGY

## 2024-01-30 PROCEDURE — 258N000003 HC RX IP 258 OP 636: Performed by: ANESTHESIOLOGY

## 2024-01-30 PROCEDURE — 250N000013 HC RX MED GY IP 250 OP 250 PS 637: Performed by: INTERNAL MEDICINE

## 2024-01-30 PROCEDURE — 80048 BASIC METABOLIC PNL TOTAL CA: CPT | Performed by: NURSE PRACTITIONER

## 2024-01-30 PROCEDURE — 250N000009 HC RX 250: Performed by: STUDENT IN AN ORGANIZED HEALTH CARE EDUCATION/TRAINING PROGRAM

## 2024-01-30 PROCEDURE — 250N000013 HC RX MED GY IP 250 OP 250 PS 637: Performed by: ANESTHESIOLOGY

## 2024-01-30 PROCEDURE — 99233 SBSQ HOSP IP/OBS HIGH 50: CPT | Performed by: NURSE PRACTITIONER

## 2024-01-30 PROCEDURE — 99254 IP/OBS CNSLTJ NEW/EST MOD 60: CPT | Performed by: PSYCHIATRY & NEUROLOGY

## 2024-01-30 PROCEDURE — 84100 ASSAY OF PHOSPHORUS: CPT | Performed by: NURSE PRACTITIONER

## 2024-01-30 PROCEDURE — 85347 COAGULATION TIME ACTIVATED: CPT

## 2024-01-30 RX ORDER — OXYCODONE HYDROCHLORIDE 5 MG/1
5 TABLET ORAL EVERY 4 HOURS PRN
Status: DISCONTINUED | OUTPATIENT
Start: 2024-01-30 | End: 2024-01-31 | Stop reason: HOSPADM

## 2024-01-30 RX ORDER — NALOXONE HYDROCHLORIDE 0.4 MG/ML
0.4 INJECTION, SOLUTION INTRAMUSCULAR; INTRAVENOUS; SUBCUTANEOUS
Status: DISCONTINUED | OUTPATIENT
Start: 2024-01-30 | End: 2024-01-30

## 2024-01-30 RX ORDER — NALOXONE HYDROCHLORIDE 0.4 MG/ML
0.2 INJECTION, SOLUTION INTRAMUSCULAR; INTRAVENOUS; SUBCUTANEOUS
Status: DISCONTINUED | OUTPATIENT
Start: 2024-01-30 | End: 2024-01-30

## 2024-01-30 RX ORDER — METOPROLOL TARTRATE 25 MG/1
25 TABLET, FILM COATED ORAL 2 TIMES DAILY
Status: DISCONTINUED | OUTPATIENT
Start: 2024-01-30 | End: 2024-01-31 | Stop reason: HOSPADM

## 2024-01-30 RX ORDER — FLUMAZENIL 0.1 MG/ML
0.2 INJECTION, SOLUTION INTRAVENOUS
Status: DISCONTINUED | OUTPATIENT
Start: 2024-01-30 | End: 2024-01-30

## 2024-01-30 RX ORDER — ATROPINE SULFATE 0.1 MG/ML
0.5 INJECTION INTRAVENOUS
Status: DISPENSED | OUTPATIENT
Start: 2024-01-30 | End: 2024-01-30

## 2024-01-30 RX ORDER — MAGNESIUM SULFATE HEPTAHYDRATE 40 MG/ML
2 INJECTION, SOLUTION INTRAVENOUS ONCE
Status: COMPLETED | OUTPATIENT
Start: 2024-01-30 | End: 2024-01-30

## 2024-01-30 RX ORDER — FENTANYL CITRATE 50 UG/ML
25 INJECTION, SOLUTION INTRAMUSCULAR; INTRAVENOUS
Status: DISCONTINUED | OUTPATIENT
Start: 2024-01-30 | End: 2024-01-30

## 2024-01-30 RX ORDER — POTASSIUM CHLORIDE 20MEQ/15ML
20 LIQUID (ML) ORAL ONCE
Status: COMPLETED | OUTPATIENT
Start: 2024-01-30 | End: 2024-01-30

## 2024-01-30 RX ADMIN — POTASSIUM CHLORIDE 20 MEQ: 20 SOLUTION ORAL at 06:52

## 2024-01-30 RX ADMIN — GABAPENTIN 300 MG: 300 CAPSULE ORAL at 11:39

## 2024-01-30 RX ADMIN — OXYCODONE HYDROCHLORIDE 5 MG: 5 TABLET ORAL at 15:37

## 2024-01-30 RX ADMIN — GABAPENTIN 300 MG: 300 CAPSULE ORAL at 19:28

## 2024-01-30 RX ADMIN — METOPROLOL TARTRATE 25 MG: 25 TABLET, FILM COATED ORAL at 09:27

## 2024-01-30 RX ADMIN — CLONIDINE HYDROCHLORIDE 0.2 MG: 0.1 TABLET ORAL at 11:39

## 2024-01-30 RX ADMIN — ENALAPRILAT 2.5 MG: 1.25 INJECTION INTRAVENOUS at 15:37

## 2024-01-30 RX ADMIN — MAGNESIUM SULFATE HEPTAHYDRATE 2 G: 40 INJECTION, SOLUTION INTRAVENOUS at 06:51

## 2024-01-30 RX ADMIN — CLONIDINE HYDROCHLORIDE 0.2 MG: 0.1 TABLET ORAL at 06:03

## 2024-01-30 RX ADMIN — ENALAPRILAT 2.5 MG: 1.25 INJECTION INTRAVENOUS at 21:31

## 2024-01-30 RX ADMIN — ACETAMINOPHEN 650 MG: 325 TABLET, FILM COATED ORAL at 03:20

## 2024-01-30 RX ADMIN — ENALAPRILAT 2.5 MG: 1.25 INJECTION INTRAVENOUS at 09:27

## 2024-01-30 RX ADMIN — MELATONIN 5 MG TABLET 5 MG: at 19:28

## 2024-01-30 RX ADMIN — ENALAPRILAT 2.5 MG: 1.25 INJECTION INTRAVENOUS at 03:14

## 2024-01-30 RX ADMIN — OXYCODONE HYDROCHLORIDE 5 MG: 5 TABLET ORAL at 19:28

## 2024-01-30 RX ADMIN — GABAPENTIN 300 MG: 300 CAPSULE ORAL at 07:37

## 2024-01-30 RX ADMIN — METOPROLOL TARTRATE 25 MG: 25 TABLET, FILM COATED ORAL at 21:31

## 2024-01-30 RX ADMIN — GABAPENTIN 300 MG: 300 CAPSULE ORAL at 19:29

## 2024-01-30 RX ADMIN — CLONIDINE HYDROCHLORIDE 0.2 MG: 0.1 TABLET ORAL at 00:44

## 2024-01-30 RX ADMIN — CLONIDINE HYDROCHLORIDE 0.2 MG: 0.1 TABLET ORAL at 17:13

## 2024-01-30 RX ADMIN — SODIUM CHLORIDE, POTASSIUM CHLORIDE, SODIUM LACTATE AND CALCIUM CHLORIDE: 600; 310; 30; 20 INJECTION, SOLUTION INTRAVENOUS at 06:11

## 2024-01-30 RX ADMIN — OXYCODONE HYDROCHLORIDE 5 MG: 5 TABLET ORAL at 11:39

## 2024-01-30 ASSESSMENT — ACTIVITIES OF DAILY LIVING (ADL)
ADLS_ACUITY_SCORE: 50
ADLS_ACUITY_SCORE: 48
ADLS_ACUITY_SCORE: 50
ADLS_ACUITY_SCORE: 48
ADLS_ACUITY_SCORE: 50
ADLS_ACUITY_SCORE: 48
ADLS_ACUITY_SCORE: 50
ADLS_ACUITY_SCORE: 50
ADLS_ACUITY_SCORE: 48
ADLS_ACUITY_SCORE: 50
ADLS_ACUITY_SCORE: 48
ADLS_ACUITY_SCORE: 50

## 2024-01-30 NOTE — PROGRESS NOTES
[facility]  Cardiology Progress Note  Date of Admission: 1/25/2024  Date of Service: 01/30/2024  Primary Cardiologist: New to cardiology    Assessment & Plan   Danielle Tadeo is a 48 year old female with past medical history significant for opioid dependence, obesity status post gastric bypass, hypertension, nicotine dependence and history of QTc prolongation in the setting of multiple QT prolonging medications (buprenorphine, trazodone, quetiapine).  She was admitted admitted on 1/25/2024 with after a fentanyl overdose and cardiac arrest (VT/VF) requiring defibrillation in the field.  She was intubated and sedated and had significant QT prolongation with heart rates in the 50s and recurrent VT/VF.  Temporary pacer was implanted on 1/24.     Interval History:  Temp wire pulled today.  Patient tolerated well    Assessment:   Cardiac arrest (VT/VF) and prolonged QTc:  In the setting of fentanyl overdose and required defibrillation x 4 for VT and intubation in the field.  Recurrent episodes of VT and torsades in the hospital and placed.  Initially on amiodarone and metoprolol were discontinued 1/24  Coronary angiogram revealed normal coronaries and temporary pacer wire was placed on 1/24  Admission EKG (1/23/2024): ST at 109 bpm, , QTc 581  Longest QTc 1/2024 during admission was 682  EKG today notes SR at 81 bpm,  and QTc 476    Plan:  Temporary pacer wire pulled today.  Please pull sheaths.  Start metoprolol tartrate 25 mg twice daily and uptitrate as able  No indication for permanent pacemaker at this time, but recommend avoiding QT prolonging agents/medications  Daily EKG for QTc monitoring  Return to clinic in follow-up with EP in approximately 1 month (orders placed)      Mary Ellen Roberts, APRN CNP  Pager:  [phone]     Physical Exam   Temp: 99.7  F (37.6  C) Temp src: Bladder BP: (!) 164/98 Pulse: 82   Resp: (!) 32 SpO2: 94 % O2 Device: Nasal cannula Oxygen  Delivery: 2 LPM  Vitals:    01/25/24 0515 01/28/24 0015 01/30/24 0400   Weight: 66.3 kg (146 lb 2.6 oz) 69.1 kg (152 lb 5.4 oz) 65.7 kg (144 lb 13.5 oz)       Constitutional:   Lethargic and sleeping   Skin:   Warm and dry   Head:   Nontraumatic   Abdomen:   Benign    Extremities and Back:   Sheath in place on left femoral vein site.  Clean dry and intact.   Neurological:   Grossly nonfocal     Medications    lactated ringers 50 mL/hr at 01/30/24 0611      cloNIDine  0.2 mg Oral Q6H    enalaprilat  2.5 mg Intravenous Q6H    gabapentin  300 mg Oral TID    gabapentin  300 mg Oral Daily at 8 pm    melatonin  5 mg Oral At Bedtime    metoprolol tartrate  25 mg Oral BID       Code Status    Full Code    Allergies   Allergies   Allergen Reactions    Zofran [Ondansetron] Other (See Comments)     QTc Prolongation

## 2024-01-30 NOTE — PLAN OF CARE
Goal Outcome Evaluation:      Plan of Care Reviewed With: patient    Overall Patient Progress: improvingOverall Patient Progress: improving    Outcome Evaluation: Pt A&Ox3. Neuros intact. Follows commands. Generalized weakness. Lungs course, on 2L NC. Tele SR to . Ocassionally hypertensive overnight - gave PRN hydralazine. Gaytan in place with AUO. 2 BMs. Plan to remove temporary pacer today and possible transfer to .      Problem: Restraint, Nonviolent  Goal: Absence of Harm or Injury  Intervention: Implement Least Restrictive Safety Strategies  Flowsheets  Taken 1/30/2024 0620  Medical Device Protection:   IV pole/bag removed from visual field   tubing secured  De-Escalation Techniques:   appropriate behavior reinforced   reoriented   stimulation decreased  Less Restrictive Alternative:   bed alarm in use   calming techniques promoted   family presence promoted   medication offered  Diversional Activities:   music   television  Taken 1/30/2024 0400  Medical Device Protection:   IV pole/bag removed from visual field   tubing secured  Taken 1/30/2024 0000  Medical Device Protection:   IV pole/bag removed from visual field   tubing secured  Taken 1/29/2024 2000  Medical Device Protection:   IV pole/bag removed from visual field   tubing secured

## 2024-01-30 NOTE — CONSULTS
Initial Psychiatric Consult   Consult date: January 30, 2024         Reason for Consult, requesting source:      Pression, anxiety, substance use, previously prescribed treatment has a prolonged QT here with unintentional overdose    Requesting source: Noah Gomez    Labs and imaging reviewed. Patient seen and evaluated by Marlene Salguero MD          HPI:     This is a 48-year-old female who initially presented to [facility] after a fentanyl overdose 1/23/2024.  She was reportedly snorting fentanyl with her significant other and became unresponsive.  He called 911, and started performing CPR.  She was down for 3 minutes before EMS arrival.  Patient went into asystole 4 times and was subject to AED x 4 at the scene.  She received Narcan 8 mg, and was intubated in the field.  Patient has been in the ICU.  Past medical history significant for opioid use disorder, gastric bypass, hypertension.  Patient's  gave history that she does not take prescribed medications for blood pressure and vitamin supplementation status post gastric bypass, and has had teeth problems felt to be secondary to nutritional issues.  ICU notes indicate that the patient has had prolonged QTc with concern for torsades.  He apparently does have a history of TDP when being treated for alcohol dependence in the past.  Radiology has been involved and patient had a transvenous pacer placed to override her underlying heart rate.  Neurology has also been involved, and EEG is showing slowing, but no epileptiform discharges.  At this point, patient appears to have stabilized somewhat, and is no longer being cooled for cardiac arrest.  She is also not requiring pressors any longer.  She remains confused and was on low dose Precedex yesterday for agitation.  This has been replaced with clonidine patch today.  It appears the patient is also being weaned off opiate analgesia.    I did see the patient today along with nursing.  She  is very somnolent after having had a pain pill, and fell asleep while we were talking.  She also may not have wanted to talk.  It was difficult to ascertain.        Past Psychiatric History:   Prior psychiatric notes indicate patient does have prior medication trials of Wellbutrin and gabapentin.  Gabapentin caused leg swelling, and Wellbutrin made her feel suicidal.  Patient has previously been prescribed Wellbutrin, Adderall, duloxetine, escitalopram, gabapentin, Ativan, methadone, Percocet, Zoloft, Ambien.  I do not see any recent prescriptions for antidepressants.          Substance Use and History:     Per chart notes, patient has a history of alcohol use disorder, but does not drink per .  Patient does have a history of being seen for her substance use disorder, last visit being on 3/28/2023.  This note indicates a history of alcohol use disorder, severe, dependence in early remission and opioid use disorder moderate on maintenance therapy.  Indicated at that time, the patient started using opiates 11 years prior when she was going to a pain clinic.  She was cut off, so she continue to buy opioids from the street.  Reportedly, she stopped using everything in 2021, including drinking a liter of alcohol a day, and using opiates off the street daily.  She was using buprenorphine/naloxone 8/2 mg 3 times daily.  She had reported her last relapses 1/16/2022.  It may have been due to prior substance abuse treatments in the past.The last prescription I was able to see for buprenorphine/naloxone 8-2 mg tabs was filled 1/30/2023, but written on 12/6/2022.  This was for #90 tabs.  I do not see any other prescriptions filled and stated [place] for Suboxone.  It looks like her provider was under the impression that she was taking it.        Past Medical History:   PAST MEDICAL HISTORY:   Past Medical History:   Diagnosis Date    Alcohol dependence     Anxiety     Benign essential hypertension     PIH    CAD      Cervical spondylosis without myelopathy 2015    Continuous opioid dependence     Depression     Seasonal allergies        PAST SURGICAL HISTORY:   Past Surgical History:   Procedure Laterality Date    ABDOMINOPLASTY      Arm and thigh lift       SECTION      CV CORONARY ANGIOGRAM N/A 2024    Procedure: Coronary Angiogram;  Surgeon: Jeff Dickerson MD;  Location:  [facility]    CV TEMPORARY PACEMAKER INSERTION N/A 2024    Procedure: Temporary Pacemaker Insertion;  Surgeon: Jeff Dickerson MD;  Location:  [facility]    GASTRIC BYPASS      HYSTERECTOMY      LASIK Bilateral 2001    MAMMOPLASTY REDUCTION               Family History:   FAMILY HISTORY:   Family History   Problem Relation Age of Onset    Cancer Maternal Grandmother         lung ca    Hypertension Father     Cancer Father 63        pancreatic cancer    Hypertension Mother     Allergies Mother         asthma    Cancer - colorectal No family hx of     Breast Cancer No family hx of        Family Psychiatric History:         Social History:   SOCIAL HISTORY:   Social History     Tobacco Use    Smoking status: Some Days     Packs/day: 0.50     Years: 5.00     Additional pack years: 0.00     Total pack years: 2.50     Types: Cigarettes    Smokeless tobacco: Never    Tobacco comments:     some   Substance Use Topics    Alcohol use: Not Currently     Comment: stopped  10 days ago            Physical ROS:   Patient cannot comply with comprehensive review of systems due to somnolence           Medications:      cloNIDine  0.2 mg Oral Q6H    enalaprilat  2.5 mg Intravenous Q6H    gabapentin  300 mg Oral TID    gabapentin  300 mg Oral Daily at 8 pm    melatonin  5 mg Oral At Bedtime    metoprolol tartrate  25 mg Oral BID              Allergies:     Allergies   Allergen Reactions    Zofran [Ondansetron] Other (See Comments)     QTc Prolongation          Labs and Imaging:     Recent Results (from the past 48  hour(s))   Potassium    Collection Time: 01/28/24  2:16 PM   Result Value Ref Range    Potassium 3.6 3.4 - 5.3 mmol/L   Glucose by meter    Collection Time: 01/28/24  8:03 PM   Result Value Ref Range    GLUCOSE BY METER POCT 94 70 - 99 mg/dL   Magnesium    Collection Time: 01/29/24  4:30 AM   Result Value Ref Range    Magnesium 1.5 (L) 1.7 - 2.3 mg/dL   Potassium    Collection Time: 01/29/24  4:30 AM   Result Value Ref Range    Potassium 3.0 (L) 3.4 - 5.3 mmol/L   Comprehensive metabolic panel    Collection Time: 01/29/24  4:30 AM   Result Value Ref Range    Sodium 139 135 - 145 mmol/L    Potassium 3.1 (L) 3.4 - 5.3 mmol/L    Carbon Dioxide (CO2) 20 (L) 22 - 29 mmol/L    Anion Gap 15 7 - 15 mmol/L    Urea Nitrogen 6.2 6.0 - 20.0 mg/dL    Creatinine 0.38 (L) 0.51 - 0.95 mg/dL    GFR Estimate >90 >60 mL/min/1.73m2    Calcium 8.3 (L) 8.6 - 10.0 mg/dL    Chloride 104 98 - 107 mmol/L    Glucose 110 (H) 70 - 99 mg/dL    Alkaline Phosphatase 89 40 - 150 U/L    AST 41 0 - 45 U/L    ALT 23 0 - 50 U/L    Protein Total 5.6 (L) 6.4 - 8.3 g/dL    Albumin 3.3 (L) 3.5 - 5.2 g/dL    Bilirubin Total 0.6 <=1.2 mg/dL   Calcium    Collection Time: 01/29/24  4:30 AM   Result Value Ref Range    Calcium 8.3 (L) 8.6 - 10.0 mg/dL   Phosphorus    Collection Time: 01/29/24  4:30 AM   Result Value Ref Range    Phosphorus 2.5 2.5 - 4.5 mg/dL   Glucose by meter    Collection Time: 01/29/24  4:32 AM   Result Value Ref Range    GLUCOSE BY METER POCT 108 (H) 70 - 99 mg/dL   CBC with platelets    Collection Time: 01/29/24  7:54 AM   Result Value Ref Range    WBC Count 12.3 (H) 4.0 - 11.0 10e3/uL    RBC Count 3.96 3.80 - 5.20 10e6/uL    Hemoglobin 11.7 11.7 - 15.7 g/dL    Hematocrit 34.2 (L) 35.0 - 47.0 %    MCV 86 78 - 100 fL    MCH 29.5 26.5 - 33.0 pg    MCHC 34.2 31.5 - 36.5 g/dL    RDW 14.0 10.0 - 15.0 %    Platelet Count 219 150 - 450 10e3/uL   Magnesium    Collection Time: 01/29/24  1:52 PM   Result Value Ref Range    Magnesium 2.2 1.7 - 2.3  mg/dL   Potassium    Collection Time: 01/29/24  1:52 PM   Result Value Ref Range    Potassium 3.7 3.4 - 5.3 mmol/L   Magnesium    Collection Time: 01/30/24  5:36 AM   Result Value Ref Range    Magnesium 2.0 1.7 - 2.3 mg/dL   Potassium    Collection Time: 01/30/24  5:36 AM   Result Value Ref Range    Potassium 3.6 3.4 - 5.3 mmol/L   Basic metabolic panel    Collection Time: 01/30/24  5:36 AM   Result Value Ref Range    Sodium 141 135 - 145 mmol/L    Potassium 3.7 3.4 - 5.3 mmol/L    Chloride 109 (H) 98 - 107 mmol/L    Carbon Dioxide (CO2) 24 22 - 29 mmol/L    Anion Gap 8 7 - 15 mmol/L    Urea Nitrogen 6.4 6.0 - 20.0 mg/dL    Creatinine 0.45 (L) 0.51 - 0.95 mg/dL    GFR Estimate >90 >60 mL/min/1.73m2    Calcium 8.7 8.6 - 10.0 mg/dL    Glucose 105 (H) 70 - 99 mg/dL   Phosphorus    Collection Time: 01/30/24  5:36 AM   Result Value Ref Range    Phosphorus 3.6 2.5 - 4.5 mg/dL   EKG 12-lead, tracing only    Collection Time: 01/30/24  7:06 AM   Result Value Ref Range    Systolic Blood Pressure  mmHg    Diastolic Blood Pressure  mmHg    Ventricular Rate 81 BPM    Atrial Rate 81 BPM    MI Interval 148 ms    QRS Duration 68 ms     ms    QTc 476 ms    P Axis 75 degrees    R AXIS 25 degrees    T Axis 91 degrees    Interpretation ECG       Sinus rhythm  Possible Left atrial enlargement  Anteroseptal infarct (cited on or before 26-JAN-2024)  Abnormal ECG  When compared with ECG of 28-JAN-2024 10:34, (unconfirmed)  T wave inversion now evident in Anterior leads     Activated clotting time celite, POCT    Collection Time: 01/30/24 10:12 AM   Result Value Ref Range    Activated Clotting Time (Celite) POCT 158 (H) 74 - 150 seconds   CBC with platelets    Collection Time: 01/30/24 10:21 AM   Result Value Ref Range    WBC Count 8.3 4.0 - 11.0 10e3/uL    RBC Count 3.63 (L) 3.80 - 5.20 10e6/uL    Hemoglobin 10.7 (L) 11.7 - 15.7 g/dL    Hematocrit 32.3 (L) 35.0 - 47.0 %    MCV 89 78 - 100 fL    MCH 29.5 26.5 - 33.0 pg    MCHC 33.1  31.5 - 36.5 g/dL    RDW 14.1 10.0 - 15.0 %    Platelet Count 202 150 - 450 10e3/uL          Physical and Psychiatric Examination:     BP (!) 145/87   Pulse 71   Temp 99.5  F (37.5  C)   Resp 24   Wt 65.7 kg (144 lb 13.5 oz)   LMP 06/07/2015   SpO2 96%   BMI 24.86 kg/m    Weight is 144 lbs 13.48 oz  Body mass index is 24.86 kg/m .    Physical Exam:  I have reviewed the physical exam as documented by by the medical team and agree with findings and assessment and have no additional findings to add at this time.    Mental Status Exam:    Appearance: dressed in hospital scrubs, appeared older than stated age, somnolent, and disheveled   Attitude:  guarded and uncooperative  Eye Contact:  poor   Mood:   Unable to assess  Affect:  intensity is blunted  Speech:  paucity of speech  Language: Fluent in english   Psychomotor Behavior:  physical retardation  Thought Process:   [place], disorganized  Associations:   Unable to assess  Thought Content:   Unable to assess  Insight:  limited  Judgement:  limited  Oriented to:   Unable to assess  Attention Span and Concentration:  poor  Recent and Remote Memory:   Unable to assess  Fund of Knowledge: Appropriate   Gait and Station:                DSM-5 Diagnosis:     Opioid use disorder, severe, dependence    Fentanyl overdose, unintentional, subsequent encounter    Toxic metabolic encephalopathy    Alcohol use disorder, severe, dependence, remission status unclear    Cardiac arrest    Prolonged QT          Assessment:     This is a 48-year-old female with cardiac arrest in the setting of fentanyl overdose.  She has been in the ICU since 1/25/2024.  She apparently has improved, though she was somnolent and altered today when I saw her.  She could not participate, but RN told me she had just had a pain pill.    Patient has a history of opioid use disorder and severe alcohol use disorder.  She had been seeing an addiction provider up until March 2023, but I do not see any  Suboxone prescriptions for over a year.  It also looks like the patient was not feeling antidepressants if she was being prescribed them.  Notes from the chart indicate that the  stated she had not been taking any of her medications.    As fentanyl is weaned, patient may begin to show signs of opiate withdrawal.  We will follow along with this in the event that it complicates patient's current encephalopathic state.    Lab indices are not reflective of heavy recent alcohol use, possibly consistent with patient's  stating that she has not been drinking recently.          Summary of Recommendations:     1.  I do not recommend starting any psychiatric medications.  It does not look like the patient's been on anything recently, and she is currently encephalopathic.  I do not see any benefit to it currently.    2.  I will follow along.        Marlene Salguero MD  Consult/Liaison Psychiatry and Addiction Medicine  [facility]

## 2024-01-30 NOTE — PROGRESS NOTES
Critical Care  Note      01/30/2024    Name: Danielle Tadeo MRN#: [id number]   Age: 48 year old YOB: 1975     Hsptl Day# 5  ICU DAY #    MV DAY #             Problem List:   Principal Problem:    Overdose    Summary/Hospital Course:      48-year-old female presents to the ICU after fentanyl overdose. According to the ER the patient was using fentanyl and became unresponsive the patient received on scene CPR and was in asystole after receiving 8 mg of Narcan.  She was treated with ACLS protocol and developed ventricular tachycardia which was defibrillated. She was intubated in the field with a 7.0 endotracheal tube.  Arterial line and cooling catheter placed in the emergency room.  The patient was admitted to the ICU.     Medical history includes opioid dependence, status post gastric bypass, hypertension, nicotine dependence     Interim history:  Patient had episodes of ventricular fibrillation and has had 2 cardiac arrests requiring defibrillation.  She was also found to have prolonged QTc and concern for torsades.  As per chart review the patient did have a history of torsades in the past when being treated for alcohol dependence.  Given the patient's continued hemodynamic instability and cardiac arrhythmias, cardiology was consulted and the patient had a transvenous pacer placed to override her underlying heart rate since she had the most episodes of arrhythmias when bradycardic.       Interval/overnight events:  Overnight  no major events. Afebrile stable hemodynamically. Patient no longer being cooled for her arrest and is not requiring any pressors. This Am awake, but confuse. Oriented to her self, on low dose Precedex for agitation.         Assessment and plan :      48 year old female admitted on 1/25/2024 for drug over dose, intubated S/P Cardiac Arrest x2 with persistent encephalopathy.      My assessment and plan for this patient is as follows:     - Repeat EKG  - Stop continuous  fentanyl infusion  - Pacer wires pulled by EP this am  - Remove sheaths  - Start Metoprolol  - Electrolyte replacement  - Morning labs  - MRI Brain per neurology     Neurology/Psychiatry:   #Encephalopathy 2/2 cardiac arrest, drug overdose  #Fentanyl overdose  #Anxiety  Awake, confused  Plan:  - MRI Brain per Neurology now that pacer wires are out  - Precedex off. Continue Gabapentin 300/300/600  - Stop continuous fentanyl infusion now that extubated. PRN Oxycodone available  - Psychiatry/Addiction medicine consult  - Promote sleep, delirium precautions. HS melatonin-retime     Cardiovascular:   #Cardiac arrest  # VT, Torsades  #Hypertension  - Hypertensive, SR, Pacer off  Plan:  - Pacer wires removed this am  - Remove arterial and venous sheaths  - Continue Clonidine 0.2 q6 hours  - Continue Vasotec 2.6 q6 hours  - Start low dose metoprolol 25 mg BID with hold parameters     Pulmonary/Ventilator Management:   - Extubated, on 2 L NC  Plan:  - Wean O2 as tolerated  - Mobilize out of bed after bedrest completed   - Add IS and acapella     GI and Nutrition :   - Regular diet  - Last stool 1/29 x 2, hold on bowel regimen    Renal/Fluids/Electrolytes:   - Add on renal panel this am  - Replace K>4, Mag> 2    Infectious Disease:   #Leukocytosis  WBC 12.3 yesterday, repeat today  Afebrile  Completed 5 day course of Unasyn, presumably for aspiration.      Endocrine:   Check glucose with am labs  Target BG as per ICU protocol     Hematology/Oncology:   CBC pending today  Anemia, no signs, symptoms of active blood loss      IV/Access:   1. Venous access - Left Arm PICC line, L femoral sheath  2. Arterial access - Left femoral A-line  3.  Plan  - Central Access Required and Necessary     ICU Prophylaxis:   1. DVT: Mechanical. Will start prophylaxis post sheath removal  2. VAP: HOB 30 degrees  3. Stress Ulcer: not indicated  4. Restraints: None  6. Feeding - Regular diet  7. Family Update: Family update pending  8. Disposition -  transfer to hospitalist    Clinically Significant Risk Factors        # Hypokalemia: Lowest K = 3 mmol/L in last 2 days, will replace as needed   # Hypocalcemia: Lowest Ca = 8.3 mg/dL in last 2 days, will monitor and replace as appropriate   # Hypomagnesemia: Lowest Mg = 1.5 mg/dL in last 2 days, will replace as needed   # Hypoalbuminemia: Lowest albumin = 3.3 g/dL at 1/29/2024  4:30 AM, will monitor as appropriate     # Hypertension: Noted on problem list              # Financial/Environmental Concerns:                   Key Medications:      cloNIDine  0.2 mg Oral Q6H    enalaprilat  2.5 mg Intravenous Q6H    gabapentin  300 mg Oral TID    gabapentin  300 mg Oral Daily at 8 pm    melatonin  5 mg Oral At Bedtime    metoprolol tartrate  25 mg Oral BID      lactated ringers 50 mL/hr at 01/30/24 0611          Physical Examination:   Temp:  [99.5  F (37.5  C)-100.6  F (38.1  C)] 99.7  F (37.6  C)  Pulse:  [] 82  Resp:  [21-39] 32  BP: (141-180)/() 164/98  MAP:  [109 mmHg-142 mmHg] 116 mmHg  Arterial Line BP: (147-191)/() 162/84  SpO2:  [94 %-98 %] 94 %    Intake/Output Summary (Last 24 hours) at 1/29/2024 0751  Last data filed at 1/29/2024 0600  Gross per 24 hour   Intake 1895.47 ml   Output 2990 ml   Net -1094.53 ml     Wt Readings from Last 4 Encounters:   01/30/24 65.7 kg (144 lb 13.5 oz)   01/23/24 64.2 kg (141 lb 8.6 oz)   10/04/21 78.5 kg (173 lb)   09/11/21 77.4 kg (170 lb 9.6 oz)     Arterial Line BP: (147-191)/() 162/84  MAP:  [109 mmHg-142 mmHg] 116 mmHg  BP - Mean:  [104-142] 114  Resp: (!) 32    Recent Labs   Lab 01/27/24  0546 01/26/24  2121 01/26/24  1546 01/26/24  1221   PH 7.41 7.50* 7.47* 7.50*   PCO2 37 30* 33* 31*   PO2 94 111* 101 69*   HCO3 23 24 24 24   O2PER 40 40 40 30     GEN: No Acute distress, confused, oriented to herself only  HEENT: Sclera Anicteric, Trachea midline   PULM: B/L Breath sounds on auscultation, clear anteriorly    CV/COR: RRR S1S2 no gallop,  No rub,  no murmur  ABD: soft non distended, hypoactive bowel sounds, no mass  EXT:  Warm and Well perfused x4  NEURO: PERRL, confuse, moving all four extremities.  SKIN: No Obvious Rash  LINES: Clean, Dry intact         Data:   All data and imaging reviewed     ROUTINE ICU LABS (Last four results)  CMP  Recent Labs   Lab 01/30/24  0536 01/29/24  1352 01/29/24  0432 01/29/24  0430 01/28/24 2003 01/28/24  1416 01/28/24  0506 01/27/24  1829 01/27/24  0546 01/26/24  2121 01/26/24  1514 01/26/24  0520 01/25/24  0915 01/25/24  0624 01/25/24  0336 01/25/24  0332 01/24/24  0958 01/24/24  0907 01/24/24  0809 01/24/24  0525 01/23/24  1109 01/23/24  1106   NA  --   --   --  139  --   --   --   --  142 143  --  139   < > 142  --   --    < > 133*  --  134*   < > 139   POTASSIUM 3.6 3.7  --  3.1*  3.0*  --  3.6 3.4  --  4.0 3.4   < > 3.7   < > 3.9  --  4.2   < > 4.6  4.6  --  3.3*   < > 3.9  3.9   CHLORIDE  --   --   --  104  --   --   --   --  112* 111*  --  108*   < > 111*  --   --    < > 104  --  104   < > 103   CO2  --   --   --  20*  --   --   --   --  23 23  --  22   < > 23  --   --    < > 19*  --  20*   < > 20*   ANIONGAP  --   --   --  15  --   --   --   --  7 9  --  9   < > 8  --   --    < > 10  --  10   < > 16*   GLC  --   --  108* 110* 94  --   --  81 96 88  --  95   < > 98   < >  --    < > 120*  116*   < > 163*   < > 281*   BUN  --   --   --  6.2  --   --   --   --  10.9 10.1  --  10.0   < > 9.5  --   --    < > 10.6  --  11.9   < > 11.8   CR  --   --   --  0.38*  --   --   --   --  0.50* 0.56  --  0.47*   < > 0.47*  --   --    < > 0.39*  --  0.44*   < > 0.83   GFRESTIMATED  --   --   --  >90  --   --   --   --  >90 >90  --  >90   < > >90  --   --    < > >90  --  >90   < > 86   HAZEL  --   --   --  8.3*  8.3*  --   --   --   --  7.9* 7.9*  --  8.3*   < > 8.4*  --   --    < > 8.0*  --  8.0*   < > 7.6*   MAG 2.0 2.2  --  1.5*  --   --  1.7  --  2.1 1.9   < > 1.9  --  2.0  --  2.0   < > 2.5*  --  2.4*   < > 2.1   PHOS  --    "--   --  2.5  --   --   --   --   --   --   --   --   --  2.7  --  2.4*  --  2.5  --  2.8   < >  --    PROTTOTAL  --   --   --  5.6*  --   --   --   --   --   --   --   --   --   --   --   --   --   --   --  5.8*  --  5.8*   ALBUMIN  --   --   --  3.3*  --   --   --   --   --   --   --   --   --   --   --   --   --   --   --  3.7  --  3.7   BILITOTAL  --   --   --  0.6  --   --   --   --   --   --   --   --   --   --   --   --   --   --   --  0.4  --  0.4   ALKPHOS  --   --   --  89  --   --   --   --   --   --   --   --   --   --   --   --   --   --   --  152*  --  207*   AST  --   --   --  41  --   --   --   --   --   --   --   --   --   --   --   --   --   --   --  204*  --  163*   ALT  --   --   --  23  --   --   --   --   --   --   --   --   --   --   --   --   --   --   --  50  --  44    < > = values in this interval not displayed.     CBC  Recent Labs   Lab 01/29/24  0754 01/27/24  0546 01/26/24  0809 01/24/24  0525   WBC 12.3* 5.3 9.5 21.7*   RBC 3.96 2.98* 3.89 5.12   HGB 11.7 8.6* 11.2* 15.1   HCT 34.2* 27.0* 34.3* 44.0   MCV 86 91 88 86   MCH 29.5 28.9 28.8 29.5   MCHC 34.2 31.9 32.7 34.3   RDW 14.0 14.6 14.9 13.8    105* 131* 250     INR  Recent Labs   Lab 01/24/24  1409 01/24/24  0525 01/23/24  2216   INR 1.07 0.99 1.05     Arterial Blood Gas  Recent Labs   Lab 01/27/24  0546 01/26/24  2121 01/26/24  1546 01/26/24  1221   PH 7.41 7.50* 7.47* 7.50*   PCO2 37 30* 33* 31*   PO2 94 111* 101 69*   HCO3 23 24 24 24   O2PER 40 40 40 30     All cultures:  No results for input(s): \"CULT\" in the last 168 hours.  No results found for this or any previous visit (from the past 24 hour(s)).    Billing: This patient is critically ill: Yes.                              "

## 2024-01-30 NOTE — PLAN OF CARE
Goal Outcome Evaluation:      Plan of Care Reviewed With: patient, spouse          Outcome Evaluation: Patient alert to self, and situation (that she had a heart issue and is in a hospital). She knows the year and month, but not the date. She has generalized weakness. Follows commands. Can make her needs known. 2L nasal cannula. Sinus rhythm/Sinus tachy on monitor. Hypertension this morning. Clonidine started orally and seems to be helping. Precedex weaned off. Gaytan in place for I+Os and due to patient immobility from having the groin lines for trans-venous pacer. Electrophysiology physician was paged this afternoon and spoke to intensivist Dr. Barragan. They will see patient tomorrow.    Marcelo Lay RN 7:54 PM 01/29/24

## 2024-01-30 NOTE — PROGRESS NOTES
Arterial and venous sheath removed, uneventful, no hematoma, no bruit noted and pedals intact.  VSSMarcelo RN to assume further assessments of groin per orders.

## 2024-01-31 ENCOUNTER — APPOINTMENT (OUTPATIENT)
Dept: PHYSICAL THERAPY | Facility: CLINIC | Age: 49
End: 2024-01-31
Attending: ANESTHESIOLOGY
Payer: COMMERCIAL

## 2024-01-31 ENCOUNTER — APPOINTMENT (OUTPATIENT)
Dept: OCCUPATIONAL THERAPY | Facility: CLINIC | Age: 49
End: 2024-01-31
Attending: ANESTHESIOLOGY
Payer: COMMERCIAL

## 2024-01-31 VITALS
HEART RATE: 74 BPM | RESPIRATION RATE: 16 BRPM | WEIGHT: 144.84 LBS | TEMPERATURE: 99.1 F | BODY MASS INDEX: 24.86 KG/M2 | DIASTOLIC BLOOD PRESSURE: 89 MMHG | SYSTOLIC BLOOD PRESSURE: 147 MMHG | OXYGEN SATURATION: 96 %

## 2024-01-31 LAB
MAGNESIUM SERPL-MCNC: 1.9 MG/DL (ref 1.7–2.3)
POTASSIUM SERPL-SCNC: 3.5 MMOL/L (ref 3.4–5.3)

## 2024-01-31 PROCEDURE — 250N000013 HC RX MED GY IP 250 OP 250 PS 637: Performed by: STUDENT IN AN ORGANIZED HEALTH CARE EDUCATION/TRAINING PROGRAM

## 2024-01-31 PROCEDURE — 84132 ASSAY OF SERUM POTASSIUM: CPT | Performed by: STUDENT IN AN ORGANIZED HEALTH CARE EDUCATION/TRAINING PROGRAM

## 2024-01-31 PROCEDURE — G0463 HOSPITAL OUTPT CLINIC VISIT: HCPCS

## 2024-01-31 PROCEDURE — 97161 PT EVAL LOW COMPLEX 20 MIN: CPT | Mod: GP

## 2024-01-31 PROCEDURE — 83735 ASSAY OF MAGNESIUM: CPT | Performed by: STUDENT IN AN ORGANIZED HEALTH CARE EDUCATION/TRAINING PROGRAM

## 2024-01-31 PROCEDURE — 97535 SELF CARE MNGMENT TRAINING: CPT | Mod: GO

## 2024-01-31 PROCEDURE — 99232 SBSQ HOSP IP/OBS MODERATE 35: CPT | Mod: 25 | Performed by: INTERNAL MEDICINE

## 2024-01-31 PROCEDURE — 250N000009 HC RX 250: Performed by: STUDENT IN AN ORGANIZED HEALTH CARE EDUCATION/TRAINING PROGRAM

## 2024-01-31 PROCEDURE — 93005 ELECTROCARDIOGRAM TRACING: CPT

## 2024-01-31 PROCEDURE — 97116 GAIT TRAINING THERAPY: CPT | Mod: GP

## 2024-01-31 PROCEDURE — 97530 THERAPEUTIC ACTIVITIES: CPT | Mod: GP

## 2024-01-31 PROCEDURE — 250N000013 HC RX MED GY IP 250 OP 250 PS 637: Performed by: HOSPITALIST

## 2024-01-31 PROCEDURE — 97165 OT EVAL LOW COMPLEX 30 MIN: CPT | Mod: GO

## 2024-01-31 RX ORDER — MAGNESIUM OXIDE 400 MG/1
400 TABLET ORAL EVERY 4 HOURS
Status: COMPLETED | OUTPATIENT
Start: 2024-01-31 | End: 2024-01-31

## 2024-01-31 RX ORDER — POTASSIUM CHLORIDE 1.5 G/1.58G
20 POWDER, FOR SOLUTION ORAL ONCE
Status: COMPLETED | OUTPATIENT
Start: 2024-01-31 | End: 2024-01-31

## 2024-01-31 RX ADMIN — POTASSIUM CHLORIDE 20 MEQ: 1.5 POWDER, FOR SOLUTION ORAL at 10:38

## 2024-01-31 RX ADMIN — CLONIDINE HYDROCHLORIDE 0.2 MG: 0.1 TABLET ORAL at 00:40

## 2024-01-31 RX ADMIN — GABAPENTIN 300 MG: 300 CAPSULE ORAL at 12:44

## 2024-01-31 RX ADMIN — METOPROLOL TARTRATE 25 MG: 25 TABLET, FILM COATED ORAL at 09:01

## 2024-01-31 RX ADMIN — GABAPENTIN 300 MG: 300 CAPSULE ORAL at 09:01

## 2024-01-31 RX ADMIN — Medication 400 MG: at 12:44

## 2024-01-31 RX ADMIN — CLONIDINE HYDROCHLORIDE 0.2 MG: 0.1 TABLET ORAL at 12:45

## 2024-01-31 RX ADMIN — CLONIDINE HYDROCHLORIDE 0.2 MG: 0.1 TABLET ORAL at 06:49

## 2024-01-31 RX ADMIN — ENALAPRILAT 2.5 MG: 1.25 INJECTION INTRAVENOUS at 09:09

## 2024-01-31 RX ADMIN — Medication 400 MG: at 09:00

## 2024-01-31 ASSESSMENT — ACTIVITIES OF DAILY LIVING (ADL)
ADLS_ACUITY_SCORE: 34
ADLS_ACUITY_SCORE: 33
ADLS_ACUITY_SCORE: 34
ADLS_ACUITY_SCORE: 27
ADLS_ACUITY_SCORE: 33
DEPENDENT_IADLS:: INDEPENDENT

## 2024-01-31 NOTE — DISCHARGE SUMMARY
[facility]  Hospitalist Discharge Summary      Date of Admission:  1/25/2024  Date of Discharge:  1/31/2024  1:35 PM  Discharging Provider: Mike Hobson MD  Discharge Service: Hospitalist Service    Discharge Diagnoses   Cardiac arrest due to ventricular tachycardia   Polymorphous ventricular tachycardia associated with prolonged QTc likely due to opioid overdose   Unintentional opioid overdose   Acute toxic metabolic encephalopathy due to opioid overdose and cardiac arrest   Aspiration pneumonia   Hypertension   Acute versus chronic anemia   Hypokalemia   Thrombocytopenia   Opioid use disorder, severe, dependence   Alcohol use disorder, severe, dependence, remission status unclear     Clinically Significant Risk Factors          Follow-ups Needed After Discharge       Unresulted Labs Ordered in the Past 30 Days of this Admission       No orders found from 12/26/2023 to 1/26/2024.            Discharge Disposition   Discharged to home  Left against medical advice   Condition at discharge:     Hospital Course   Danielle Tadeo is a 48 year old female admitted on 1/25/2024. She initially presented to [facility] after a fentanyl overdose. She was reportedly was snorting fentanyl with her significant other and then became unresponsive. CPR was started immediately. She was defibrillated x4 and intubated in the field. She was admitted for [facility] ICU.   While in the ICU she developed further episodes of ventricular fibrillation and required additional defibrillation. She was also found to have prolonged QTc with concern for torsades. Cardiology was consulted and placed a transvenous pacer to override her underlying heart rate.      Cardiac arrest   QT prolongation   Polymorphous VT (Torsades de Pointes)   Pt presented to the hospital in cardiac arrest in the setting of fentanyl overdose (see below).  * She has a previous history of VT/Torsade de Pointes requiring  defibrillation/magnesium/amiodarone 8/19/2020 while undergoing acute alcohol withdrawal/on Sertraline.   * Had recurrent Torsades while in the ICU this admission in the setting of significant ectopy/bradycardia while on the hypothermia protocol. She ultimately required transcutaneous pacemaker placement to override her underlying hear rate.   * Pt has not required pacing since 1/27, transvenous pace pulled on 12/30.   - Cardiology and EP following.   - Cardiac monitoring   - Maintain normal potassium and magnesium levels   - Avoid QT prolonging medications   - Avoid bradycardia   - Per cardiology no indication for permanent pacemaker   - Started metoprolol tartrate 25mg BID per cardiology      Acute Toxic Metabolic Encephalopathy, concern for anoxic injury  2/2 above. Pt is awake, but confused, oriented to self only.   CT scan on 1/26 showed diffuse mild blurring of gray-white matter differentiation. EEG 1/25, 1/26, 1/27 no seizures - moderate/diffuse encephalopathy     Fentanyl Overdose  Polysubstance use disorder  Anxiety and depression   Pt presented after a fentanyl overdose. This was reportedly unintentional.   See psychiatry consult for substance use history.   * Pt had been maintained on a fentanyl drip while in the ICU until 1/30.      HTN  BPs persistently elevated, but improving. Currently on Metoprolol tartrate 25mg BID (started 1/30), enalaprit 2.5mg Q6H, and clonidine 0.2mg Q6H.      Acute Anemia  Hemoglobin down to 10.7 from 14.6 on admission. - Suspect related to critical illness, frequent blood draws, etc. No reports of bleeding     Aspiration pneumonia  - Completed a course of Unasyn (1/23-1/28)     Hx of gastric bypass   Noted       The patient left AMA.  I did not give prescriptions or make follow up appointments.    Consultations This Hospital Stay   PHYSICAL THERAPY ADULT IP CONSULT  OCCUPATIONAL THERAPY ADULT IP CONSULT  NUTRITION SERVICES ADULT IP CONSULT  CARDIOLOGY IP  CONSULT  ELECTROPHYSIOLOGY IP CONSULT  NEUROLOGY CRITICAL CARE ADULT IP CONSULT  WOUND OSTOMY CONTINENCE NURSE  IP CONSULT  VASCULAR ACCESS ADULT IP CONSULT  VASCULAR ACCESS ADULT IP CONSULT  PSYCHIATRY IP CONSULT  CARE MANAGEMENT / SOCIAL WORK IP CONSULT  ELECTROPHYSIOLOGY IP CONSULT  CHEMICAL DEPENDENCY IP CONSULT  VASCULAR ACCESS ADULT IP CONSULT    Code Status   Full Code    Time Spent on this Encounter   I, Mike Hobson MD, discharged this patient today but I did not personally see the patient today and will not be billing for the patient's discharge.       Mike Hobson MD  [place] [address] [facility]  [facility] [place] [address]  Phone: [phone]  ______________________________________________________________________    Physical Exam   Vital Signs: Temp: 99.1  F (37.3  C) Temp src: Oral BP: (!) 147/89 Pulse: 74   Resp: 16 SpO2: 96 % O2 Device: None (Room air)    Weight: 144 lbs 13.48 oz       Primary Care Physician   Physician No Ref-Primary    Discharge Orders       Significant Results and Procedures   Most Recent 3 CBC's:  Recent Labs   Lab Test 01/30/24  1021 01/29/24  0754 01/27/24  0546   WBC 8.3 12.3* 5.3   HGB 10.7* 11.7 8.6*   MCV 89 86 91    219 105*     Most Recent 3 BMP's:  Recent Labs   Lab Test 01/31/24  0654 01/30/24  0536 01/29/24  1352 01/29/24  0432 01/29/24  0430 01/27/24  1829 01/27/24  0546   NA  --  141  --   --  139  --  142   POTASSIUM 3.5 3.7  3.6 3.7  --  3.1*  3.0*   < > 4.0   CHLORIDE  --  109*  --   --  104  --  112*   CO2  --  24  --   --  20*  --  23   BUN  --  6.4  --   --  6.2  --  10.9   CR  --  0.45*  --   --  0.38*  --  0.50*   ANIONGAP  --  8  --   --  15  --  7   HAZEL  --  8.7  --   --  8.3*  8.3*  --  7.9*   GLC  --  105*  --  108* 110*   < > 96    < > = values in this interval not displayed.     Most Recent 2 LFT's:  Recent Labs   Lab Test 01/29/24  0430 01/24/24  0525   AST 41 204*   ALT 23 50   ALKPHOS  89 152*   BILITOTAL 0.6 0.4   ,   Results for orders placed or performed during the hospital encounter of 01/25/24   XR Chest Port 1 View    Narrative    EXAM: XR CHEST PORT 1 VIEW  LOCATION: [facility]  DATE: 1/25/2024    INDICATION: ETT placement  COMPARISON: 01/24/2024.    FINDINGS: ET tube approximately 2 cm above the nicol. NG tube passes into the stomach. There is no pneumothorax. The heart size is normal. There is again a left basilar infiltrate. The lungs are otherwise clear.      Impression    IMPRESSION: ET tube 2 cm above the nicol. Left basilar infiltrate.   CT Head w/o Contrast    Narrative    EXAM: CT HEAD W/O CONTRAST  1/26/2024 11:45 AM     HISTORY:  evaluate for anoxic injury       COMPARISON:  Head CT 1/22/2024    TECHNIQUE: Using multidetector thin collimation helical acquisition  technique, axial, coronal and sagittal CT images from the skull base  to the vertex were obtained without intravenous contrast.   (topogram) image(s) also obtained and reviewed.    FINDINGS:  No intracranial hemorrhage, mass effect, or midline shift. Diffuse  blurring of gray-white matter differentiation. However, there is no  sulcal effacement compared to 1/23/2024 dated CT. Ventricles are  proportionate to the cerebral sulci. Clear basal cisterns.    The bony calvaria and the bones of the skull base are normal. The  visualized portions of the paranasal sinuses and mastoid air cells are  clear. Grossly normal orbits.       Impression    IMPRESSION: Diffuse mild blurring of gray-white matter  differentiation. However, there is no sulcal or basilar cistern  effacement compared to previous CT. This is favored artifactual.  Recommend f/u imaging if concern for on anoxic brain injury remains.     JOSHUA NEWMAN MD         SYSTEM ID:  [id number]       Discharge Medications   Discharge Medication List as of 1/31/2024  1:35 PM        CONTINUE these medications which have NOT CHANGED    Details    calcium citrate (CITRACAL) 950 (200 Ca) MG tablet Take 1 tablet by mouth 2 times daily, Historical      ferrous fumarate 65 mg, [place]. FE,-Vitamin C 125 mg (VITRON C)  MG TABS tablet Take 1 tablet by mouth daily, Historical      Vitamin D3 (CHOLECALCIFEROL) 125 MCG (5000 UT) tablet Take 1 tablet by mouth daily, Historical           Allergies   Allergies   Allergen Reactions    Zofran [Ondansetron] Other (See Comments)     QTc Prolongation

## 2024-01-31 NOTE — PROVIDER NOTIFICATION
MD Notification    Notified Person: MD    Notified Person Name: Dr. Hobson     Notification Date/Time: 1/31/24    Notification Interaction: Vocera    Purpose of Notification: hi, 609 says she is going to discharge when her  gets here. Sounds like she is going to leave AMA    Orders Received: Pt can leave ama not holdable at this time    Comments:

## 2024-01-31 NOTE — PLAN OF CARE
SUMMARY: Cardiac Arrest/Fentanyl Overdose  DATE & TIME: 1/30-01/31/24 Night shift    Cognitive Concerns/ Orientation : Alert but intermittently confused - wax and wane - Per neurology - encephalopathy 2/2 to cardiac arrest/overdose -  Neuros intact  BEHAVIOR & AGGRESSION TOOL COLOR: Green  ABNL VS/O2:  VSS on RA -   MOBILITY: SBAx2- strong assist  PAIN MANAGMENT: Denied  DIET: Regular diet   BOWEL/BLADDER: Incontinent; purewick in place- no BM this shift  ABNL LAB/BG: K+ 3.7- replaced and recheck this AM, Mag 2.0- replaced and recheck this AM  DRAIN/DEVICES: PICC to LUE  TELEMETRY RHYTHM: A-fib w/PAC and   SKIN: redness to sacrum/gluteal cleft - mepilex applied CDI. Abrasion to chest following CPR/Leroy device   TESTS/PROCEDURES: None  D/C DATE: Will most likely need TCU  Discharge Barriers: medical stability  OTHER IMPORTANT INFO: LS diminished, Pt intermittently restless and confused, easily redirectable. Patient asking to leave, states she is good to go.

## 2024-01-31 NOTE — PROGRESS NOTES
Cardiac Electrophysiology Progress Note          Assessment and Plan:     Torsades de pointe due to LQTS, likely acquired from overdosing. QT has improving. No fhx of LQT or sudden death. Pt may have intrinsic mild QT prolongation and became dangerously long with drug overdosing. Tolerating bb so far. Obtain ecg today. See ep and genetic testing in a month. Will sign off.                  Interval History:     doing well; no cp, sob, n/v/d, or abd pain.              Review of Systems:   As per subjective, otherwise 5 systems reviewed and negative.           Physical Exam:   Blood pressure (!) 149/81, pulse 74, temperature 99.1  F (37.3  C), temperature source Oral, resp. rate 16, weight 65.7 kg (144 lb 13.5 oz), last menstrual period 06/07/2015, SpO2 96%, not currently breastfeeding.          Intake/Output Summary (Last 24 hours) at 1/31/2024 1027  Last data filed at 1/31/2024 0630  Gross per 24 hour   Intake 675 ml   Output 1750 ml   Net -1075 ml       Constitutional:   NAD   Skin:   Warm and dry   Head:   Nontraumatic   Neck:   Supple, symmetrical, trachea midline, no adenopathy, thyroid symmetric, not enlarged and no tenderness, skin normal   Lungs:   normal   Cardiovascular:   Normal apical impulse, regular rate and rhythm, normal S1 and S2, no S3 or S4, and no murmur noted    Abdomen:   Benign   Extremities and Back:   Symmetric, no curvature, spinous processes are non-tender on palpation, paraspinous muscles are non-tender on palpation, no costal vertebral tenderness   Neurological:   Grossly nonfocal            Medications:      cloNIDine  0.2 mg Oral Q6H    enalaprilat  2.5 mg Intravenous Q6H    gabapentin  300 mg Oral TID    gabapentin  300 mg Oral Daily at 8 pm    magnesium oxide  400 mg Oral Q4H    melatonin  5 mg Oral At Bedtime    metoprolol tartrate  25 mg Oral BID    potassium chloride  20 mEq Oral or Feeding Tube Once     No results displayed because visit has over 200 results.                      Juwan Arvizu MD

## 2024-01-31 NOTE — PROGRESS NOTES
Patient elected to leave AMA. Documents printed and read then signed by patient. Patient picked up and transferred home by .

## 2024-01-31 NOTE — PROGRESS NOTES
01/31/24 1037   Appointment Info   Signing Clinician's Name / Credentials (PT) Refugio Soto, PT, DPT   Living Environment   People in Home spouse   Current Living Arrangements house   Home Accessibility stairs to enter home   Number of Stairs, Main Entrance 2   Stair Railings, Main Entrance none   Living Environment Comments Patient lives in house with spouse, 2 steps to enter with no handrails. reports daughter lives close by. Unclear whether patient has stairs in home, stated that all needs on main level. tub shower combo   Self-Care   Usual Activity Tolerance good   Current Activity Tolerance fair   Equipment Currently Used at Home none   Fall history within last six months no   Activity/Exercise/Self-Care Comment Patient reported being independent with mobility and cares at baseline with no assistive device.   General Information   Onset of Illness/Injury or Date of Surgery 01/25/24   Referring Physician Estrella Dsouza MD   Patient/Family Therapy Goals Statement (PT) Not assessed   Pertinent History of Current Problem (include personal factors and/or comorbidities that impact the POC) 48 year old female admitted on 1/25/2024. She initially presented to [facility] after a fentanyl overdose. She was reportedly was snorting fentanyl with her significant other and then became unresponsive. CPR was started immediately. She was defibrillated x4 and intubated in the field. She was admitted for [facility] ICU.   Existing Precautions/Restrictions fall   Cognition   Affect/Mental Status (Cognition) confused;low arousal/lethargic   Orientation Status (Cognition) oriented to;person;time;situation   Follows Commands (Cognition) WFL   Cognitive Status Comments patient impulsive at times during session, flat affect   Pain Assessment   Patient Currently in Pain Yes, see Vital Sign flowsheet   Strength (Manual Muscle Testing)   Strength (Manual Muscle Testing) Able to perform R SLR;Able to perform L SLR   Bed Mobility   Comment,  (Bed Mobility) supine<>sit transfers with supervision and bedrail   Transfers   Comment, (Transfers) sit<>stands with initial CGA and FWW   Gait/Stairs (Locomotion)   Comment, (Gait/Stairs) ambulation with close CGA and FWW   Balance   Balance Comments impaired balance without assistive device.   Sensory Examination   Sensory Perception Comments denies sensory impairments in LE's   Coordination   Coordination Comments no deficits noted with coordination testing on RLE and LLE   Clinical Impression   Criteria for Skilled Therapeutic Intervention Yes, treatment indicated   PT Diagnosis (PT) impaired mobility   Influenced by the following impairments weakness, reduced activity tolerance, impaired balance   Functional limitations due to impairments impaired functional mobility, impaired gait, stair navigation, bed mobility, and transfers   Clinical Presentation (PT Evaluation Complexity) stable   Clinical Presentation Rationale clinical judgement   Clinical Decision Making (Complexity) low complexity   Planned Therapy Interventions (PT) balance training;bed mobility training;gait training;neuromuscular re-education;patient/family education;stair training;strengthening;transfer training;progressive activity/exercise   Risk & Benefits of therapy have been explained evaluation/treatment results reviewed;care plan/treatment goals reviewed;risks/benefits reviewed;current/potential barriers reviewed;participants voiced agreement with care plan;participants included;patient   PT Total Evaluation Time   PT Eval, Low Complexity Minutes (00605) 6   Physical Therapy Goals   PT Frequency 6x/week   PT Predicted Duration/Target Date for Goal Attainment 02/07/24   PT Goals Bed Mobility;Transfers;Gait;Stairs   PT: Bed Mobility Modified independent;Supine to/from sit   PT: Transfers Modified independent;Sit to/from stand  (device as needed)   PT: Gait Modified independent;Greater than 200 feet;Rolling walker  (device as needed)   PT:  Stairs Modified independent;2 stairs;Rail on right   Interventions   Interventions Quick Adds Gait Training;Therapeutic Activity   Therapeutic Activity   Therapeutic Activities: dynamic activities to improve functional performance Minutes (04646) 8   Symptoms Noted During/After Treatment Fatigue   Treatment Detail/Skilled Intervention Patient seated in recliner at beginning of session, agreeable to participate in PT. PT left room briefly to retrieve materials, patient leaving recliner and getting into bed. Chair alarm going off and patient back in bed upon arrival, discussed using call light prior to mobilizing at this time. Patient with flat affect and some confusion throughout session, defer to OT for further cognitive assessment. Patient performing supine<>sit transfer with supervision. VSS when monitored during session. Patient performing sit<>stands with initial CGA and FWW, progressed to SBA. In seated, reaching for ring underneath bed with SBA. Patient supine in bed at end of session with call light next to her and bed alarm on. Discussed mobility during session with RN and OT.   Gait Training   Gait Training Minutes (62795) 9   Symptoms Noted During/After Treatment (Gait Training) fatigue   Treatment Detail/Skilled Intervention Patient completed initial bout of ambulation with FWW and CGA. Impulsive with walker, picking up walker intermittently while turning. Cues for walker safety while ambulating and with turns. No overt LOB throughout. Completed second bout of ambulation with no assistive device and CGA-min assist x1. Instability noted with turns requiring min assist x1, cues for sequencing and safety with turns. Noted increased speed while ambulating without AD, cues for reduced speed and to improve control while ambulating.   Distance in Feet 75' x 2   PT Discharge Planning   PT Plan progress ambulation distance, initiate stair training, repeated sit<>stands, formal balance assessment, dynamic balance    PT Discharge Recommendation (DC Rec) Transitional Care Facility;home with assist;home with home care physical therapy   PT Rationale for DC Rec Patient well below self reported baseline functional mobility. Presents with weakness, reduced activity tolerance, impaired balance, and cognitive concerns resulting in need for assist and use of FWW for mobility. Lives with spouse in house, 2 steps to enter. At this time, recommend discharge to TCU for improvement of strength, activity tolerance, balance, and independence with functional mobility. Would need supervision and assist for all mobility and cares if discharging home with use of FWW for all mobility. Would also need HH PT/OT to address deficits in mobility and cognition.   PT Brief overview of current status CGA-min assist x1 for mobility   PT Equipment Needed at Discharge walker, rolling   Total Session Time   Timed Code Treatment Minutes 17   Total Session Time (sum of timed and untimed services) 23

## 2024-01-31 NOTE — PLAN OF CARE
Goal Outcome Evaluation:      Plan of Care Reviewed With: patient, spouse, child          Outcome Evaluation: The patient is more alert today. Less restless. Less impulsive. She still has some generalized weakness and occasional confusion. Sinus rhythm on monitor. Hypertension is better than it was yesterday with oral metoprolol and clonidine. Patient transitioned from 2L NC to room air. Groin lines were removed. Patient to be flat bed rest until 18:00 today. Patient able to transfer to 6th floor.  and daughter Paige were updated. Patient transferred via cart with RN. Groin site check done with Josue MONDRAGON RN on 6th floor.    Marcelo Lay RN 5:00 PM 01/30/24

## 2024-01-31 NOTE — PROGRESS NOTES
01/31/24 1000   Appointment Info   Signing Clinician's Name / Credentials (OT) Latoya Salguero OTR/L   Rehab Comments (OT) Initial OT Eval   Living Environment   People in Home spouse   Current Living Arrangements house   Home Accessibility stairs to enter home;stairs within home   Living Environment Comments reports daughter lives close by. stairs in the home. tub shower combo   Self-Care   Usual Activity Tolerance good   Current Activity Tolerance moderate   Equipment Currently Used at Home none   Fall history within last six months no   Activity/Exercise/Self-Care Comment pt reports inp with no use of device   Instrumental Activities of Daily Living (IADL)   IADL Comments pt reports indp. questionable historian at this time   General Information   Onset of Illness/Injury or Date of Surgery 01/25/24   Referring Physician Noah Gomez MD   Patient/Family Therapy Goal Statement (OT) pt reports she feels ready to go home   Additional Occupational Profile Info/Pertinent History of Current Problem Danielle Tadeo is a 48 year old female with past medical history significant for opioid dependence, obesity status post gastric bypass, hypertension, nicotine dependence and history of QTc prolongation in the setting of multiple QT prolonging medications (buprenorphine, trazodone, quetiapine).  She was admitted admitted on 1/25/2024 with after a fentanyl overdose and cardiac arrest (VT/VF) requiring defibrillation in the field.  She was intubated and sedated and had significant QT prolongation with heart rates in the 50s and recurrent VT/VF. now temp pacer wires pulled and tx to the medical unit. PICC in place.   Existing Precautions/Restrictions fall   General Observations and Info activity orders in for up with assist. on RA   Cognitive Status Examination   Orientation Status orientation to person, place and time   Affect/Mental Status (Cognitive) WFL;confused   Follows Commands follows one-step commands   Safety  Deficit judgment;insight into deficits/self-awareness;impulsivity;awareness of need for assistance;problem-solving;safety precautions awareness;safety precautions follow-through/compliance   Memory Deficit short-term memory   Cognitive Status Comments ecephalopathic per psych note. disoriented to situation at times and confused, very poor insight and awareness of needs. will benefit from SLUMS next tx session   Visual Perception   Visual Impairment/Limitations WFL   Sensory   Sensory Quick Adds sensation intact   Pain Assessment   Patient Currently in Pain Yes, see Vital Sign flowsheet   Posture   Posture forward head position;protracted shoulders;kyphosis   Range of Motion Comprehensive   General Range of Motion no range of motion deficits identified   Strength Comprehensive (MMT)   Comment, General Manual Muscle Testing (MMT) Assessment gross deconditioning and weakness   Muscle Tone Assessment   Muscle Tone Quick Adds No deficits were identified   Coordination   Upper Extremity Coordination No deficits were identified   Transfers   Transfers sit-stand transfer   Sit-Stand Transfer   Sit-Stand [place] (Transfers) minimum assist (75% patient effort)   Assistive Device (Sit-Stand Transfers) walker, front-wheeled   Balance   Balance Comments poor. see PT eval   Activities of Daily Living   BADL Assessment/Intervention lower body dressing;bathing;toileting   Bathing Assessment/Intervention   [place] Level (Bathing) moderate assist (50% patient effort)   Lower Body Dressing Assessment/Training   Comment, (Lower Body Dressing) figure 4 seated in chair   [place] Level (Lower Body Dressing) socks;set up   Toileting   [place] Level (Toileting) minimum assist (75% patient effort)   Clinical Impression   Criteria for Skilled Therapeutic Interventions Met (OT) Yes, treatment indicated   OT Diagnosis decreased function in ADL   OT Problem List-Impairments impacting ADL problems related to;activity tolerance  impaired;balance;cognition;mobility;strength   Assessment of Occupational Performance 5 or more Performance Deficits   Identified Performance Deficits all ADL and IADL   Planned Therapy Interventions (OT) ADL retraining;IADL retraining;cognition;progressive activity/exercise   Clinical Decision Making Complexity (OT) problem focused assessment/low complexity   Risk & Benefits of therapy have been explained evaluation/treatment results reviewed;care plan/treatment goals reviewed;risks/benefits reviewed;current/potential barriers reviewed;participants voiced agreement with care plan;participants included;patient   Clinical Impression Comments decreased function in ADL Warrants skilled therapy   OT Total Evaluation Time   OT Eval, Low Complexity Minutes (07945) 14   OT Goals   Therapy Frequency (OT) 6 times/week   OT Predicted Duration/Target Date for Goal Attainment 02/11/24   OT Goals Hygiene/Grooming;Upper Body Dressing;Lower Body Dressing;Transfers;Toilet Transfer/Toileting;Cognition   OT: Hygiene/Grooming modified independent   OT: Upper Body Dressing Modified independent   OT: Lower Body Dressing Modified independent   OT: Transfer Modified independent  (tub shower)   OT: Toilet Transfer/Toileting Modified independent   OT: Cognitive Patient/caregiver will verbalize understanding of cognitive assessment results/recommendations as needed for safe discharge planning   Interventions   Interventions Quick Adds Self-Care/Home Management   Self-Care/Home Management   Self-Care/Home Mgmt/ADL, Compensatory, Meal Prep Minutes (36305) 30   Symptoms Noted During/After Treatment (Meal Preparation/Planning Training) fatigue   Treatment Detail/Skilled Intervention pt seen for oT tx session this date. direct handoff with nursing. completed shower tx, poor awareness for hand placement, balance, needs consistent cues to safely exit shower. assist needed for bathing and drying. fatigued, needed seated rest break. educated pt on  "progression and duration of her hospital stay, how intubation/ICU stay impacts function etc. verbalized \"ok\". pt reports then that she is ready to go home but redirected to amb to chair, poor awareness of space iwht FWw, cues for mgmt and safety. completed LB dressing in seated in chair, sets off chair alarm prior to exit room. gentle redireciton and reorientation needed, delirium intervention put in place, pt educated.   OT Discharge Planning   OT Plan MACE or SLUMS. toileting, TB dressing   OT Discharge Recommendation (DC Rec) Transitional Care Facility   OT Rationale for DC Rec pt below baseline function in self care, ADL and IADL at this time. recommend discharge to TCU. demonstrating signficant deficits in activity tolerance, endurance, cognition and balance. high fall risk   OT Brief overview of current status Ax1   Total Session Time   Timed Code Treatment Minutes 28   Total Session Time (sum of timed and untimed services) 42     "

## 2024-01-31 NOTE — CONSULTS
1/31/2024    I spoke with pt who was not interested in participating in a MANAS CA with a referral to either inpatient or outpatient MANAS treatment at this time. If pt changes their mind, they can call [facility] at [phone] for an Outpatient MANAS Assessment upon discharge from the hospital.    Kylah Mabry MA [facility]  MANAS Evaluation Counselor  [phone]  [email or web address]

## 2024-01-31 NOTE — CONSULTS
Care Management Initial Consult    General Information  Assessment completed with: Danielle Lopez  Type of CM/SW Visit: Initial Assessment    Primary Care Provider verified and updated as needed: No (Patient does not have primary care doctor)   Readmission within the last 30 days: no previous admission in last 30 days      Reason for Consult: discharge planning  Advance Care Planning:            Communication Assessment  Patient's communication style: spoken language (English or Bilingual)    Hearing Difficulty or Deaf: no   Wear Glasses or Blind: no    Cognitive  Cognitive/Neuro/Behavioral: .WDL except  Level of Consciousness: intermittent confusion, alert  Arousal Level: opens eyes spontaneously  Orientation: disoriented to, time  Mood/Behavior: calm, cooperative  Best Language: 0 - No aphasia  Speech: clear    Living Environment:   People in home: spouse  Madan  Current living Arrangements: house      Able to return to prior arrangements: yes       Family/Social Support:  Care provided by: self  Provides care for: no one  Marital Status:     Madan       Description of Support System: Other (see comments) (Not able to assess)         Current Resources:   Patient receiving home care services: No     Community Resources: None  Equipment currently used at home: none  Supplies currently used at home: None    Employment/Financial:  Employment Status:          Financial Concerns: none   Referral to Financial Worker: No       Does the patient's insurance plan have a 3 day qualifying hospital stay waiver?  No    Lifestyle & Psychosocial Needs:  Social Determinants of Health     Food Insecurity: Not on file   Depression: Not at risk (8/25/2020)    PHQ-2     PHQ-2 Score: 2   Housing Stability: Not on file   Tobacco Use: High Risk (1/24/2024)    Patient History     Smoking Tobacco Use: Some Days     Smokeless Tobacco Use: Never     Passive Exposure: Not on file   Financial Resource Strain: Not on file    Alcohol Use: Not on file   Transportation Needs: Not on file   Physical Activity: Not on file   Interpersonal Safety: Not on file   Stress: Not on file   Social Connections: Not on file       Functional Status:  Prior to admission patient needed assistance:   Dependent ADLs:: Independent  Dependent IADLs:: Independent  Assesssment of Functional Status: Not at  functional baseline      Additional Information:  Writer was consulted for discharge planning. Patient was spoke with at bedside. Chart was reviewed. 48-year-old female presents to the hospital after fentanyl overdose. She was intubated and sedated and unable to provide a history when first came to the hosptial. Writer spoke with patient at bedside. Writer introduced self and role for discharge planning. Patient is refusing to go to TCU and wants to discharge from the hospital as soon as possible. Patient declined a CD consult at this time and declines any services from . Patient was documented to need a FWW to ambulate but at this time patient is stating she is wanting to discharge as soon as possible. Psych has seen patient and have signed off for discharge as she is denying she had the overdose. Patient unwilling to accept services at this time.     Oziel Andujar [facility]  Care Management

## 2024-01-31 NOTE — PLAN OF CARE
Goal Outcome Evaluation:    SUMMARY: Cardiac Arrest/Fentanyl Overdose  DATE & TIME: 1/30/23    Cognitive Concerns/ Orientation : Alert but intermittently confused - wax and wane - predominantly oriented to self, place and intermittently confused on time and situation. Per neurology - encephalopathy 2/2 to cardiac arrest/overdose -  Neuros intact  BEHAVIOR & AGGRESSION TOOL COLOR: green  ABNL VS/O2: Hypertensive amongst arrival 175/114 - received PO clonidine - other VSS on RA - also receiving scheduled metoprolol and enalapril for HTN  MOBILITY: bedrest until 1800 d/t arterial and venous sheath removal (site wdl)   PAIN MANAGMENT: c/o 5/10 back pain upon movement but comfortable at rest.  DIET: Regular diet   BOWEL/BLADDER: st removed - incontinent. One large BM this shift  ABNL LAB/BG: 3.7, , mag 2.0 (PMH of torsades)  DRAIN/DEVICES: PICC to LUE  TELEMETRY RHYTHM:   SKIN: redness to sacrum/gluteal cleft - mepilex applied CDI. Abrasion to chest following CPR/Leroy device   TESTS/PROCEDURES: none this shift  D/C DATE: pending  Discharge Barriers: medical stability  OTHER IMPORTANT INFO: LS diminished, Pt intermittently restless and confused, easily redirectable. Family visited at bedside. Temporary pacer removed today. Receiving Gabapentin for anxiety.

## 2024-02-01 NOTE — PLAN OF CARE
Physical Therapy Discharge Summary    Reason for therapy discharge:    Discharged to home with home therapy.    Progress towards therapy goal(s). See goals on Care Plan in [facility] electronic health record for goal details.  Goals not met.  Barriers to achieving goals:   discharge from facility.    Therapy recommendation(s):    Continued therapy is recommended.  Rationale/Recommendations:  To further increase independence with mobility.

## 2024-02-01 NOTE — PLAN OF CARE
Occupational Therapy Discharge Summary    Reason for therapy discharge:    Discharged to home.    Progress towards therapy goal(s). See goals on Care Plan in [facility] health record for goal details.  Goals partially met.  Barriers to achieving goals:   discharge from facility.    Therapy recommendation(s):    Continued therapy is recommended.  Rationale/Recommendations:  pt below baseline function in self care, ADL and IADL at this time. recommend discharge to TCU. demonstrating signficant deficits in activity tolerance, endurance, cognition and balance. high fall risk.

## 2024-02-02 LAB
ATRIAL RATE - MUSE: 63 BPM
ATRIAL RATE - MUSE: 81 BPM
DIASTOLIC BLOOD PRESSURE - MUSE: NORMAL MMHG
DIASTOLIC BLOOD PRESSURE - MUSE: NORMAL MMHG
INTERPRETATION ECG - MUSE: NORMAL
INTERPRETATION ECG - MUSE: NORMAL
P AXIS - MUSE: 71 DEGREES
P AXIS - MUSE: 75 DEGREES
PR INTERVAL - MUSE: 132 MS
PR INTERVAL - MUSE: 148 MS
QRS DURATION - MUSE: 68 MS
QRS DURATION - MUSE: 94 MS
QT - MUSE: 410 MS
QT - MUSE: 492 MS
QTC - MUSE: 476 MS
QTC - MUSE: 503 MS
R AXIS - MUSE: 25 DEGREES
R AXIS - MUSE: 7 DEGREES
SYSTOLIC BLOOD PRESSURE - MUSE: NORMAL MMHG
SYSTOLIC BLOOD PRESSURE - MUSE: NORMAL MMHG
T AXIS - MUSE: 251 DEGREES
T AXIS - MUSE: 91 DEGREES
VENTRICULAR RATE- MUSE: 63 BPM
VENTRICULAR RATE- MUSE: 81 BPM

## 2024-02-03 ENCOUNTER — APPOINTMENT (OUTPATIENT)
Dept: GENERAL RADIOLOGY | Facility: CLINIC | Age: 49
End: 2024-02-03
Attending: EMERGENCY MEDICINE
Payer: MEDICAID

## 2024-02-03 ENCOUNTER — HOSPITAL ENCOUNTER (INPATIENT)
Facility: CLINIC | Age: 49
LOS: 4 days | Discharge: HOME HEALTH | End: 2024-02-07
Attending: EMERGENCY MEDICINE | Admitting: INTERNAL MEDICINE
Payer: MEDICAID

## 2024-02-03 DIAGNOSIS — R07.9 CHEST PAIN, UNSPECIFIED TYPE: ICD-10-CM

## 2024-02-03 DIAGNOSIS — L30.4 INTERTRIGO: ICD-10-CM

## 2024-02-03 DIAGNOSIS — N39.0 E. COLI UTI: ICD-10-CM

## 2024-02-03 DIAGNOSIS — B96.20 E. COLI UTI: ICD-10-CM

## 2024-02-03 DIAGNOSIS — F19.94 SUBSTANCE INDUCED MOOD DISORDER (H): ICD-10-CM

## 2024-02-03 DIAGNOSIS — F11.23 OPIOID DEPENDENCE WITH WITHDRAWAL (H): ICD-10-CM

## 2024-02-03 DIAGNOSIS — T50.901A ACCIDENTAL OVERDOSE, INITIAL ENCOUNTER: ICD-10-CM

## 2024-02-03 DIAGNOSIS — I10 HYPERTENSION, UNSPECIFIED TYPE: Primary | ICD-10-CM

## 2024-02-03 DIAGNOSIS — R94.31 PROLONGED Q-T INTERVAL ON ECG: ICD-10-CM

## 2024-02-03 DIAGNOSIS — E86.0 DEHYDRATION: ICD-10-CM

## 2024-02-03 LAB
ALBUMIN SERPL BCG-MCNC: 4 G/DL (ref 3.5–5.2)
ALBUMIN UR-MCNC: NEGATIVE MG/DL
ALP SERPL-CCNC: 110 U/L (ref 40–150)
ALT SERPL W P-5'-P-CCNC: 30 U/L (ref 0–50)
AMPHETAMINES UR QL SCN: ABNORMAL
ANION GAP SERPL CALCULATED.3IONS-SCNC: 13 MMOL/L (ref 7–15)
APPEARANCE UR: CLEAR
AST SERPL W P-5'-P-CCNC: 58 U/L (ref 0–45)
BACTERIA #/AREA URNS HPF: ABNORMAL /HPF
BARBITURATES UR QL SCN: ABNORMAL
BASOPHILS # BLD AUTO: 0 10E3/UL (ref 0–0.2)
BASOPHILS NFR BLD AUTO: 0 %
BENZODIAZ UR QL SCN: ABNORMAL
BILIRUB SERPL-MCNC: 0.4 MG/DL
BILIRUB UR QL STRIP: NEGATIVE
BUN SERPL-MCNC: 3.3 MG/DL (ref 6–20)
BZE UR QL SCN: ABNORMAL
CALCIUM SERPL-MCNC: 8.9 MG/DL (ref 8.6–10)
CANNABINOIDS UR QL SCN: ABNORMAL
CHLORIDE SERPL-SCNC: 104 MMOL/L (ref 98–107)
CK SERPL-CCNC: 123 U/L (ref 26–192)
COLOR UR AUTO: ABNORMAL
CREAT SERPL-MCNC: 0.58 MG/DL (ref 0.51–0.95)
DEPRECATED HCO3 PLAS-SCNC: 24 MMOL/L (ref 22–29)
EGFRCR SERPLBLD CKD-EPI 2021: >90 ML/MIN/1.73M2
EOSINOPHIL # BLD AUTO: 0 10E3/UL (ref 0–0.7)
EOSINOPHIL NFR BLD AUTO: 0 %
ERYTHROCYTE [DISTWIDTH] IN BLOOD BY AUTOMATED COUNT: 14 % (ref 10–15)
FENTANYL UR QL: ABNORMAL
GLUCOSE SERPL-MCNC: 108 MG/DL (ref 70–99)
GLUCOSE UR STRIP-MCNC: NEGATIVE MG/DL
HCT VFR BLD AUTO: 40.8 % (ref 35–47)
HGB BLD-MCNC: 13.7 G/DL (ref 11.7–15.7)
HGB UR QL STRIP: NEGATIVE
IMM GRANULOCYTES # BLD: 0 10E3/UL
IMM GRANULOCYTES NFR BLD: 0 %
KETONES UR STRIP-MCNC: NEGATIVE MG/DL
LEUKOCYTE ESTERASE UR QL STRIP: NEGATIVE
LYMPHOCYTES # BLD AUTO: 3.6 10E3/UL (ref 0.8–5.3)
LYMPHOCYTES NFR BLD AUTO: 35 %
MAGNESIUM SERPL-MCNC: 2.1 MG/DL (ref 1.7–2.3)
MCH RBC QN AUTO: 29.3 PG (ref 26.5–33)
MCHC RBC AUTO-ENTMCNC: 33.6 G/DL (ref 31.5–36.5)
MCV RBC AUTO: 87 FL (ref 78–100)
MONOCYTES # BLD AUTO: 0.7 10E3/UL (ref 0–1.3)
MONOCYTES NFR BLD AUTO: 7 %
NEUTROPHILS # BLD AUTO: 5.9 10E3/UL (ref 1.6–8.3)
NEUTROPHILS NFR BLD AUTO: 58 %
NITRATE UR QL: POSITIVE
NRBC # BLD AUTO: 0 10E3/UL
NRBC BLD AUTO-RTO: 0 /100
NT-PROBNP SERPL-MCNC: ABNORMAL PG/ML (ref 0–450)
OPIATES UR QL SCN: ABNORMAL
PCP QUAL URINE (ROCHE): ABNORMAL
PH UR STRIP: 6.5 [PH] (ref 5–7)
PLATELET # BLD AUTO: 238 10E3/UL (ref 150–450)
POTASSIUM SERPL-SCNC: 3.2 MMOL/L (ref 3.4–5.3)
PROT SERPL-MCNC: 6.9 G/DL (ref 6.4–8.3)
RBC # BLD AUTO: 4.67 10E6/UL (ref 3.8–5.2)
RBC URINE: 1 /HPF
SODIUM SERPL-SCNC: 141 MMOL/L (ref 135–145)
SP GR UR STRIP: 1 (ref 1–1.03)
SQUAMOUS EPITHELIAL: 1 /HPF
TROPONIN T SERPL HS-MCNC: 80 NG/L
TROPONIN T SERPL HS-MCNC: 83 NG/L
UROBILINOGEN UR STRIP-MCNC: NORMAL MG/DL
WBC # BLD AUTO: 10.2 10E3/UL (ref 4–11)
WBC URINE: 2 /HPF

## 2024-02-03 PROCEDURE — 80307 DRUG TEST PRSMV CHEM ANLYZR: CPT | Performed by: EMERGENCY MEDICINE

## 2024-02-03 PROCEDURE — 250N000013 HC RX MED GY IP 250 OP 250 PS 637: Performed by: INTERNAL MEDICINE

## 2024-02-03 PROCEDURE — 120N000001 HC R&B MED SURG/OB

## 2024-02-03 PROCEDURE — 83880 ASSAY OF NATRIURETIC PEPTIDE: CPT | Performed by: EMERGENCY MEDICINE

## 2024-02-03 PROCEDURE — 84484 ASSAY OF TROPONIN QUANT: CPT | Performed by: EMERGENCY MEDICINE

## 2024-02-03 PROCEDURE — 258N000003 HC RX IP 258 OP 636: Performed by: INTERNAL MEDICINE

## 2024-02-03 PROCEDURE — 82040 ASSAY OF SERUM ALBUMIN: CPT | Performed by: EMERGENCY MEDICINE

## 2024-02-03 PROCEDURE — 87086 URINE CULTURE/COLONY COUNT: CPT | Performed by: EMERGENCY MEDICINE

## 2024-02-03 PROCEDURE — 99285 EMERGENCY DEPT VISIT HI MDM: CPT | Mod: 25

## 2024-02-03 PROCEDURE — 83735 ASSAY OF MAGNESIUM: CPT | Performed by: EMERGENCY MEDICINE

## 2024-02-03 PROCEDURE — 82550 ASSAY OF CK (CPK): CPT | Performed by: EMERGENCY MEDICINE

## 2024-02-03 PROCEDURE — 36415 COLL VENOUS BLD VENIPUNCTURE: CPT | Performed by: EMERGENCY MEDICINE

## 2024-02-03 PROCEDURE — 71046 X-RAY EXAM CHEST 2 VIEWS: CPT

## 2024-02-03 PROCEDURE — 87186 SC STD MICRODIL/AGAR DIL: CPT | Performed by: EMERGENCY MEDICINE

## 2024-02-03 PROCEDURE — 99223 1ST HOSP IP/OBS HIGH 75: CPT | Performed by: INTERNAL MEDICINE

## 2024-02-03 PROCEDURE — 85025 COMPLETE CBC W/AUTO DIFF WBC: CPT | Performed by: EMERGENCY MEDICINE

## 2024-02-03 PROCEDURE — 93005 ELECTROCARDIOGRAM TRACING: CPT

## 2024-02-03 PROCEDURE — 81001 URINALYSIS AUTO W/SCOPE: CPT | Performed by: EMERGENCY MEDICINE

## 2024-02-03 RX ORDER — CLONIDINE HYDROCHLORIDE 0.2 MG/1
0.2 TABLET ORAL EVERY 4 HOURS PRN
Status: ON HOLD | COMMUNITY
End: 2024-02-05

## 2024-02-03 RX ORDER — OXYCODONE HYDROCHLORIDE 5 MG/1
5 TABLET ORAL EVERY 4 HOURS PRN
Status: DISCONTINUED | OUTPATIENT
Start: 2024-02-03 | End: 2024-02-07 | Stop reason: HOSPADM

## 2024-02-03 RX ORDER — OXYCODONE HYDROCHLORIDE 5 MG/1
10 TABLET ORAL EVERY 4 HOURS PRN
Status: DISCONTINUED | OUTPATIENT
Start: 2024-02-03 | End: 2024-02-04

## 2024-02-03 RX ORDER — NALOXONE HYDROCHLORIDE 0.4 MG/ML
0.4 INJECTION, SOLUTION INTRAMUSCULAR; INTRAVENOUS; SUBCUTANEOUS
Status: DISCONTINUED | OUTPATIENT
Start: 2024-02-03 | End: 2024-02-07 | Stop reason: HOSPADM

## 2024-02-03 RX ORDER — POTASSIUM CHLORIDE 1500 MG/1
40 TABLET, EXTENDED RELEASE ORAL ONCE
Status: COMPLETED | OUTPATIENT
Start: 2024-02-03 | End: 2024-02-03

## 2024-02-03 RX ORDER — AMOXICILLIN 250 MG
1 CAPSULE ORAL 2 TIMES DAILY PRN
Status: DISCONTINUED | OUTPATIENT
Start: 2024-02-03 | End: 2024-02-04

## 2024-02-03 RX ORDER — ACETAMINOPHEN 325 MG/1
975 TABLET ORAL 2 TIMES DAILY
Status: DISCONTINUED | OUTPATIENT
Start: 2024-02-03 | End: 2024-02-04

## 2024-02-03 RX ORDER — CALCIUM CARBONATE 500 MG/1
1000 TABLET, CHEWABLE ORAL 4 TIMES DAILY PRN
Status: DISCONTINUED | OUTPATIENT
Start: 2024-02-03 | End: 2024-02-07 | Stop reason: HOSPADM

## 2024-02-03 RX ORDER — AMOXICILLIN 250 MG
2 CAPSULE ORAL 2 TIMES DAILY
Status: DISCONTINUED | OUTPATIENT
Start: 2024-02-03 | End: 2024-02-07 | Stop reason: HOSPADM

## 2024-02-03 RX ORDER — NALOXONE HYDROCHLORIDE 0.4 MG/ML
0.2 INJECTION, SOLUTION INTRAMUSCULAR; INTRAVENOUS; SUBCUTANEOUS
Status: DISCONTINUED | OUTPATIENT
Start: 2024-02-03 | End: 2024-02-07 | Stop reason: HOSPADM

## 2024-02-03 RX ORDER — LIDOCAINE 40 MG/G
CREAM TOPICAL
Status: DISCONTINUED | OUTPATIENT
Start: 2024-02-03 | End: 2024-02-07 | Stop reason: HOSPADM

## 2024-02-03 RX ORDER — SODIUM CHLORIDE 9 MG/ML
INJECTION, SOLUTION INTRAVENOUS CONTINUOUS
Status: DISCONTINUED | OUTPATIENT
Start: 2024-02-03 | End: 2024-02-05

## 2024-02-03 RX ORDER — AMOXICILLIN 250 MG
2 CAPSULE ORAL 2 TIMES DAILY PRN
Status: DISCONTINUED | OUTPATIENT
Start: 2024-02-03 | End: 2024-02-04

## 2024-02-03 RX ORDER — OXYCODONE HYDROCHLORIDE 5 MG/1
10 TABLET ORAL EVERY 4 HOURS PRN
Status: DISCONTINUED | OUTPATIENT
Start: 2024-02-03 | End: 2024-02-07

## 2024-02-03 RX ORDER — AMOXICILLIN 250 MG
1 CAPSULE ORAL 2 TIMES DAILY
Status: DISCONTINUED | OUTPATIENT
Start: 2024-02-03 | End: 2024-02-07 | Stop reason: HOSPADM

## 2024-02-03 RX ADMIN — OXYCODONE HYDROCHLORIDE 10 MG: 5 TABLET ORAL at 17:54

## 2024-02-03 RX ADMIN — POTASSIUM CHLORIDE 40 MEQ: 1500 TABLET, EXTENDED RELEASE ORAL at 20:10

## 2024-02-03 RX ADMIN — ACETAMINOPHEN 975 MG: 325 TABLET, FILM COATED ORAL at 20:10

## 2024-02-03 RX ADMIN — SODIUM CHLORIDE: 9 INJECTION, SOLUTION INTRAVENOUS at 17:53

## 2024-02-03 RX ADMIN — OXYCODONE HYDROCHLORIDE 5 MG: 5 TABLET ORAL at 22:04

## 2024-02-03 ASSESSMENT — ACTIVITIES OF DAILY LIVING (ADL)
ADLS_ACUITY_SCORE: 33
ADLS_ACUITY_SCORE: 31
ADLS_ACUITY_SCORE: 37
ADLS_ACUITY_SCORE: 35
ADLS_ACUITY_SCORE: 35

## 2024-02-03 NOTE — ED NOTES
[facility]  ED Nurse Handoff Report    ED Chief complaint: Generalized Weakness  . ED Diagnosis:   Final diagnoses:   Chest pain, unspecified type   Prolonged Q-T interval on ECG       Allergies:   Allergies   Allergen Reactions    Zofran [Ondansetron] Other (See Comments)     QTc Prolongation       Code Status: see admit orders for status    Activity level - Baseline/Home:  independent.  Activity Level - Current:   independent.   Lift room needed: No.   Bariatric: No   Needed: No   Isolation: No.   Infection: Not Applicable.     Respiratory status: Room air    Vital Signs (within 30 minutes):   Vitals:    02/03/24 1430 02/03/24 1600   BP: 116/81 113/81   Pulse: 109 93   Resp: 20 25   Temp: 97.6  F (36.4  C)    TempSrc: Oral    SpO2: 100% 100%       Cardiac Rhythm:  ,      Pain level:    Patient confused: Yes.   Patient Falls Risk: nonskid shoes/slippers when out of bed, patient and family education, assistive device/personal items within reach, and activity supervised.   Elimination Status:  incontinent; has purewick in place. Did not change brief for 3 days; redness to adrian area and buttocks      Patient Report - Initial Complaint: generalized weakness.   Focused Assessment: Danielle Tadeo is a 48 year old female with a history of hypertension, accidental fentanyl overdose, CAD, and cardiac arrest who presents to the ED for evaluation of memory problems. The patient's  reports that the patient was recently seen for a cardiac arrest after a fentanyl overdose. The patient discharged herself from the hospital on 1/31, despite her  being concerned that she wasn't ready to go home. She was feeling good, when she checked herself out, but started feeling worse at home. The patient's  states that the patient has been more confused and has had sleeping issues. Last night, she started to breath rapidly and needed her  to calm her done. Today, she agreed to go  back to the hospital to get further evaluation. The patient's endorses chest pain. Her  notes that it is apparent where he did compressions and where the machine was on the patient's chest. The patient denies recent fevers. The patient has been eating and drinking fluids. The patient reports that she hasn't been walking around okay. The patient's  denies that the patient has had any recent drug use.      Independent Historian:   Spouse/Partner - They report supplemental history as seen above     Review of External Notes:   I reviewed patient's discharge from  for a cardiac arrest after a fentanyl overdose.       Medications:    Calcium citrate  Ferrous fumarate  Hydroxyzine pamoate  Lamotrigine  Melatonin  Trazodone  Zinc   Clonidine  Gabapentin  Buprenorphine-naloxone  Naloxone  Quetiapine  Sertraline  Metoprolol tartrate     Past Medical History:    Anxiety  Hypertension   CAD  Cervical spondylosis   Continuous opioid dependence  Lumbago  Prolonged Q-T interval on ECG  Alcohol withdrawal syndrome   Hypokalemia  Elevated LFTs  Cardiac arrest   Cigarette nicotine dependence  Chronic jaw pain  Major depressive disorder  Organic mood disorder of depressed type  Substance induced mood disorder   Persistent insomnia  Suicidal ideation  Accidental fentanyl overdose   Lactic acidosis  Asthma  Degenerative disc disease  Menorrhagia   Dysmenorrhea  Arrhythmia ventricular  Fibrillation ventricular  Chronic low back pain     Past Surgical History:    Abdominoplasty  Arm and thigh lift   section  Coronary angiogram  Temporary pacemaker insertion  Gastric bypass  Hysterectomy   Bilateral lasik  Mammoplasty reduction     Physical Exam   Patient Vitals for the past 24 hrs:    BP Temp Temp src Pulse Resp SpO2   24 1430 116/81 97.6  F (36.4  C) Oral 109 20 100 %         Abnormal Results:   Labs Ordered and Resulted from Time of ED Arrival to Time of ED Departure   COMPREHENSIVE METABOLIC PANEL -  Abnormal       Result Value    Sodium 141      Potassium 3.2 (*)     Carbon Dioxide (CO2) 24      Anion Gap 13      Urea Nitrogen 3.3 (*)     Creatinine 0.58      GFR Estimate >90      Calcium 8.9      Chloride 104      Glucose 108 (*)     Alkaline Phosphatase 110      AST 58 (*)     ALT 30      Protein Total 6.9      Albumin 4.0      Bilirubin Total 0.4     TROPONIN T, HIGH SENSITIVITY - Abnormal    Troponin T, High Sensitivity 83 (*)    NT PROBNP INPATIENT - Abnormal    N terminal Pro BNP Inpatient 12,939 (*)    ROUTINE UA WITH MICROSCOPIC REFLEX TO CULTURE - Abnormal    Color Urine Light Yellow      Appearance Urine Clear      Glucose Urine Negative      Bilirubin Urine Negative      Ketones Urine Negative      Specific Gravity Urine 1.005      Blood Urine Negative      pH Urine 6.5      Protein Albumin Urine Negative      Urobilinogen Urine Normal      Nitrite Urine Positive (*)     Leukocyte Esterase Urine Negative      Bacteria Urine Moderate (*)     RBC Urine 1      WBC Urine 2      Squamous Epithelials Urine 1     URINE DRUG SCREEN PANEL - Abnormal    Amphetamines Urine Screen Negative      Barbituates Urine Screen Negative      Benzodiazepine Urine Screen Negative      Cannabinoids Urine Screen Positive (*)     Cocaine Urine Screen Negative      Fentanyl Qual Urine Screen Positive (*)     Opiates Urine Screen Negative      PCP Urine Screen Negative     MAGNESIUM - Normal    Magnesium 2.1     CK TOTAL - Normal         CBC WITH PLATELETS AND DIFFERENTIAL    WBC Count 10.2      RBC Count 4.67      Hemoglobin 13.7      Hematocrit 40.8      MCV 87      MCH 29.3      MCHC 33.6      RDW 14.0      Platelet Count 238      % Neutrophils 58      % Lymphocytes 35      % Monocytes 7      % Eosinophils 0      % Basophils 0      % Immature Granulocytes 0      NRBCs per 100 WBC 0      Absolute Neutrophils 5.9      Absolute Lymphocytes 3.6      Absolute Monocytes 0.7      Absolute Eosinophils 0.0      Absolute  Basophils 0.0      Absolute Immature Granulocytes 0.0      Absolute NRBCs 0.0     URINE CULTURE        XR Chest 2 Views   Final Result   IMPRESSION: Negative chest. Lungs clear.          Treatments provided:   Family Comments:  at bedside  OBS brochure/video discussed/provided to patient:  No  ED Medications: Medications - No data to display    Drips infusing:  No  For the majority of the shift this patient was Green.   Interventions performed were .    Sepsis treatment initiated: No    Cares/treatment/interventions/medications to be completed following ED care: admit orders.    ED Nurse Name: Kate Tao RN  4:08 PM  RECEIVING UNIT ED HANDOFF REVIEW    Above ED Nurse Handoff Report was reviewed: Yes  Reviewed by: Sumaya Meza RN on February 3, 2024 at 5:58 PM

## 2024-02-03 NOTE — H&P
"[facility] History and Physical    Danielle Tadeo MRN# [id number]   Age: 48 year old YOB: 1975     Date of Admission:  2/3/2024    Home clinic: Unknown at this time.          Assessment and Plan:   Assessment:   Danielle Tadeo is a 48 year old woman who returned to the hospital tonight for problems with weakness and \"memory problems\" after having been discharged from [facility] on 1/31/24.  According to the discharge summary, the pt left \"AMA\".  Today, the patient's partner brought her in stating that \"he could not care for her at home\".  The patient gives me very little information other than simply that she does not feel well and has chest pain.    Ms. Tadeo had initially been admitted to [facility] on 1/23/2024 after she became unresponsive.  She and her significant other evidently had been snorting fentanyl and he was able to start CPR at the scene.  She was shocked 4 times, received 2 mg of epi and was intubated.  Following evaluation in the emergency department, the patient was admitted to the ICU at [facility] for cooling protocol.  She had additional episodes of V-fib requiring defibrillation.  After starting on amiodarone, the patient had problems with bradycardia and ultimately she had an episode of torsades.  At that point, she required transvenous pacing and was transferred to [facility] on 1/25/2024.      While the patient was at [facility], patient was able to be extubated.  There was clearly concern for acute toxic metabolic encephalopathy and possibly anoxic injury.  Vascular neurology was involved as were psychiatry, chemical dependency, OT and PT.  It appears that shortly after becoming coherent, the patient decided to discharge home and she was accompanied by her spouse.      On presentation to the emergency department, VS: , /81, RR 20, oxygen saturation 100% breathing room air.  Patient is " afebrile.  Examination reveals a moderately uncomfortable woman who appears reluctant to interact directly with me.  She frequently complains of chest discomfort.  Is not in acute distress lying completely flat on a gurney.  Labs: Creatinine 0.58, BUN 3.3, potassium 3.2.  Other electrolytes normal.  AST 58/ALT 30, glucose 108.  N-terminal proBNP 13,000, troponin T 83 with a delta value of 80.   WBC 10.2 with normal differential, Hgb 13.7, .  UA is not suggestive of UTI.  Urine drug screen positive for fentanyl as well as cannabinoids.  Imaging: Chest x-ray clear.    DX:  1.  Weakness and SOB.  Function is inadequate to care for herself.  However, I note that the patient was able to  order to get to the bedside commode.  2.  Chronic narcotic abuse.  Patient denies a history of alcohol abuse.  3.  Acute chest pain due to recent arrest and chest compressions.  4.  Elevated troponin and NT pro-BNP. Echo completed while she was intubated on 1/23/24 indicated EF 45-50%.  No evidence at this time of congestive failure based on symptoms or chest x-ray.  5.  Suspect irritant dermatitis to the diaper area.  Nurses clearly described redness and irritation of the adrian area.  I note that the patient was unable to utilize a pure wick catheter.      Plan:   Admit to inpatient on telemetry.   Repeat trop is more compatible with strain than acute MI.  Echo planned for tomorrow.   Chem dep consult. Is pt a good candidate for buprenorphine? She has severe, acute chest pain s/p chest compressions superimposed on chronic narcotic abuse.  Oxycodone 5 -10 mg q 4 h prn. No IV narcs unless pt is unabl;e to take po.   Acetaminophen and Naproxen scheduled BID for now.  Unclear liver function at this time (pt reports she is NOT a drinker,) but has elevated LFTs on presentation and documentation of Etoh abuse on her chart.  I note that although the patient had psychiatric consultation during the last hospitalization, she was  encephalopathic at the time of the evaluation.  I suspect that the patient is not competent at this time to make her own decisions.  In addition, it seems that the patient's partner has demonstrated that he is incapable of caring for her also in her current state.             Chief Complaint:     Generalized weakness.  Patient indicates she is just not feeling well.  Triage indicated the patient was not able to be cared for adequately by her partner at home.     History is obtained from the patient, electronic health record, and emergency department physician     Ms. Tadeo is quite vague in terms of giving history.  She is tells me that she is feeling ill and confirms that she was unable to care for herself at home.    It is noteworthy that the nurses found that she apparently had not changed her pull-up over the course of the last 3 days.  She apparently had been discharged and not changed since that time.    She denies fevers, sweats and chills.  She does indicate that she is somewhat dizzy.  Denies nausea and vomiting.  Complains frequently of chest discomfort.  Indicates that she is urinating adequately.  No BMs in the last couple of days.        Past Medical History:     Past Medical History:   Diagnosis Date    Alcohol dependence     Anxiety     Benign essential hypertension     PIH    CAD     Cervical spondylosis without myelopathy 2015    Continuous opioid dependence     Depression     Seasonal allergies              Past Surgical History:      Past Surgical History:   Procedure Laterality Date    ABDOMINOPLASTY      Arm and thigh lift       SECTION      CV CORONARY ANGIOGRAM N/A 2024    Procedure: Coronary Angiogram;  Surgeon: Jeff Dickerson MD;  Location:  [facility]    CV TEMPORARY PACEMAKER INSERTION N/A 2024    Procedure: Temporary Pacemaker Insertion;  Surgeon: Jeff Dickerson MD;  Location:  [facility]ATH [facility]    GASTRIC BYPASS      HYSTERECTOMY       LASIK Bilateral 2001    MAMMOPLASTY REDUCTION               Social History:     Social History     Tobacco Use    Smoking status: Some Days     Packs/day: 0.50     Years: 5.00     Additional pack years: 0.00     Total pack years: 2.50     Types: Cigarettes    Smokeless tobacco: Never    Tobacco comments:     some   Substance Use Topics    Alcohol use: Not Currently     Comment: stopped  10 days ago             Family History:     Family History   Problem Relation Age of Onset    Cancer Maternal Grandmother         lung ca    Hypertension Father     Cancer Father 63        pancreatic cancer    Hypertension Mother     Allergies Mother         asthma    Cancer - colorectal No family hx of     Breast Cancer No family hx of      Family history reviewed.         Immunizations:     Immunization History   Administered Date(s) Administered    COVID-19 MONOVALENT 12+ (Pfizer) 05/08/2021, 05/29/2021    COVID-19 Monovalent 12+ (Pfizer 2022) 02/02/2022    Influenza Vaccine >6 months,quad, PF 02/19/2019    TDAP (Adacel,Boostrix) 08/08/2006             Allergies:     Allergies   Allergen Reactions    Zofran [Ondansetron] Other (See Comments)     QTc Prolongation             Medications:     Medications Prior to Admission   Medication Sig Dispense Refill Last Dose    cloNIDine (CATAPRES) 0.2 MG tablet Take 0.2 mg by mouth every 4 hours as needed (anxiety)   2/2/2024 at PM             Review of Systems:     A comprehensive review of systems was performed and found to be negative except as described in this note           Physical Exam:   Vitals were reviewed  Temp: 98.6  F (37  C) Temp src: Oral BP: 102/73 Pulse: 94   Resp: 18 SpO2: 99 % O2 Device: None (Room air)    Constitutional: Awake and appropriate to situation.  Eyes: Lids and lashes normal, pupils equal, round and reactive to light, extra ocular muscles intact, sclera clear, conjunctiva normal.  ENT: Normocephalic, without obvious abnormality, atraumatic.  Dentures upper  and lower.  Neck: Supple, symmetrical, trachea midline, no adenopathy, thyroid symmetric, not enlarged and no tenderness, skin normal.  Hematologic / Lymphatic: No cervical lymphadenopathy and no supraclavicular lymphadenopathy.  Back: Symmetric, no curvature, spinous processes are non-tender on palpation, paraspinous muscles are non-tender on palpation, no costal vertebral tenderness.  Lungs: No increased work of breathing, good air exchange, clear to auscultation bilaterally, no crackles or wheezing.  Complains of discomfort even with the pressure of the stethoscope during auscultation of the chest.  Cardiovascular: Regular rate and rhythm, normal S1 and S2, no S3 or S4, and soft, high-pitched systolic murmur.  Abdomen: No scars, normal bowel sounds, soft, non-distended, non-tender, no masses palpated, no hepatosplenomegaly.  Musculoskeletal: No redness, warmth, or swelling of the joints. Tone is normal.  Neurologic: Awake, alert, oriented to name, place and time.  Cranial nerves II-XII are grossly intact.    Neuropsychiatric: Flat/withdrawn.  Skin: No rashes, erythema, pallor, petechia or purpura.          Data:     Results for orders placed or performed during the hospital encounter of 02/03/24 (from the past 24 hour(s))   EKG 12-lead, tracing only   Result Value Ref Range    Systolic Blood Pressure  mmHg    Diastolic Blood Pressure  mmHg    Ventricular Rate 107 BPM    Atrial Rate 107 BPM    VT Interval 122 ms    QRS Duration 86 ms     ms    QTc 555 ms    P Axis 69 degrees    R AXIS 3 degrees    T Axis 210 degrees    Interpretation ECG       Sinus tachycardia  Right atrial enlargement  Moderate voltage criteria for LVH, may be normal variant ( R in aVL , Vidal product )  Cannot rule out Anterior infarct (cited on or before 26-JAN-2024)  T wave abnormality, consider lateral ischemia  Prolonged QT  Abnormal ECG  When compared with ECG of 31-JAN-2024 10:34,  Premature ventricular complexes are no longer  Present  Vent. rate has increased BY  44 BPM  T wave inversion no longer evident in Anterior leads  Inverted T waves have replaced nonspecific T wave abnormality in Lateral leads     CBC with platelets differential    Narrative    The following orders were created for panel order CBC with platelets differential.  Procedure                               Abnormality         Status                     ---------                               -----------         ------                     CBC with platelets and d...[[id number]]                      Final result                 Please view results for these tests on the individual orders.   Comprehensive metabolic panel   Result Value Ref Range    Sodium 141 135 - 145 mmol/L    Potassium 3.2 (L) 3.4 - 5.3 mmol/L    Carbon Dioxide (CO2) 24 22 - 29 mmol/L    Anion Gap 13 7 - 15 mmol/L    Urea Nitrogen 3.3 (L) 6.0 - 20.0 mg/dL    Creatinine 0.58 0.51 - 0.95 mg/dL    GFR Estimate >90 >60 mL/min/1.73m2    Calcium 8.9 8.6 - 10.0 mg/dL    Chloride 104 98 - 107 mmol/L    Glucose 108 (H) 70 - 99 mg/dL    Alkaline Phosphatase 110 40 - 150 U/L    AST 58 (H) 0 - 45 U/L    ALT 30 0 - 50 U/L    Protein Total 6.9 6.4 - 8.3 g/dL    Albumin 4.0 3.5 - 5.2 g/dL    Bilirubin Total 0.4 <=1.2 mg/dL   Troponin T, High Sensitivity   Result Value Ref Range    Troponin T, High Sensitivity 83 (H) <=14 ng/L   Magnesium   Result Value Ref Range    Magnesium 2.1 1.7 - 2.3 mg/dL   Nt probnp inpatient (BNP)   Result Value Ref Range    N terminal Pro BNP Inpatient 12,939 (H) 0 - 450 pg/mL   CK total   Result Value Ref Range     26 - 192 U/L   CBC with platelets and differential   Result Value Ref Range    WBC Count 10.2 4.0 - 11.0 10e3/uL    RBC Count 4.67 3.80 - 5.20 10e6/uL    Hemoglobin 13.7 11.7 - 15.7 g/dL    Hematocrit 40.8 35.0 - 47.0 %    MCV 87 78 - 100 fL    MCH 29.3 26.5 - 33.0 pg    MCHC 33.6 31.5 - 36.5 g/dL    RDW 14.0 10.0 - 15.0 %    Platelet Count 238 150 - 450 10e3/uL    %  Neutrophils 58 %    % Lymphocytes 35 %    % Monocytes 7 %    % Eosinophils 0 %    % Basophils 0 %    % Immature Granulocytes 0 %    NRBCs per 100 WBC 0 <1 /100    Absolute Neutrophils 5.9 1.6 - 8.3 10e3/uL    Absolute Lymphocytes 3.6 0.8 - 5.3 10e3/uL    Absolute Monocytes 0.7 0.0 - 1.3 10e3/uL    Absolute Eosinophils 0.0 0.0 - 0.7 10e3/uL    Absolute Basophils 0.0 0.0 - 0.2 10e3/uL    Absolute Immature Granulocytes 0.0 <=0.4 10e3/uL    Absolute NRBCs 0.0 10e3/uL   XR Chest 2 Views    Narrative    EXAM: XR CHEST 2 VIEWS  LOCATION: [facility]  DATE: 2/3/2024    INDICATION: Chest pain.  COMPARISON: 01/25/2024.      Impression    IMPRESSION: Negative chest. Lungs clear.   Urine Drug Screen    Narrative    The following orders were created for panel order Urine Drug Screen.  Procedure                               Abnormality         Status                     ---------                               -----------         ------                     Urine Drug Screen Panel[[id number]]      Abnormal            Final result                 Please view results for these tests on the individual orders.   UA with Microscopic reflex to Culture    Specimen: Urine, Catheter   Result Value Ref Range    Color Urine Light Yellow Colorless, Straw, Light Yellow, Yellow    Appearance Urine Clear Clear    Glucose Urine Negative Negative mg/dL    Bilirubin Urine Negative Negative    Ketones Urine Negative Negative mg/dL    Specific Gravity Urine 1.005 1.003 - 1.035    Blood Urine Negative Negative    pH Urine 6.5 5.0 - 7.0    Protein Albumin Urine Negative Negative mg/dL    Urobilinogen Urine Normal Normal, 2.0 mg/dL    Nitrite Urine Positive (A) Negative    Leukocyte Esterase Urine Negative Negative    Bacteria Urine Moderate (A) None Seen /HPF    RBC Urine 1 <=2 /HPF    WBC Urine 2 <=5 /HPF    Squamous Epithelials Urine 1 <=1 /HPF    Narrative    Urine Culture ordered based on laboratory criteria   Urine Drug Screen  Panel   Result Value Ref Range    Amphetamines Urine Screen Negative Screen Negative    Barbituates Urine Screen Negative Screen Negative    Benzodiazepine Urine Screen Negative Screen Negative    Cannabinoids Urine Screen Positive (A) Screen Negative    Cocaine Urine Screen Negative Screen Negative    Fentanyl Qual Urine Screen Positive (A) Screen Negative    Opiates Urine Screen Negative Screen Negative    PCP Urine Screen Negative Screen Negative   Troponin T, High Sensitivity   Result Value Ref Range    Troponin T, High Sensitivity 80 (H) <=14 ng/L      All cardiac studies reviewed by me.   All imaging studies reviewed by me.      Attestation:  I have reviewed today's vital signs, notes, medications, labs and imaging.     Ameya Cadet MD

## 2024-02-03 NOTE — ED PROVIDER NOTES
History     Chief Complaint:  Generalized Weakness    The history is provided by the patient and the spouse.      Danielle Tadeo is a 48 year old female with a history of hypertension, accidental fentanyl overdose, CAD, and cardiac arrest who presents to the ED for evaluation of memory problems. The patient's  reports that the patient was recently seen for a cardiac arrest after a fentanyl overdose. The patient left AMA from the hospital on 1/31, despite her  being concerned that she wasn't ready to go home. She was feeling good, when she checked herself out, but started feeling worse at home. The patient's  states that the patient has been more confused and has had sleeping issues. Last night, she started to breath rapidly and needed her  to calm her done. Today, she agreed to go back to the hospital to get further evaluation.  states he is not able to care for her at home. The patient's endorses chest pain. Her  notes that it is apparent where he did compressions and where the machine was on the patient's chest. The patient denies recent fevers. The patient has been eating and drinking fluids. The patient reports that she hasn't been walking around okay. The patient's  denies that the patient has had any recent drug use.     Independent Historian:   Spouse/Partner - They report supplemental history as seen above    Review of External Notes:   I reviewed patient's discharge from 1/31 for a cardiac arrest after a fentanyl overdose.      Medications:    Calcium citrate  Ferrous fumarate  Hydroxyzine pamoate  Lamotrigine  Melatonin  Trazodone  Zinc   Clonidine  Gabapentin  Buprenorphine-naloxone  Naloxone  Quetiapine  Sertraline  Metoprolol tartrate    Past Medical History:    Anxiety  Hypertension   CAD  Cervical spondylosis   Continuous opioid dependence  Lumbago  Prolonged Q-T interval on ECG  Alcohol withdrawal syndrome   Hypokalemia  Elevated LFTs  Cardiac  arrest   Cigarette nicotine dependence  Chronic jaw pain  Major depressive disorder  Organic mood disorder of depressed type  Substance induced mood disorder   Persistent insomnia  Suicidal ideation  Accidental fentanyl overdose   Lactic acidosis  Asthma  Degenerative disc disease  Menorrhagia   Dysmenorrhea  Arrhythmia ventricular  Fibrillation ventricular  Chronic low back pain    Past Surgical History:    Abdominoplasty  Arm and thigh lift   section  Coronary angiogram  Temporary pacemaker insertion  Gastric bypass  Hysterectomy   Bilateral lasik  Mammoplasty reduction    Physical Exam   Patient Vitals for the past 24 hrs:   BP Temp Temp src Pulse Resp SpO2   24 1709 118/80 -- -- 95 16 100 %   24 1630 103/74 -- -- 99 -- 99 %   24 1600 113/81 -- -- 93 25 100 %   24 1539 110/76 -- -- 98 23 98 %   24 1430 116/81 97.6  F (36.4  C) Oral 109 20 100 %     Physical Exam  Vitals reviewed.   Constitutional:       General: She is not in acute distress.     Appearance: She is ill-appearing.      Comments: Cachetic appearing    HENT:      Head: Normocephalic and atraumatic.   Eyes:      Extraocular Movements: Extraocular movements intact.   Cardiovascular:      Rate and Rhythm: Normal rate and regular rhythm.   Pulmonary:      Effort: Pulmonary effort is normal. No respiratory distress.      Breath sounds: Normal breath sounds. No wheezing.   Abdominal:      Palpations: Abdomen is soft.      Tenderness: There is no abdominal tenderness. There is no guarding.   Musculoskeletal:      Cervical back: Normal range of motion.   Skin:     General: Skin is warm and dry.   Neurological:      Mental Status: She is alert and oriented to person, place, and time.      GCS: GCS eye subscore is 4. GCS verbal subscore is 5. GCS motor subscore is 6.   Psychiatric:         Behavior: Behavior normal.           Emergency Department Course   ECG  ECG taken at 1451, ECG read at 1459  Sinus tachycardia  Right  atrial enlargement  Moderate voltage criteria for LVH, may be normal variant (R in a aVL, Vidal product)  Cannot rule out Anterior infarct, age undetermined  T wave abnormality, consider lateral ischemia  Premature ventricular complexes are no longer present. Vent. Rate has increased by 44 bpm, T wave inversion no longer evident in Anterior leads, Inverted T waves have replaced nonspecific T wave abnormality in lateral leads as compared to prior, dated 1/31/24.  Rate 107 bpm. NM interval 122 ms. QRS duration 86 ms. QT/QTc 416/555 ms. P-R-T axes 69 3 210.     Imaging:  XR Chest 2 Views   Final Result   IMPRESSION: Negative chest. Lungs clear.        Laboratory:  Labs Ordered and Resulted from Time of ED Arrival to Time of ED Departure   COMPREHENSIVE METABOLIC PANEL - Abnormal       Result Value    Sodium 141      Potassium 3.2 (*)     Carbon Dioxide (CO2) 24      Anion Gap 13      Urea Nitrogen 3.3 (*)     Creatinine 0.58      GFR Estimate >90      Calcium 8.9      Chloride 104      Glucose 108 (*)     Alkaline Phosphatase 110      AST 58 (*)     ALT 30      Protein Total 6.9      Albumin 4.0      Bilirubin Total 0.4     TROPONIN T, HIGH SENSITIVITY - Abnormal    Troponin T, High Sensitivity 83 (*)    NT PROBNP INPATIENT - Abnormal    N terminal Pro BNP Inpatient 12,939 (*)    ROUTINE UA WITH MICROSCOPIC REFLEX TO CULTURE - Abnormal    Color Urine Light Yellow      Appearance Urine Clear      Glucose Urine Negative      Bilirubin Urine Negative      Ketones Urine Negative      Specific Gravity Urine 1.005      Blood Urine Negative      pH Urine 6.5      Protein Albumin Urine Negative      Urobilinogen Urine Normal      Nitrite Urine Positive (*)     Leukocyte Esterase Urine Negative      Bacteria Urine Moderate (*)     RBC Urine 1      WBC Urine 2      Squamous Epithelials Urine 1     URINE DRUG SCREEN PANEL - Abnormal    Amphetamines Urine Screen Negative      Barbituates Urine Screen Negative      Benzodiazepine  Urine Screen Negative      Cannabinoids Urine Screen Positive (*)     Cocaine Urine Screen Negative      Fentanyl Qual Urine Screen Positive (*)     Opiates Urine Screen Negative      PCP Urine Screen Negative     MAGNESIUM - Normal    Magnesium 2.1     CK TOTAL - Normal         CBC WITH PLATELETS AND DIFFERENTIAL    WBC Count 10.2      RBC Count 4.67      Hemoglobin 13.7      Hematocrit 40.8      MCV 87      MCH 29.3      MCHC 33.6      RDW 14.0      Platelet Count 238      % Neutrophils 58      % Lymphocytes 35      % Monocytes 7      % Eosinophils 0      % Basophils 0      % Immature Granulocytes 0      NRBCs per 100 WBC 0      Absolute Neutrophils 5.9      Absolute Lymphocytes 3.6      Absolute Monocytes 0.7      Absolute Eosinophils 0.0      Absolute Basophils 0.0      Absolute Immature Granulocytes 0.0      Absolute NRBCs 0.0     TROPONIN T, HIGH SENSITIVITY   URINE CULTURE     Emergency Department Course & Assessments:    Interventions:  Medications   oxyCODONE (ROXICODONE) tablet 10 mg (has no administration in time range)   sodium chloride 0.9 % infusion (has no administration in time range)        Assessments:  1438 I obtained history and examined the patient as noted above.     Consultations/Discussion of Management or Tests:  ED Course as of 24 1742   Sat 2024   1644 I spoke with Dr. Cadet, hospitalist, regarding the patient's presentation and plan of care.          Social Determinants of Health affecting care:   None    Disposition:  The patient was admitted to the hospital under the care of Dr. Cadet.     Impression & Plan    Medical Decision Makin-year-old female presenting to the ER for chest pain, memory issues.  Recent cardiac arrest due to accidental fentanyl overdose left AMA from [facility] on .   states that patient was not ready to go home.  States that she has continued chest pain at home.   has not been able to care for the patient.   She is noted to be in the same brief that she left in from the hospital.  She is otherwise hemodynamically stable.  Blood work obtained troponin elevated but lower from prior.  BNP found to be elevated.  Chest x-ray showed no evidence of any pulmonary congestion.  Hospitalist contacted and patient was admitted to the hospital service to Dr. Cadet.     Diagnosis:    ICD-10-CM    1. Chest pain, unspecified type  R07.9       2. Prolonged Q-T interval on ECG  R94.31            Scribe Disclosure:  I, Austin Burciaga, am serving as a scribe at 3:59 PM on 2/3/2024 to document services personally performed by Chika Holt DO, based on my observations and the provider's statements to me.   2/3/2024   Chika Holt DO Doan, Tiffani, DO  02/03/24 1746

## 2024-02-03 NOTE — LETTER
Transition Communication Hand-off for Care Transitions to Next Level of Care Provider    Name: Danielle Tadeo  : 1975  MRN #: [id number]  Primary Care Provider: Physician No Ref-Primary     Primary Clinic: No address on file     Reason for Hospitalization:  Prolonged Q-T interval on ECG [R94.31]  Chest pain, unspecified type [R07.9]  Admit Date/Time: 2/3/2024  2:33 PM  Discharge Date: ***  Payor Source: No coverage found.      Readmission Assessment Measure (MARK ANTHONY) Risk Score/category: ***    Plan of Care Goals/Milestone Events:   Patient Concerns: ***   Patient Goals:   Short-term ***   Long-term ***   Medical Goals   Short-term ***   Long-term ***         Reason for Communication Hand-off Referral: {CCREASONCMCTNHANDOFFREFERRALCTS:96181}    Discharge Plan:       Concern for non-adherence with plan of care:   Y/N ***  Discharge Needs Assessment:  Needs      Flowsheet Row Most Recent Value   Equipment Currently Used at Home none            Already enrolled in Tele-monitoring program and name of program:  ***  Follow-up specialty is recommended: {YES / NO:31633}    Follow-up plan:    Future Appointments   Date Time Provider Department Center   2024  1:30 PM Ana Us OTR [facility]       Any outstanding tests or procedures:        Referrals       Future Labs/Procedures    Home Care Referral     Comments:    Your provider has ordered home health services. If you have not been contacted within 2 days of your discharge please call the selected Home Care agency listed on your Discharge document.  If a Home Care agency is NOT listed, please call [phone].              [place] Recommendations:      Zainab Bass [facility]    AVS/Discharge Summary is the source of truth; this is a helpful guide for improved communication of patient story

## 2024-02-03 NOTE — PHARMACY-ADMISSION MEDICATION HISTORY
Pharmacist Admission Medication History    Admission medication history is complete. The information provided in this note is only as accurate as the sources available at the time of the update.    Information Source(s): Patient via in-person    Pertinent Information: None    Changes made to PTA medication list:  Added:  clonidine  Deleted: calcium citrate, ferrous fumarate, vitamin d3 (not taking)  Changed: None    Medication Affordability:       Allergies reviewed with patient and updates made in EHR: no    Medication History Completed By: Dawood Calabrese [facility] 2/3/2024 5:32 PM    PTA Med List   Medication Sig Last Dose    cloNIDine (CATAPRES) 0.2 MG tablet Take 0.2 mg by mouth every 4 hours as needed (anxiety) 2/2/2024 at PM

## 2024-02-03 NOTE — ED TRIAGE NOTES
Pt arrives from home with  for increasing memory issues. Pt was seen here several weeks ago for a cardiac arrest after a fentanyl overdose. Pts  states she was discharged from [facility] 3 days ago and since coming home has become less responsive, more confused and has issues with sleeping. Flat affect in triage.     /81   Pulse 109   Temp 97.6  F (36.4  C) (Oral)   Resp 20   LMP 06/07/2015   SpO2 100%        Triage Assessment (Adult)       Row Name 02/03/24 1430          Triage Assessment    Airway WDL WDL        Respiratory WDL    Respiratory WDL WDL        Cardiac WDL    Cardiac WDL WDL        Cognitive/Neuro/Behavioral WDL    Cognitive/Neuro/Behavioral WDL WDL                      100

## 2024-02-03 NOTE — ED NOTES
Pt laying in bed, able to participate in getting cleaned up. Pt able to stand. Pt had pull up brief which was overly saturated and was reported pt had not changed it for 3 days. Pt had rancid smelling urine, has redness to adrian area and buttocks. Pt otherwise tolerated straight cath for urinalysis.  Pt cleaned up with soap and water and placed new brief on with purewick as she has been incontinent.

## 2024-02-04 ENCOUNTER — APPOINTMENT (OUTPATIENT)
Dept: CARDIOLOGY | Facility: CLINIC | Age: 49
End: 2024-02-04
Attending: INTERNAL MEDICINE
Payer: MEDICAID

## 2024-02-04 ENCOUNTER — APPOINTMENT (OUTPATIENT)
Dept: OCCUPATIONAL THERAPY | Facility: CLINIC | Age: 49
End: 2024-02-04
Attending: INTERNAL MEDICINE
Payer: MEDICAID

## 2024-02-04 LAB
ALBUMIN SERPL BCG-MCNC: 3.5 G/DL (ref 3.5–5.2)
ALP SERPL-CCNC: 105 U/L (ref 40–150)
ALT SERPL W P-5'-P-CCNC: 34 U/L (ref 0–50)
ANION GAP SERPL CALCULATED.3IONS-SCNC: 11 MMOL/L (ref 7–15)
AST SERPL W P-5'-P-CCNC: 61 U/L (ref 0–45)
BACTERIA UR CULT: ABNORMAL
BILIRUB SERPL-MCNC: 0.3 MG/DL
BUN SERPL-MCNC: 7.2 MG/DL (ref 6–20)
CALCIUM SERPL-MCNC: 8.2 MG/DL (ref 8.6–10)
CHLORIDE SERPL-SCNC: 108 MMOL/L (ref 98–107)
CREAT SERPL-MCNC: 0.56 MG/DL (ref 0.51–0.95)
DEPRECATED HCO3 PLAS-SCNC: 22 MMOL/L (ref 22–29)
EGFRCR SERPLBLD CKD-EPI 2021: >90 ML/MIN/1.73M2
ERYTHROCYTE [DISTWIDTH] IN BLOOD BY AUTOMATED COUNT: 14.3 % (ref 10–15)
GLUCOSE SERPL-MCNC: 136 MG/DL (ref 70–99)
HCT VFR BLD AUTO: 39.5 % (ref 35–47)
HGB BLD-MCNC: 12.3 G/DL (ref 11.7–15.7)
LVEF ECHO: NORMAL
MAGNESIUM SERPL-MCNC: 2.1 MG/DL (ref 1.7–2.3)
MCH RBC QN AUTO: 29.3 PG (ref 26.5–33)
MCHC RBC AUTO-ENTMCNC: 31.1 G/DL (ref 31.5–36.5)
MCV RBC AUTO: 94 FL (ref 78–100)
PLATELET # BLD AUTO: 186 10E3/UL (ref 150–450)
POTASSIUM SERPL-SCNC: 2.8 MMOL/L (ref 3.4–5.3)
POTASSIUM SERPL-SCNC: 4.8 MMOL/L (ref 3.4–5.3)
POTASSIUM SERPL-SCNC: 4.8 MMOL/L (ref 3.4–5.3)
PROT SERPL-MCNC: 6.1 G/DL (ref 6.4–8.3)
RBC # BLD AUTO: 4.2 10E6/UL (ref 3.8–5.2)
SODIUM SERPL-SCNC: 141 MMOL/L (ref 135–145)
WBC # BLD AUTO: 9 10E3/UL (ref 4–11)

## 2024-02-04 PROCEDURE — 36415 COLL VENOUS BLD VENIPUNCTURE: CPT | Performed by: INTERNAL MEDICINE

## 2024-02-04 PROCEDURE — 250N000013 HC RX MED GY IP 250 OP 250 PS 637: Performed by: INTERNAL MEDICINE

## 2024-02-04 PROCEDURE — 120N000001 HC R&B MED SURG/OB

## 2024-02-04 PROCEDURE — 97530 THERAPEUTIC ACTIVITIES: CPT | Mod: GO

## 2024-02-04 PROCEDURE — 97165 OT EVAL LOW COMPLEX 30 MIN: CPT | Mod: GO

## 2024-02-04 PROCEDURE — 84155 ASSAY OF PROTEIN SERUM: CPT | Performed by: INTERNAL MEDICINE

## 2024-02-04 PROCEDURE — 258N000003 HC RX IP 258 OP 636: Performed by: INTERNAL MEDICINE

## 2024-02-04 PROCEDURE — 93308 TTE F-UP OR LMTD: CPT | Mod: 26 | Performed by: INTERNAL MEDICINE

## 2024-02-04 PROCEDURE — 80053 COMPREHEN METABOLIC PANEL: CPT | Performed by: INTERNAL MEDICINE

## 2024-02-04 PROCEDURE — 85027 COMPLETE CBC AUTOMATED: CPT | Performed by: INTERNAL MEDICINE

## 2024-02-04 PROCEDURE — 97535 SELF CARE MNGMENT TRAINING: CPT | Mod: GO

## 2024-02-04 PROCEDURE — 93321 DOPPLER ECHO F-UP/LMTD STD: CPT | Mod: 26 | Performed by: INTERNAL MEDICINE

## 2024-02-04 PROCEDURE — 93325 DOPPLER ECHO COLOR FLOW MAPG: CPT

## 2024-02-04 PROCEDURE — 83735 ASSAY OF MAGNESIUM: CPT | Performed by: INTERNAL MEDICINE

## 2024-02-04 PROCEDURE — 84132 ASSAY OF SERUM POTASSIUM: CPT | Performed by: INTERNAL MEDICINE

## 2024-02-04 PROCEDURE — 99233 SBSQ HOSP IP/OBS HIGH 50: CPT | Performed by: INTERNAL MEDICINE

## 2024-02-04 PROCEDURE — 93325 DOPPLER ECHO COLOR FLOW MAPG: CPT | Mod: 26 | Performed by: INTERNAL MEDICINE

## 2024-02-04 RX ORDER — POTASSIUM CHLORIDE 1500 MG/1
20 TABLET, EXTENDED RELEASE ORAL ONCE
Status: DISCONTINUED | OUTPATIENT
Start: 2024-02-04 | End: 2024-02-04

## 2024-02-04 RX ORDER — LANOLIN ALCOHOL/MO/W.PET/CERES
3 CREAM (GRAM) TOPICAL
Status: DISCONTINUED | OUTPATIENT
Start: 2024-02-04 | End: 2024-02-07 | Stop reason: HOSPADM

## 2024-02-04 RX ORDER — ACETAMINOPHEN 325 MG/1
975 TABLET ORAL 4 TIMES DAILY
Status: DISCONTINUED | OUTPATIENT
Start: 2024-02-04 | End: 2024-02-07 | Stop reason: HOSPADM

## 2024-02-04 RX ORDER — POTASSIUM CHLORIDE 1500 MG/1
20 TABLET, EXTENDED RELEASE ORAL ONCE
Status: COMPLETED | OUTPATIENT
Start: 2024-02-04 | End: 2024-02-04

## 2024-02-04 RX ORDER — POTASSIUM CHLORIDE 1500 MG/1
40 TABLET, EXTENDED RELEASE ORAL ONCE
Status: COMPLETED | OUTPATIENT
Start: 2024-02-04 | End: 2024-02-04

## 2024-02-04 RX ORDER — POTASSIUM CHLORIDE 1500 MG/1
40 TABLET, EXTENDED RELEASE ORAL ONCE
Status: DISCONTINUED | OUTPATIENT
Start: 2024-02-04 | End: 2024-02-04

## 2024-02-04 RX ADMIN — SODIUM CHLORIDE: 9 INJECTION, SOLUTION INTRAVENOUS at 23:27

## 2024-02-04 RX ADMIN — ACETAMINOPHEN 975 MG: 325 TABLET, FILM COATED ORAL at 08:16

## 2024-02-04 RX ADMIN — MICONAZOLE NITRATE: 20 POWDER TOPICAL at 21:45

## 2024-02-04 RX ADMIN — SENNOSIDES AND DOCUSATE SODIUM 2 TABLET: 50; 8.6 TABLET ORAL at 08:15

## 2024-02-04 RX ADMIN — OXYCODONE HYDROCHLORIDE 10 MG: 5 TABLET ORAL at 10:01

## 2024-02-04 RX ADMIN — OXYCODONE HYDROCHLORIDE 10 MG: 5 TABLET ORAL at 14:07

## 2024-02-04 RX ADMIN — OXYCODONE HYDROCHLORIDE 10 MG: 5 TABLET ORAL at 18:06

## 2024-02-04 RX ADMIN — Medication 125 MCG: at 08:15

## 2024-02-04 RX ADMIN — ACETAMINOPHEN 975 MG: 325 TABLET, FILM COATED ORAL at 21:42

## 2024-02-04 RX ADMIN — OXYCODONE HYDROCHLORIDE 5 MG: 5 TABLET ORAL at 04:59

## 2024-02-04 RX ADMIN — POTASSIUM CHLORIDE 20 MEQ: 1500 TABLET, EXTENDED RELEASE ORAL at 03:05

## 2024-02-04 RX ADMIN — ACETAMINOPHEN 975 MG: 325 TABLET, FILM COATED ORAL at 18:06

## 2024-02-04 RX ADMIN — SODIUM CHLORIDE: 9 INJECTION, SOLUTION INTRAVENOUS at 03:05

## 2024-02-04 RX ADMIN — SODIUM CHLORIDE: 9 INJECTION, SOLUTION INTRAVENOUS at 14:09

## 2024-02-04 RX ADMIN — ACETAMINOPHEN 975 MG: 325 TABLET, FILM COATED ORAL at 12:30

## 2024-02-04 RX ADMIN — SENNOSIDES AND DOCUSATE SODIUM 1 TABLET: 50; 8.6 TABLET ORAL at 21:43

## 2024-02-04 RX ADMIN — Medication 3 MG: at 23:50

## 2024-02-04 RX ADMIN — POTASSIUM CHLORIDE 40 MEQ: 1500 TABLET, EXTENDED RELEASE ORAL at 01:04

## 2024-02-04 RX ADMIN — MICONAZOLE NITRATE: 20 POWDER TOPICAL at 11:36

## 2024-02-04 ASSESSMENT — ACTIVITIES OF DAILY LIVING (ADL)
ADLS_ACUITY_SCORE: 37
ADLS_ACUITY_SCORE: 38
PREVIOUS_RESPONSIBILITIES: MEAL PREP;HOUSEKEEPING;LAUNDRY;SHOPPING;MEDICATION MANAGEMENT;FINANCES;DRIVING
ADLS_ACUITY_SCORE: 38
ADLS_ACUITY_SCORE: 37
ADLS_ACUITY_SCORE: 38
ADLS_ACUITY_SCORE: 37

## 2024-02-04 NOTE — PLAN OF CARE
Goal Outcome Evaluation:       End of Shift Summary.  For vital signs and complete assessments, please see documentation flowsheets.      Pertinent assessments: A&OX4. Flat affect. Up SBA. Bed alarm is on. VSS on RA. Tele: SR. K and Mg protocol. On scheduled Tylenol. NS running at 100 ml/hr.     Major Shift Events:   Given PRN Oxycodone x2 for rib pain.     Plan (Upcoming Events): Pain management.   Discharge/Transfer Needs: TBD     Bedside Shift Report Completed : Y  Bedside Safety Check Completed: Y

## 2024-02-04 NOTE — UTILIZATION REVIEW
Admission Status; Secondary Review Determination       Under the authority of the Utilization Management Committee, the utilization review process indicated a secondary review on the above patient. The review outcome is based on review of the medical records, discussions with staff, and applying clinical experience noted on the date of the review.     (x) Inpatient Status Appropriate - This patient's medical care is consistent with medical management for inpatient care and reasonable inpatient medical practice.     RATIONALE FOR DETERMINATION   48-year-old Ms. Tadeo was readmitted due to weakness and memory problems after leaving against medical advice (AMA) from a prior hospitalization where she suffered cardiac arrest and required intubation, cooling protocol, and multiple defibrillations. On her current presentation, she complained of chest discomfort but appeared uncomfortable. Labs showed elevated troponin and N-terminal proBNP, indicating potential cardiac strain. Chest x-ray was clear. The diagnosis includes weakness, SOB, chronic narcotic abuse, acute chest pain post-arrest, and irritant dermatitis in the diaper area. She was admitted for telemetry monitoring, further troponin evaluation, an echocardiogram, a chemical dependency consult, and pain management. Her mental competence is questioned, and her partner's ability to care for her is also in doubt.  Inpatient admission is crucial for the 48-year-old patient due to her recent history of cardiac arrest, elevated troponin, and ongoing chest discomfort, as an outpatient approach poses a significant risk of missing potential cardiac complications, including myocardial infarction, arrhythmias, or cardiac ischemia, which could lead to life-threatening consequences.        This document was produced using voice recognition software       The information on this document is developed by the utilization review team in order for the business office to  ensure compliance. This only denotes the appropriateness of proper admission status and does not reflect the quality of care rendered.   The definitions of Inpatient Status and Observation Status used in making the determination above are those provided in the CMS Coverage Manual, Chapter 1 and Chapter 6, section 70.4.   Sincerely,   SHEBA CRUZ MD   System Medical Director   Utilization Management   [facility].

## 2024-02-04 NOTE — PROGRESS NOTES
"[facility]    Medicine Progress Note - Hospitalist Service    Date of Admission:  2/3/2024    Primary Care Physician   Physician No Ref-Primary  CONSULTANTS: Therapy    Assessment & Plan       Danielle Tadeo is a 48 year old woman who returned to the hospital 2/3/24 for problems with weakness and \"memory problems\" after having been discharged from [facility] on 1/31/24.  According to the discharge summary, the patient  left \"AMA\". On admission, the patient's partner brought her in stating  \"he could not care for her at home\".  The patient gives me very little information other than simply that she does not feel well and has chest pain from her previous CPR.       Ms. Tadeo had initially been admitted to [facility] on 1/23/2024 after she became unresponsive.  She and her significant other evidently had been snorting fentanyl and he was able to start CPR at the scene.  She was shocked 4 times, received 2 mg of epi and was intubated.  Following evaluation in the emergency department, the patient was admitted to the ICU at [facility] for cooling protocol.  She had additional episodes of V-fib requiring defibrillation.  After starting on amiodarone, the patient had problems with bradycardia and ultimately she had an episode of torsades.  At that point, she required transvenous pacing and was transferred to [facility] on 1/25/2024.       While the patient was at [facility], patient was able to be extubated.  There was clearly concern for acute toxic metabolic encephalopathy and possibly anoxic injury.  Vascular neurology was involved as were psychiatry, chemical dependency, OT and PT.  It appears that shortly after becoming coherent, the patient decided to discharge home and she was accompanied by her spouse.       On presentation to the emergency department this admission.  Her vitals were , /81, RR 20, oxygen saturation 100% " breathing room air.  Patient was afebrile.  Examination revealed a moderately uncomfortable woman who appears reluctant to interact with the admission team  She frequently complains of chest discomfort.  She was not in acute distress lying completely flat on a gurney.  Her work up showed Labs: Creatinine 0.58, BUN 3.3, potassium 3.2.  Other electrolytes normal.  AST 58/ALT 30, glucose 108.  N-terminal proBNP 13,000, troponin T 83 with a delta value of 80.   WBC 10.2 with normal differential, Hgb 13.7, .  UA is not suggestive of UTI.  Urine drug screen positive for fentanyl as well as cannabinoids.  Imaging: Chest x-ray clear.                Weakness/deconditioning, toxic encephalopathy, opiate/THC abuse, failure to thrive  Patient presented with failing at home, confusion, and a positive tox screen for Fentanyl and THC.  She denies she went home to use but on admission was quite confused and is doing better now.  Occupational therapy to see.  May need TCU at discharge.  Chem dep to see as well. May be a candidate for buprenorphine.  Patient denies alcohol abuse though it is on past medical history in [facility] and states she has had withdrawal in the past. Given her life choices and recurrent admissions, may need to consider commitment to treatment.     Chest pain, musculoskeletal  Patient required CPR 1/23/24 and has had chest pain since.  Cxr without evidence of fractures.  Patient's pain will be difficult to control as she is tolerant opioids given her fentanyl use history.  Patient is on scheduled Tylenol and oxycodone as needed.  Likely take some time for pain to resolve.    Cardiomyopathy, hypertension   Patient presented with an elevated troponin and BNP which is consistent with her recent hospital stay that required CPR for a drug (fentanyl) overdose.   She does not have symptoms of an acute coronary syndrome.  Previously her echocardiogram 1/23/2024 showed ejection fraction of 45-50%.  This is in the  "setting of a fentanyl overdose and CPR.  Patient's repeat troponin continue to drop down to 80.  Repeat echocardiogram 2/4/2024, results pending.  Patient being followed on telemetry given her history of arrhythmias.  Patient here has been 102-143/73-94. Is not on medications at home.  If her EF is still low, consider a beta blocker/ ace inhibitor.    Hypokalemia  Potassium only 2.8 2/4/24, replace per protocol.     Intertrigo versus irritant dermatitis  Patient with an irritated diaper area with redness and irritation.  Patient was unable to utilize a pure wick catheter.  Will start the patient on antifungal powder.      Anxiety, depression   Has been on clonidine for \"anxiety\" as needed.  Per chart has history of suicidal ideation.     History gastric bypass  Not on vitamins, not sure why.       Prolonged QTc  Seen on EKG    Murmur  Has a murmur over the mitral valve.  She states this is old.  Echo is pending.        Discussed plan of care with nursing      Diet: Combination Diet Regular Diet Adult    DVT Prophylaxis: Pneumatic Compression Devices  Gaytan Catheter: Not present  Lines: None     Cardiac Monitoring: ACTIVE order. Indication: QTc prolonging medication (48 hours)    RESTRAINTS: not indicated  Code Status: Full Code        Follow up plan: likely needs TCU, therapy to see.  Needs aggressive chem dep      This document was created using voice recognition technology.  Please excuse any typographical errors that may have occurred.  Please call with any questions.       Clinically Significant Risk Factors Present on Admission        # Hypokalemia: Lowest K = 2.8 mmol/L in last 2 days, will replace as needed   # Hypocalcemia: Lowest Ca = 8.2 mg/dL in last 2 days, will monitor and replace as appropriate         # Hypertension: Noted on problem list          # Financial/Environmental Concerns:           Disposition Plan      Expected Discharge Date: 02/05/2024           likely to need a TCU       Barrier to " discharge: placement    Cal Clark MD  Hospitalist Service  [facility]  Securely message with [employer] (more info)  Text page via [employer] Paging/Directory   ______________________________________________________________________    Interval History   Patient is new to me.  She is actually interactive with me which looks like it is an improvement from the time of admission.  Does not appear impaired at all.  Able to tell me a story that she is just weak and not safe at home.  She denies using drugs including fentanyl or THC at home even though her talk screen is positive.  Patient's biggest complaint is her chest pain but she did receive CPR prior to her last admission a couple weeks ago.  Patient denies any current fever, chills, sweats.  She has no shortness of breath.  She seems back to her baseline.      ROS: A comprehensive review of systems was negative except for items noted in the HPI.  Patient currently denies any fever, chills, sweats, nausea, vomiting, diarrhea, shortness of breath, or chest pain.      Physical Exam   Vital Signs: Temp: 98.3  F (36.8  C) Temp src: Oral BP: 102/73 Pulse: 98   Resp: 20 SpO2: 100 % O2 Device: None (Room air)    Weight: 128 lbs 9.6 oz      General appearance: Patient is alert and oriented x3, no apparent distress, pleasant and conversing normally, speaking in full sentences, appears older than stated age, sitting up in bed, somewhat flat affect  HEENT:    Mucous membranes are moist  RESPIRATORY: Clear to auscultation bilateral, good air movement  Chest: Patient does have sternal tenderness to palpation and deep breathing  CARDIOVASCULAR: Regular rate and rhythm, normal S1/S2, has a systolic 2/6 murmur over the mitral valve, peripheral pulses intact  GASTROINTESTINAL: Non-distended, non-tender, soft, bowel sounds present throughout  NEUROLOGIC:  Cranial nerves II-XII intact, without any focal deficits, strength 5/5 throughout  EXTREMITIES:  Moves all  extremities, no clubbing, cyanosis, nor edema  :  Lukas not present         Data     I have personally reviewed the following data over the past 24 hrs:    9.0  \   12.3   / 186     141 108 (H) 7.2 /  136 (H)   4.8 22 0.56 \     ALT: 34 AST: 61 (H) AP: 105 TBILI: 0.3   ALB: 3.5 TOT PROTEIN: 6.1 (L) LIPASE: N/A     Trop: 80 (H) BNP: 12,939 (H)       Imaging:   Results for orders placed or performed during the hospital encounter of 02/03/24   XR Chest 2 Views    Narrative    EXAM: XR CHEST 2 VIEWS  LOCATION: [facility]  DATE: 2/3/2024    INDICATION: Chest pain.  COMPARISON: 01/25/2024.      Impression    IMPRESSION: Negative chest. Lungs clear.     Procedures: echo 2/4/24 pending    I have personally have reviewed the patient's most up to date radiologic exams, EKG, labs, orders, and medications myself

## 2024-02-04 NOTE — PLAN OF CARE
Goal Outcome Evaluation:      Plan of Care Reviewed With: patient    Overall Patient Progress: no change    Outcome Evaluation: .    VSS on RA. A&Ox4. Flat affect. Given PRN Oxy x2 for rib pain. K and Mg protocol- K replaced this shift with recheck around 0800. On scheduled Tylenol. NS running at 100 ml/hr. Tele: SR w/prolonged QT. Denies SOB. Ax1. Plan for echo today.

## 2024-02-04 NOTE — PROGRESS NOTES
"   02/04/24 1329   Appointment Info   Signing Clinician's Name / Credentials (OT) Kylah Jose M, MS, OTR/L   Living Environment   People in Home spouse   Current Living Arrangements house   Home Accessibility stairs to enter home   Number of Stairs, Main Entrance 2   Living Environment Comments Pt reports spouse does not work   Self-Care   Usual Activity Tolerance good   Current Activity Tolerance moderate   Regular Exercise No   Equipment Currently Used at Home none   Fall history within last six months no   Activity/Exercise/Self-Care Comment Pt reports at baseline she is independent in self-cares   Instrumental Activities of Daily Living (IADL)   Previous Responsibilities meal prep;housekeeping;laundry;shopping;medication management;finances;driving   IADL Comments 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 Reports she was not taking daily medication at home. Pt is questionable historian   General Information   Onset of Illness/Injury or Date of Surgery 02/03/24   Referring Physician Cal Clark MD   Patient/Family Therapy Goal Statement (OT) None stated   Additional Occupational Profile Info/Pertinent History of Current Problem Per Hospitalist \"48 year old woman who returned to the hospital 2/3/24 for problems with weakness and \"memory problems\" after having been discharged from [facility] on 1/31/24.  According to the discharge summary, the patient  left \"AMA\". On admission, the patient's partner brought her in stating  \"he could not care for her at home\".  The patient gives me very little information other than simply that she does not feel well and has chest pain from her previous CPR....Patient presented with failing at home, confusion, and a positive tox screen for Fentanyl and THC.  She denies she went home to use but on admission was quite confused and is doing better now.  Occupational therapy to see.  May need TCU at discharge.  Chem dep to see as well. May be a candidate for buprenorphine. " " Patient denies alcohol abuse though it is on past medical history in [facility] and states she has had withdrawal in the past. Given her life choices and recurrent admissions, may need to consider commitment to treatment.\"   Existing Precautions/Restrictions fall   Cognitive Status Examination   Orientation Status orientation to person, place and time   Affect/Mental Status (Cognitive) flat/blunted affect   Attention Deficit moderate deficit;alternating attention   Cognitive Screens/Assessments   Cognitive Assessments Completed [facility] Mental Status Exam ([facility]):  Total Score out of /30 22   [facility] Norms 21-26 equals mild neurocognitive disorder   [facility] Domains assessed: orientation, memory, attention, executive functions   [facility] Interpretation Pt made errors in naming animals in specified time frame, delayed recall of 3 objects, saying four digit number series in reverse order, immediate recall of a detail of a short story   Visual Perception   Impact of Vision Impairment on Function (Vision) Pt reports no glasses use and denies vision impairment   Sensory   Sensory Comments Pt denies BUE/BLE numbness or tingling   Pain Assessment   Patient Currently in Pain Yes, see Vital Sign flowsheet  (reporting high level of chest discomfort from recent CPR)   Sit-Stand Transfer   Sit-Stand [place] (Transfers) supervision   Activities of Daily Living   BADL Assessment/Intervention grooming   Lower Body Dressing Assessment/Training   [place] Level (Lower Body Dressing) supervision   Grooming Assessment/Training   [place] Level (Grooming) minimum assist (75% patient effort)   Toileting   [place] Level (Toileting) supervision   Clinical Impression   Criteria for Skilled Therapeutic Interventions Met (OT) Yes, treatment indicated   OT Diagnosis Decline function   OT Problem List-Impairments impacting ADL problems related to;activity tolerance impaired;balance;cognition;mobility;strength;pain "   Assessment of Occupational Performance 5 or more Performance Deficits   Identified Performance Deficits grooming, dressing, toileting, bathing, functional mobility, IADLs   Planned Therapy Interventions (OT) ADL retraining;IADL retraining;cognition;progressive activity/exercise   Clinical Decision Making Complexity (OT) problem focused assessment/low complexity   Risk & Benefits of therapy have been explained evaluation/treatment results reviewed;care plan/treatment goals reviewed;risks/benefits reviewed;current/potential barriers reviewed;participants voiced agreement with care plan;participants included;patient   OT Total Evaluation Time   OT Eval, Low Complexity Minutes (83343) 21   OT Goals   Therapy Frequency (OT) 6 times/week   OT Predicted Duration/Target Date for Goal Attainment 02/14/24   OT Goals OT Goal 1;OT Goal 2   OT: Hygiene/Grooming modified independent   OT: Lower Body Dressing Modified independent   OT: Toilet Transfer/Toileting Modified independent   OT: Goal 1 Pt will verbalize understanding of at least 3 memory compensation strategies to incorporate into her daily routines   OT: Goal 2 Pt will complete simulated med management task independently   Interventions   Interventions Quick Adds Therapeutic Activity   Self-Care/Home Management   Self-Care/Home Mgmt/ADL, Compensatory, Meal Prep Minutes (14560) 8   Symptoms Noted During/After Treatment (Meal Preparation/Planning Training) increased pain   Treatment Detail/Skilled Intervention Upon arrival pt agreeable to therapy. Pt setting off bed alarm. Pt reporting need to void. Pt completed toileting with SBA for safety. Pt reported mild dizziness. BP WNL. Pt required assist to open sock packaging as unable too despite five attempts and cues for technique. Pt donned socks with SBA using figure four technique. At end of session pt used toilet again with SBA. Pt reported she showered today and declined grooming tasks.   Therapeutic Activities    Therapeutic Activity Minutes (66481) 9   Treatment Detail/Skilled Intervention Pt seen for a focus on enhancing activity tolerance for future selfcare performance. Pt ambulated 300 ft in eller with SBA for balance safety. Pt ambulates slowly. Needs assist with pathfinding. Pt left seated in recliner with alarm on and needed supplies   OT Discharge Planning   OT Plan Give memory compensation handout. Med management task. Toileting. Dressing. Grooming standing. 3 step direction following   OT Discharge Recommendation (DC Rec) Transitional Care Facility   OT Rationale for DC Rec Pt overall requiring SBA to complete selfcares and functional mobility due to pain and impairments in activity tolerance, cognition, dynamic standing balance. Pt scored 22/30 on SLUMS cognitive screen indicating likely cognitive impairment and need for further testing. Pt with flat affect. Pt recently left [facility]. Not functioning well at home. Pt would benefit from TCU prior to returning home to progress independence and reduce caregiver burden. IF home would recommend SBA with self-cares and assist with all IADLS (cooking, cleaning, laundry, transportation, med management, financial management, etc) and home OT/PT/RN.   OT Brief overview of current status 22/30 SLUMS. SBA   Total Session Time   Timed Code Treatment Minutes 17   Total Session Time (sum of timed and untimed services) 38

## 2024-02-05 ENCOUNTER — APPOINTMENT (OUTPATIENT)
Dept: OCCUPATIONAL THERAPY | Facility: CLINIC | Age: 49
End: 2024-02-05
Payer: MEDICAID

## 2024-02-05 LAB
ATRIAL RATE - MUSE: 107 BPM
DIASTOLIC BLOOD PRESSURE - MUSE: NORMAL MMHG
HOLD SPECIMEN: NORMAL
INTERPRETATION ECG - MUSE: NORMAL
MAGNESIUM SERPL-MCNC: 2.1 MG/DL (ref 1.7–2.3)
P AXIS - MUSE: 69 DEGREES
POTASSIUM SERPL-SCNC: 4.8 MMOL/L (ref 3.4–5.3)
PR INTERVAL - MUSE: 122 MS
QRS DURATION - MUSE: 86 MS
QT - MUSE: 416 MS
QTC - MUSE: 555 MS
R AXIS - MUSE: 3 DEGREES
SYSTOLIC BLOOD PRESSURE - MUSE: NORMAL MMHG
T AXIS - MUSE: 210 DEGREES
VENTRICULAR RATE- MUSE: 107 BPM

## 2024-02-05 PROCEDURE — 36415 COLL VENOUS BLD VENIPUNCTURE: CPT | Performed by: INTERNAL MEDICINE

## 2024-02-05 PROCEDURE — 97535 SELF CARE MNGMENT TRAINING: CPT | Mod: GO | Performed by: OCCUPATIONAL THERAPIST

## 2024-02-05 PROCEDURE — 120N000001 HC R&B MED SURG/OB

## 2024-02-05 PROCEDURE — 250N000013 HC RX MED GY IP 250 OP 250 PS 637: Performed by: INTERNAL MEDICINE

## 2024-02-05 PROCEDURE — 99233 SBSQ HOSP IP/OBS HIGH 50: CPT | Performed by: INTERNAL MEDICINE

## 2024-02-05 PROCEDURE — 99254 IP/OBS CNSLTJ NEW/EST MOD 60: CPT | Mod: 95

## 2024-02-05 PROCEDURE — 83735 ASSAY OF MAGNESIUM: CPT | Performed by: INTERNAL MEDICINE

## 2024-02-05 PROCEDURE — 250N000013 HC RX MED GY IP 250 OP 250 PS 637

## 2024-02-05 PROCEDURE — 84132 ASSAY OF SERUM POTASSIUM: CPT | Performed by: INTERNAL MEDICINE

## 2024-02-05 RX ORDER — POLYETHYLENE GLYCOL 3350 17 G/17G
17 POWDER, FOR SOLUTION ORAL DAILY PRN
Status: DISCONTINUED | OUTPATIENT
Start: 2024-02-05 | End: 2024-02-07 | Stop reason: HOSPADM

## 2024-02-05 RX ORDER — ACETAMINOPHEN 325 MG/1
975 TABLET ORAL 4 TIMES DAILY
Start: 2024-02-05 | End: 2024-02-12

## 2024-02-05 RX ORDER — TRAZODONE HYDROCHLORIDE 50 MG/1
50 TABLET, FILM COATED ORAL
Status: DISCONTINUED | OUTPATIENT
Start: 2024-02-05 | End: 2024-02-07 | Stop reason: HOSPADM

## 2024-02-05 RX ORDER — BUPRENORPHINE AND NALOXONE 2; .5 MG/1; MG/1
1 FILM, SOLUBLE BUCCAL; SUBLINGUAL 2 TIMES DAILY
Status: COMPLETED | OUTPATIENT
Start: 2024-02-06 | End: 2024-02-07

## 2024-02-05 RX ORDER — METOPROLOL SUCCINATE 25 MG/1
25 TABLET, EXTENDED RELEASE ORAL DAILY
Start: 2024-02-05 | End: 2024-02-07

## 2024-02-05 RX ORDER — CLONIDINE HYDROCHLORIDE 0.1 MG/1
0.1 TABLET ORAL EVERY 6 HOURS PRN
Status: DISCONTINUED | OUTPATIENT
Start: 2024-02-05 | End: 2024-02-07

## 2024-02-05 RX ORDER — NITROFURANTOIN 25; 75 MG/1; MG/1
100 CAPSULE ORAL EVERY 12 HOURS SCHEDULED
Status: DISCONTINUED | OUTPATIENT
Start: 2024-02-05 | End: 2024-02-07 | Stop reason: HOSPADM

## 2024-02-05 RX ORDER — LOPERAMIDE HCL 2 MG
2 CAPSULE ORAL EVERY 4 HOURS PRN
Status: DISCONTINUED | OUTPATIENT
Start: 2024-02-05 | End: 2024-02-07 | Stop reason: HOSPADM

## 2024-02-05 RX ORDER — BUPRENORPHINE AND NALOXONE 8; 2 MG/1; MG/1
1 FILM, SOLUBLE BUCCAL; SUBLINGUAL 2 TIMES DAILY
Status: DISCONTINUED | OUTPATIENT
Start: 2024-02-08 | End: 2024-02-07 | Stop reason: HOSPADM

## 2024-02-05 RX ORDER — BUPRENORPHINE AND NALOXONE 4; 1 MG/1; MG/1
1 FILM, SOLUBLE BUCCAL; SUBLINGUAL 2 TIMES DAILY
Status: DISCONTINUED | OUTPATIENT
Start: 2024-02-07 | End: 2024-02-07 | Stop reason: HOSPADM

## 2024-02-05 RX ORDER — METOPROLOL SUCCINATE 25 MG/1
25 TABLET, EXTENDED RELEASE ORAL DAILY
Status: DISCONTINUED | OUTPATIENT
Start: 2024-02-05 | End: 2024-02-06

## 2024-02-05 RX ORDER — NITROFURANTOIN 25; 75 MG/1; MG/1
100 CAPSULE ORAL EVERY 12 HOURS
Start: 2024-02-05 | End: 2024-02-07

## 2024-02-05 RX ORDER — OXYCODONE HYDROCHLORIDE 5 MG/1
5 TABLET ORAL EVERY 4 HOURS PRN
Qty: 30 TABLET | Refills: 0 | Status: ON HOLD | OUTPATIENT
Start: 2024-02-05 | End: 2024-02-20

## 2024-02-05 RX ADMIN — ACETAMINOPHEN 975 MG: 325 TABLET, FILM COATED ORAL at 08:06

## 2024-02-05 RX ADMIN — ACETAMINOPHEN 975 MG: 325 TABLET, FILM COATED ORAL at 22:23

## 2024-02-05 RX ADMIN — MICONAZOLE NITRATE: 20 POWDER TOPICAL at 21:13

## 2024-02-05 RX ADMIN — NITROFURANTOIN MONOHYDRATE/MACROCRYSTALS 100 MG: 25; 75 CAPSULE ORAL at 10:09

## 2024-02-05 RX ADMIN — OXYCODONE HYDROCHLORIDE 10 MG: 5 TABLET ORAL at 02:00

## 2024-02-05 RX ADMIN — METOPROLOL SUCCINATE 25 MG: 25 TABLET, EXTENDED RELEASE ORAL at 09:45

## 2024-02-05 RX ADMIN — SENNOSIDES AND DOCUSATE SODIUM 2 TABLET: 50; 8.6 TABLET ORAL at 21:11

## 2024-02-05 RX ADMIN — BUPRENORPHINE HYDROCHLORIDE 450 MCG: 450 FILM, SOLUBLE BUCCAL at 14:00

## 2024-02-05 RX ADMIN — OXYCODONE HYDROCHLORIDE 10 MG: 5 TABLET ORAL at 13:59

## 2024-02-05 RX ADMIN — OXYCODONE HYDROCHLORIDE 10 MG: 5 TABLET ORAL at 10:08

## 2024-02-05 RX ADMIN — OXYCODONE HYDROCHLORIDE 10 MG: 5 TABLET ORAL at 22:23

## 2024-02-05 RX ADMIN — ACETAMINOPHEN 975 MG: 325 TABLET, FILM COATED ORAL at 13:05

## 2024-02-05 RX ADMIN — BUPRENORPHINE HYDROCHLORIDE 450 MCG: 450 FILM, SOLUBLE BUCCAL at 18:22

## 2024-02-05 RX ADMIN — Medication 125 MCG: at 08:06

## 2024-02-05 RX ADMIN — NITROFURANTOIN MONOHYDRATE/MACROCRYSTALS 100 MG: 25; 75 CAPSULE ORAL at 21:11

## 2024-02-05 RX ADMIN — ACETAMINOPHEN 975 MG: 325 TABLET, FILM COATED ORAL at 18:21

## 2024-02-05 RX ADMIN — OXYCODONE HYDROCHLORIDE 10 MG: 5 TABLET ORAL at 06:17

## 2024-02-05 RX ADMIN — CLONIDINE HYDROCHLORIDE 0.1 MG: 0.1 TABLET ORAL at 23:45

## 2024-02-05 RX ADMIN — OXYCODONE HYDROCHLORIDE 10 MG: 5 TABLET ORAL at 18:21

## 2024-02-05 RX ADMIN — BUPRENORPHINE HYDROCHLORIDE 450 MCG: 450 FILM, SOLUBLE BUCCAL at 22:24

## 2024-02-05 ASSESSMENT — ACTIVITIES OF DAILY LIVING (ADL)
ADLS_ACUITY_SCORE: 34
ADLS_ACUITY_SCORE: 40
ADLS_ACUITY_SCORE: 40
ADLS_ACUITY_SCORE: 37
ADLS_ACUITY_SCORE: 40
ADLS_ACUITY_SCORE: 34
ADLS_ACUITY_SCORE: 34
ADLS_ACUITY_SCORE: 36
ADLS_ACUITY_SCORE: 37
ADLS_ACUITY_SCORE: 36
ADLS_ACUITY_SCORE: 34
ADLS_ACUITY_SCORE: 34

## 2024-02-05 NOTE — CONSULTS
Chemical dependency consult closed, addiction med assistance will be given through a psychiatry consult at this time for assistance with buprenorphine.

## 2024-02-05 NOTE — PLAN OF CARE
Goal Outcome Evaluation:      Plan of Care Reviewed With: patient    Overall Patient Progress: improving    Outcome Evaluation: .    VSS on RA. A&Ox4. Flat affect. Given scheduled Tylenol and PRN Oxy x2 for pain. Denies SOB. K and Mg protocol. NS running at 100 ml/hr. Urinary frequency. Tele: /[facility]. Waiting for TCU placement. Discharge TBD.

## 2024-02-05 NOTE — PROGRESS NOTES
"[facility]    Medicine Progress Note - Hospitalist Service    Date of Admission:  2/3/2024    Primary Care Physician   Physician No Ref-Primary  CONSULTANTS: Therapy    Assessment & Plan       Danielle Tadeo is a 48 year old woman who returned to the hospital 2/3/24 for problems with weakness and \"memory problems\" after having been discharged from [facility] on 1/31/24.  According to the discharge summary, the patient  left \"AMA\". On admission, the patient's partner brought her in stating  \"he could not care for her at home\".  The patient gives me very little information other than simply that she does not feel well and has chest pain from her previous CPR.       Ms. Tadeo had initially been admitted to [facility] on 1/23/2024 after she became unresponsive.  She and her significant other evidently had been snorting fentanyl and he was able to start CPR at the scene.  She was shocked 4 times, received 2 mg of epi and was intubated.  Following evaluation in the emergency department, the patient was admitted to the ICU at [facility] for cooling protocol.  She had additional episodes of V-fib requiring defibrillation.  After starting on amiodarone, the patient had problems with bradycardia and ultimately she had an episode of torsades.  At that point, she required transvenous pacing and was transferred to [facility] on 1/25/2024.       While the patient was at [facility], patient was able to be extubated.  There was clearly concern for acute toxic metabolic encephalopathy and possibly anoxic injury.  Vascular neurology was involved as were psychiatry, chemical dependency, OT and PT.  It appears that shortly after becoming coherent, the patient decided to discharge home and she was accompanied by her spouse.       On presentation to the emergency department this admission.  Her vitals were , /81, RR 20, oxygen saturation 100% " breathing room air.  Patient was afebrile.  Examination revealed a moderately uncomfortable woman who appears reluctant to interact with the admission team  She frequently complains of chest discomfort.  She was not in acute distress lying completely flat on a gurney.  Her work up showed Labs: Creatinine 0.58, BUN 3.3, potassium 3.2.  Other electrolytes normal.  AST 58/ALT 30, glucose 108.  N-terminal proBNP 13,000, troponin T 83 with a delta value of 80.   WBC 10.2 with normal differential, Hgb 13.7, .  UA is not suggestive of UTI.  Urine drug screen positive for fentanyl as well as cannabinoids.  Imaging: Chest x-ray clear.            Weakness/deconditioning, toxic encephalopathy, opiate/THC abuse, failure to thrive  Patient presented with failing at home, confusion, and a positive tox screen for Fentanyl and THC.  She denies she went home to use but on admission was quite confused and is doing better now.  Occupational therapy has seen and recommending a TCU at discharge.   May be a candidate for buprenorphine, psych to see.  Patient denies alcohol abuse though it is on past medical history in [facility] and states she has had withdrawal in the past. Given her life choices and recurrent admissions, may need to consider commitment to chem dep treatment. Wonder if she has a component of anoxic brain injury.  Her SLUMS 22/20 as of 2/4/24.    Chest pain, musculoskeletal  Patient required CPR 1/23/24 and has had chest pain since.  Cxr without evidence of fractures.  Patient's pain will be difficult to control as she is tolerant opioids given her fentanyl use history.  Patient is on scheduled Tylenol and oxycodone as needed.  Likely to take some time for pain to resolve.  Seems to be doing a little better    Cardiomyopathy, hypertension   Patient presented with an elevated troponin and BNP which is consistent with her recent hospital stay that required CPR for a drug (fentanyl) overdose.   She does not have symptoms  "of an acute coronary syndrome.  Previously her echocardiogram 1/23/2024 showed ejection fraction of 45-50%.  This is in the setting of a fentanyl overdose and CPR.  Patient's repeat troponin continue to drop down to 80.  Repeat echocardiogram 2/4/2024, looks better with a normal EF.  Patient being followed on telemetry given her history of arrhythmias, has looked good so will discontinue.  Patient here has been up to 164/102.   Is not on medications at home.  Her EF is normal and given her history of arrythmias, will place her on toprol.        Hypokalemia  K is is 4.8 as of 2/5/24, was replaced per protocol.     Intertrigo versus irritant dermatitis  Patient with an irritated diaper area with redness and irritation.  Patient was unable to utilize a pure wick catheter.  Will start the patient on antifungal powder.      Anxiety, depression   Has been on clonidine for \"anxiety\" as needed.  Per chart has history of suicidal ideation.     History gastric bypass  Not on vitamins, not sure why.     Prolonged QTc  Seen on EKG    Murmur  Has a murmur over the mitral valve.  She states this is old.  Echo showed no significant valvular disease.  Likely a flow murmur.     E coli UTI  Urine analysis positive with a culture showing e coli.  Will treat with macrobid.          Discussed plan of care with nursing, social work, case management      Diet: Combination Diet Regular Diet Adult  Diet    DVT Prophylaxis: Pneumatic Compression Devices  Gaytan Catheter: Not present  Lines: None     Cardiac Monitoring: ACTIVE order. Indication: QTc prolonging medication (48 hours)    RESTRAINTS: not indicated  Code Status: Full Code        Follow up plan: patient needs TCU, therapy to see.  Needs aggressive chem dep      This document was created using voice recognition technology.  Please excuse any typographical errors that may have occurred.  Please call with any questions.       Clinically Significant Risk Factors        # Hypokalemia: " Lowest K = 2.8 mmol/L in last 2 days, will replace as needed   # Hypocalcemia: Lowest Ca = 8.2 mg/dL in last 2 days, will monitor and replace as appropriate         # Hypertension: Noted on problem list              # Financial/Environmental Concerns:           Disposition Plan      Expected Discharge Date: 02/05/2024           Needs a TCU, can go anytime once seen by psychiatry, therapy, and when a bed is available       Barrier to discharge: placement    Cal Clark MD  Hospitalist Service  [facility]  Securely message with [employer] (more info)  Text page via [employer] Paging/Directory   ______________________________________________________________________    Interval History   Patient is doing ok.  No real changes from yesterday.  Urine analysis/UC turned positive for e coli.  Echo improved.  Blood pressure today is up to 160s. Has no new complaints.  Still having some chest pain.          ROS: A comprehensive review of systems was negative except for items noted in the HPI.  Patient currently denies any fever, chills, sweats, nausea, vomiting, diarrhea, shortness of breath    Physical Exam   Vital Signs: Temp: 97.7  F (36.5  C) Temp src: Oral BP: (!) 164/102 Pulse: 85   Resp: 18 SpO2: 99 % O2 Device: None (Room air)    Weight: 132 lbs 12.8 oz    Exam not changed  General appearance: Patient is alert and oriented x3, no apparent distress, pleasant and conversing normally, speaking in full sentences, appears older than stated age, sitting up in bed, somewhat flat affect  HEENT:  Mucous membranes are moist  RESPIRATORY: Clear to auscultation bilateral, good air movement  Chest: Patient does have sternal tenderness to palpation and deep breathing  CARDIOVASCULAR: Regular rate and rhythm, normal   GASTROINTESTINAL: Non-distended, non-tender, soft, bowel sounds present throughout  NEUROLOGIC:  Cranial nerves II-XII intact, without any focal deficits, strength 5/5 throughout  EXTREMITIES:  Moves  all extremities, no clubbing, cyanosis, nor edema  :  Lukas not present         Data     I have personally reviewed the following data over the past 24 hrs:    N/A  \   N/A   / N/A     N/A N/A N/A /  N/A   4.8 N/A N/A \       Imaging:   Results for orders placed or performed during the hospital encounter of 02/03/24   XR Chest 2 Views    Narrative    EXAM: XR CHEST 2 VIEWS  LOCATION: [facility]  DATE: 2/3/2024    INDICATION: Chest pain.  COMPARISON: 01/25/2024.      Impression    IMPRESSION: Negative chest. Lungs clear.     Procedures: ECHO 2/4/24 No regional wall motion abnormalities noted  The left ventricle is normal in structure, function and size.  The visual ejection fraction is 60-65%.  The right ventricle is normal in structure, function and size.  The aortic root is normal size.  Sinus rhythm was noted.  Doppler interrogation does not demonstrate significant stenosis or  insufficiency involving cardiac valves  Since the last study 1/23/2024, performed post cardiac arrest, LV systolic  performance and wall motion are now normal      I have personally have reviewed the patient's most up to date  labs, orders, and medications myself

## 2024-02-05 NOTE — CONSULTS
Initial Psychiatric Consult   Consult date: February 5, 2024         Reason for Consult, requesting source:    Bupenorphine?  Requesting source: Ameya Cadet    Labs and imaging reviewed. Patient seen and evaluated by NAZ Graham CNP  Telemedicine Visit: The patient was seen for a visit utilizing the [employer] system. Permission from the patient to conduct the exam by telemedicine was obtained prior to proceeding. Start Time: 1200  Stop Time: 1225  Patient Location:  [facility]   Provider Location: [facility]  As the provider I attest to compliance with applicable laws and regulations related to telemedicine          HPI:   This is a 48-year-old female who initially presented to [facility] after a fentanyl overdose 1/23/2024. She was reportedly snorting fentanyl with her significant other and became unresponsive. He called 911, and started performing CPR. She has been complaining of acute chest pain s/p chest compressions, superimposed on severe opioid use disorder.     She was interviewed in her room with her  and friend at bedside. She wished for them to stay for interview.  was helpful at providing history as pt was a limited historian. She states she is struggling with sleep since the overdose and is worried about her insurance lapsing. Her main complaint is her pain, and she also feels restless and irritable. She denies using any illicit substances in between admissions. Her  states they both used to be on Suboxone and it worked really well for them.      Flowsheet Row ED to Hosp-Admission (Current) from 2/3/2024 in [facility] 3 Medical Surgical   SLUMS Score 22 filed at 02/04/2024 1329                  Past Psychiatric History:   Prior psychiatric notes indicate patient does have prior medication trials of Wellbutrin and gabapentin. Gabapentin caused leg swelling, and Wellbutrin made her feel  suicidal. Patient has previously been prescribed Wellbutrin, Adderall, duloxetine, escitalopram, gabapentin, Ativan, methadone, Percocet, Zoloft, Ambien.     Last psychiatric consult was done while she was admitted at [facility] on 23        Substance Use and History:   Per chart notes, patient has a history of alcohol use disorder, but does not drink per . Patient does have a history of being seen for her substance use disorder, last visit being on 3/28/2023. This note indicates a history of alcohol use disorder, severe, dependence in early remission and opioid use disorder moderate on maintenance therapy. Indicated at that time, the patient started using opiates 11 years prior when she was going to a pain clinic. She was cut off, so she continue to buy opioids from the street. Reportedly, she stopped using everything in , including drinking a liter of alcohol a day, and using opiates off the street daily. She was using buprenorphine/naloxone 8/2 mg 3 times daily.     buprenorphine/naloxone 8-2 mg tabs was filled 2023, but written on 2022.         Past Medical History:   PAST MEDICAL HISTORY:   Past Medical History:   Diagnosis Date    Alcohol dependence     Anxiety     Benign essential hypertension     PIH    CAD     Cervical spondylosis without myelopathy 2015    Continuous opioid dependence     Depression     Seasonal allergies        PAST SURGICAL HISTORY:   Past Surgical History:   Procedure Laterality Date    ABDOMINOPLASTY      Arm and thigh lift       SECTION      CV CORONARY ANGIOGRAM N/A 2024    Procedure: Coronary Angiogram;  Surgeon: Jeff Dickerson MD;  Location:  [facility]ATH [facility]    CV TEMPORARY PACEMAKER INSERTION N/A 2024    Procedure: Temporary Pacemaker Insertion;  Surgeon: Jeff Dickerson MD;  Location:  HE[facility]ARD[facility] CATH LAB    GASTRIC BYPASS      HYSTERECTOMY      LASIK Bilateral     MAMMOPLASTY REDUCTION                Family History:   FAMILY HISTORY:   Family History   Problem Relation Age of Onset    Cancer Maternal Grandmother         lung ca    Hypertension Father     Cancer Father 63        pancreatic cancer    Hypertension Mother     Allergies Mother         asthma    Cancer - colorectal No family hx of     Breast Cancer No family hx of        Family Psychiatric History: unknown        Social History:   SOCIAL HISTORY:   Social History     Tobacco Use    Smoking status: Some Days     Packs/day: 0.50     Years: 5.00     Additional pack years: 0.00     Total pack years: 2.50     Types: Cigarettes    Smokeless tobacco: Never    Tobacco comments:     some   Substance Use Topics    Alcohol use: Not Currently     Comment: stopped  10 days ago       Living with           Physical ROS:   The 10 point Review of Systems is negative other than noted in the HPI or here.           Medications:      acetaminophen  975 mg Oral 4x Daily    cholecalciferol  125 mcg Oral Daily    metoprolol succinate ER  25 mg Oral Daily    miconazole   Topical BID    nitroFURantoin macrocrystal-monohydrate  100 mg Oral Q12H [facility] (08/20)    senna-docusate  1 tablet Oral BID    Or    senna-docusate  2 tablet Oral BID    sodium chloride (PF)  3 mL Intracatheter Q8H              Allergies:     Allergies   Allergen Reactions    Zofran [Ondansetron] Other (See Comments)     QTc Prolongation          Labs:     Lab Results   Component Value Date    WBC 9.0 02/04/2024    HGB 12.3 02/04/2024    HCT 39.5 02/04/2024     02/04/2024     02/04/2024    POTASSIUM 4.8 02/05/2024    CHLORIDE 108 (H) 02/04/2024    CO2 22 02/04/2024    BUN 7.2 02/04/2024    CR 0.56 02/04/2024     (H) 02/04/2024    NTBNPI 12,939 (H) 02/03/2024    TROPONIN <0.015 09/27/2021    AST 61 (H) 02/04/2024    ALT 34 02/04/2024     (H) 02/16/2019    ALKPHOS 105 02/04/2024    BILITOTAL 0.3 02/04/2024    INR 1.07 01/24/2024              Physical and Psychiatric Examination:  "    BP (!) 164/102 (BP Location: Right arm)   Pulse 85   Temp 97.7  F (36.5  C) (Oral)   Resp 18   Ht 1.651 m (5' 5\")   Wt 60.2 kg (132 lb 12.8 oz)   LMP 06/07/2015   SpO2 99%   BMI 22.10 kg/m    Weight is 132 lbs 12.8 oz  Body mass index is 22.1 kg/m .    Physical Exam:  I have reviewed the physical exam as documented by by the medical team and agree with findings and assessment and have no additional findings to add at this time.    Mental Status Exam:    Appearance: awake, alert and adequately groomed  Attitude:  cooperative  Eye Contact:  fair  Mood:   \"I just don't know\"  Affect:  intensity is blunted  Speech:  clear, coherent  Language: Fluent in english   Psychomotor Behavior:  no evidence of tardive dyskinesia, dystonia, or tics  Thought Process:  linear  Associations:  no loose associations  Thought Content:  no evidence of suicidal ideation or homicidal ideation and no evidence of psychotic thought  Insight:  partial  Judgement:  limited  Oriented to:  time, person, and place  Attention Span and Concentration:  limited  Recent and Remote Memory:  limited  Fund of Knowledge: Appropriate   Gait and Station: decreased                DSM-5 Diagnosis:   Opioid use disorder, severe  Unspecified anxiety disorder  Unspecified depressive disorder           Assessment:   Danielle is a 48 year old female with a history of the above diagnoses who presents initially post accidental fentanyl overdose with multiple medical complications. We discussed risks, benefits, and alternatives of suboxone and she agreed to restart. Given her history of dependence and husbands use of illicit fentanyl, suboxone would be the safest option for patient.           Summary of Recommendations:     Plan: to induce onto buprenorphine, without having to stop current opioids, will perform micro induction. I discussed the following with the patient and have ordered the following:      Induction with buccal films, while continuing " agonist opioids:  very low dose, with belbuca, a buccal form of buprenorphine:  day 1: 450 microgram belbuca buccal film QID   day 2:  transition to sublingual suboxone: 2 mg BID  day 3: 4 mg BID sublingual suboxone (4/1 mg film)  Subsequent days: continue 4/1mg BID dosing if acute pain requiring agonist opioids.  Otherwise, stop other opioids (oxycodone) and increase to 8-2 mg BID.       --Trazodone 50mg PRN at bedtime for sleep (in meta analyses, trazodone has actually been shown to on average reduce the Qtc by ~5msec)  --Resources placed in AVS regarding outpatient Suboxone providers and Recovery clinic       LUCINA Curtis-BC  Consult/Liaison Psychiatry   [facility]

## 2024-02-05 NOTE — DISCHARGE INSTRUCTIONS
Buprenorphine Micro induction: to induce onto buprenorphine, without having to stop current opioids, will perform micro induction. I discussed the following with the patient and have ordered the following:      Induction with buccal films, while continuing agonist opioids:  very low dose, with belbuca, a buccal form of buprenorphine:  Day 1: 450 microgram belbuca buccal film QID -- COMPLETED  Day 2:  transition to sublingual suboxone: 2 mg BID  Day 3: 4 mg BID sublingual suboxone (4/1 mg film)  Subsequent days: continue 4/1mg BID dosing if acute pain requiring agonist opioids.  Otherwise, stop other opioids (oxycodone) and increase to 8-2 mg BID.     Behavioral Healthcare Providers Scheduling:  During your hospitalization, you were seen by a licensed mental health professional through [facility] Transition sevices, Behavioral Healthcare Providers (P)  for a brief mental health assessment and/or psychotherapy  at [facility].  It is recommended that you follow up with your estabished providers (psychiatrist, mental health therapist, and/or primary care doctor - as relevant) as soon as possible. Coordinators from [facility] will be calling you in the next 24-48 hours to ensure that you have the resources you need.  You can also contact [facility] coordinators directly at [phone].     You have been scheduled the following appointments:      Date: Tuesday, 2/13/2024  Time: 2:30 pm - 3:30 pm  Provider: Palak DUPREE  CNP,PMHNP,RN  Location: [facility], [address] [place] [address] (THIS IS A VIRTUAL APPOINTMENT, THERE IS NO NEED TO COME TO THE OFFICE IN PERSON)  Phone: [phone]  Patient Instructions  NEW PATIENT PAPERWORK WILL NEED TO BE COMPLETED AND RETURNED 24 HRS BEFORE APPOINTMENT INCLUDING VIRTUAL... EMAIL IS REQUIRED TO SCHEDULE- NO EXCEPTIONS!!! Also, please bring the following items: Insurance Card and 's  license. Thank you.        [facility] maintains an extensive network of licensed behavioral health providers to connect patients with the services they need.  We do not charge providers a fee to participate in our referral network.  We match patients with providers based on a patient s specific needs, insurance coverage, and location.  Our first effort will be to refer you to a provider within your care system, and will utilize providers outside your care system as needed.           CD Treatment Contact Info    Mental Health and Addiction Services Line: [phone] Appt through [facility].    [facility]  Outpatient  [facility] OP Admissions  Constanza Diallo - [phone]  [email or web address]    [facility] Virtual option  Phone: [phone]  Fax: [phone]    [facility]   [email or web address]  Phone: [phone]  Fax: [phone]    [facility] - in [place] (formerly [place])  Phone: [phone]  Fax: [phone]    Supportive Services    SMART Recovery   https://www.smartrecovery.org    [place] [facility]  [address]  [place] [address]  Phone: [phone] [email or web address]    Alcoholics Anonymous   http://www.aa.org    Community Drug & Alcohol Support Resources  Alcoholics Anonymous  24/7 Phone Line: [phone]  https://aaminnesBloomBoard.org/ For additional list of online meetings: http://aa-intergroup.org     [facility] [phone]  Mental Health & Addiction Services   CURRENT AVAILABILITY    Mental Health All Ages  Assessment and Referrals, Individual and Group Therapy, Psychiatry, Intensive Outpatient Program, Partial Hospitalization Program, Dual Diagnosis Program, and 55+ Seniors Program    Substance Use Disorder: Adult & Adolescent  Assessment and Referrals, Outpatient MANAS Programs (Day & Evening Groups), Dual Diagnosis, Intensive Outpatient Programs with [facility], Residential Treatment, Addiction Medicine, Detox, and  Medication Assisted Treatment (MAT) Services.    Problem Gambling Treatment  Assessment and Referrals, Individual & Group Therapy, Rule 82, and Court Ordered.    Several Locations PLUS Telephone or Video Visits Offered     Behavioral Health Access   [phone]  *[facility] Appointments Available

## 2024-02-05 NOTE — PLAN OF CARE
Goal Outcome Evaluation:      Plan of Care Reviewed With: patient    Overall Patient Progress: improvingOverall Patient Progress: improving    C/O Lower chest pain from chest compressions. Pain  meds available. Alert, oriented, forgetful. Up SBA.  Frequent urination. Abx stared per assessment and UC results. HTN meds started. Family in room. Plan is TCU at discharge. Personal belongings and call light in reach. Bed alarm on and falls bundle present. For VS and assessment, please see documentation under flowsheets.           Universal Safety Interventions

## 2024-02-06 ENCOUNTER — APPOINTMENT (OUTPATIENT)
Dept: OCCUPATIONAL THERAPY | Facility: CLINIC | Age: 49
End: 2024-02-06
Payer: MEDICAID

## 2024-02-06 LAB
HOLD SPECIMEN: NORMAL
MAGNESIUM SERPL-MCNC: 2.1 MG/DL (ref 1.7–2.3)
POTASSIUM SERPL-SCNC: 4.7 MMOL/L (ref 3.4–5.3)

## 2024-02-06 PROCEDURE — 250N000013 HC RX MED GY IP 250 OP 250 PS 637: Performed by: INTERNAL MEDICINE

## 2024-02-06 PROCEDURE — 250N000013 HC RX MED GY IP 250 OP 250 PS 637

## 2024-02-06 PROCEDURE — 97530 THERAPEUTIC ACTIVITIES: CPT | Mod: GO

## 2024-02-06 PROCEDURE — 99207 PR NOT IN PERSON INPATIENT CONSULT STATISTICAL MARKER: CPT

## 2024-02-06 PROCEDURE — 83735 ASSAY OF MAGNESIUM: CPT | Performed by: INTERNAL MEDICINE

## 2024-02-06 PROCEDURE — 999N000147 HC STATISTIC PT IP EVAL DEFER: Performed by: PHYSICAL THERAPIST

## 2024-02-06 PROCEDURE — 120N000001 HC R&B MED SURG/OB

## 2024-02-06 PROCEDURE — 99233 SBSQ HOSP IP/OBS HIGH 50: CPT | Performed by: INTERNAL MEDICINE

## 2024-02-06 PROCEDURE — 97535 SELF CARE MNGMENT TRAINING: CPT | Mod: GO

## 2024-02-06 PROCEDURE — 36415 COLL VENOUS BLD VENIPUNCTURE: CPT | Performed by: INTERNAL MEDICINE

## 2024-02-06 PROCEDURE — 84132 ASSAY OF SERUM POTASSIUM: CPT | Performed by: INTERNAL MEDICINE

## 2024-02-06 RX ORDER — METOPROLOL SUCCINATE 25 MG/1
25 TABLET, EXTENDED RELEASE ORAL 2 TIMES DAILY
Status: DISCONTINUED | OUTPATIENT
Start: 2024-02-06 | End: 2024-02-07

## 2024-02-06 RX ADMIN — ACETAMINOPHEN 975 MG: 325 TABLET, FILM COATED ORAL at 13:34

## 2024-02-06 RX ADMIN — OXYCODONE HYDROCHLORIDE 10 MG: 5 TABLET ORAL at 22:41

## 2024-02-06 RX ADMIN — NITROFURANTOIN MONOHYDRATE/MACROCRYSTALS 100 MG: 25; 75 CAPSULE ORAL at 09:06

## 2024-02-06 RX ADMIN — BUPRENORPHINE AND NALOXONE 1 FILM: 2; .5 FILM, SOLUBLE BUCCAL; SUBLINGUAL at 21:19

## 2024-02-06 RX ADMIN — OXYCODONE HYDROCHLORIDE 10 MG: 5 TABLET ORAL at 18:32

## 2024-02-06 RX ADMIN — Medication 125 MCG: at 09:06

## 2024-02-06 RX ADMIN — NITROFURANTOIN MONOHYDRATE/MACROCRYSTALS 100 MG: 25; 75 CAPSULE ORAL at 22:41

## 2024-02-06 RX ADMIN — OXYCODONE HYDROCHLORIDE 10 MG: 5 TABLET ORAL at 06:41

## 2024-02-06 RX ADMIN — METOPROLOL SUCCINATE 25 MG: 25 TABLET, EXTENDED RELEASE ORAL at 22:41

## 2024-02-06 RX ADMIN — OXYCODONE HYDROCHLORIDE 10 MG: 5 TABLET ORAL at 10:04

## 2024-02-06 RX ADMIN — SENNOSIDES AND DOCUSATE SODIUM 2 TABLET: 50; 8.6 TABLET ORAL at 22:41

## 2024-02-06 RX ADMIN — ACETAMINOPHEN 975 MG: 325 TABLET, FILM COATED ORAL at 22:41

## 2024-02-06 RX ADMIN — ACETAMINOPHEN 975 MG: 325 TABLET, FILM COATED ORAL at 18:32

## 2024-02-06 RX ADMIN — ACETAMINOPHEN 975 MG: 325 TABLET, FILM COATED ORAL at 09:06

## 2024-02-06 RX ADMIN — BUPRENORPHINE HYDROCHLORIDE 450 MCG: 450 FILM, SOLUBLE BUCCAL at 09:05

## 2024-02-06 RX ADMIN — OXYCODONE HYDROCHLORIDE 10 MG: 5 TABLET ORAL at 14:26

## 2024-02-06 RX ADMIN — METOPROLOL SUCCINATE 25 MG: 25 TABLET, EXTENDED RELEASE ORAL at 09:06

## 2024-02-06 RX ADMIN — OXYCODONE HYDROCHLORIDE 10 MG: 5 TABLET ORAL at 02:39

## 2024-02-06 ASSESSMENT — ACTIVITIES OF DAILY LIVING (ADL)
ADLS_ACUITY_SCORE: 36
DEPENDENT_IADLS:: INDEPENDENT
ADLS_ACUITY_SCORE: 36

## 2024-02-06 ASSESSMENT — COLUMBIA-SUICIDE SEVERITY RATING SCALE - C-SSRS
TOTAL  NUMBER OF INTERRUPTED ATTEMPTS SINCE LAST CONTACT: NO
ATTEMPT SINCE LAST CONTACT: NO
1. SINCE LAST CONTACT, HAVE YOU WISHED YOU WERE DEAD OR WISHED YOU COULD GO TO SLEEP AND NOT WAKE UP?: NO
TOTAL  NUMBER OF ABORTED OR SELF INTERRUPTED ATTEMPTS SINCE LAST CONTACT: NO
2. HAVE YOU ACTUALLY HAD ANY THOUGHTS OF KILLING YOURSELF?: NO
6. HAVE YOU EVER DONE ANYTHING, STARTED TO DO ANYTHING, OR PREPARED TO DO ANYTHING TO END YOUR LIFE?: NO
SUICIDE, SINCE LAST CONTACT: NO

## 2024-02-06 NOTE — CONSULTS
Brief Psychiatry/Addiction Note    Buprenorphine Micro induction: to induce onto buprenorphine, without having to stop current opioids, will perform micro induction. I discussed the following with the patient and have ordered the following:      Induction with buccal films, while continuing agonist opioids:  very low dose, with belbuca, a buccal form of buprenorphine:  Day 1: 450 microgram belbuca buccal film QID -- COMPLETED  Day 2:  transition to sublingual suboxone: 2 mg BID  Day 3: 4 mg BID sublingual suboxone (4/1 mg film)  Subsequent days: continue 4/1mg BID dosing if acute pain requiring agonist opioids.  Otherwise, stop other opioids (oxycodone) and increase to 8-2 mg BID.         2. Trazodone 50mg PRN at bedtime for sleep (in meta analyses, trazodone has actually been shown to on average reduce the Qtc by ~5msec)    3. Resources placed in AVS regarding outpatient Suboxone providers and Recovery clinic     4. Disposition: discussed with LADC who attempted to meet with patient earlier, it sounds like best option would be a treatment center like [facility] who also has TCU services. At this time, given patient is voluntarily engaging in services including MAT and referrals to outpatient services, she does NOT meet criteria for CD commitment     5. Recommend sending patient home with Narcan for harm reduction         Umm Tirado, LUCINA-BC  Consult/Liaison Psychiatry   [facility]

## 2024-02-06 NOTE — CONSULTS
"Triage and Transition - Consult and Liaison     Patient Name: Danielle Tadeo  Preferred Name:  Danielle  Age:  48 year old  Legal Sex: female  Gender Identity: female  Pronouns:   Race: White  Ethnicity: Not  or   Language: English  Date of service:  2024  Patient location: [place] [address] MEDICAL [facility]GICAL                             [facility]    Patient was seen:      Virtually     Anticipated number of sessions or this episode of care: 1    Plan/Recommendations:   Continue care coordination with care team:  yes   Maintain current transition plan:  yes   Next steps:   Patient was agreeable to scheduling outpatient psychiatry for follow up. Appointment details along with resources from psychiatry are in AVS.   Please enter another psychiatry if further visits are needed. Patients are not followed by Psychiatry C&L Service unless otherwise indicated.    Reason for consult:  \"anxiety/depression\" per consult reason    Presenting problem:   Danielle reports she is hospitalized because \"I almost . Well, I did die.\" Following fentanyl overdose on 24, patient required CPR performed by her , was admitted to [facility] ICU. She discharged AMA on 24, then presented to the ED again on 2/3/24 due to chest pain, weakness, failure to thrive.   From psychiatry note by Umm Tirado CNP on 24: This is a 48-year-old female who initially presented to [facility] after a fentanyl overdose 2024. She was reportedly snorting fentanyl with her significant other and became unresponsive. He called 911, and started performing CPR. She has been complaining of acute chest pain s/p chest compressions, superimposed on severe opioid use disorder.     Session Summary:   introduced self and role, reason for consult. Danielle was agreeable to speak with this  regarding reason for consult and MH/MANAS concerns. Regarding her overdose and requiring " "CPR, intubation and ICU level care, patient states, \"I am very happy I did not die! I am glad to be here.\"  Writer validated this experience must have been scary and a lot to process. Pt does not endorse current sx of depression or anxiety to this writer, though does endorse trouble sleeping at baseline. She reports in the hospital, trazodone has been helpful for sleep. At baseline, patient states she has trouble falling and staying asleep. Patient reports no SI/HI and does not endorse AH/VH. Patient reports she has a good support system including her children and . Patient has not tried therapy previously, and is open to receiving resources about making a therapy appointment. Patient is agreeable to scheduling with a outpatient psychiatrist for medication management.     Mental Status Exam:   Affect: Flat  Appearance: Appropriate  Attention Span/Concentration: Attentive  Eye Contact: Variable    Fund of Knowledge: Appropriate   Language /Speech Content: Fluent  Language /Speech Volume: Normal  Language /Speech Rate/Productions: Normal  Recent Memory: Intact  Remote Memory: Intact  Mood: Apathetic  Orientation to Person: Yes   Orientation to Place: Yes  Orientation to Time of Day: Yes  Orientation to Date: Yes     Situation (Do they understand why they are here?): Yes  Psychomotor Behavior: Normal  Thought Content: Clear  Thought Form: Intact    Current medications:   Psychotropic medications: Suboxone, Trazodone  Medication Orders - Psychiatric (From admission, onward)      Start     Dose/Rate Route Frequency Ordered Stop    02/05/24 1254  traZODone (DESYREL) tablet 50 mg         50 mg Oral AT BEDTIME PRN 02/05/24 1254              MeasurableTreatment Goal(s) related to diagnosis / functional impairment(s)    Goal 1: Patient will Patient will engage in self-care activities that contribute to their overall mental health and wellbeing.  Objective #A:  Patient will intentionally engage in identified activities " that promote self-care and increase patient's overall mental health and wellbeing as means to reduce levels of stress, anxiety, and/or depression     Status: New   Intervention(s):  LMHP will assist patient in establishing self-care plan with specific goals or help in identifying appropriate self-care activities together with patient    Goal 2: Patient will Patient will reduce overall frequency, intensity, and duration of the anxiety so that daily functioning is not impaired.  Objective #B:  Patient will describe situations, thoughts, feelings, and actions associated with anxieties and worries, their impact on functioning, and attempts to resolve them.    Status:  New  Intervention(s):  LMHP will focus on developing a level of trust with the patient; provide support and empathy to encourage the client to feel safe in expressing their symptoms.           Therapeutic intervention and progress:  Therapeutic intervention:  building therapeutic rapport, engaging in learning/practicing coping skills, active problem solving.   Therapeutic objectives addressed:  rapport building, orienting the patient to therapy, assessing safety, identifying an appropriate aftercare plan, identifying additional supports  Patient progress towards goals:  Patient is making progress towards treatment goals as evidenced by future-oriented thinking, willing to engage with outpatient psychiatry providers.     Collateral information:       Session start: 1141  Session end: 1257  Session duration in minutes: 16 min     CPT code(s) utilized: 12509 - Psychotherapy (with patient) - 30 (16-37*) min    Diagnosis  Patient Active Problem List   Diagnosis Code    Encounter for other general counseling or advice on contraception Z30.09    Hypertension goal BP (blood pressure) < 140/90 I10    Lumbago M54.50    Anxiety F41.9    HTN (hypertension) I10    Inattention R41.840    Cervical spondylosis without myelopathy M47.812    S/P gastric bypass Z98.84     Opioid dependence with withdrawal (H) F11.23    Prolonged Q-T interval on ECG R94.31    Alcohol withdrawal syndrome without complication (H) F10.930    Dehydration E86.0    Hypokalemia E87.6    Elevated LFTs R79.89    Elevated lactic acid level R79.89    Non-intractable vomiting with nausea, unspecified vomiting type R11.2    Alcohol intoxication with delirium (H24) F10.921    Cardiac arrest (H) I46.9    Cigarette nicotine dependence without complication F17.210    Chronic jaw pain R68.84, G89.29    Alcohol abuse F10.10    Major depressive disorder, recurrent severe without psychotic features (H) F33.2    Organic mood disorder of depressed type F06.31    Substance induced mood disorder (H) F19.94    Persistent insomnia G47.00    Seasonal allergic rhinitis J30.2    Suicidal ideation R45.851    Accidental fentanyl overdose (H) T40.411A    Lactic acidosis E87.20    Overdose T50.901A    Chest pain, unspecified type R07.9       Garrison Delgado, Psychotherapist  Triage and Transition - Consult and Liaison   [phone]

## 2024-02-06 NOTE — PLAN OF CARE
Goal Outcome Evaluation:         RN(shift from [id number])VSS, on RA. BP continues to be 160s systolic.   Neuro- A&O, flat affect, cooperative with staff.   Activity- Up with SBA, Indep in bed mobiity.   Pain- Chest wall pain, rated 9 prior to PRN pain meds and post interventon pain was 7. Declined ice packs for chest wall pain.   Respiratory- Lungs clear to bases bilat.   Cardiac- Regular rate and rhythm. Tele NSR 90s.   Musculoskeletal- Gen weakness.   GI/- Voids often but has current UTI and on PO treatment for that.   Skin- Declined to let this writer look at her sacrum or check her Mepilex sacral drsg as she states they are fine. Gave patient some barrier cream for her adrian rectal area as she reported some irritation to her skin, declines to let RN see her skin.   Bed alarm in place. No S/S of opiate WD. Continue current POC.

## 2024-02-06 NOTE — PLAN OF CARE
Pt is alert and oriented x4. Pt has flat affect. Pt was given Oxycodone for pain. Pt is on Tele monitoring. Pt is on potassium and magnesium protocol. Recheck in the morning. Pt BP was in the 180's. Pt was given Clonidine PRN. Pt BP now in 150's. Pt lung are clear bilaterally. Pt is SBA in transfer and cares. Pt is sleeping in bed. Bed alarm in place and kelly light within reach. Will continue to monitor and assess pt.

## 2024-02-06 NOTE — PLAN OF CARE
Goal Outcome Evaluation:Pain, not progressing  Patient c\o chest wall pain. Controlled with 10mg oxy. Up SBA. Alert, oriented, forgetful. Declined skin assessment. Tele ST. Discontinued. PIV SL. Psyc, therapies, chem dept  following. Personal belongings and call light in reach. Bed alarm on and falls bundle present. For VS and assessment, please see documentation under flowsheets.

## 2024-02-06 NOTE — PROGRESS NOTES
"[facility]    Medicine Progress Note - Hospitalist Service    Date of Admission:  2/3/2024    Primary Care Physician   Physician No Ref-Primary  CONSULTANTS: Therapy    Assessment & Plan       Danielle Tadeo is a 48 year old woman who returned to the hospital 2/3/24 for problems with weakness and \"memory problems\" after having been discharged from [facility] on 1/31/24.  According to the discharge summary, the patient  left \"AMA\". On admission, the patient's partner brought her in stating  \"he could not care for her at home\".  The patient gives me very little information other than simply that she does not feel well and has chest pain from her previous CPR.       Ms. Tadeo had initially been admitted to [facility] on 1/23/2024 after she became unresponsive.  She and her significant other evidently had been snorting fentanyl and he was able to start CPR at the scene.  She was shocked 4 times, received 2 mg of epi and was intubated.  Following evaluation in the emergency department, the patient was admitted to the ICU at [facility] for cooling protocol.  She had additional episodes of V-fib requiring defibrillation.  After starting on amiodarone, the patient had problems with bradycardia and ultimately she had an episode of torsades.  At that point, she required transvenous pacing and was transferred to [facility] on 1/25/2024.       While the patient was at [facility], patient was able to be extubated.  There was clearly concern for acute toxic metabolic encephalopathy and possibly anoxic injury.  Vascular neurology was involved as were psychiatry, chemical dependency, OT and PT.  It appears that shortly after becoming coherent, the patient decided to discharge home and she was accompanied by her spouse.       On presentation to the emergency department this admission.  Her vitals were , /81, RR 20, oxygen saturation 100% " breathing room air.  Patient was afebrile.  Examination revealed a moderately uncomfortable woman who appears reluctant to interact with the admission team  She frequently complains of chest discomfort.  She was not in acute distress lying completely flat on a gurney.  Her work up showed Labs: Creatinine 0.58, BUN 3.3, potassium 3.2.  Other electrolytes normal.  AST 58/ALT 30, glucose 108.  N-terminal proBNP 13,000, troponin T 83 with a delta value of 80.   WBC 10.2 with normal differential, Hgb 13.7, .  UA is not suggestive of UTI.  Urine drug screen positive for fentanyl as well as cannabinoids.  Imaging: Chest x-ray clear.            Weakness/deconditioning, toxic encephalopathy, opiate/THC abuse, failure to thrive  Patient presented with failing at home, confusion, and a positive tox screen for Fentanyl and THC.  She denies she went home to use but on admission was quite confused and is doing better now.  Occupational therapy has seen and recommending a TCU at discharge.   May be a candidate for buprenorphine, psych to see.  Patient denies alcohol abuse though it is on past medical history in [facility] and states she has had withdrawal in the past. Given her life choices and recurrent admissions, may need to consider commitment to chem dep treatment. Wonder if she has a component of anoxic brain injury.  Her SLUMS 22/20 as of 2/4/24.  Appreciate psychiatry input, started on suboxone.  Does not have insurance so no likely to get a TCU.  Her  just got hospitalized too so no one at home to care for her.     Chest pain, musculoskeletal  Patient required CPR 1/23/24 and has had chest pain since.  Cxr without evidence of fractures.  Patient's pain will be difficult to control as she is tolerant opioids given her fentanyl use history.  Patient is on scheduled Tylenol and oxycodone as needed.  Likely to take some time for pain to resolve.  Seems to be doing a little better.  Started on suboxone, goal to get off  "of oxycodone.     Cardiomyopathy, hypertension   Patient presented with an elevated troponin and BNP which is consistent with her recent hospital stay that required CPR for a drug (fentanyl) overdose.   She does not have symptoms of an acute coronary syndrome.  Previously her echocardiogram 1/23/2024 showed ejection fraction of 45-50%.  This is in the setting of a fentanyl overdose and CPR.  Patient's repeat troponin continue to drop down to 80.  Repeat echocardiogram 2/4/2024, looks better with a normal EF.  Patient being followed on telemetry given her history of arrhythmias, has looked good so will discontinue.  Patient here has been up to 164/102.   Is not on medications at home.  Her EF is normal and given her history of arrythmias, will place her on toprol.   BP still elevated so will titrate up       Hypokalemia  K is is 4.7 as of 2/5624, was replaced per protocol.     Intertrigo versus irritant dermatitis  Patient with an irritated diaper area with redness and irritation.  Patient was unable to utilize a pure wick catheter.  Will start the patient on antifungal powder.    Anxiety, depression   Has been on clonidine for \"anxiety\" as needed.  Per chart has history of suicidal ideation. Psych has seen.      History gastric bypass  Not on vitamins, not sure why.     Prolonged QTc  Seen on EKG    Murmur  Has a murmur over the mitral valve.  She states this is old.  Echo showed no significant valvular disease.  Likely a flow murmur.     E coli UTI  Urine analysis positive with a culture showing e coli.  Will treat with macrobid which it is sensitive to.          Discussed plan of care with nursing, social work, case management      Diet: Combination Diet Regular Diet Adult  Diet    DVT Prophylaxis: Pneumatic Compression Devices  Gaytan Catheter: Not present  Lines: None     Cardiac Monitoring: ACTIVE order. Indication: QTc prolonging medication (48 hours)    RESTRAINTS: not indicated  Code Status: Full Code  "       Follow up plan: patient needs TCU but has no insurance, best case scenario is that she gets medical assistance so she can have some home care.  Needs ongoing aggressive mental health follow-up as well as chemical dependency follow-up.          This document was created using voice recognition technology.  Please excuse any typographical errors that may have occurred.  Please call with any questions.       Clinically Significant Risk Factors                  # Hypertension: Noted on problem list              # Financial/Environmental Concerns:           Disposition Plan      Expected Discharge Date: 02/06/2024            unfortunately the patient has no insurance to going to a TCU is out of the question.  Her  who would be her primary care provider is in the hospital himself.  Social work is working on her finances and hopefully get some support so she can have at home home care as she will not be able to afford a TCU.       Barrier to discharge: placement, lack of insurance    Cal Clark MD  Hospitalist Service  [facility]  Securely message with [employer] (more info)  Text page via [employer] Paging/Directory   ______________________________________________________________________    Interval History   No changes overnight.  Patient's chest pain is slowly improving but still significant.  Still has an elevated blood pressure as well.  There is no new complaints.  Unfortunately at the patient's  was admitted to the hospital last night as well who is her primary care provider.  Patient does not have insurance so cannot afford a TCU at discharge and will need at minimum home care and family support.    ROS: A comprehensive review of systems was negative except for items noted in the HPI.  Patient currently denies any fever, chills, sweats, nausea, vomiting, diarrhea, shortness of breath    Physical Exam   Vital Signs: Temp: 98  F (36.7  C) Temp src: Oral BP: (!) 166/104 Pulse:  88   Resp: 18 SpO2: 99 % O2 Device: None (Room air)    Weight: 127 lbs 0 oz    Exam not changed, stable from yesterday  General appearance: Patient is alert and oriented x3, no apparent distress, pleasant and conversing normally, speaking in full sentences, appears older than stated age, sitting up in bed, somewhat flat affect  HEENT:  Mucous membranes are moist  RESPIRATORY: Clear to auscultation bilateral, good air movement  Chest: Patient does have sternal tenderness to palpation and deep breathing  CARDIOVASCULAR: Regular rate and rhythm, normal   GASTROINTESTINAL: Non-distended, non-tender, soft, bowel sounds present throughout  NEUROLOGIC:  Cranial nerves II-XII intact, without any focal deficits, strength 5/5 throughout  EXTREMITIES:  Moves all extremities, no clubbing, cyanosis, nor edema  :  Gaytan not present         Data     I have personally reviewed the following data over the past 24 hrs:    N/A  \   N/A   / N/A     N/A N/A N/A /  N/A   4.7 N/A N/A \       Imaging:   Results for orders placed or performed during the hospital encounter of 02/03/24   XR Chest 2 Views    Narrative    EXAM: XR CHEST 2 VIEWS  LOCATION: [facility]  DATE: 2/3/2024    INDICATION: Chest pain.  COMPARISON: 01/25/2024.      Impression    IMPRESSION: Negative chest. Lungs clear.     Procedures: ECHO 2/4/24 No regional wall motion abnormalities noted  The left ventricle is normal in structure, function and size.  The visual ejection fraction is 60-65%.  The right ventricle is normal in structure, function and size.  The aortic root is normal size.  Sinus rhythm was noted.  Doppler interrogation does not demonstrate significant stenosis or  insufficiency involving cardiac valves  Since the last study 1/23/2024, performed post cardiac arrest, LV systolic  performance and wall motion are now normal      I have personally have reviewed the patient's most up to date  labs, orders, and medications myself

## 2024-02-06 NOTE — CONSULTS
Care Management Initial Consult    General Information  Assessment completed with: Patient,    Type of CM/SW Visit: Initial Assessment    Primary Care Provider verified and updated as needed:     Readmission within the last 30 days:        Reason for Consult: discharge planning  Advance Care Planning: Advance Care Planning Reviewed: present on chart          Communication Assessment  Patient's communication style: spoken language (English or Bilingual)    Hearing Difficulty or Deaf: no   Wear Glasses or Blind: no    Cognitive  Cognitive/Neuro/Behavioral: .WDL except  Level of Consciousness: alert  Arousal Level: opens eyes spontaneously  Orientation: oriented x 4  Mood/Behavior: flat affect  Best Language: 0 - No aphasia  Speech: clear, spontaneous    Living Environment:   People in home: spouse     Current living Arrangements: house      Able to return to prior arrangements: yes       Family/Social Support:  Care provided by: self  Provides care for: no one                Description of Support System:           Current Resources:   Patient receiving home care services:       Community Resources:    Equipment currently used at home: none  Supplies currently used at home:      Employment/Financial:  Employment Status: employed part-time        Financial Concerns:     Referral to Financial Worker: Yes       Does the patient's insurance plan have a 3 day qualifying hospital stay waiver?  No    Lifestyle & Psychosocial Needs:  Social Determinants of Health     Food Insecurity: Not on file   Depression: Not at risk (8/25/2020)    PHQ-2     PHQ-2 Score: 2   Housing Stability: Not on file   Tobacco Use: High Risk (2/3/2024)    Patient History     Smoking Tobacco Use: Some Days     Smokeless Tobacco Use: Never     Passive Exposure: Not on file   Financial Resource Strain: Not on file   Alcohol Use: Not on file   Transportation Needs: Not on file   Physical Activity: Not on file   Interpersonal Safety: Not on file   Stress:  Not on file   Social Connections: Not on file       Functional Status:  Prior to admission patient needed assistance:   Dependent ADLs:: Independent  Dependent IADLs:: Independent       Mental Health Status:          Chemical Dependency Status:                Values/Beliefs:  Spiritual, Cultural Beliefs, Faith Practices, Values that affect care:                 Additional Information:  Sw consulted for discharge planning. Sw met with pt at bedside.  Pt was cooperative and clear with appropriate affect.  Pt reports that she lives at home with spouse. She does not use any assistive devices at baseline. She works part-time at a liquor store. Pt is independent with all ADLs/IADLs. Pt met with CD and is not interested in MANAS Tx. Pt is open to trying suboxone.     Social work will continue to follow and assist with discharge planning as needed.    MARISELA Villegas, [facility]  Inpatient Care Coordination  [facility]  [phone]         MARISELA Villegas

## 2024-02-06 NOTE — PLAN OF CARE
Physical Therapy: Orders received. Chart reviewed and discussed with care team.? Physical Therapy not indicated as pt is mobilizing with SBA with OT, and does not demonstrate any acute gait or balance deficits. OT to continue to address mobility/ADL needs.? Defer discharge recommendations to OT.? Will complete orders.

## 2024-02-06 NOTE — CONSULTS
"Met with pt for CD Consult and introduced self and role in pt's care.  Inquired about pt's interest in completing a MANAS Assessment for referrals to IP or OP MANAS Tx, or any additional CD Resources, to which pt replied \"\"I don't think so, not right now\".  Pt declined all MANAS services at this time.  CTC and Psych updated.    Relayed to pt that if they change their mind while admitted and would like to complete a CD Assessment for referrals to treatment or need any additional CD Resources they may alert unit staff who will assist in having CD Consult re-ordered.  If pt has active insurance they may also schedule an assessment on an outpatient basis by calling [facility] at [phone].    GOKUL Benavides, [facility]  Substance Use Disorder Evaluation Counselor  Email: [email or web address]    "

## 2024-02-07 VITALS
SYSTOLIC BLOOD PRESSURE: 122 MMHG | DIASTOLIC BLOOD PRESSURE: 77 MMHG | OXYGEN SATURATION: 97 % | TEMPERATURE: 98 F | HEART RATE: 78 BPM | RESPIRATION RATE: 18 BRPM | WEIGHT: 125.5 LBS | HEIGHT: 65 IN | BODY MASS INDEX: 20.91 KG/M2

## 2024-02-07 PROCEDURE — 250N000013 HC RX MED GY IP 250 OP 250 PS 637: Performed by: INTERNAL MEDICINE

## 2024-02-07 PROCEDURE — 250N000013 HC RX MED GY IP 250 OP 250 PS 637

## 2024-02-07 PROCEDURE — 99239 HOSP IP/OBS DSCHRG MGMT >30: CPT | Performed by: INTERNAL MEDICINE

## 2024-02-07 RX ORDER — BUPRENORPHINE AND NALOXONE 8; 2 MG/1; MG/1
1 FILM, SOLUBLE BUCCAL; SUBLINGUAL 2 TIMES DAILY
Qty: 20 FILM | Refills: 0 | Status: ON HOLD | OUTPATIENT
Start: 2024-02-08 | End: 2024-02-20

## 2024-02-07 RX ORDER — POLYETHYLENE GLYCOL 3350 17 G/17G
17 POWDER, FOR SOLUTION ORAL DAILY
Qty: 510 G | Refills: 0 | Status: ON HOLD | OUTPATIENT
Start: 2024-02-07 | End: 2024-02-20

## 2024-02-07 RX ORDER — METOPROLOL SUCCINATE 50 MG/1
50 TABLET, EXTENDED RELEASE ORAL DAILY
Status: DISCONTINUED | OUTPATIENT
Start: 2024-02-07 | End: 2024-02-07 | Stop reason: HOSPADM

## 2024-02-07 RX ORDER — BUPRENORPHINE AND NALOXONE 4; 1 MG/1; MG/1
1 FILM, SOLUBLE BUCCAL; SUBLINGUAL 2 TIMES DAILY
Qty: 2 FILM | Refills: 0 | Status: SHIPPED | OUTPATIENT
Start: 2024-02-07 | End: 2024-02-12

## 2024-02-07 RX ORDER — METOPROLOL SUCCINATE 50 MG/1
50 TABLET, EXTENDED RELEASE ORAL DAILY
Qty: 30 TABLET | Refills: 0 | Status: ON HOLD | OUTPATIENT
Start: 2024-02-07 | End: 2024-02-20

## 2024-02-07 RX ADMIN — BUPRENORPHINE AND NALOXONE 1 FILM: 2; .5 FILM, SOLUBLE BUCCAL; SUBLINGUAL at 08:01

## 2024-02-07 RX ADMIN — METOPROLOL SUCCINATE 50 MG: 50 TABLET, EXTENDED RELEASE ORAL at 08:01

## 2024-02-07 RX ADMIN — ACETAMINOPHEN 975 MG: 325 TABLET, FILM COATED ORAL at 08:02

## 2024-02-07 RX ADMIN — NITROFURANTOIN MONOHYDRATE/MACROCRYSTALS 100 MG: 25; 75 CAPSULE ORAL at 08:00

## 2024-02-07 RX ADMIN — SENNOSIDES AND DOCUSATE SODIUM 2 TABLET: 50; 8.6 TABLET ORAL at 08:01

## 2024-02-07 RX ADMIN — Medication 125 MCG: at 08:00

## 2024-02-07 ASSESSMENT — ACTIVITIES OF DAILY LIVING (ADL)
ADLS_ACUITY_SCORE: 39
ADLS_ACUITY_SCORE: 38
ADLS_ACUITY_SCORE: 39
ADLS_ACUITY_SCORE: 38

## 2024-02-07 NOTE — PLAN OF CARE
Cared for [id number]    Pt A/O x 4, VSS; Pt flat affect/tired, rousable, but does have difficulty staying awake - MD aware. Pt denies headache, dizziness, N/V & SOB. Pt reports chest/rib pain - administered PO Tylenol. Pt up SBA. Lung sounds clear, RA. Chem Dep, OT & Social Work following. Plan to discharge home today w/Home Care referral. Will continue with plan of care.

## 2024-02-07 NOTE — PROGRESS NOTES
DC instructions given to pt and son, verbalized understanding.  All belongings with pt, IV DC'd and documented.     Pt left the unit via wheelchair, son is driving patient and her  home.

## 2024-02-07 NOTE — PLAN OF CARE
Occupational Therapy Discharge Summary    Reason for therapy discharge:    Discharged to home.    Progress towards therapy goal(s). See goals on Care Plan in [facility] electronic health record for goal details.  Goals partially met.  Barriers to achieving goals:   discharge from facility.    Therapy recommendation(s):    Continued therapy is recommended.  Rationale/Recommendations:  Anticipate pt will be able to d/c home with assist for IADLs such as meds, meals, and driving due to cog impairment. Pt demonstrates appropriate endurance today during ambulation pathfinding task.    Pt not seen by writer on this date. Note written based on previous treating OT's note and recommendations.

## 2024-02-07 NOTE — PLAN OF CARE
Goal Outcome Evaluation:         Temp: 97.9  F (36.6  C) Temp src: Oral BP: 115/85 Pulse: 86   Resp: 16 SpO2: 98 % O2 Device: None (Room air)       VSS on RA. A&O x4. SBA. COWS score: 3. On macrobid for UTI. On Suboxone. Increased metoprolol dose. R. PIV SL. Psych, OT, and SW following.

## 2024-02-07 NOTE — DISCHARGE SUMMARY
"[facility]  Hospitalist Discharge Summary      Date of Admission:  2/3/2024  Date of Discharge:  2/7/2024  Discharging Provider: Cal Clark MD  Discharge Service: Hospitalist Service  Primary Care Physician   Physician No Ref-Primary    Discharge Diagnoses   Weakness and deconditioning  Toxic encephalopathy  History of opioid and THC abuse  Failure to thrive  Recent cardiac arrest with muscle skeletal chest pain due to CPR  Hypertension  Recent cardiomyopathy, resolved  Hypokalemia  Intertrigo  Anxiety and depression  History of gastric bypass  Prolonged QTc  E. coli UTI  Heart murmur    Hospital Course       Danielle Tadeo is a 48 year old woman who returned to the hospital 2/3/24 for problems with weakness and \"memory problems\" after having been discharged from [facility] on 1/31/24.  According to the discharge summary, the patient  left \"AMA\". On admission, the patient's partner brought her in stating  \"he could not care for her at home\".  The patient gives me very little information other than simply that she does not feel well and has chest pain from her previous CPR.       Ms. Tadeo had initially been admitted to [facility] on 1/23/2024 after she became unresponsive.  She and her significant other evidently had been snorting fentanyl and he was able to start CPR at the scene.  She was shocked 4 times, received 2 mg of epi and was intubated.  Following evaluation in the emergency department, the patient was admitted to the ICU at [facility] for cooling protocol.  She had additional episodes of V-fib requiring defibrillation.  After starting on amiodarone, the patient had problems with bradycardia and ultimately she had an episode of torsades.  At that point, she required transvenous pacing and was transferred to [facility] on 1/25/2024.       While the patient was at [facility], patient was able to be extubated.  There was " clearly concern for acute toxic metabolic encephalopathy and possibly anoxic injury.  Vascular neurology was involved as were psychiatry, chemical dependency, OT and PT.  It appears that shortly after becoming coherent, the patient decided to discharge home and she was accompanied by her spouse.       On presentation to the emergency department this admission.  Her vitals were , /81, RR 20, oxygen saturation 100% breathing room air.  Patient was afebrile.  Examination revealed a moderately uncomfortable woman who appears reluctant to interact with the admission team  She frequently complains of chest discomfort.  She was not in acute distress lying completely flat on a gurney.  Her work up showed Labs: Creatinine 0.58, BUN 3.3, potassium 3.2.  Other electrolytes normal.  AST 58/ALT 30, glucose 108.  N-terminal proBNP 13,000, troponin T 83 with a delta value of 80.   WBC 10.2 with normal differential, Hgb 13.7, .  UA is not suggestive of UTI.  Urine drug screen positive for fentanyl as well as cannabinoids.  Imaging: Chest x-ray clear.             Weakness/deconditioning, toxic encephalopathy, opiate/THC abuse, failure to thrive  Patient presented with failing at home, confusion, and a positive tox screen for Fentanyl and THC.  She denies she went home to use drugs but on admission was quite confused and this improved during her stay.   Occupational therapy has seen and recommending a TCU at discharge but the patient has no insurance so at a minimum will go home with her  helping care for her and the goal is to try and get home care if possible.  She was seen by psychiatry and started on  buprenorphine.  Will also have Narcan available for possible overdoses at home.  Patient denies alcohol abuse though it is on past medical history in [facility] and states she has had withdrawal in the past. Given her life choices and recurrent admissions, may need to consider commitment to chem dep treatment in  the future but currently psychiatry does not feel that this is necessary. Wonder if she has a component of anoxic brain injury for the patient seems to be doing better when she is not on drugs.  Her SLUMS 22/20 as of 2/4/24.   At times during her stay, she was quite sleepy and nursing was concerned that the patient may be taking drugs that she is her family brought in.     Chest pain, musculoskeletal  Patient required CPR 1/23/24 and has had chest pain since. Patient did have a CXR without evidence of fractures.  Patient's pain will be difficult to control as she is tolerant opioids given her fentanyl use history.  Patient is on scheduled Tylenol and oxycodone as needed.  She was also started on Suboxone.  Likely to take some time for pain to resolve.  Seems to be doing a little chandan daily.     Cardiomyopathy (resolved), hypertension   Patient presented with an elevated troponin and BNP which is consistent with her recent hospital stay that required CPR for a drug (fentanyl) overdose.   She does not have symptoms of an acute coronary syndrome.  Previously her echocardiogram 1/23/2024 showed ejection fraction of 45-50%.  This is in the setting of a fentanyl overdose and CPR.  Patient's repeat troponin continue to drop down to 80.  Repeat echocardiogram 2/4/2024, looks better with a normal EF at this time.  Patient had been followed on telemetry given her history of arrhythmias, has looked good while hospitalized this admission.  Patient's blood pressure here has been up to 164/102.   Is not on medications at home.  Her EF is normal and given her history of arrythmias, will place her on toprol.  Will need follow-up to follow-up her blood pressure with her primary care provider        Hypokalemia  Was replaced per protocol.      Intertrigo versus irritant dermatitis  Patient with an irritated diaper area with redness and irritation.  Patient was unable to utilize a pure wick catheter.  She was treated with antifungal  "powder.     Anxiety, depression   Has been on clonidine for \"anxiety\" as needed.  Per chart has history of suicidal ideation. Psych has seen.  Patient needs outpatient psychiatry follow-up.     History gastric bypass  Not on vitamins, not sure why.      Prolonged QTc  Seen on EKG     Murmur  Has a murmur over the mitral valve.  She states this is old.  Echo showed no significant valvular disease.  Likely a flow murmur.      E coli UTI  Urine analysis positive with a culture showing e coli.  Treated with a course of macrobid which it was sensitive to.        Clinically Significant Risk Factors          Significant Results and Procedures   Most Recent 3 CBC's:  Recent Labs   Lab Test 02/04/24  0752 02/03/24  1452 01/30/24  1021   WBC 9.0 10.2 8.3   HGB 12.3 13.7 10.7*   MCV 94 87 89    238 202     Most Recent 3 BMP's:  Recent Labs   Lab Test 02/06/24  0549 02/05/24  0715 02/04/24  1445 02/04/24  0752 02/04/24  0003 02/03/24  1452 01/31/24  0654 01/30/24  0536   NA  --   --   --  141  --  141  --  141   POTASSIUM 4.7 4.8 4.8 4.8   < > 3.2*   < > 3.7  3.6   CHLORIDE  --   --   --  108*  --  104  --  109*   CO2  --   --   --  22  --  24  --  24   BUN  --   --   --  7.2  --  3.3*  --  6.4   CR  --   --   --  0.56  --  0.58  --  0.45*   ANIONGAP  --   --   --  11  --  13  --  8   HAZEL  --   --   --  8.2*  --  8.9  --  8.7   GLC  --   --   --  136*  --  108*  --  105*    < > = values in this interval not displayed.   ,   Results for orders placed or performed during the hospital encounter of 02/03/24   XR Chest 2 Views    Narrative    EXAM: XR CHEST 2 VIEWS  LOCATION: [facility]  DATE: 2/3/2024    INDICATION: Chest pain.  COMPARISON: 01/25/2024.      Impression    IMPRESSION: Negative chest. Lungs clear.   Echocardiogram Limited     Value    LVEF  60-65%    Narrative    [id number]  [id number]  [id number]  [id number]^ERVIN^FINN^R     [facility]  Echocardiography Laboratory  [address] [place] [address]     Name: CHAU ABAD  MRN: [id number]  : 1975  Study Date: 2024 09:10 AM  Age: 48 yrs  Gender: Female  Patient Location: [facility]  Reason For Study: Abnormal EKG  Ordering Physician: Ross Escoto MD  Performed By: Jacki Mckinley RDCS     BSA: 1.6 m2  Height: 65 in  Weight: 128 lb  HR: 87  BP: 121/94 mmHg  ______________________________________________________________________________  Procedure  Limited Portable Echo Adult.  ______________________________________________________________________________  Interpretation Summary     No regional wall motion abnormalities noted  The left ventricle is normal in structure, function and size.  The visual ejection fraction is 60-65%.  .  The right ventricle is normal in structure, function and size.  The aortic root is normal size.  Sinus rhythm was noted.  Doppler interrogation does not demonstrate significant stenosis or  insufficiency involving cardiac valves     Since the last study 2024, performed post cardiac arrest, LV systolic  performance and wall motion are now normal     ______________________________________________________________________________  Left Ventricle  The left ventricle is normal in structure, function and size. The visual  ejection fraction is 60-65%. Left ventricular diastolic function is  indeterminate. No regional wall motion abnormalities noted.     Right Ventricle  The right ventricle is normal in structure, function and size. There is no  mass or thrombus in the right ventricle.     Atria  Normal left atrial size. Right atrial size is normal. There is no atrial shunt  seen. The left atrial appendage is not well visualized.     Mitral Valve  The mitral valve leaflets appear normal. There is no evidence of stenosis,  fluttering, or prolapse. There is no mitral regurgitation noted. There is no  mitral valve stenosis.     Tricuspid Valve  Normal tricuspid valve. No tricuspid  regurgitation. Right ventricular systolic  pressure could not be approximated due to inadequate tricuspid regurgitation.  There is no tricuspid stenosis.     Aortic Valve  The aortic valve is trileaflet. No aortic regurgitation is present. No aortic  stenosis is present.     Pulmonic Valve  The pulmonic valve is not well seen, but is grossly normal.     Vessels  The aortic root is normal size. The inferior vena cava is normal. The  pulmonary artery is normal size.     Pericardium  The pericardium appears normal. There is no pleural effusion.     Rhythm  Sinus rhythm was noted.  ______________________________________________________________________________  MMode/2D Measurements & Calculations     TAPSE: 2.4 cm     Doppler Measurements & Calculations  MV E max reuben: 93.8 cm/sec  MV A max reuben: 97.5 cm/sec  MV E/A: 0.96  MV dec time: 0.20 sec  E/E' av.4  Lateral E/e': 12.5  Medial E/e': 12.3  RV S Reuben: 15.2 cm/sec     ______________________________________________________________________________  Report approved by: Dr. Mart Bernabe 2024 12:30 PM                  Follow up/instructions: Patient needs close follow-up with psychiatry for her ongoing Suboxone need as well as her mental health.  She should follow-up with her primary care provider making sure her blood pressure is stable as well.  Ongoing drug cessation needs to be encouraged.    Pending test results at discharge:     Unresulted Labs Ordered in the Past 30 Days of this Admission       No orders found from 2024 to 2024.            Discharge Orders      Home Care Referral      Reason for your hospital stay    Failure to thrive, chest pain     Activity - Up ad shashi     Follow-up and recommended labs and tests     Follow up with primary care provider, Physician No Ref-Primary, within 7 days for hospital follow- up.  No follow up labs or test are needed. Chest pain follow up     Activity    Your activity upon discharge: activity as  tolerated     Full Code     Diet    Follow this diet upon discharge: Orders Placed This Encounter      Combination Diet Regular Diet Adult     Diet    Follow this diet upon discharge: Orders Placed This Encounter      Combination Diet Regular Diet Adult      Diet       Discharge Disposition   Discharged to home  Condition at discharge: Stable      Consultations This Hospital Stay   CHEMICAL DEPENDENCY IP CONSULT  PHYSICAL THERAPY ADULT IP CONSULT  OCCUPATIONAL THERAPY ADULT IP CONSULT  PHYSICAL THERAPY ADULT IP CONSULT  OCCUPATIONAL THERAPY ADULT IP CONSULT  PSYCHIATRY IP CONSULT  CHEMICAL DEPENDENCY IP CONSULT  PSYCHIATRY IP CONSULT  CARE MANAGEMENT / SOCIAL WORK IP CONSULT    Code Status   Full Code    Time Spent on this Encounter   I, Cal Clakr MD, personally saw the patient today and spent greater than 30 minutes discharging this patient.  Discussed with nursing at length,  bedside, also updated social work/case management        This document was created using voice recognition technology.  Please excuse any typographical errors that may have occurred.  Please call with any questions.       Cal Clark MD  [facility] MEDICAL SURGICAL  [address] [place] [address]  Phone: [phone]  Fax: [phone]  ______________________________________________________________________    Physical Exam   Vital Signs: Temp: 98  F (36.7  C) Temp src: Oral BP: 133/88 Pulse: 83   Resp: 18 SpO2: 97 % O2 Device: None (Room air)    Weight: 125 lbs 8 oz      Exam not changed, stable from yesterday  General appearance: Patient is alert and oriented x3, no apparent distress, pleasant and conversing normally, speaking in full sentences, appears older than stated age, sitting up in bed, somewhat flat affect  HEENT:  Mucous membranes are moist  RESPIRATORY: Clear to auscultation bilateral, good air movement  Chest: Patient does have sternal tenderness to palpation and deep  breathing  CARDIOVASCULAR: Regular rate and rhythm, normal   GASTROINTESTINAL: Non-distended, non-tender, soft, bowel sounds present throughout  NEUROLOGIC:  Cranial nerves II-XII intact, without any focal deficits, strength 5/5 throughout  EXTREMITIES:  Moves all extremities, no clubbing, cyanosis, nor edema  :  Gaytan not present         Discharge Medications patient is being started on         Current Discharge Medication List        START taking these medications    Details   acetaminophen (TYLENOL) 325 MG tablet Take 3 tablets (975 mg) by mouth 4 times daily    Associated Diagnoses: Chest pain, unspecified type      buprenorphine HCl-naloxone HCl (SUBOXONE) 4-1 MG per film Place 1 Film under the tongue 2 times daily  Qty: 2 Film, Refills: 0    Associated Diagnoses: Opioid dependence with withdrawal (H)      buprenorphine HCl-naloxone HCl (SUBOXONE) 8-2 MG per film Place 1 Film under the tongue 2 times daily for 10 days  Qty: 20 Film, Refills: 0    Associated Diagnoses: Opioid dependence with withdrawal (H)      metoprolol succinate ER (TOPROL XL) 50 MG 24 hr tablet Take 1 tablet (50 mg) by mouth daily for 30 days  Qty: 30 tablet, Refills: 0    Associated Diagnoses: Hypertension, unspecified type      miconazole (MICATIN) 2 % external powder Apply topically 2 times daily  Qty: 71 g, Refills: 0    Associated Diagnoses: Intertrigo      naloxone (NARCAN) 4 MG/0.1ML nasal spray Spray 1 spray (4 mg) into one nostril alternating nostrils as needed for opioid reversal every 2-3 minutes until assistance arrives  Qty: 0.2 mL, Refills: 0    Associated Diagnoses: Accidental overdose, initial encounter      oxyCODONE (ROXICODONE) 5 MG tablet Take 1 tablet (5 mg) by mouth every 4 hours as needed for moderate pain (IF pain not managed with non-pharmacological and non-opioid interventions)  Qty: 30 tablet, Refills: 0    Associated Diagnoses: Chest pain, unspecified type      polyethylene glycol (MIRALAX) 17 GM/Dose powder  Take 17 g by mouth daily for 30 days  Qty: 510 g, Refills: 0    Associated Diagnoses: Chest pain, unspecified type           STOP taking these medications       cloNIDine (CATAPRES) 0.2 MG tablet Comments:   Reason for Stopping:         Vitamin D3 (CHOLECALCIFEROL) 125 MCG (5000 UT) tablet Comments:   Reason for Stopping:             Allergies   Allergies   Allergen Reactions    Zofran [Ondansetron] Other (See Comments)     QTc Prolongation

## 2024-02-07 NOTE — PLAN OF CARE
Goal Outcome Evaluation:    A&Ox4. VSS on RA. K/mag protocols, recheck AM. On PO macrobid. Received tylenol and PRN oxy for chest wall pain. On Suboxone. Up SBA. OT/Psych/SW following.

## 2024-02-09 ENCOUNTER — PATIENT OUTREACH (OUTPATIENT)
Dept: CARE COORDINATION | Facility: CLINIC | Age: 49
End: 2024-02-09
Payer: MEDICAID

## 2024-02-09 NOTE — PROGRESS NOTES
Connected Care Resource Center:   [facility] Resource [facility] Contact  [facility]/Voicemail     Clinical Data: Post-Discharge Outreach     Outreach attempted x 2.  Left message on patient's voicemail, providing [facility]'s central phone number of [phone] ([phone]) for questions/concerns and/or to schedule an appt with an [facility] provider, if they do not have a PCP.      Plan:  [facility] will do no further outreaches at this time.       MARLON Faustin  Connected Care Resource Center, [facility]    *Connected Care Resource Team does NOT follow patient ongoing. Referrals are identified based on internal discharge reports and the outreach is to ensure patient has an understanding of their discharge instructions.

## 2024-02-12 ENCOUNTER — APPOINTMENT (OUTPATIENT)
Dept: CT IMAGING | Facility: CLINIC | Age: 49
End: 2024-02-12
Attending: PHYSICIAN ASSISTANT
Payer: MEDICAID

## 2024-02-12 ENCOUNTER — HOSPITAL ENCOUNTER (OUTPATIENT)
Facility: CLINIC | Age: 49
Setting detail: OBSERVATION
Discharge: PSYCHIATRIC OR SUBSTANCE USE FACILITY | End: 2024-02-21
Attending: PHYSICIAN ASSISTANT | Admitting: STUDENT IN AN ORGANIZED HEALTH CARE EDUCATION/TRAINING PROGRAM
Payer: MEDICAID

## 2024-02-12 DIAGNOSIS — K59.00 CONSTIPATION, UNSPECIFIED CONSTIPATION TYPE: ICD-10-CM

## 2024-02-12 DIAGNOSIS — R07.9 CHEST PAIN, UNSPECIFIED TYPE: ICD-10-CM

## 2024-02-12 DIAGNOSIS — R11.2 NON-INTRACTABLE VOMITING WITH NAUSEA: ICD-10-CM

## 2024-02-12 DIAGNOSIS — L30.4 INTERTRIGO: ICD-10-CM

## 2024-02-12 DIAGNOSIS — F10.10 ALCOHOL ABUSE: ICD-10-CM

## 2024-02-12 DIAGNOSIS — F19.10 POLYSUBSTANCE ABUSE (H): ICD-10-CM

## 2024-02-12 DIAGNOSIS — R00.0 TACHYCARDIA, UNSPECIFIED: ICD-10-CM

## 2024-02-12 DIAGNOSIS — F41.9 ANXIETY: ICD-10-CM

## 2024-02-12 DIAGNOSIS — S31.809A WOUND OF BUTTOCK, UNSPECIFIED LATERALITY, INITIAL ENCOUNTER: Primary | ICD-10-CM

## 2024-02-12 DIAGNOSIS — F10.920 ALCOHOLIC INTOXICATION WITHOUT COMPLICATION (H): ICD-10-CM

## 2024-02-12 DIAGNOSIS — I10 HYPERTENSION, UNSPECIFIED TYPE: ICD-10-CM

## 2024-02-12 DIAGNOSIS — R62.7 ADULT FAILURE TO THRIVE: ICD-10-CM

## 2024-02-12 DIAGNOSIS — F10.921 ALCOHOL INTOXICATION WITH DELIRIUM (H): ICD-10-CM

## 2024-02-12 DIAGNOSIS — G47.00 INSOMNIA, UNSPECIFIED TYPE: ICD-10-CM

## 2024-02-12 LAB
ALBUMIN SERPL BCG-MCNC: 3.6 G/DL (ref 3.5–5.2)
ALBUMIN UR-MCNC: NEGATIVE MG/DL
ALP SERPL-CCNC: 100 U/L (ref 40–150)
ALT SERPL W P-5'-P-CCNC: 13 U/L (ref 0–50)
AMPHETAMINES UR QL SCN: ABNORMAL
ANION GAP SERPL CALCULATED.3IONS-SCNC: 19 MMOL/L (ref 7–15)
APPEARANCE UR: CLEAR
AST SERPL W P-5'-P-CCNC: 52 U/L (ref 0–45)
BACTERIA #/AREA URNS HPF: ABNORMAL /HPF
BARBITURATES UR QL SCN: ABNORMAL
BASOPHILS # BLD AUTO: 0 10E3/UL (ref 0–0.2)
BASOPHILS NFR BLD AUTO: 0 %
BENZODIAZ UR QL SCN: ABNORMAL
BILIRUB SERPL-MCNC: 0.3 MG/DL
BILIRUB UR QL STRIP: NEGATIVE
BUN SERPL-MCNC: 18 MG/DL (ref 6–20)
BZE UR QL SCN: ABNORMAL
CA-I BLD-MCNC: 4.1 MG/DL (ref 4.4–5.2)
CALCIUM SERPL-MCNC: 8 MG/DL (ref 8.6–10)
CANNABINOIDS UR QL SCN: ABNORMAL
CHLORIDE SERPL-SCNC: 104 MMOL/L (ref 98–107)
COLOR UR AUTO: ABNORMAL
CREAT SERPL-MCNC: 0.61 MG/DL (ref 0.51–0.95)
DEPRECATED HCO3 PLAS-SCNC: 18 MMOL/L (ref 22–29)
EGFRCR SERPLBLD CKD-EPI 2021: >90 ML/MIN/1.73M2
EOSINOPHIL # BLD AUTO: 0 10E3/UL (ref 0–0.7)
EOSINOPHIL NFR BLD AUTO: 0 %
ERYTHROCYTE [DISTWIDTH] IN BLOOD BY AUTOMATED COUNT: 15 % (ref 10–15)
ETHANOL SERPL-MCNC: 0.27 G/DL
FENTANYL UR QL: ABNORMAL
FLUAV RNA SPEC QL NAA+PROBE: NEGATIVE
FLUBV RNA RESP QL NAA+PROBE: NEGATIVE
GLUCOSE SERPL-MCNC: 80 MG/DL (ref 70–99)
GLUCOSE UR STRIP-MCNC: NEGATIVE MG/DL
HCG SER QL IA.RAPID: NEGATIVE
HCT VFR BLD AUTO: 38.3 % (ref 35–47)
HGB BLD-MCNC: 12.9 G/DL (ref 11.7–15.7)
HGB UR QL STRIP: NEGATIVE
IMM GRANULOCYTES # BLD: 0.1 10E3/UL
IMM GRANULOCYTES NFR BLD: 1 %
KETONES UR STRIP-MCNC: 15 MG/DL
LACTATE SERPL-SCNC: 4.7 MMOL/L (ref 0.7–2)
LEUKOCYTE ESTERASE UR QL STRIP: NEGATIVE
LIPASE SERPL-CCNC: 38 U/L (ref 13–60)
LYMPHOCYTES # BLD AUTO: 2.5 10E3/UL (ref 0.8–5.3)
LYMPHOCYTES NFR BLD AUTO: 22 %
MAGNESIUM SERPL-MCNC: 1.8 MG/DL (ref 1.7–2.3)
MCH RBC QN AUTO: 30.6 PG (ref 26.5–33)
MCHC RBC AUTO-ENTMCNC: 33.7 G/DL (ref 31.5–36.5)
MCV RBC AUTO: 91 FL (ref 78–100)
MONOCYTES # BLD AUTO: 0.5 10E3/UL (ref 0–1.3)
MONOCYTES NFR BLD AUTO: 5 %
MUCOUS THREADS #/AREA URNS LPF: PRESENT /LPF
NEUTROPHILS # BLD AUTO: 8 10E3/UL (ref 1.6–8.3)
NEUTROPHILS NFR BLD AUTO: 72 %
NITRATE UR QL: NEGATIVE
NRBC # BLD AUTO: 0 10E3/UL
NRBC BLD AUTO-RTO: 0 /100
OPIATES UR QL SCN: ABNORMAL
PCP QUAL URINE (ROCHE): ABNORMAL
PH UR STRIP: 6 [PH] (ref 5–7)
PHOSPHATE SERPL-MCNC: 2.9 MG/DL (ref 2.5–4.5)
PLATELET # BLD AUTO: 247 10E3/UL (ref 150–450)
POTASSIUM SERPL-SCNC: 3.7 MMOL/L (ref 3.4–5.3)
PROT SERPL-MCNC: 5.7 G/DL (ref 6.4–8.3)
RBC # BLD AUTO: 4.21 10E6/UL (ref 3.8–5.2)
RBC URINE: 0 /HPF
RSV RNA SPEC NAA+PROBE: NEGATIVE
SARS-COV-2 RNA RESP QL NAA+PROBE: NEGATIVE
SODIUM SERPL-SCNC: 141 MMOL/L (ref 135–145)
SP GR UR STRIP: 1.01 (ref 1–1.03)
SQUAMOUS EPITHELIAL: 3 /HPF
UROBILINOGEN UR STRIP-MCNC: NORMAL MG/DL
WBC # BLD AUTO: 11.1 10E3/UL (ref 4–11)
WBC URINE: 5 /HPF

## 2024-02-12 PROCEDURE — 87637 SARSCOV2&INF A&B&RSV AMP PRB: CPT | Performed by: PHYSICIAN ASSISTANT

## 2024-02-12 PROCEDURE — 80307 DRUG TEST PRSMV CHEM ANLYZR: CPT | Performed by: PHYSICIAN ASSISTANT

## 2024-02-12 PROCEDURE — 258N000003 HC RX IP 258 OP 636: Performed by: PHYSICIAN ASSISTANT

## 2024-02-12 PROCEDURE — 83690 ASSAY OF LIPASE: CPT | Performed by: PHYSICIAN ASSISTANT

## 2024-02-12 PROCEDURE — 250N000009 HC RX 250: Performed by: PHYSICIAN ASSISTANT

## 2024-02-12 PROCEDURE — 84100 ASSAY OF PHOSPHORUS: CPT | Performed by: PHYSICIAN ASSISTANT

## 2024-02-12 PROCEDURE — 72125 CT NECK SPINE W/O DYE: CPT

## 2024-02-12 PROCEDURE — 250N000011 HC RX IP 250 OP 636: Performed by: PHYSICIAN ASSISTANT

## 2024-02-12 PROCEDURE — 99222 1ST HOSP IP/OBS MODERATE 55: CPT | Performed by: PHYSICIAN ASSISTANT

## 2024-02-12 PROCEDURE — 82040 ASSAY OF SERUM ALBUMIN: CPT | Performed by: PHYSICIAN ASSISTANT

## 2024-02-12 PROCEDURE — 36415 COLL VENOUS BLD VENIPUNCTURE: CPT | Performed by: PHYSICIAN ASSISTANT

## 2024-02-12 PROCEDURE — 84703 CHORIONIC GONADOTROPIN ASSAY: CPT

## 2024-02-12 PROCEDURE — 82077 ASSAY SPEC XCP UR&BREATH IA: CPT | Performed by: PHYSICIAN ASSISTANT

## 2024-02-12 PROCEDURE — 96365 THER/PROPH/DIAG IV INF INIT: CPT | Mod: 59

## 2024-02-12 PROCEDURE — 70450 CT HEAD/BRAIN W/O DYE: CPT

## 2024-02-12 PROCEDURE — 82330 ASSAY OF CALCIUM: CPT | Performed by: PHYSICIAN ASSISTANT

## 2024-02-12 PROCEDURE — 250N000013 HC RX MED GY IP 250 OP 250 PS 637: Performed by: PHYSICIAN ASSISTANT

## 2024-02-12 PROCEDURE — G0378 HOSPITAL OBSERVATION PER HR: HCPCS

## 2024-02-12 PROCEDURE — 81001 URINALYSIS AUTO W/SCOPE: CPT | Performed by: PHYSICIAN ASSISTANT

## 2024-02-12 PROCEDURE — 96361 HYDRATE IV INFUSION ADD-ON: CPT

## 2024-02-12 PROCEDURE — 96375 TX/PRO/DX INJ NEW DRUG ADDON: CPT

## 2024-02-12 PROCEDURE — 83735 ASSAY OF MAGNESIUM: CPT | Performed by: PHYSICIAN ASSISTANT

## 2024-02-12 PROCEDURE — 99291 CRITICAL CARE FIRST HOUR: CPT | Mod: 25

## 2024-02-12 PROCEDURE — 85025 COMPLETE CBC W/AUTO DIFF WBC: CPT | Performed by: PHYSICIAN ASSISTANT

## 2024-02-12 RX ORDER — DIAZEPAM 5 MG
10 TABLET ORAL EVERY 30 MIN PRN
Status: DISCONTINUED | OUTPATIENT
Start: 2024-02-12 | End: 2024-02-19

## 2024-02-12 RX ORDER — FLUMAZENIL 0.1 MG/ML
0.2 INJECTION, SOLUTION INTRAVENOUS
Status: DISCONTINUED | OUTPATIENT
Start: 2024-02-12 | End: 2024-02-21 | Stop reason: HOSPADM

## 2024-02-12 RX ORDER — POLYETHYLENE GLYCOL 3350 17 G/17G
17 POWDER, FOR SOLUTION ORAL 2 TIMES DAILY PRN
Status: DISCONTINUED | OUTPATIENT
Start: 2024-02-12 | End: 2024-02-21 | Stop reason: HOSPADM

## 2024-02-12 RX ORDER — CALCIUM GLUCONATE 20 MG/ML
2 INJECTION, SOLUTION INTRAVENOUS ONCE
Status: COMPLETED | OUTPATIENT
Start: 2024-02-12 | End: 2024-02-12

## 2024-02-12 RX ORDER — AMOXICILLIN 250 MG
1 CAPSULE ORAL 2 TIMES DAILY
Status: DISCONTINUED | OUTPATIENT
Start: 2024-02-12 | End: 2024-02-21 | Stop reason: HOSPADM

## 2024-02-12 RX ORDER — ONDANSETRON 2 MG/ML
4 INJECTION INTRAMUSCULAR; INTRAVENOUS EVERY 6 HOURS PRN
Status: DISCONTINUED | OUTPATIENT
Start: 2024-02-12 | End: 2024-02-21 | Stop reason: HOSPADM

## 2024-02-12 RX ORDER — CLOTRIMAZOLE 1 %
CREAM (GRAM) TOPICAL ONCE
Status: COMPLETED | OUTPATIENT
Start: 2024-02-12 | End: 2024-02-12

## 2024-02-12 RX ORDER — OLANZAPINE 5 MG/1
5-10 TABLET, ORALLY DISINTEGRATING ORAL EVERY 6 HOURS PRN
Status: DISCONTINUED | OUTPATIENT
Start: 2024-02-12 | End: 2024-02-19

## 2024-02-12 RX ORDER — MULTIPLE VITAMINS W/ MINERALS TAB 9MG-400MCG
1 TAB ORAL DAILY
Status: DISCONTINUED | OUTPATIENT
Start: 2024-02-13 | End: 2024-02-21 | Stop reason: HOSPADM

## 2024-02-12 RX ORDER — AMOXICILLIN 250 MG
2 CAPSULE ORAL 2 TIMES DAILY
Status: DISCONTINUED | OUTPATIENT
Start: 2024-02-12 | End: 2024-02-21 | Stop reason: HOSPADM

## 2024-02-12 RX ORDER — LIDOCAINE 40 MG/G
CREAM TOPICAL
Status: DISCONTINUED | OUTPATIENT
Start: 2024-02-12 | End: 2024-02-21 | Stop reason: HOSPADM

## 2024-02-12 RX ORDER — HALOPERIDOL 5 MG/ML
2.5-5 INJECTION INTRAMUSCULAR EVERY 6 HOURS PRN
Status: DISCONTINUED | OUTPATIENT
Start: 2024-02-12 | End: 2024-02-19

## 2024-02-12 RX ORDER — BUPRENORPHINE AND NALOXONE 2; .5 MG/1; MG/1
1 FILM, SOLUBLE BUCCAL; SUBLINGUAL 2 TIMES DAILY
Status: COMPLETED | OUTPATIENT
Start: 2024-02-13 | End: 2024-02-14

## 2024-02-12 RX ORDER — ACETAMINOPHEN 650 MG/1
650 SUPPOSITORY RECTAL EVERY 4 HOURS PRN
Status: DISCONTINUED | OUTPATIENT
Start: 2024-02-12 | End: 2024-02-21 | Stop reason: HOSPADM

## 2024-02-12 RX ORDER — BUPRENORPHINE AND NALOXONE 8; 2 MG/1; MG/1
1 FILM, SOLUBLE BUCCAL; SUBLINGUAL 2 TIMES DAILY
Status: DISCONTINUED | OUTPATIENT
Start: 2024-02-12 | End: 2024-02-12 | Stop reason: ALTCHOICE

## 2024-02-12 RX ORDER — POLYETHYLENE GLYCOL 3350 17 G
2 POWDER IN PACKET (EA) ORAL
Status: DISCONTINUED | OUTPATIENT
Start: 2024-02-12 | End: 2024-02-21 | Stop reason: HOSPADM

## 2024-02-12 RX ORDER — SODIUM CHLORIDE, SODIUM LACTATE, POTASSIUM CHLORIDE, CALCIUM CHLORIDE 600; 310; 30; 20 MG/100ML; MG/100ML; MG/100ML; MG/100ML
INJECTION, SOLUTION INTRAVENOUS CONTINUOUS
Status: ACTIVE | OUTPATIENT
Start: 2024-02-12 | End: 2024-02-13

## 2024-02-12 RX ORDER — BUPRENORPHINE AND NALOXONE 4; 1 MG/1; MG/1
1 FILM, SOLUBLE BUCCAL; SUBLINGUAL 2 TIMES DAILY
Status: COMPLETED | OUTPATIENT
Start: 2024-02-14 | End: 2024-02-15

## 2024-02-12 RX ORDER — ACETAMINOPHEN 325 MG/1
650 TABLET ORAL EVERY 4 HOURS PRN
Status: DISCONTINUED | OUTPATIENT
Start: 2024-02-12 | End: 2024-02-21 | Stop reason: HOSPADM

## 2024-02-12 RX ORDER — METOPROLOL SUCCINATE 50 MG/1
50 TABLET, EXTENDED RELEASE ORAL DAILY
Status: DISCONTINUED | OUTPATIENT
Start: 2024-02-12 | End: 2024-02-21 | Stop reason: HOSPADM

## 2024-02-12 RX ORDER — ONDANSETRON 4 MG/1
4 TABLET, ORALLY DISINTEGRATING ORAL EVERY 6 HOURS PRN
Status: DISCONTINUED | OUTPATIENT
Start: 2024-02-12 | End: 2024-02-21 | Stop reason: HOSPADM

## 2024-02-12 RX ORDER — BUPRENORPHINE AND NALOXONE 4; 1 MG/1; MG/1
1 FILM, SOLUBLE BUCCAL; SUBLINGUAL 2 TIMES DAILY
Status: DISCONTINUED | OUTPATIENT
Start: 2024-02-15 | End: 2024-02-21 | Stop reason: HOSPADM

## 2024-02-12 RX ORDER — DIAZEPAM 10 MG/2ML
5-10 INJECTION, SOLUTION INTRAMUSCULAR; INTRAVENOUS EVERY 30 MIN PRN
Status: DISCONTINUED | OUTPATIENT
Start: 2024-02-12 | End: 2024-02-15

## 2024-02-12 RX ORDER — FOLIC ACID 1 MG/1
1 TABLET ORAL DAILY
Status: DISCONTINUED | OUTPATIENT
Start: 2024-02-13 | End: 2024-02-21 | Stop reason: HOSPADM

## 2024-02-12 RX ADMIN — SODIUM CHLORIDE 1000 ML: 9 INJECTION, SOLUTION INTRAVENOUS at 12:34

## 2024-02-12 RX ADMIN — FOLIC ACID: 5 INJECTION, SOLUTION INTRAMUSCULAR; INTRAVENOUS; SUBCUTANEOUS at 11:04

## 2024-02-12 RX ADMIN — SENNOSIDES AND DOCUSATE SODIUM 1 TABLET: 8.6; 5 TABLET ORAL at 19:48

## 2024-02-12 RX ADMIN — BUPRENORPHINE HYDROCHLORIDE 450 MCG: 450 FILM, SOLUBLE BUCCAL at 21:28

## 2024-02-12 RX ADMIN — Medication 5 MG: at 21:34

## 2024-02-12 RX ADMIN — METOPROLOL SUCCINATE 50 MG: 50 TABLET, EXTENDED RELEASE ORAL at 16:20

## 2024-02-12 RX ADMIN — BUPRENORPHINE HYDROCHLORIDE 450 MCG: 450 FILM, SOLUBLE BUCCAL at 17:27

## 2024-02-12 RX ADMIN — MICONAZOLE NITRATE: 20 POWDER TOPICAL at 19:48

## 2024-02-12 RX ADMIN — CLOTRIMAZOLE: 10 CREAM TOPICAL at 14:15

## 2024-02-12 RX ADMIN — ACETAMINOPHEN 650 MG: 325 TABLET, FILM COATED ORAL at 16:20

## 2024-02-12 RX ADMIN — CALCIUM GLUCONATE 2 G: 20 INJECTION, SOLUTION INTRAVENOUS at 16:23

## 2024-02-12 RX ADMIN — SODIUM CHLORIDE, POTASSIUM CHLORIDE, SODIUM LACTATE AND CALCIUM CHLORIDE: 600; 310; 30; 20 INJECTION, SOLUTION INTRAVENOUS at 16:22

## 2024-02-12 ASSESSMENT — ACTIVITIES OF DAILY LIVING (ADL)
ADLS_ACUITY_SCORE: 40
ADLS_ACUITY_SCORE: 38
ADLS_ACUITY_SCORE: 40
ADLS_ACUITY_SCORE: 36

## 2024-02-12 NOTE — PHARMACY-ADMISSION MEDICATION HISTORY
"Pharmacist Admission Medication History    Admission medication history is complete. The information provided in this note is only as accurate as the sources available at the time of the update.    Information Source(s): Patient, Hospital records, and CareEverywhere/SureScripts via in-person    Pertinent Information:     Patient was discharged from this hospital on 02/07/24, subsequently admitted to hospital in [place] on 02/09/24 and left AMA on 02/10/24. She was given lisinopril while at [place].  She did not  her Suboxone from [facility] and instead has been taking oxycodone.  She states it's been a few days since taking \"heart med.\"   She was asked directly regarding the use of \"Perc 30s,\" fentanyl, or other \"street\" drugs, but adamantly denied and redirected.     Changes made to PTA medication list:  Added: None  Deleted: Suboxone 4-1mg  Changed: None    Allergies reviewed with patient and updates made in EHR: yes    Medication History Completed By: Obdulio Hannah [facility] 2/12/2024 3:32 PM    Prior to Admission medications    Medication Sig Last Dose Taking? Auth Provider Long Term End Date   metoprolol succinate ER (TOPROL XL) 50 MG 24 hr tablet Take 1 tablet (50 mg) by mouth daily for 30 days Past Week Yes Cal Clark MD Yes 3/8/24   miconazole (MICATIN) 2 % external powder Apply topically 2 times daily  Yes Cal Clark MD     naloxone (NARCAN) 4 MG/0.1ML nasal spray Spray 1 spray (4 mg) into one nostril alternating nostrils as needed for opioid reversal every 2-3 minutes until assistance arrives  Yes Cal Clark MD Yes    oxyCODONE (ROXICODONE) 5 MG tablet Take 1 tablet (5 mg) by mouth every 4 hours as needed for moderate pain (IF pain not managed with non-pharmacological and non-opioid interventions) Past Week Yes Cal Clark MD     polyethylene glycol (MIRALAX) 17 GM/Dose powder Take 17 g by mouth daily for 30 days  Yes Cal Clark MD  3/8/24 "   buprenorphine HCl-naloxone HCl (SUBOXONE) 8-2 MG per film Place 1 Film under the tongue 2 times daily for 10 days   Cal Clark MD  2/18/24
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
## 2024-02-13 ENCOUNTER — APPOINTMENT (OUTPATIENT)
Dept: PHYSICAL THERAPY | Facility: CLINIC | Age: 49
End: 2024-02-13
Attending: PHYSICIAN ASSISTANT
Payer: MEDICAID

## 2024-02-13 LAB
ALBUMIN SERPL BCG-MCNC: 3.6 G/DL (ref 3.5–5.2)
ALBUMIN UR-MCNC: NEGATIVE MG/DL
ALP SERPL-CCNC: 96 U/L (ref 40–150)
ALT SERPL W P-5'-P-CCNC: 16 U/L (ref 0–50)
ANION GAP SERPL CALCULATED.3IONS-SCNC: 11 MMOL/L (ref 7–15)
APPEARANCE UR: CLEAR
AST SERPL W P-5'-P-CCNC: 55 U/L (ref 0–45)
ATRIAL RATE - MUSE: 114 BPM
BILIRUB SERPL-MCNC: 0.9 MG/DL
BILIRUB UR QL STRIP: NEGATIVE
BUN SERPL-MCNC: 7.8 MG/DL (ref 6–20)
CALCIUM SERPL-MCNC: 8.5 MG/DL (ref 8.6–10)
CHLORIDE SERPL-SCNC: 103 MMOL/L (ref 98–107)
COLOR UR AUTO: ABNORMAL
CREAT SERPL-MCNC: 0.46 MG/DL (ref 0.51–0.95)
DEPRECATED HCO3 PLAS-SCNC: 25 MMOL/L (ref 22–29)
DIASTOLIC BLOOD PRESSURE - MUSE: NORMAL MMHG
EGFRCR SERPLBLD CKD-EPI 2021: >90 ML/MIN/1.73M2
ERYTHROCYTE [DISTWIDTH] IN BLOOD BY AUTOMATED COUNT: 14.6 % (ref 10–15)
GLUCOSE SERPL-MCNC: 114 MG/DL (ref 70–99)
GLUCOSE UR STRIP-MCNC: NEGATIVE MG/DL
HCT VFR BLD AUTO: 33.8 % (ref 35–47)
HGB BLD-MCNC: 11.4 G/DL (ref 11.7–15.7)
HGB UR QL STRIP: NEGATIVE
INTERPRETATION ECG - MUSE: NORMAL
KETONES UR STRIP-MCNC: NEGATIVE MG/DL
LACTATE SERPL-SCNC: 0.9 MMOL/L (ref 0.7–2)
LEUKOCYTE ESTERASE UR QL STRIP: NEGATIVE
MCH RBC QN AUTO: 30.4 PG (ref 26.5–33)
MCHC RBC AUTO-ENTMCNC: 33.7 G/DL (ref 31.5–36.5)
MCV RBC AUTO: 90 FL (ref 78–100)
NITRATE UR QL: NEGATIVE
P AXIS - MUSE: 74 DEGREES
PH UR STRIP: 6.5 [PH] (ref 5–7)
PLATELET # BLD AUTO: 185 10E3/UL (ref 150–450)
POTASSIUM SERPL-SCNC: 3.4 MMOL/L (ref 3.4–5.3)
PR INTERVAL - MUSE: 130 MS
PROT SERPL-MCNC: 5.7 G/DL (ref 6.4–8.3)
QRS DURATION - MUSE: 84 MS
QT - MUSE: 376 MS
QTC - MUSE: 518 MS
R AXIS - MUSE: 9 DEGREES
RBC # BLD AUTO: 3.75 10E6/UL (ref 3.8–5.2)
RBC URINE: 1 /HPF
SODIUM SERPL-SCNC: 139 MMOL/L (ref 135–145)
SP GR UR STRIP: 1 (ref 1–1.03)
SQUAMOUS EPITHELIAL: 5 /HPF
SYSTOLIC BLOOD PRESSURE - MUSE: NORMAL MMHG
T AXIS - MUSE: 69 DEGREES
UROBILINOGEN UR STRIP-MCNC: NORMAL MG/DL
VENTRICULAR RATE- MUSE: 114 BPM
WBC # BLD AUTO: 6.8 10E3/UL (ref 4–11)
WBC URINE: 1 /HPF

## 2024-02-13 PROCEDURE — 36415 COLL VENOUS BLD VENIPUNCTURE: CPT | Performed by: PHYSICIAN ASSISTANT

## 2024-02-13 PROCEDURE — 81001 URINALYSIS AUTO W/SCOPE: CPT | Performed by: INTERNAL MEDICINE

## 2024-02-13 PROCEDURE — 83605 ASSAY OF LACTIC ACID: CPT | Performed by: PHYSICIAN ASSISTANT

## 2024-02-13 PROCEDURE — 99232 SBSQ HOSP IP/OBS MODERATE 35: CPT | Performed by: HOSPITALIST

## 2024-02-13 PROCEDURE — G0378 HOSPITAL OBSERVATION PER HR: HCPCS

## 2024-02-13 PROCEDURE — 97530 THERAPEUTIC ACTIVITIES: CPT | Mod: GP

## 2024-02-13 PROCEDURE — 99254 IP/OBS CNSLTJ NEW/EST MOD 60: CPT

## 2024-02-13 PROCEDURE — 96361 HYDRATE IV INFUSION ADD-ON: CPT

## 2024-02-13 PROCEDURE — 85027 COMPLETE CBC AUTOMATED: CPT | Performed by: PHYSICIAN ASSISTANT

## 2024-02-13 PROCEDURE — 250N000013 HC RX MED GY IP 250 OP 250 PS 637: Performed by: INTERNAL MEDICINE

## 2024-02-13 PROCEDURE — 250N000013 HC RX MED GY IP 250 OP 250 PS 637: Performed by: PHYSICIAN ASSISTANT

## 2024-02-13 PROCEDURE — 97161 PT EVAL LOW COMPLEX 20 MIN: CPT | Mod: GP

## 2024-02-13 PROCEDURE — 80053 COMPREHEN METABOLIC PANEL: CPT | Performed by: PHYSICIAN ASSISTANT

## 2024-02-13 RX ORDER — TRAZODONE HYDROCHLORIDE 50 MG/1
50 TABLET, FILM COATED ORAL
Status: DISCONTINUED | OUTPATIENT
Start: 2024-02-13 | End: 2024-02-16

## 2024-02-13 RX ORDER — IBUPROFEN 400 MG/1
400 TABLET, FILM COATED ORAL
Status: COMPLETED | OUTPATIENT
Start: 2024-02-13 | End: 2024-02-13

## 2024-02-13 RX ADMIN — BUPRENORPHINE AND NALOXONE 1 FILM: 2; .5 FILM, SOLUBLE BUCCAL; SUBLINGUAL at 20:41

## 2024-02-13 RX ADMIN — SENNOSIDES AND DOCUSATE SODIUM 1 TABLET: 8.6; 5 TABLET ORAL at 09:12

## 2024-02-13 RX ADMIN — METOPROLOL SUCCINATE 50 MG: 50 TABLET, EXTENDED RELEASE ORAL at 09:12

## 2024-02-13 RX ADMIN — MICONAZOLE NITRATE: 20 POWDER TOPICAL at 20:41

## 2024-02-13 RX ADMIN — BUPRENORPHINE HYDROCHLORIDE 450 MCG: 450 FILM, SOLUBLE BUCCAL at 09:12

## 2024-02-13 RX ADMIN — IBUPROFEN 400 MG: 400 TABLET, FILM COATED ORAL at 04:32

## 2024-02-13 RX ADMIN — BUPRENORPHINE HYDROCHLORIDE 450 MCG: 450 FILM, SOLUBLE BUCCAL at 13:19

## 2024-02-13 RX ADMIN — FOLIC ACID 1 MG: 1 TABLET ORAL at 09:12

## 2024-02-13 RX ADMIN — NICOTINE POLACRILEX 2 MG: 2 LOZENGE ORAL at 03:07

## 2024-02-13 RX ADMIN — Medication 1 TABLET: at 09:12

## 2024-02-13 RX ADMIN — ACETAMINOPHEN 650 MG: 325 TABLET, FILM COATED ORAL at 01:54

## 2024-02-13 RX ADMIN — MICONAZOLE NITRATE: 20 POWDER TOPICAL at 09:17

## 2024-02-13 RX ADMIN — DIAZEPAM 10 MG: 5 TABLET ORAL at 20:50

## 2024-02-13 RX ADMIN — ACETAMINOPHEN 650 MG: 325 TABLET, FILM COATED ORAL at 06:01

## 2024-02-13 RX ADMIN — THIAMINE HCL TAB 100 MG 100 MG: 100 TAB at 09:12

## 2024-02-13 ASSESSMENT — ACTIVITIES OF DAILY LIVING (ADL)
ADLS_ACUITY_SCORE: 36
ADLS_ACUITY_SCORE: 36
ADLS_ACUITY_SCORE: 35
ADLS_ACUITY_SCORE: 36
ADLS_ACUITY_SCORE: 35
ADLS_ACUITY_SCORE: 35
ADLS_ACUITY_SCORE: 36
ADLS_ACUITY_SCORE: 35
ADLS_ACUITY_SCORE: 36

## 2024-02-13 NOTE — PLAN OF CARE
Physical Therapy Discharge Summary    Reason for therapy discharge:    All goals and outcomes met, no further needs identified.    Progress towards therapy goal(s). See goals on Care Plan in [facility] electronic health record for goal details.  Goals met    Therapy recommendation(s):    Patient progressing from SBA/supervision to IND during session with no overt loss of balance while ambulating in eller or negotiating stairs with handrails. Patient may benefit from OP PT to address high level balance deficits at discharge. Encouraged continued ambulation in halls while hospitalized to progress activity tolerance and maintain functional strength.
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
## 2024-02-13 NOTE — DISCHARGE SUMMARY
[facility]  Hospitalist Discharge Summary      Date of Admission:  1/23/2024  Date of Discharge:  1/25/2024  4:27 AM  Discharging Provider: Mihaela March DO  Discharge Service: Hospitalist Service    Discharge Diagnoses   OOHCA, asystole with likely respiratory arrest following overdose  Subsequent polymorphic ventricular tachycardia  History of TdP  Prolonged QT   Polysubstance Use Disorder  History of gastric bypass     Clinically Significant Risk Factors            Discharge Disposition   Transferred to [facility]  Condition at discharge: Stable    Hospital Course      48-year-old admitted yesterday following a fentanyl overdose after which she immediately became unresponsive, her significant other started CPR.  EMS arrived and patient was found to be in asystole with AED x 4 and intubated, was started on cooling protocol.       Overnight had increasing ectopy and nonsustained V. tach, then a CODE BLUE was called for sustained pulseless VT status post 1 round ACLS with ROSC.  Rhythm appeared to be TdP.  This morning had again increasing ectopy and several episodes of torsades.  Amiodarone and metoprolol were discontinued, she was given magnesium and potassium, had a brief response to lidocaine and then was subsequently put on isoproterenol with good response.  She was then taken to Cath Lab to undergo angiogram and temporary pacemaker placement if she fails isoproterenol with overdrive pacing with goal of 100 bpm.  Called the transfer center was placed to [facility] or [facility] for ICU bed with EP support.     Consultations This Hospital Stay   NEUROLOGY IP CONSULT  CARDIOLOGY IP CONSULT  CARE MANAGEMENT / SOCIAL WORK IP CONSULT  SMOKING CESSATION PROGRAM IP CONSULT  PHARMACY IP CONSULT  PHARMACY IP CONSULT    Code Status   Full Code    Time Spent on this Encounter   Mihaela WADDELL DO, personally saw the patient today and spent greater than 30 minutes discharging this patient.       Mihaela  DO Joaquim  [facility] ICU  [address] [place] [address]  Phone: [phone]  Fax: [phone]  ______________________________________________________________________    Physical Exam   Vital Signs:                    Weight: 141 lbs 8.57 oz         Primary Care Physician   Physician No Ref-Primary    Discharge Orders   No discharge procedures on file.    Significant Results and Procedures   Results for orders placed or performed during the hospital encounter of 01/23/24   XR Chest Port 1 View    Narrative    CHEST ONE VIEW  1/23/2024 12:13 PM     HISTORY: Check ETT.    COMPARISON: September 11, 2021.      Impression    IMPRESSION: Endotracheal tube tip 4.2 cm from the nicol. Extensive  right upper lobe consolidation. Remaining lungs clear.    VERONICA ORTIZ MD         SYSTEM ID:  [id number]   CT Head w/o Contrast    Narrative    EXAM: CT HEAD W/O CONTRAST  1/23/2024 12:40 PM     HISTORY:  post arrest       COMPARISON:  No prior similar studies    TECHNIQUE: Using multidetector thin collimation helical acquisition  technique, axial, coronal and sagittal CT images from the skull base  to the vertex were obtained without intravenous contrast.   (topogram) image(s) also obtained and reviewed.    FINDINGS:  No intracranial hemorrhage, mass effect, or midline shift. No acute  loss of gray-white matter differentiation in the cerebral hemispheres.  Ventricles are proportionate to the cerebral sulci. Clear basal  cisterns.    The bony calvaria and the bones of the skull base are normal.  Scattered paranasal sinus mucosal thickening. Minimal bilateral  mastoid effusions. Grossly normal orbits.       Impression    IMPRESSION: No acute intracranial pathology.     JOSHUA NEWMAN MD         SYSTEM ID:  [id number]   XR Abdomen Port 1 View    Narrative    EXAM: XR ABDOMEN PORT 1 VIEW  LOCATION: [facility]  DATE: 1/24/2024    INDICATION: verify OG tube  COMPARISON: CT  2021      Impression    IMPRESSION: Enteric tube extending below level of the EG junction with its sidehole in the mid gastric body. Right-sided central venous catheter extending to the IVC with its tip in the mid right atrium. Postoperative changes to the bowel in the left mid   abdomen.   XR Chest Port 1 View    Narrative    EXAM: XR CHEST PORT 1 VIEW  LOCATION: [facility]  DATE: 2024    INDICATION: VFib arrest  COMPARISON: 2024.      Impression    IMPRESSION: Endotracheal tube in satisfactory position terminating 4 cm above the nicol. Nasogastric tube tip in the stomach. Opacification of the lower left lung consistent with left lower lobe atelectasis and possible small left pleural effusion.   Right lung clear. Normal heart size and pulmonary vascularity.   XR Chest Port 1 View    Narrative    EXAM: XR CHEST PORTABLE 1 VIEW  LOCATION: [facility]  DATE: 2024    INDICATION: Line placement.  COMPARISON: Earlier today.      Impression    IMPRESSION: Numerous EKG wires and leads and midline defibrillator pad projecting over the chest obscure details. Endotracheal tube tip approximately 3.5 cm from the nicol. Tubing projecting over the inferior right atrium or ventricle may represent an   inferior central line with tip in the right atrium and right ventricle. Clinical correlation recommended. Enteric tube enters stomach. Hazy opacification left mid and lower lung may represent a combination of left pleural effusion and atelectasis or   infiltrate. Osseous structures grossly intact.     Echo Limited     Value    LVEF  45-50%    Narrative    [id number]  [id number]  [id number]  [id number]^BALDOMERO^[facility]  Echocardiography Laboratory  [address] [place] [address]     Name: CHAU ABAD  MRN: [id number]  : 1975  Study Date: 2024 03:45 PM  Age: 48 yrs  Gender: Female  Patient Location: [facility]  Reason  "For Study: Cardiac Arrest  Ordering Physician: GENARO ALEXANDRE  Performed By: Ly Spencer     BSA: 1.7 m2  Height: 64 in  Weight: 140 lb  BP: 172/100 mmHg  ______________________________________________________________________________  Procedure  Limited Portable Echo Adult.  ______________________________________________________________________________  Interpretation Summary     This is a stat limited echo on a patient post arrest intubated and supine.  Limited views obtained  The visual ejection fraction is 45-50%.  The RV is not well seen. The TAPSE value for RV function is normal but  visually on limited images the RV may be hypokinetic  The LV is mildly hypokinetic. There is \"septal shudder\" which can be seen with  intraventricular conduction delay, and constrictive pericardial physiology.  However patient is on a ventillator so that affects differential diagnosis.  Hepatic vein doppler which is also used to assess for constriction was not  performed and likely would not be useful on a ventillated patient.  Also the mid septum is mildly hypokinetic and does not follow a classic  coronary distribution. Rarely this can be seen with an LAD \"myocardial bridge\"  or septal disease.  Report called to ICU  ______________________________________________________________________________  Left Ventricle  The left ventricle is normal in size. There is normal left ventricular wall  thickness. The visual ejection fraction is 45-50%. The LV is mildly  hypokinetic. There is \"septal shudder\" which can be seen with intraventricular  conduction delay, and constrictive pericardial physiology. However patient is  on a ventillator so that affects differential diagnosis. Hepatic vein doppler  which is also used to assess for constriction was not performed and likely  would not be useful on a ventillated patient.  Also the mid septum is mildly hypokinetic and does not follow a classic  coronary distribution. Rarely this can be seen with " "an LAD \"myocardial bridge\"  or septal disease.     Right Ventricle  The RV is not well seen. The TAPSE value for RV function is normal but  visually on limited images the RV may be hypokinetic. The right ventricle is  not well visualized.     Atria  Normal left atrial size. Right atrial size is normal.     Mitral Valve  There is trace mitral regurgitation.     Tricuspid Valve  There is trace to mild tricuspid regurgitation. Doppler findings do not  suggest pulmonary hypertension.     Aortic Valve  The aortic valve is trileaflet.     Pulmonic Valve  The pulmonic valve is not well seen, but is grossly normal.     Vessels  The aortic root is normal size. Unable to assess mean RA pressure given the  patient is on a ventilator.     Pericardium  The pericardium appears normal.     Rhythm  Sinus rhythm was noted.  ______________________________________________________________________________  MMode/2D Measurements & Calculations  IVSd: 0.87 cm  LVIDd: 4.2 cm  LVIDs: 3.2 cm  LVPWd: 0.68 cm  FS: 24.2 %  LV mass(C)d: 98.3 grams  LV mass(C)dI: 58.5 grams/m2  asc Aorta Diam: 3.0 cm  Asc Ao diam index BSA (cm/m2): 1.8  Asc Ao diam index Ht(cm/m): 1.8  RWT: 0.32  TAPSE: 1.8 cm     Doppler Measurements & Calculations  TR max konrad: 226.2 cm/sec  TR max P.5 mmHg     ______________________________________________________________________________  Report approved by: Deena Cobb 2024 04:25 PM         Cardiac Catheterization    Narrative    Normal coronary arteries   Temporary transvenous pacing wire insertion         Discharge Medications   Discharge Medication List as of 2024  3:42 AM        CONTINUE these medications which have NOT CHANGED    Details   cloNIDine (CATAPRES) 0.1 MG tablet Take 1 tablet (0.1 mg) by mouth every 8 hours as needed (anxiety), Disp-20 tablet, R-0, E-Prescribe      gabapentin (NEURONTIN) 100 MG capsule Take 1 capsule (100 mg) by mouth every 8 hours, Disp-45 capsule, R-0, E-Prescribe    "   lisinopril (ZESTRIL) 20 MG tablet Take 1 tablet (20 mg) by mouth daily, Disp-90 tablet,R-1, E-Prescribe      metoprolol succinate ER (TOPROL-XL) 50 MG 24 hr tablet Take 50 mg by mouth daily, Historical           Allergies   Allergies   Allergen Reactions    Zofran [Ondansetron] Other (See Comments)     QTc Prolongation

## 2024-02-13 NOTE — PROGRESS NOTES
PRIMARY DIAGNOSIS: FTT, CD WITHDRAWL     OUTPATIENT/OBSERVATION GOALS TO BE MET BEFORE DISCHARGE  1. Orthostatic performed: No     2. Tolerating PO medications: Yes     3. Return to near baseline physical activity: Yes     4. Cleared for discharge by consultants (if involved): No        Discharge Planner Nurse   Safe discharge environment identified: No  Barriers to discharge: Yes    Pt is A/O x4. VSS, except hypertensive. CIWA 2, COWS 2. Pt appears more calm after the [place] and has not mentioned going home.Using the bathroom frequently to urinate.    Psych and CD consults placed, see notes for details. SW following.     Please review provider order for any additional goals.   Nurse to notify provider when observation goals have been met and patient is ready for discharge.

## 2024-02-13 NOTE — PROGRESS NOTES
PRIMARY DIAGNOSIS: FTT, CD WITHDRAWL     OUTPATIENT/OBSERVATION GOALS TO BE MET BEFORE DISCHARGE  1. Orthostatic performed: No     2. Tolerating PO medications: Yes     3. Return to near baseline physical activity: Yes     4. Cleared for discharge by consultants (if involved): No        Discharge Planner Nurse   Safe discharge environment identified: No  Barriers to discharge: Yes     Pt is A/O x4. VSS, except hypertensive. CIWA 0, COWS 1. Pt appears more calm after the [place] and has not mentioned going home.Using the bathroom frequently to urinate.    Psych and CD consults placed, see notes for details. SW following.      Please review provider order for any additional goals.   Nurse to notify provider when observation goals have been met and patient is ready for discharge.

## 2024-02-13 NOTE — CONSULTS
"Care Management Note    Length of Stay (days): 0    Expected Discharge Date: 02/13/2024     Concerns to be Addressed: discharge planning      Patient plan of care discussed at interdisciplinary rounds: Yes    Anticipated Discharge Disposition: possible CD residential treatment      Anticipated Discharge Services:  CD  Anticipated Discharge DME:      Referrals Placed by CM/SW: awaiting CD assessment   Private pay costs discussed: Not applicable    Additional Information:  Pt admitted with failure to thrive, positive for fentanyl and ETOH of .27 on admission. Noted to have unplanned readmission risk of 28%. PT evaluated pt and recommending OP PT.    Updated by psych that pt is interested in residential CD treatment at discharge. CD consult pending.     Pt's face sheet currently lists no insurance. SW sent in-basket message to financial counseling asking for update as appears her last admission they faxed a renewal to [facility] on 2/6.     ADDENDUM @ 1330:    Received response from , \"I called [facility] today-said they will send the renewal on to a superviser and request priority processing but she could not give a timeline of how long it will take to be processed. \"      Social work will continue to follow and assist with discharge planning as needed.    BRYSON Pitt, hospitals  Care Coordination  [phone]    BRYSON Johnson      "

## 2024-02-13 NOTE — PLAN OF CARE
"PRIMARY DIAGNOSIS: FTT R/T WITHDRAWL  OUTPATIENT/OBSERVATION GOALS TO BE MET BEFORE DISCHARGE:  ADLs back to baseline: No    Activity and level of assistance: Up with standby assistance.    Pain status: Improved with use of alternative comfort measures i.e.: heat    Return to near baseline physical activity: No     Discharge Planner Nurse   Safe discharge environment identified: No  Barriers to discharge: Yes       Entered by: Ethan Dotson RN 02/12/2024   BP (!) 176/91 (BP Location: Right arm)   Pulse 111   Temp 99.1  F (37.3  C) (Oral)   Resp 16   Ht 1.676 m (5' 6\")   Wt 61.2 kg (134 lb 14.4 oz)   LMP 06/07/2015   SpO2 97%   BMI 21.77 kg/m    HTN and Tachy w/ mild temp on RA. A&Ox4 and able to make needs known. Ambulating SBA, fall bundle in place. Tolerating regular diet. LR infusing @ 75mL/hr. Reporting 8/10 mid sternal pain and 2/10 left ear ache, heat applied to chest. CIWA 3 and 4,and COWS 4 and 6. Plan to continue IVF, monitor and treat withdrawal symptoms, as well as consult PT/SW, Pain services, and Nutrition in the AM. Pt is agreeable to POC and resting peacefully, fall bundle in place and call light w/in reach. Will continue to provide supportive cares.  Please review provider order for any additional goals.   Nurse to notify provider when observation goals have been met and patient is ready for discharge.  "
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
## 2024-02-13 NOTE — PROGRESS NOTES
ROOM # 224     Living Situation (if not independent, order SW consult): Home w. [facility]  Facility name:  : [facility]    Activity level at baseline: Ind   Activity level on admit: SBA/Ax1    Who will be transporting you at discharge: Family    Patient registered to observation; given Patient Bill of Rights; given the opportunity to ask questions about observation status and their plan of care.  Patient has been oriented to the observation room, bathroom and call light is in place.    Discussed discharge goals and expectations with patient/family.

## 2024-02-13 NOTE — PLAN OF CARE
PRIMARY DIAGNOSIS: FTT, chemical dependency withdrawal    OUTPATIENT/OBSERVATION GOALS TO BE MET BEFORE DISCHARGE  1. Orthostatic performed: No    2. Tolerating PO medications: Yes    3. Return to near baseline physical activity: Yes    4. Cleared for discharge by consultants (if involved): No    Discharge Planner Nurse   Safe discharge environment identified: No  Barriers to discharge: Yes       Entered by: Maricruz Castellano RN 02/13/2024 5:50 AM   Pt A&O, up to BR frequently, SBA. Plan: continue IVF, monitor and treat withdrawal symptoms, as well as consult PT/SW, Pain services, and Nutrition in the AM.    Please review provider order for any additional goals.   Nurse to notify provider when observation goals have been met and patient is ready for discharge.

## 2024-02-13 NOTE — DISCHARGE INSTRUCTIONS
Resources:    Free Counseling Services  COUNSELING SERVICES ARE COMPLETELY FREE AND ANONYMOUS. [facility] is a non-profit, non-[employer]iscopal organization, All of our professional counselors volunteer their time. Counseling is for individuals, couples, or families. No appointment (during clinics) or insurance is needed.    CLINIC HOURS: Please come/call/login only during clinic hours.  M, W, F:  1:00p - 3:00p for both in-person and virtual (phone and login) services    M, T, W, Th: 5:30p - 7:30p virtual services only    IN PERSON SERVICES: Come to [address] [place] [address] on Monday, Wednesday or Friday from 1 - 3 pm.    BY PHONE: Call Zoom at [phone]. When prompted, enter the meeting ID: [phone].    BY COMPUTER: Go to [email or web address]    ONE TAP MOBILE ON [employer]: To attend a clinic using the smart cell phone  one-tap mobile  button, click on this link and press the dial button on your phone:  [phone]  When Zoom answers, it may ask for a meeting id (you won t have one), so just wait until it asks you to simply press the # (number) button.    If the phone line is busy, try the phone numbers below. Try each phone number. Or, use landline phone-in instructions below.  [phone]  [phone]  [phone]  [phone]  [phone]    Services for British Speakers  Services for British speakers remain the same.  The client can simply call our main number ([phone]), dial extension 2 and leave a message with the call-back phone number.    Counseling by Appointment  If you are interested in ongoing counseling, the first step is to see a counselor at a clinic. Ongoing counseling is arranged by request during the clinic.    Free Counseling Services  Counseling services are completely free and anonymous, with no appointment needed. SOME CLINICS ARE NOW IN PERSON! All of our professional counselors volunteer their time.    [facility]  " [phone] or [phone]  [email or web address]  [address] [facility] [facility] [address], [address]  [facility] [address]  Call [phone]  Text \"Support\" to [phone]    CRISIS:  Text or Call [phone]   "

## 2024-02-13 NOTE — PROGRESS NOTES
"PRIMARY DIAGNOSIS: FTT, CD WITHDRAWL    OUTPATIENT/OBSERVATION GOALS TO BE MET BEFORE DISCHARGE  1. Orthostatic performed: No    2. Tolerating PO medications: Yes    3. Return to near baseline physical activity: Yes    4. Cleared for discharge by consultants (if involved): No    Discharge Planner Nurse   Safe discharge environment identified: No  Barriers to discharge: Yes       Entered by: Raquel Winters RN 02/13/2024 12:25 PM  Pt is A/O x4, anxious about being in hospital stating that she has to ho home and \"has a lot of things to get done.\" VSS, except hypertensive. CIWA 0, COWS 2. Up to BR frequently to void. Second UA set to lab.   Psych and CD consults placed. SW following.   Please review provider order for any additional goals.   Nurse to notify provider when observation goals have been met and patient is ready for discharge.  "

## 2024-02-13 NOTE — PROVIDER NOTIFICATION
Paged cross cover:  [id number] pt is c/o midsternal chest pain (not a new complaint), tylenol does not help, can she have something stronger? thanks, Maricruz SCOTT *[phone]  Paged cross cover  Pt is c/o urinary frequency, foul smelling urine, can we send UA?
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
## 2024-02-13 NOTE — PLAN OF CARE
PRIMARY DIAGNOSIS: FTT, chemical dependency withdrawal    OUTPATIENT/OBSERVATION GOALS TO BE MET BEFORE DISCHARGE  1. Orthostatic performed: No    2. Tolerating PO medications: Yes    3. Return to near baseline physical activity: Yes    4. Cleared for discharge by consultants (if involved): No    Discharge Planner Nurse   Safe discharge environment identified: No  Barriers to discharge: Yes       Entered by: Maricruz Castellano RN 02/13/2024 5:52 AM   Pt A&O, up to BR frequently, SBA, c/o foul smelling urine. C/o mid sternal pain, tylenol & ibuprofen given. Heating pad in use. CIWA & COWS assessments done, see flow sheet. Plan: encourage po intake, monitor and treat withdrawal symptoms (pt has not needed medication for this tonight), consult PT/SW, Pain services, and Nutrition in the AM.  will cont w/ POC.   Please review provider order for any additional goals.   Nurse to notify provider when observation goals have been met and patient is ready for discharge.

## 2024-02-13 NOTE — PLAN OF CARE
"PRIMARY DIAGNOSIS: FTT R/T WITHDRAWL  OUTPATIENT/OBSERVATION GOALS TO BE MET BEFORE DISCHARGE:  ADLs back to baseline: No    Activity and level of assistance: Up with standby assistance.    Pain status: Improved with use of alternative comfort measures i.e.: heat    Return to near baseline physical activity: No     Discharge Planner Nurse   Safe discharge environment identified: No  Barriers to discharge: Yes       Entered by: Ethan Dotson RN 02/12/2024   BP (!) 172/104   Pulse 106   Temp 99.8  F (37.7  C) (Oral)   Resp 16   Ht 1.676 m (5' 6\")   Wt 61.2 kg (134 lb 14.4 oz)   LMP 06/07/2015   SpO2 97%   BMI 21.77 kg/m    Please review provider order for any additional goals.   Nurse to notify provider when observation goals have been met and patient is ready for discharge.  "

## 2024-02-14 PROCEDURE — 250N000013 HC RX MED GY IP 250 OP 250 PS 637: Performed by: PHYSICIAN ASSISTANT

## 2024-02-14 PROCEDURE — 250N000013 HC RX MED GY IP 250 OP 250 PS 637

## 2024-02-14 PROCEDURE — 99232 SBSQ HOSP IP/OBS MODERATE 35: CPT | Performed by: HOSPITALIST

## 2024-02-14 PROCEDURE — G0378 HOSPITAL OBSERVATION PER HR: HCPCS

## 2024-02-14 RX ADMIN — THIAMINE HCL TAB 100 MG 100 MG: 100 TAB at 08:06

## 2024-02-14 RX ADMIN — MICONAZOLE NITRATE: 20 POWDER TOPICAL at 20:10

## 2024-02-14 RX ADMIN — SENNOSIDES AND DOCUSATE SODIUM 2 TABLET: 8.6; 5 TABLET ORAL at 08:07

## 2024-02-14 RX ADMIN — TRAZODONE HYDROCHLORIDE 50 MG: 50 TABLET ORAL at 00:23

## 2024-02-14 RX ADMIN — DIAZEPAM 10 MG: 5 TABLET ORAL at 12:54

## 2024-02-14 RX ADMIN — BUPRENORPHINE AND NALOXONE 1 FILM: 4; 1 FILM, SOLUBLE BUCCAL; SUBLINGUAL at 20:07

## 2024-02-14 RX ADMIN — DIAZEPAM 10 MG: 5 TABLET ORAL at 15:46

## 2024-02-14 RX ADMIN — FOLIC ACID 1 MG: 1 TABLET ORAL at 08:06

## 2024-02-14 RX ADMIN — MICONAZOLE NITRATE: 20 POWDER TOPICAL at 11:00

## 2024-02-14 RX ADMIN — Medication 1 TABLET: at 08:06

## 2024-02-14 RX ADMIN — SENNOSIDES AND DOCUSATE SODIUM 2 TABLET: 8.6; 5 TABLET ORAL at 20:07

## 2024-02-14 RX ADMIN — METOPROLOL SUCCINATE 50 MG: 50 TABLET, EXTENDED RELEASE ORAL at 08:07

## 2024-02-14 RX ADMIN — ACETAMINOPHEN 650 MG: 325 TABLET, FILM COATED ORAL at 05:42

## 2024-02-14 RX ADMIN — BUPRENORPHINE AND NALOXONE 1 FILM: 2; .5 FILM, SOLUBLE BUCCAL; SUBLINGUAL at 08:07

## 2024-02-14 RX ADMIN — ACETAMINOPHEN 650 MG: 325 TABLET, FILM COATED ORAL at 00:23

## 2024-02-14 RX ADMIN — DIAZEPAM 10 MG: 5 TABLET ORAL at 08:29

## 2024-02-14 ASSESSMENT — ACTIVITIES OF DAILY LIVING (ADL)
ADLS_ACUITY_SCORE: 35

## 2024-02-14 NOTE — PLAN OF CARE
Patient is in deep sleep, reported difficult falling in sleep and requested not wake her up. Will do attempt to assess when patient is awake.

## 2024-02-14 NOTE — PLAN OF CARE
PRIMARY DIAGNOSIS: GENERALIZED WEAKNESS    OUTPATIENT/OBSERVATION GOALS TO BE MET BEFORE DISCHARGE  1. Orthostatic performed: N/A    2. Tolerating PO medications: Yes    3. Return to near baseline physical activity: Yes    4. Cleared for discharge by consultants (if involved): No    Discharge Planner Nurse   Safe discharge environment identified: Yes  Barriers to discharge: Yes       Entered by: Diana Barahona RN 02/14/2024      Patient is Alert and Oriented x4. SBA with Gait Belt. Pt is a Regular diet. complaining of 4/10 Mid sternal pain.Tylenol given was effective.  Patient is Saline locked.CIWA 0.  Please review provider order for any additional goals.   Nurse to notify provider when observation goals have been met and patient is ready for discharge.Goal Outcome Evaluation:
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
## 2024-02-14 NOTE — PROGRESS NOTES
[facility]    Medicine Progress Note - Hospitalist Service    Date of Admission:  2/12/2024    Assessment & Plan   Danielle Tadeo is a 48 year old woman with history of substance abuse (fentanyl), alcohol abuse, HTN, history of v-fib, recent cardiac arrest due to overdose (Jan). Admitted on 2/12/2024 with failure to thrive (found by EMS in chair stooling and urinating herself), continued substance abuse.    Failure to thrive  Substance abuse    - started on suboxone (was recently recommended)    - significant other is admitted at [facility]    - seen by Psych and Chem dep:Rec residential treatment    - spoke with SW: working on determining when she will have insurance to cover this    Alcohol withdrawal    - on CIWA    - needed valium this am    HTN    - cont metoprolol    - may need to start second med    Hypocalcemia    - replaced     Observation Goals: -diagnostic tests and consults completed and resulted, -vital signs normal or at patient baseline, -tolerating oral intake to maintain hydration, -safe disposition plan has been identified, Nurse to notify provider when observation goals have been met and patient is ready for discharge.  Diet: Regular Diet Adult    DVT Prophylaxis: Low Risk/Ambulatory with no VTE prophylaxis indicated. Walking the halls  Gaytan Catheter: Not present  Lines: None     Cardiac Monitoring: None  Code Status: Full Code      Clinically Significant Risk Factors Present on Admission          # Hypocalcemia: Lowest iCa = 4.1 mg/dL in last 2 days, will monitor and replace as appropriate         # Hypertension: Noted on problem list            # Financial/Environmental Concerns:           Disposition Plan           Luis E Burciaga MD  Hospitalist Service  [facility]  Securely message with [employer] (more info)  Text page via [employer] Paging/Directory   ______________________________________________________________________    Interval History    Patient sitting on side of bed. Eating. No complaints today. Doing well.     Physical Exam   Vital Signs: Temp: 98.1  F (36.7  C) Temp src: Oral BP: (!) 154/95 Pulse: 91   Resp: 16 SpO2: 98 % O2 Device: None (Room air)    Weight: 128 lbs 12.8 oz    Constitutional: awake, alert, cooperative, no apparent distress, and appears stated age  Eyes: Lids and lashes normal, pupils equal, round and reactive to light, extra ocular muscles intact, sclera clear, conjunctiva normal  ENT: Normocephalic, without obvious abnormality, atraumatic, sinuses nontender on palpation, external ears without lesions, oral pharynx with moist mucous membranes, tonsils without erythema or exudates, gums normal and good dentition.    Medical Decision Making       30 MINUTES SPENT BY ME on the date of service doing chart review, history, exam, documentation & further activities per the note.      Data         Imaging results reviewed over the past 24 hrs:   No results found for this or any previous visit (from the past 24 hour(s)).

## 2024-02-14 NOTE — PLAN OF CARE
"PRIMARY DIAGNOSIS: FAILURE TO THRIVE  OUTPATIENT/OBSERVATION GOALS TO BE MET BEFORE DISCHARGE:  ADLs back to baseline: Yes    Activity and level of assistance: Ambulating independently.    Pain status: Improved-controlled with oral pain medications.    Return to near baseline physical activity: Yes     Discharge Planner Nurse   Safe discharge environment identified: Yes  Barriers to discharge: Yes       Entered by: Helen Way RN 02/14/2024 4:26 PM   Pt. A&O, independent for mobility, has lot of pain in all over, CIWA score 11, PRN Valium  was given, ate 50% of her meal, voided adequately, has PIV on left arm, CMS intact.  BP (!) 157/94 (BP Location: Right arm)   Pulse 98   Temp 98.7  F (37.1  C) (Oral)   Resp 16   Ht 1.676 m (5' 6\")   Wt 58.4 kg (128 lb 12.8 oz)   LMP 06/07/2015   SpO2 95%   BMI 20.79 kg/m     Please review provider order for any additional goals.   Nurse to notify provider when observation goals have been met and patient is ready for discharge.Goal Outcome Evaluation:                        "

## 2024-02-14 NOTE — PLAN OF CARE
"PRIMARY DIAGNOSIS: FTT  OUTPATIENT/OBSERVATION GOALS TO BE MET BEFORE DISCHARGE:  ADLs back to baseline: Yes    Activity and level of assistance: Ambulating independently.    Pain status: Improved with use of alternative comfort measures i.e.: heat    Return to near baseline physical activity: Yes     Discharge Planner Nurse   Safe discharge environment identified: No  Barriers to discharge: Yes       Entered by: Ethan Dotson RN 02/13/2024   BP (!) 147/90 (BP Location: Left arm)   Pulse 94   Temp 98.5  F (36.9  C) (Oral)   Resp 12   Ht 1.676 m (5' 6\")   Wt 59.2 kg (130 lb 9.6 oz)   LMP 06/07/2015   SpO2 98%   BMI 21.08 kg/m    HTN on RA. A&Ox4 and able to make needs known. Ambulating independently. Tolerating regular diet. PIV SL. Reporting 6/10 mid sternal pain, declined intervention. CIWA 8 covered w/ 10mg PO Valium, recheck CIWA 2 w/ no coverage needed. COWS of 6. Plan to continue monitoring and treating withdrawal symptoms while consulting chem dep and psych. Pt is resting peacefully, call light in reach. Will continue to provide supportive cares.  Please review provider order for any additional goals.   Nurse to notify provider when observation goals have been met and patient is ready for discharge.  "

## 2024-02-14 NOTE — PLAN OF CARE
"Dx: Failure to thrive     BP (!) 154/95 (BP Location: Left arm)   Pulse 91   Temp 98.1  F (36.7  C) (Oral)   Resp 16   Ht 1.676 m (5' 6\")   Wt 58.4 kg (128 lb 12.8 oz)   LMP 06/07/2015   SpO2 98%   BMI 20.79 kg/m       A&O. Independent- patient refuse bed alarm, education given. Regular diet tolerated. Reporting 8/10 generalized pain (see MAR). CIWA 13- reduced to 4 after valium, COWS 7- reduced to 3 after valium. Bed alarm off and call light within reach.   "

## 2024-02-14 NOTE — PLAN OF CARE
PRIMARY DIAGNOSIS: GENERALIZED WEAKNESS    OUTPATIENT/OBSERVATION GOALS TO BE MET BEFORE DISCHARGE  1. Orthostatic performed: N/A    2. Tolerating PO medications: Yes    3. Return to near baseline physical activity: Yes    4. Cleared for discharge by consultants (if involved): No    Discharge Planner Nurse   Safe discharge environment identified: Yes  Barriers to discharge: Yes       Entered by: Daina Barahona RN 02/14/2024      Patient is Alert and Oriented x4. SBA with Gait Belt. Pt is a Regular diet. complaining of 4/10 Mid sternal pain.Tylenol given was effective. Patient is Saline locked.CIWA 0.  Please review provider order for any additional goals.   Nurse to notify provider when observation goals have been met and patient is ready for discharge.Goal Outcome Evaluation:

## 2024-02-14 NOTE — PLAN OF CARE
"Dx: Failure to thrive    BP (!) 148/81 (BP Location: Left arm)   Pulse 89   Temp 99.1  F (37.3  C) (Oral)   Resp 16   Ht 1.676 m (5' 6\")   Wt 58.4 kg (128 lb 12.8 oz)   LMP 06/07/2015   SpO2 98%   BMI 20.79 kg/m       A&O. Independent- patient refused bed alarm, education given. Regular diet tolerated. Reporting 8/10 pain (see MAR). CIWA 9- reduced to 5 after valium, COWS 3- reduced to 2 after valium. Bed alarm off and call light within reach.   "

## 2024-02-15 PROCEDURE — 250N000013 HC RX MED GY IP 250 OP 250 PS 637: Performed by: PHYSICIAN ASSISTANT

## 2024-02-15 PROCEDURE — 96375 TX/PRO/DX INJ NEW DRUG ADDON: CPT

## 2024-02-15 PROCEDURE — 250N000011 HC RX IP 250 OP 636: Performed by: PHYSICIAN ASSISTANT

## 2024-02-15 PROCEDURE — 250N000013 HC RX MED GY IP 250 OP 250 PS 637: Performed by: HOSPITALIST

## 2024-02-15 PROCEDURE — G0378 HOSPITAL OBSERVATION PER HR: HCPCS

## 2024-02-15 PROCEDURE — 99232 SBSQ HOSP IP/OBS MODERATE 35: CPT | Performed by: HOSPITALIST

## 2024-02-15 PROCEDURE — 250N000013 HC RX MED GY IP 250 OP 250 PS 637

## 2024-02-15 RX ORDER — NALOXONE HYDROCHLORIDE 0.4 MG/ML
0.4 INJECTION, SOLUTION INTRAMUSCULAR; INTRAVENOUS; SUBCUTANEOUS
Status: DISCONTINUED | OUTPATIENT
Start: 2024-02-15 | End: 2024-02-21 | Stop reason: HOSPADM

## 2024-02-15 RX ORDER — NALOXONE HYDROCHLORIDE 0.4 MG/ML
0.2 INJECTION, SOLUTION INTRAMUSCULAR; INTRAVENOUS; SUBCUTANEOUS
Status: DISCONTINUED | OUTPATIENT
Start: 2024-02-15 | End: 2024-02-21 | Stop reason: HOSPADM

## 2024-02-15 RX ORDER — IBUPROFEN 600 MG/1
600 TABLET, FILM COATED ORAL EVERY 8 HOURS PRN
Status: DISCONTINUED | OUTPATIENT
Start: 2024-02-15 | End: 2024-02-21 | Stop reason: HOSPADM

## 2024-02-15 RX ORDER — ZOLPIDEM TARTRATE 5 MG/1
5 TABLET ORAL
Status: DISCONTINUED | OUTPATIENT
Start: 2024-02-15 | End: 2024-02-21 | Stop reason: HOSPADM

## 2024-02-15 RX ORDER — HYDROXYZINE HYDROCHLORIDE 25 MG/1
25 TABLET, FILM COATED ORAL EVERY 6 HOURS PRN
Status: DISCONTINUED | OUTPATIENT
Start: 2024-02-15 | End: 2024-02-21 | Stop reason: HOSPADM

## 2024-02-15 RX ORDER — OXYCODONE HYDROCHLORIDE 5 MG/1
5 TABLET ORAL EVERY 4 HOURS PRN
Status: DISCONTINUED | OUTPATIENT
Start: 2024-02-15 | End: 2024-02-21 | Stop reason: HOSPADM

## 2024-02-15 RX ADMIN — BUPRENORPHINE AND NALOXONE 1 FILM: 4; 1 FILM, SOLUBLE BUCCAL; SUBLINGUAL at 21:01

## 2024-02-15 RX ADMIN — FOLIC ACID 1 MG: 1 TABLET ORAL at 08:47

## 2024-02-15 RX ADMIN — DIAZEPAM 10 MG: 10 INJECTION, SOLUTION INTRAMUSCULAR; INTRAVENOUS at 04:14

## 2024-02-15 RX ADMIN — HYDROXYZINE HYDROCHLORIDE 25 MG: 25 TABLET, FILM COATED ORAL at 15:50

## 2024-02-15 RX ADMIN — SENNOSIDES AND DOCUSATE SODIUM 2 TABLET: 8.6; 5 TABLET ORAL at 08:47

## 2024-02-15 RX ADMIN — THIAMINE HCL TAB 100 MG 100 MG: 100 TAB at 08:47

## 2024-02-15 RX ADMIN — TRAZODONE HYDROCHLORIDE 50 MG: 50 TABLET ORAL at 00:27

## 2024-02-15 RX ADMIN — METOPROLOL SUCCINATE 50 MG: 50 TABLET, EXTENDED RELEASE ORAL at 08:47

## 2024-02-15 RX ADMIN — BUPRENORPHINE AND NALOXONE 1 FILM: 4; 1 FILM, SOLUBLE BUCCAL; SUBLINGUAL at 08:47

## 2024-02-15 RX ADMIN — ACETAMINOPHEN 650 MG: 325 TABLET, FILM COATED ORAL at 01:52

## 2024-02-15 RX ADMIN — MICONAZOLE NITRATE: 20 POWDER TOPICAL at 21:08

## 2024-02-15 RX ADMIN — DICLOFENAC SODIUM 2 G: 10 GEL TOPICAL at 17:34

## 2024-02-15 RX ADMIN — Medication 1 TABLET: at 08:47

## 2024-02-15 RX ADMIN — OXYCODONE HYDROCHLORIDE 5 MG: 5 TABLET ORAL at 17:06

## 2024-02-15 ASSESSMENT — ACTIVITIES OF DAILY LIVING (ADL)
ADLS_ACUITY_SCORE: 35

## 2024-02-15 NOTE — PROGRESS NOTES
Care Management Follow Up    Expected Discharge Date: 02/19/2024     Concerns to be Addressed: Discharge planning      Patient plan of care discussed at interdisciplinary rounds: Yes    Anticipated Discharge Disposition: IP CD Tx     Anticipated Discharge Services:  CD Treatment    Patient/Family in Agreement with the Plan: Yes     Referrals Placed by CM/SW: IP CD Tx  Private pay costs discussed: Not applicable    Additional Information:  SW reviewed FC notes and followed up with FC assigned to patient. They followed up and reported that pt is now showing that she has active [place] insurance. They have updated the chart.     SW sent follow up message to CD evaluator that assessed patient 2/13 and asked that they send IP CD Tx referrals now that patient has insurance. Awaiting response.     SW will continue to follow.     1400: Spoke with CD . She is going to follow up with the patient virtually about sending referrals for IP CD. She will email SW EMILIA's to have patient complete    1440: Patient signed EMILIA's for [facility]. Emailed to CD , originals placed in chart    1535: Received email from [place] asking that updated notes be faxed to (f) [phone]. MAR and updated notes faxed.     MARISELA Silveira, [facility]   Inpatient Care Coordination  [facility]   [phone]

## 2024-02-15 NOTE — PLAN OF CARE
"PRIMARY DIAGNOSIS: FTT  OUTPATIENT/OBSERVATION GOALS TO BE MET BEFORE DISCHARGE:  ADLs back to baseline: Yes    Activity and level of assistance: Ambulating independently.    Pain status: Improved-controlled with oral pain medications.    Return to near baseline physical activity: Yes     Discharge Planner Nurse   Safe discharge environment identified: No  Barriers to discharge: Yes       Entered by: Ethan Dotson RN 02/15/2024 4:05 PM  BP (!) 166/97 (BP Location: Right arm)   Pulse 80   Temp 98.6  F (37  C) (Oral)   Resp 18   Ht 1.676 m (5' 6\")   Wt 60.5 kg (133 lb 4.8 oz)   LMP 06/07/2015   SpO2 98%   BMI 21.52 kg/m    Please review provider order for any additional goals.   Nurse to notify provider when observation goals have been met and patient is ready for discharge.  "

## 2024-02-15 NOTE — PLAN OF CARE
[id number]  Primary DX: Failure to thrive.    Vitals are Temp: 97.7  F (36.5  C) Temp src: Oral BP: 132/82 Pulse: 86   Resp: 18 SpO2: 98 %.  Patient is Alert and Oriented x4.  independent with no assistive devices, refused bed alarm. Pt is a Regular diet. Pain 8/10 and restlessness CIWA 14,Valium 10mg was effective.Patient sleep/rest. Call light with in reach.Patient is Saline locked.

## 2024-02-15 NOTE — PROGRESS NOTES
[facility]    Medicine Progress Note - Hospitalist Service    Date of Admission:  2/12/2024    Assessment & Plan   Danielle Tadeo is a 48 year old woman with history of substance abuse (fentanyl), alcohol abuse, HTN, history of v-fib, recent cardiac arrest due to overdose (Jan). Admitted on 2/12/2024 with failure to thrive (found by EMS in chair stooling and urinating herself), continued substance abuse.    Failure to thrive  Substance abuse    - started on suboxone (was recently recommended)    - significant other is admitted at [facility]    - seen by Psych and Chem dep:Rec residential treatment (voluntary)    - spoke with SW: working on determining when she will have insurance to cover this    Alcohol withdrawal    - on CIWA    - needed valium yesterday    - likely more due to anxiety    Back pain    - added motrin, atarax, diclofenac    HTN    - cont metoprolol    - may need to start second med    Hypocalcemia    - replaced    Addendum: Patient was found by staff tried to walk out of the hospital with her daughter.  Apparently she had called her daughter and told her she was discharged.  I spent about an hour talking to patient's daughter and the patient.  The patient has been manipulative.  She has been calling both of her daughters telling them that she is able to discharge home.  She tells me that nobody here helps her.  She tells me that she has been in pain and nobody helps her.  I indicated that I have sat in her room and had a conversation with her every day and she never said anything.  I indicated that I have asked her every day if she had any complaints that she never said anything.  Today this when I saw her she did indicate she had pain and I reviewed with her that I had added on medications for this.  Patient is angry and yelling.  We were able to calm her down.  The daughter told her mother that she was not taking her home.  After the daughter left I spoke with the  "patient again.  I asked if she had any thoughts about hurting herself or others.  She said, \"of course I do.  Anyone in this situation would\".  I have now placed a one-to-one sitter.  I have placed suicide precautions.  I have placed another psych consult.  I will call the daughter and update her.     Observation Goals: -diagnostic tests and consults completed and resulted, -vital signs normal or at patient baseline, -tolerating oral intake to maintain hydration, -safe disposition plan has been identified, Nurse to notify provider when observation goals have been met and patient is ready for discharge.  Diet: Regular Diet Adult    DVT Prophylaxis: Low Risk/Ambulatory with no VTE prophylaxis indicated. Walking the halls  Gaytan Catheter: Not present  Lines: None     Cardiac Monitoring: None  Code Status: Full Code      Clinically Significant Risk Factors Present on Admission                  # Hypertension: Noted on problem list            # Financial/Environmental Concerns:           Disposition Plan      Expected Discharge Date: 02/19/2024                Luis E Burciaga MD  Hospitalist Service  [facility]  Securely message with [employer] (more info)  Text page via [employer] Paging/Directory   ______________________________________________________________________    Interval History   Patient eating and drinking. Ambulating. Becomes tearful and tells me she has anxiety and low back pain.    Physical Exam   Vital Signs: Temp: 98.1  F (36.7  C) Temp src: Oral BP: 131/88 Pulse: 88   Resp: 20 SpO2: 98 % O2 Device: None (Room air)    Weight: 128 lbs 12.8 oz    Constitutional: awake, alert, cooperative, no apparent distress, and appears stated age  Eyes: Lids and lashes normal, pupils equal, round and reactive to light, extra ocular muscles intact, sclera clear, conjunctiva normal  ENT: Normocephalic, without obvious abnormality, atraumatic, sinuses nontender on palpation, external ears without lesions, " oral pharynx with moist mucous membranes, tonsils without erythema or exudates, gums normal and good dentition.    Medical Decision Making       30 MINUTES SPENT BY ME on the date of service doing chart review, history, exam, documentation & further activities per the note.      Data         Imaging results reviewed over the past 24 hrs:   No results found for this or any previous visit (from the past 24 hour(s)).

## 2024-02-15 NOTE — PLAN OF CARE
PRIMARY DIAGNOSIS: GENERALIZED WEAKNESS/FTT    OUTPATIENT/OBSERVATION GOALS TO BE MET BEFORE DISCHARGE  1. Orthostatic performed: N/A    2. Tolerating PO medications: Yes    3. Return to near baseline physical activity: Yes    4. Cleared for discharge by consultants (if involved): No    Discharge Planner Nurse   Safe discharge environment identified: Yes  Barriers to discharge: Yes       Entered by: Diana Barahona RN 02/14/2024      Patient is Alert and Oriented x4. Independent  .CIWA 0.  Please review provider order for any additional goals.   Nurse to notify provider when observation goals have been met and patient is ready for discharge.Goal Outcome Evaluation:

## 2024-02-15 NOTE — PLAN OF CARE
"Dx: failure to thrive    BP (!) 142/91 (BP Location: Left arm)   Pulse 94   Temp 97.7  F (36.5  C) (Oral)   Resp 20   Ht 1.676 m (5' 6\")   Wt 58.4 kg (128 lb 12.8 oz)   LMP 06/07/2015   SpO2 99%   BMI 20.79 kg/m       A&O x4. Independent in room- refused bed alarm, education given. Regular diet tolerated. Reporting no pain at this time. CIWA 5, COWS 5. Bed alarm off and call light within reach.   " Verified Results  CBC WITH AUTOMATED DIFFERENTIAL 02Nov2017 02:00PM STEFFI MELCHOR     Test Name Result Flag Reference   WHITE BLOOD COUNT 5.9 K/mcL  4.2-11.0   RED CELL COUNT 4.73 mil/mcL  4.00-5.20   HEMOGLOBIN 13.3 g/dl  12.0-15.5   HEMATOCRIT 39.9 %  36.0-46.5   MEAN CORPUSCULAR VOLUME 84.4 fL  78.0-100.0   MEAN CORPUSCULAR HEMOGLOBIN 28.1 pg  26.0-34.0   MEAN CORPUSCULAR HGB CONC 33.3 g/dl  32.0-36.5   RDW-CV 13.6 %  11.0-15.0   PLATELET COUNT 229 K/mcL  140-450   DIFF TYPE      AUTOMATED DIFFERENTIAL   BRITTANI% 56 %     LYM% 32 %     MON% 10 %     EOS% 1 %     BASO% 1 %     BRITTANI ABS 3.3 K/mcL  1.8-8.0   LYM ABS 1.9 K/mcL  1.2-5.2   MON ABS 0.6 K/mcL  0.3-0.9   EOS ABS 0.1 K/mcL  0.1-0.5   BASO ABS 0.0 K/mcL  0.0-0.3

## 2024-02-16 PROCEDURE — 99232 SBSQ HOSP IP/OBS MODERATE 35: CPT | Performed by: HOSPITALIST

## 2024-02-16 PROCEDURE — G0378 HOSPITAL OBSERVATION PER HR: HCPCS

## 2024-02-16 PROCEDURE — 250N000013 HC RX MED GY IP 250 OP 250 PS 637: Performed by: HOSPITALIST

## 2024-02-16 PROCEDURE — 250N000013 HC RX MED GY IP 250 OP 250 PS 637

## 2024-02-16 PROCEDURE — G0463 HOSPITAL OUTPT CLINIC VISIT: HCPCS

## 2024-02-16 PROCEDURE — 93010 ELECTROCARDIOGRAM REPORT: CPT | Performed by: INTERNAL MEDICINE

## 2024-02-16 PROCEDURE — 250N000013 HC RX MED GY IP 250 OP 250 PS 637: Performed by: NURSE PRACTITIONER

## 2024-02-16 PROCEDURE — 250N000013 HC RX MED GY IP 250 OP 250 PS 637: Performed by: PHYSICIAN ASSISTANT

## 2024-02-16 PROCEDURE — 99233 SBSQ HOSP IP/OBS HIGH 50: CPT | Performed by: NURSE PRACTITIONER

## 2024-02-16 PROCEDURE — 93005 ELECTROCARDIOGRAM TRACING: CPT

## 2024-02-16 RX ORDER — BISACODYL 10 MG
10 SUPPOSITORY, RECTAL RECTAL DAILY PRN
Status: DISCONTINUED | OUTPATIENT
Start: 2024-02-16 | End: 2024-02-21 | Stop reason: HOSPADM

## 2024-02-16 RX ORDER — QUETIAPINE FUMARATE 50 MG/1
50 TABLET, FILM COATED ORAL AT BEDTIME
Status: DISCONTINUED | OUTPATIENT
Start: 2024-02-16 | End: 2024-02-21 | Stop reason: HOSPADM

## 2024-02-16 RX ORDER — QUETIAPINE FUMARATE 25 MG/1
25 TABLET, FILM COATED ORAL 2 TIMES DAILY PRN
Status: DISCONTINUED | OUTPATIENT
Start: 2024-02-16 | End: 2024-02-21 | Stop reason: HOSPADM

## 2024-02-16 RX ADMIN — DICLOFENAC SODIUM 2 G: 10 GEL TOPICAL at 08:41

## 2024-02-16 RX ADMIN — QUETIAPINE FUMARATE 50 MG: 50 TABLET ORAL at 21:52

## 2024-02-16 RX ADMIN — BISACODYL 10 MG: 10 SUPPOSITORY RECTAL at 21:55

## 2024-02-16 RX ADMIN — Medication 1 TABLET: at 08:40

## 2024-02-16 RX ADMIN — METOPROLOL SUCCINATE 50 MG: 50 TABLET, EXTENDED RELEASE ORAL at 08:40

## 2024-02-16 RX ADMIN — HYDROXYZINE HYDROCHLORIDE 25 MG: 25 TABLET, FILM COATED ORAL at 04:32

## 2024-02-16 RX ADMIN — MICONAZOLE NITRATE: 20 POWDER TOPICAL at 08:44

## 2024-02-16 RX ADMIN — SENNOSIDES AND DOCUSATE SODIUM 2 TABLET: 8.6; 5 TABLET ORAL at 20:14

## 2024-02-16 RX ADMIN — BUPRENORPHINE AND NALOXONE 1 FILM: 4; 1 FILM, SOLUBLE BUCCAL; SUBLINGUAL at 08:41

## 2024-02-16 RX ADMIN — ZOLPIDEM TARTRATE 5 MG: 5 TABLET ORAL at 00:15

## 2024-02-16 RX ADMIN — IBUPROFEN 600 MG: 600 TABLET, FILM COATED ORAL at 11:44

## 2024-02-16 RX ADMIN — QUETIAPINE FUMARATE 25 MG: 25 TABLET ORAL at 16:45

## 2024-02-16 RX ADMIN — TRAZODONE HYDROCHLORIDE 50 MG: 50 TABLET ORAL at 04:32

## 2024-02-16 RX ADMIN — HYDROXYZINE HYDROCHLORIDE 25 MG: 25 TABLET, FILM COATED ORAL at 11:44

## 2024-02-16 RX ADMIN — IBUPROFEN 600 MG: 600 TABLET, FILM COATED ORAL at 20:14

## 2024-02-16 RX ADMIN — DICLOFENAC SODIUM 2 G: 10 GEL TOPICAL at 16:46

## 2024-02-16 RX ADMIN — SENNOSIDES AND DOCUSATE SODIUM 1 TABLET: 8.6; 5 TABLET ORAL at 08:40

## 2024-02-16 RX ADMIN — BUPRENORPHINE AND NALOXONE 1 FILM: 4; 1 FILM, SOLUBLE BUCCAL; SUBLINGUAL at 20:14

## 2024-02-16 RX ADMIN — HYDROXYZINE HYDROCHLORIDE 25 MG: 25 TABLET, FILM COATED ORAL at 20:14

## 2024-02-16 RX ADMIN — DICLOFENAC SODIUM 2 G: 10 GEL TOPICAL at 11:44

## 2024-02-16 RX ADMIN — THIAMINE HCL TAB 100 MG 100 MG: 100 TAB at 08:40

## 2024-02-16 RX ADMIN — OXYCODONE HYDROCHLORIDE 5 MG: 5 TABLET ORAL at 00:15

## 2024-02-16 RX ADMIN — FOLIC ACID 1 MG: 1 TABLET ORAL at 08:40

## 2024-02-16 ASSESSMENT — ACTIVITIES OF DAILY LIVING (ADL)
ADLS_ACUITY_SCORE: 35

## 2024-02-16 NOTE — PLAN OF CARE
PRIMARY DIAGNOSIS: FTT, SUICIDAL IDEATION   OUTPATIENT/OBSERVATION GOALS TO BE MET BEFORE DISCHARGE:  ADLs back to baseline: Yes    Activity and level of assistance: Ambulating independently.    Pain status: Improved-controlled with oral pain medications.    Return to near baseline physical activity: Yes     Discharge Planner Nurse   Safe discharge environment identified: Yes  Barriers to discharge: Yes       Entered by: MIRNA SINGLETON RN 02/16/2024    Vital signs:  Temp: 98.7  F (37.1  C) Temp src: Oral BP: (!) 142/92 Pulse: 99   Resp: 16 SpO2: 98 % O2 Device: None (Room air)   Alert and oriented x4. Withdrawal. Flat affect. Reported generalized body ache/back ache. Voltaren gel applied. Scored 1 for CIWA and 2 for COWS this AM. Sitter at bedside for suicidal precaution. On regular diet. Good appetite. PIV saline locked. Psch and CD consulted and following. [facility] WOC consulted for wound/skin breakdown/rash in the buttocks.       Please review provider order for any additional goals.   Nurse to notify provider when observation goals have been met and patient is ready for discharge.

## 2024-02-16 NOTE — CONSULTS
[facility]  WOC Nurse Inpatient Assessment     Consulted for: Buttock     Patient History (according to provider note(s):      Danielle Tadeo is a 48 year old woman with history of substance abuse (fentanyl), alcohol abuse, HTN, history of v-fib, recent cardiac arrest due to overdose (Jan). Admitted on 2/12/2024 with failure to thrive (found by EMS in chair stooling and urinating herself), continued substance abuse.     Assessment:      Areas visualized during today's visit: Focused:    Skin Injury Location: Buttocks  Last photo: 2/16/24    Skin injury due to: Fungal rash   Skin history and plan of care:   Patient admitted with failure to thrive found in her own stool and and urine.  Affected area:      Skin assessment: Dry/flaky, Lesions-satellite, and Rash     Measurements (length x width x depth, in cm) Extending from coccyx, buttocks and perianal area      Color: pink     Temperature  normal      Drainage: none .      Color: none      Odor: none  Pain: mild  Pain interventions prior to dressing change: slow and gentle cares   Treatment goal: Heal   STATUS: initial assessment  Supplies ordered: supplies stored on unit         Treatment Plan:     Buttock/perianal area: BIDand prn with incontinence  Cleanse with Kg Cleanse and Protect spray and soft dry wipe  Apply a thin layer of Kg antifungal cream ([facility]#[id number])  No briefs in bed     Orders: Written    RECOMMEND PRIMARY TEAM ORDER: None, at this time  Education provided: plan of care  Discussed plan of care with: Patient  WOC nurse follow-up plan: weekly  Notify WOC if wound(s) deteriorate.  Nursing to notify the Provider(s) and re-consult the WOC Nurse if new skin concern.    DATA:     Current support surface: Standard  Standard gel/foam mattress (IsoFlex, Atmos air, etc)  Containment of urine/stool: Purewick external catheter   BMI: Body mass index is 21.52 kg/m .   Active diet order: Orders Placed This Encounter      Regular Diet  Adult     Output: I/O last 3 completed shifts:  In: 680 [P.O.:680]  Out: 1950 [Urine:1950]     Labs:   Recent Labs   Lab 02/13/24  0701   ALBUMIN 3.6   HGB 11.4*   WBC 6.8     Pressure injury risk assessment:   Sensory Perception: 4-->no impairment  Moisture: 4-->rarely moist  Activity: 3-->walks occasionally  Mobility: 3-->slightly limited  Nutrition: 3-->adequate  Friction and Shear: 3-->no apparent problem  Yves Score: 20    JAMES High  [facility] [employer]  Dept. Office Number: [phone]

## 2024-02-16 NOTE — PLAN OF CARE
PRIMARY DIAGNOSIS: FTT  OUTPATIENT/OBSERVATION GOALS TO BE MET BEFORE DISCHARGE:  ADLs back to baseline: Yes    Activity and level of assistance: Ambulating independently.    Pain status: Improved-controlled with oral pain medications.    Return to near baseline physical activity: Yes     Discharge Planner Nurse   Safe discharge environment identified: No  Barriers to discharge: Yes       Entered by: Diana Barahona RN    Patient is Alert and Oriented x4. Independent    Please review provider order for any additional goals.   Nurse to notify provider when observation goals have been met and patient is ready for discharge.Goal Outcome Evaluation:

## 2024-02-16 NOTE — PROGRESS NOTES
Care Management Follow Up    Expected Discharge Date: 02/19/2024     Concerns to be Addressed: Discharge planning      Patient plan of care discussed at interdisciplinary rounds: Yes    Anticipated Discharge Disposition: IP CD Tx    Additional Information:  CD sent referrals for IP Tx 2/15.    SUE placed follow up call to [facility] in [place], [phone]. VM left requesting return call.    SUE placed call to [facility] facility in [place], [phone]. SW spoke with intake. They received referral and anticipate that they will process today and follow up. They have immediate availability if pt is accepted.     SUE will continue to follow.     1540: SUE received email from [facility] asking for progress notes and MAR. This has been faxed to (f) [phone].     SUE received VM from Rabia at [facility], [phone]. They have openings next week. They are reviewing and would have to staff for a new admission. They will call back with a decision on Monday.     MARISELA Silveira, [facility]   Inpatient Care Coordination  [facility]   [phone]

## 2024-02-16 NOTE — PLAN OF CARE
PRIMARY DIAGNOSIS: FTT, SUICIDAL IDEATION   OUTPATIENT/OBSERVATION GOALS TO BE MET BEFORE DISCHARGE:  ADLs back to baseline: Yes    Activity and level of assistance: Ambulating independently.    Pain status: Improved-controlled with oral pain medications.    Return to near baseline physical activity: Yes     Discharge Planner Nurse   Safe discharge environment identified: Yes  Barriers to discharge: Yes       Entered by: MIRNA SIGNLETON RN 02/16/2024    Vital signs:  Temp: 98.7  F (37.1  C) Temp src: Oral BP: (!) 142/92 Pulse: 99   Resp: 16 SpO2: 98 % O2 Device: None (Room air)   Alert and oriented x4. Withdrawal. Flat affect. Reported generalized body ache/back ache. Voltaren gel applied. Ibuprofen given. Atarax given for anxiety. Scored 1 for CIWA and 2 for COWS this AM. Sitter at bedside. on suicidal precaution. On regular diet. Good appetite. PIV saline locked. Psch and CD consulted and following. Seen by Psych via web cam today. Psych recommended to continue 72 hrs hold and 1:1 sitter. Plan for discharge to  mental health/ Addiction/ Geripsych when bed available. SW following and sent referrals out. WOC consulted for wound/skin breakdown/rash in the buttocks.      Please review provider order for any additional goals.   Nurse to notify provider when observation goals have been met and patient is ready for discharge.

## 2024-02-16 NOTE — PROGRESS NOTES
[facility]    Medicine Progress Note - Hospitalist Service    Date of Admission:  2/12/2024    Assessment & Plan   Danielle Tadeo is a 48 year old woman with history of substance abuse (fentanyl), alcohol abuse, HTN, history of v-fib, recent cardiac arrest due to overdose (Jan). Admitted on 2/12/2024 with failure to thrive (found by EMS in chair stooling and urinating herself), continued substance abuse.    Failure to thrive  Substance abuse    - started on suboxone (was recently recommended)    - significant other is admitted at [place]    - seen by Psych and Chem dep: Rec residential treatment (voluntary)    - spoke with SW: patient now has insurance and referrals were sent to treatment programs    Passive suicidal threats    - when patient was upset and was trying to leave yesterday, she made passive suicidal threats    - much better today    - Psych consult pending    - still on sitter/suicide precautions    Alcohol withdrawal    - on CIWA    - needed valium yesterday    - likely more due to anxiety    - no actual signs of withdrawal    Buttock pain    - has wounds from sitting in her stool    - added PRN oxy    - added atarax and motrin    - WOC consult    Back pain    - added motrin, atarax, diclofenac    HTN    - cont metoprolol    - may need to start second med    Hypocalcemia    - replaced    Called and updated daughter Paige     Observation Goals: -diagnostic tests and consults completed and resulted, -vital signs normal or at patient baseline, -tolerating oral intake to maintain hydration, -safe disposition plan has been identified, Nurse to notify provider when observation goals have been met and patient is ready for discharge.  Diet: Regular Diet Adult    DVT Prophylaxis: Low Risk/Ambulatory with no VTE prophylaxis indicated. Walking the halls  Gaytan Catheter: Not present  Lines: None     Cardiac Monitoring: None  Code Status: Full Code      Clinically Significant Risk  Factors Present on Admission                  # Hypertension: Noted on problem list            # Financial/Environmental Concerns:           Disposition Plan     Expected Discharge Date: 02/19/2024    Discharge Delays: Placement - Mental Health/Addiction/Geripsych            Luis E Burciaga MD  Hospitalist Service  [facility]  Securely message with [employer] (more info)  Text page via [employer] Paging/Directory   ______________________________________________________________________    Interval History   Patient eating and drinking. Ambulating. Tells me she slept last night. Feels better today. Not angry. No other new complaints.    Physical Exam   Vital Signs: Temp: 98.7  F (37.1  C) Temp src: Oral BP: (!) 142/92 Pulse: 99   Resp: 16 SpO2: 98 % O2 Device: None (Room air)    Weight: 133 lbs 4.8 oz    Constitutional: awake, alert, cooperative, no apparent distress, and appears stated age  Eyes: Lids and lashes normal, pupils equal, round and reactive to light, extra ocular muscles intact, sclera clear, conjunctiva normal  ENT: Normocephalic, without obvious abnormality, atraumatic, sinuses nontender on palpation, external ears without lesions, oral pharynx with moist mucous membranes, tonsils without erythema or exudates, gums normal and good dentition.    Medical Decision Making       30 MINUTES SPENT BY ME on the date of service doing chart review, history, exam, documentation & further activities per the note.      Data         Imaging results reviewed over the past 24 hrs:   No results found for this or any previous visit (from the past 24 hour(s)).

## 2024-02-16 NOTE — PLAN OF CARE
PRIMARY DIAGNOSIS: FTT, SUICIDAL IDEATION  OUTPATIENT/OBSERVATION GOALS TO BE MET BEFORE DISCHARGE:  ADLs back to baseline: Yes    Activity and level of assistance: Ambulating independently.    Pain status: Improved-controlled with oral pain medications.    Return to near baseline physical activity: Yes     Discharge Planner Nurse   Safe discharge environment identified: Yes  Barriers to discharge: Yes       Entered by: MIRNA SINGLETON RN 02/16/2024    Vital signs:  Temp: 98.8  F (37.1  C) Temp src: Oral BP: 130/73 Pulse: 84   Resp: 18 SpO2: 97 % O2 Device: None (Room air)   Alert and oriented x4. Reported generalized body ache/back ache. Voltaren gel applied. Ibuprofen given. Atarax given for anxiety. Sitter at bedside. on suicidal precaution. On regular diet. Good appetite. PIV saline locked. Psch and CD consulted and following. Seen by Psych via web cam today. Psych recommended to continue 72 hrs hold and 1:1 sitter. Prn Seroquel given due pt stated she is upset and agitated after talking with her  on the phone even if she appeared flat and withdrawn. Plan for discharge to  mental health/ Addiction/ Geripsych when bed available. SW following and sent referrals out. WOC consulted for wound/skin breakdown/rash in the buttocks.          Please review provider order for any additional goals.   Nurse to notify provider when observation goals have been met and patient is ready for discharge.

## 2024-02-16 NOTE — PLAN OF CARE
PRIMARY DIAGNOSIS: Alcohol dependent/FTT  OUTPATIENT/OBSERVATION GOALS TO BE MET BEFORE DISCHARGE:  ADLs back to baseline: Yes    Activity and level of assistance: Ambulating independently.    Pain status: Improved-controlled with oral pain medications.    Return to near baseline physical activity: Yes     Discharge Planner Nurse   Safe discharge environment identified: No  Barriers to discharge: Yes       Entered by: Diana Barahona RN    Patient is Alert and Oriented x4. Independent. But has sitter for possible suicide attempt. No agressive  symptoms observed CIWA 1,  prn c/o back pain atarax and  trazodone was given after midnight patient slept well.    Please review provider order for any additional goals.   Nurse to notify provider when observation goals have been met and patient is ready for discharge.Goal Outcome Evaluation:
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
## 2024-02-17 PROCEDURE — 99207 PR CDG-CUT & PASTE-POTENTIAL IMPACT ON LEVEL: CPT | Performed by: INTERNAL MEDICINE

## 2024-02-17 PROCEDURE — 250N000013 HC RX MED GY IP 250 OP 250 PS 637: Performed by: HOSPITALIST

## 2024-02-17 PROCEDURE — 99232 SBSQ HOSP IP/OBS MODERATE 35: CPT | Performed by: INTERNAL MEDICINE

## 2024-02-17 PROCEDURE — 250N000013 HC RX MED GY IP 250 OP 250 PS 637: Performed by: NURSE PRACTITIONER

## 2024-02-17 PROCEDURE — G0378 HOSPITAL OBSERVATION PER HR: HCPCS

## 2024-02-17 PROCEDURE — 250N000013 HC RX MED GY IP 250 OP 250 PS 637: Performed by: PHYSICIAN ASSISTANT

## 2024-02-17 RX ADMIN — BUPRENORPHINE AND NALOXONE 1 FILM: 4; 1 FILM, SOLUBLE BUCCAL; SUBLINGUAL at 19:36

## 2024-02-17 RX ADMIN — OXYCODONE HYDROCHLORIDE 5 MG: 5 TABLET ORAL at 09:42

## 2024-02-17 RX ADMIN — DICLOFENAC SODIUM 2 G: 10 GEL TOPICAL at 12:52

## 2024-02-17 RX ADMIN — ZOLPIDEM TARTRATE 5 MG: 5 TABLET ORAL at 21:09

## 2024-02-17 RX ADMIN — DICLOFENAC SODIUM 2 G: 10 GEL TOPICAL at 08:42

## 2024-02-17 RX ADMIN — HYDROXYZINE HYDROCHLORIDE 25 MG: 25 TABLET, FILM COATED ORAL at 16:33

## 2024-02-17 RX ADMIN — SENNOSIDES AND DOCUSATE SODIUM 2 TABLET: 8.6; 5 TABLET ORAL at 19:38

## 2024-02-17 RX ADMIN — DICLOFENAC SODIUM 2 G: 10 GEL TOPICAL at 16:30

## 2024-02-17 RX ADMIN — METOPROLOL SUCCINATE 50 MG: 50 TABLET, EXTENDED RELEASE ORAL at 08:39

## 2024-02-17 RX ADMIN — BUPRENORPHINE AND NALOXONE 1 FILM: 4; 1 FILM, SOLUBLE BUCCAL; SUBLINGUAL at 08:37

## 2024-02-17 RX ADMIN — DIAZEPAM 10 MG: 5 TABLET ORAL at 19:36

## 2024-02-17 RX ADMIN — HYDROXYZINE HYDROCHLORIDE 25 MG: 25 TABLET, FILM COATED ORAL at 08:43

## 2024-02-17 RX ADMIN — ACETAMINOPHEN 650 MG: 325 TABLET, FILM COATED ORAL at 19:36

## 2024-02-17 RX ADMIN — Medication 1 TABLET: at 08:37

## 2024-02-17 RX ADMIN — MICONAZOLE NITRATE: 20 POWDER TOPICAL at 08:45

## 2024-02-17 RX ADMIN — OXYCODONE HYDROCHLORIDE 5 MG: 5 TABLET ORAL at 16:29

## 2024-02-17 RX ADMIN — DICLOFENAC SODIUM 2 G: 10 GEL TOPICAL at 19:38

## 2024-02-17 RX ADMIN — SENNOSIDES AND DOCUSATE SODIUM 2 TABLET: 8.6; 5 TABLET ORAL at 08:36

## 2024-02-17 RX ADMIN — QUETIAPINE FUMARATE 50 MG: 50 TABLET ORAL at 21:09

## 2024-02-17 RX ADMIN — FOLIC ACID 1 MG: 1 TABLET ORAL at 08:37

## 2024-02-17 ASSESSMENT — ACTIVITIES OF DAILY LIVING (ADL)
ADLS_ACUITY_SCORE: 35
ADLS_ACUITY_SCORE: 36

## 2024-02-17 NOTE — PROGRESS NOTES
[facility]    Hospitalist Progress Note      Assessment & Plan   Danielle Tadeo is a 48 year old woman who was admitted on 2/12/2024. PMH significant for substance abuse (fentanyl), alcohol abuse, HTN, history of v-fib, recent cardiac arrest due to overdose (Jan). Admitted on 2/12/2024 with failure to thrive (found by EMS in chair stooling and urinating herself), continued substance abuse.     Failure to thrive  Substance abuse    - started on suboxone (was recently recommended)    - significant other is admitted at [place]    - seen by Psych and Chem dep: Rec residential treatment (voluntary)    - spoke with SW: patient now has insurance and referrals were sent to treatment programs. Monday will be the earliest option.      Passive suicidal threats    - when patient was upset and was trying to leave yesterday, she made passive suicidal threats    - much better today    - Psych consult pending    - still on sitter/suicide precautions     Alcohol withdrawal    - on CIWA    - no actual signs of withdrawal     Buttock pain    - has wounds from sitting in her stool    - added PRN oxy    - added atarax and motrin    - WOC consult     Back pain    - added motrin, atarax, diclofenac     HTN    - cont metoprolol    - may need to start second med     Hypocalcemia    - replaced      Observation Goals: -diagnostic tests and consults completed and resulted, -vital signs normal or at patient baseline, -tolerating oral intake to maintain hydration, -safe disposition plan has been identified, Nurse to notify provider when observation goals have been met and patient is ready for discharge.    DVT Prophylaxis: Ambulate every shift  Code Status: Full Code  Expected discharge: Pending further options with SW as soon as Monday.     Deann Galindo MD FACP  Hospitalist Service  [facility]        Interval History   No acute events. Continue to follow and await decisions from inpatient  treatment center.     -Data reviewed today: I reviewed all new labs and imaging results over the last 24 hours.     Physical Exam   Temp: 97  F (36.1  C) Temp src: Oral BP: (!) 145/86 Pulse: 87   Resp: 16 SpO2: 99 % O2 Device: None (Room air)    Vitals:    02/13/24 0617 02/14/24 0639 02/15/24 1535   Weight: 59.2 kg (130 lb 9.6 oz) 58.4 kg (128 lb 12.8 oz) 60.5 kg (133 lb 4.8 oz)     Vital Signs with Ranges  Temp:  [97  F (36.1  C)-98.5  F (36.9  C)] 97  F (36.1  C)  Pulse:  [84-96] 87  Resp:  [16] 16  BP: (126-145)/(75-86) 145/86  SpO2:  [99 %] 99 %  No intake/output data recorded.    Constitutional: Tearful woman sitting at side of bed. Alert. Oriented. Otherwise no acute distress.    HEENT: NCAT. EOMI. Moist oral mucosa.  Respiratory: No increased work of breathing. Deferred exam.   Cardiovascular: Regular rate.  Musculoskeletal: No gross deformities.   Neurologic: Alert and oriented x3. No focal neurologic deficits.   Psychiatric: Tearful, anxious at time of exam.     Medications      buprenorphine HCl-naloxone HCl  1 Film Sublingual BID    diclofenac  2 g Topical 4x Daily    folic acid  1 mg Oral Daily    metoprolol succinate ER  50 mg Oral Daily    miconazole   Topical BID    multivitamin w/minerals  1 tablet Oral Daily    QUEtiapine  50 mg Oral At Bedtime    senna-docusate  1 tablet Oral BID    Or    senna-docusate  2 tablet Oral BID    sodium chloride (PF)  3 mL Intracatheter Q8H       Data   Recent Labs   Lab 02/13/24  0701 02/12/24  1103   WBC 6.8 11.1*   HGB 11.4* 12.9   MCV 90 91    247    141   POTASSIUM 3.4 3.7   CHLORIDE 103 104   CO2 25 18*   BUN 7.8 18.0   CR 0.46* 0.61   ANIONGAP 11 19*   HAZEL 8.5* 8.0*   * 80   ALBUMIN 3.6 3.6   PROTTOTAL 5.7* 5.7*   BILITOTAL 0.9 0.3   ALKPHOS 96 100   ALT 16 13   AST 55* 52*   LIPASE  --  38       No results found for this or any previous visit (from the past 24 hour(s)).

## 2024-02-17 NOTE — PROGRESS NOTES
Care Management Follow Up    Length of Stay (days): 0    Expected Discharge Date: 02/19/2024     Concerns to be Addressed:     residential MANAS placement  Patient plan of care discussed at interdisciplinary rounds: Yes    Anticipated Discharge Disposition:  residential MANAS treatment     Anticipated Discharge Services:  MANAS treatment  Anticipated Discharge DME:  TBD    Patient/family educated on Medicare website which has current facility and service quality ratings:    Education Provided on the Discharge Plan:  no  Patient/Family in Agreement with the Plan:  yes    Referrals Placed by CM/SW:  no additional referrals placed  Private pay costs discussed: Not applicable    Additional Information:  Writer called [facility], [phone], to confirm they received requested paperwork and get a status update. They report not receiving MAR and notes yesterday. Writer refaxed information to fax [phone]. Writer called back later to confirm they received the faxed. Unfortunately clinician who was reviewing referral is not in until Monday.     Await response from [facility] on Monday regarding acceptance.     Lizette [facility], Casual   Inpatient Care Coordination  [facility]  [phone]

## 2024-02-17 NOTE — PLAN OF CARE
Goal Outcome Evaluation:       PRIMARY DIAGNOSIS: SUICIDAL IDEATION   OUTPATIENT/OBSERVATION GOALS TO BE MET BEFORE DISCHARGE:  ADLs back to baseline: Yes    Activity and level of assistance: Ambulating independently.    Pain status: Improved-controlled with oral pain medications.    Return to near baseline physical activity: Yes     Discharge Planner Nurse   Safe discharge environment identified: No  Barriers to discharge: Yes       Entered by: Lisa Marroquin RN 02/17/2024 12:40 PM   Pt A/Ox4, VSS on RA. PIV SL. Tolerating regular diet - on third meal of the day. Up independently with sitter at bedside. Walked the halls this afternoon. Requested activities to pass the time. Coloring sheets/word finds provided. CIWAS/COWS WNL.  Please review provider order for any additional goals.   Nurse to notify provider when observation goals have been met and patient is ready for discharge.

## 2024-02-17 NOTE — PLAN OF CARE
Goal Outcome Evaluation:       PRIMARY DIAGNOSIS: SUICIDAL IDEATION   OUTPATIENT/OBSERVATION GOALS TO BE MET BEFORE DISCHARGE:  ADLs back to baseline: Yes    Activity and level of assistance: Ambulating independently.    Pain status: Improved-controlled with oral pain medications.    Return to near baseline physical activity: Yes     Discharge Planner Nurse   Safe discharge environment identified: No  Barriers to discharge: Yes       Entered by: Lisa Marroquin RN 02/17/2024 12:37 PM   Pt A/Ox4, VSS on RA. PIV SL. Tolerating regular diet. Up independently in room with sitter at bedside. No complaints of nausea/abdominal discomfort. Reports generalized pain rating 8/10. PRN oxy given with relief. Requested anxiety medication, PRN atarax given. CIWA score: 5; COW score: 3. Pt reports minor headache and occasional visual hallucinations of bugs.   Please review provider order for any additional goals.   Nurse to notify provider when observation goals have been met and patient is ready for discharge.

## 2024-02-17 NOTE — PLAN OF CARE
Goal Outcome Evaluation:       PRIMARY DIAGNOSIS: SUICIDAL IDEATION   OUTPATIENT/OBSERVATION GOALS TO BE MET BEFORE DISCHARGE:  ADLs back to baseline: Yes    Activity and level of assistance: Ambulating independently.    Pain status: Improved-controlled with oral pain medications.    Return to near baseline physical activity: Yes     Discharge Planner Nurse   Safe discharge environment identified: No  Barriers to discharge: Yes       Entered by: Lisa Marroquin RN 02/17/2024 5:25 PM   Pt A/Ox4, VSS on RA. PIV SL. Tolerating regular diet. Up independently with sitter at bedside. PRN oxy given for chronic/generalized pain. Tearful this afternoon after speaking with  and daughters briefly. CIWAS/COWS WNL/unchanged.   Please review provider order for any additional goals.   Nurse to notify provider when observation goals have been met and patient is ready for discharge.

## 2024-02-17 NOTE — UTILIZATION REVIEW
Concurrent stay review; Secondary Review Determination - [facility]        Under the authority of the Utilization Management Committee, the utilization review process indicated a secondary review on the above patient.  The review outcome is based on review of the medical records, discussions with staff, and applying clinical experience noted on the date of the review.        (x) Observation/outpatient Status Appropriate - Concurrent stay review       RATIONALE FOR DETERMINATION:     Patient is a 48-year-old female admitted on 2/12/2024.  The patient came to the ED via EMS after being found in a chair, stooling and urinating herself, continued substance abuse with long history.  Fentanyl and alcohol are both mentioned in the admitting note.  She was placed on CIWA and required Valium initially.  Currently the patient is stable and not requiring Ativan but does require inpatient treatment.  Disposition is the current reason for delay of discharge.  The patient is currently eating and drinking fluids.  The patient has made some passive suicide comments yesterday apparently.  Based on current status of disposition barrier to discharge, recommend observation USP.  Fentanyl screen was positive on admission.    Patient delayed discharge is related to disposition, there is no medical necessity for inpatient admission at the time of this review. If there is a change in patient status, please resend for review.    The information on this document is developed by the utilization review team in order for the business office to ensure compliance.  This only denotes the appropriateness of proper admission status and does not reflect the quality of care rendered.       The definitions of Inpatient Status and Observation Status used in making the determination above are those provided in the CMS Coverage Manual, Chapter 1 and Chapter 6, section 70.4.       Sincerely,    Prabhakar Rivera MD

## 2024-02-17 NOTE — PLAN OF CARE
PRIMARY DIAGNOSIS: FTT, Suicidal ideations     OUTPATIENT/OBSERVATION GOALS TO BE MET BEFORE DISCHARGE:  ADLs back to baseline: Yes     Activity and level of assistance: Ambulating independently.     Pain status: Improved-controlled with oral pain medications.     Return to near baseline physical activity: Yes          Discharge Planner Nurse   Safe discharge environment identified: No  Barriers to discharge: Yes       Entered by: Lito Floyd RN 02/17/2024 12:44 AM    Pt is A&O x4, Pt denied to allow me to clean and apply fungal cream to wound area. Pt is resting in bed and able to make needs known sitter at bedside. Will continue to follow plan of care.     Please review provider order for any additional goals.   Nurse to notify provider when observation goals have been met and patient is ready for discharge.

## 2024-02-17 NOTE — PLAN OF CARE
PRIMARY DIAGNOSIS: FTT, Suicidal ideations      OUTPATIENT/OBSERVATION GOALS TO BE MET BEFORE DISCHARGE:  ADLs back to baseline: Yes     Activity and level of assistance: Ambulating independently.     Pain status: Improved-controlled with oral pain medications.     Return to near baseline physical activity: Yes          Discharge Planner Nurse   Safe discharge environment identified: No  Barriers to discharge: Yes       Entered by: Lito Floyd RN 02/17/2024 5:34 AM     Pt is A&O x4, Pt denied to allow me to clean and apply fungal cream to wound area. Pt did mention once that she saw bugs crawling on the walls. Placement for [facility] is pending for next week. Pt is resting in bed and able to make needs known sitter at bedside. Will continue to follow plan of care.      Please review provider order for any additional goals.   Nurse to notify provider when observation goals have been met and patient is ready for discharge.

## 2024-02-17 NOTE — PLAN OF CARE
PRIMARY DIAGNOSIS: FTT, Suicidal ideations   OUTPATIENT/OBSERVATION GOALS TO BE MET BEFORE DISCHARGE:  ADLs back to baseline: Yes    Activity and level of assistance: Ambulating independently.    Pain status: Improved-controlled with oral pain medications.    Return to near baseline physical activity: Yes     Discharge Planner Nurse   Safe discharge environment identified: No  Barriers to discharge: Yes       Entered by: Lito Floyd RN 02/16/2024 11:09 PM  Pt is A&O x4, Pt is having some rectal pain due to some constipation, she received her scheduled senna but requested something suppository, Pt received prn dulcolax and was able to have a bowel movement. Pt is resting in bed and able to make needs known sitter at bedside. Will continue to follow plan of care.   Please review provider order for any additional goals.   Nurse to notify provider when observation goals have been met and patient is ready for discharge.

## 2024-02-18 PROCEDURE — 250N000013 HC RX MED GY IP 250 OP 250 PS 637: Performed by: NURSE PRACTITIONER

## 2024-02-18 PROCEDURE — G0378 HOSPITAL OBSERVATION PER HR: HCPCS

## 2024-02-18 PROCEDURE — 250N000013 HC RX MED GY IP 250 OP 250 PS 637: Performed by: PHYSICIAN ASSISTANT

## 2024-02-18 PROCEDURE — 250N000013 HC RX MED GY IP 250 OP 250 PS 637: Performed by: HOSPITALIST

## 2024-02-18 PROCEDURE — 99232 SBSQ HOSP IP/OBS MODERATE 35: CPT | Performed by: INTERNAL MEDICINE

## 2024-02-18 RX ADMIN — IBUPROFEN 600 MG: 600 TABLET, FILM COATED ORAL at 21:02

## 2024-02-18 RX ADMIN — DICLOFENAC SODIUM 2 G: 10 GEL TOPICAL at 12:29

## 2024-02-18 RX ADMIN — OXYCODONE HYDROCHLORIDE 5 MG: 5 TABLET ORAL at 13:44

## 2024-02-18 RX ADMIN — ACETAMINOPHEN 650 MG: 325 TABLET, FILM COATED ORAL at 16:36

## 2024-02-18 RX ADMIN — DICLOFENAC SODIUM 2 G: 10 GEL TOPICAL at 22:25

## 2024-02-18 RX ADMIN — DICLOFENAC SODIUM 2 G: 10 GEL TOPICAL at 16:30

## 2024-02-18 RX ADMIN — OXYCODONE HYDROCHLORIDE 5 MG: 5 TABLET ORAL at 05:01

## 2024-02-18 RX ADMIN — QUETIAPINE FUMARATE 50 MG: 50 TABLET ORAL at 22:25

## 2024-02-18 RX ADMIN — DIAZEPAM 10 MG: 5 TABLET ORAL at 14:38

## 2024-02-18 RX ADMIN — SENNOSIDES AND DOCUSATE SODIUM 1 TABLET: 8.6; 5 TABLET ORAL at 19:52

## 2024-02-18 RX ADMIN — METOPROLOL SUCCINATE 50 MG: 50 TABLET, EXTENDED RELEASE ORAL at 08:08

## 2024-02-18 RX ADMIN — BUPRENORPHINE AND NALOXONE 1 FILM: 4; 1 FILM, SOLUBLE BUCCAL; SUBLINGUAL at 19:52

## 2024-02-18 RX ADMIN — HYDROXYZINE HYDROCHLORIDE 25 MG: 25 TABLET, FILM COATED ORAL at 12:29

## 2024-02-18 RX ADMIN — SENNOSIDES AND DOCUSATE SODIUM 1 TABLET: 8.6; 5 TABLET ORAL at 08:09

## 2024-02-18 RX ADMIN — BUPRENORPHINE AND NALOXONE 1 FILM: 4; 1 FILM, SOLUBLE BUCCAL; SUBLINGUAL at 08:10

## 2024-02-18 RX ADMIN — HYDROXYZINE HYDROCHLORIDE 25 MG: 25 TABLET, FILM COATED ORAL at 05:01

## 2024-02-18 RX ADMIN — OXYCODONE HYDROCHLORIDE 5 MG: 5 TABLET ORAL at 21:02

## 2024-02-18 RX ADMIN — FOLIC ACID 1 MG: 1 TABLET ORAL at 08:09

## 2024-02-18 RX ADMIN — HYDROXYZINE HYDROCHLORIDE 25 MG: 25 TABLET, FILM COATED ORAL at 19:52

## 2024-02-18 RX ADMIN — Medication 1 TABLET: at 08:08

## 2024-02-18 RX ADMIN — ACETAMINOPHEN 650 MG: 325 TABLET, FILM COATED ORAL at 05:01

## 2024-02-18 RX ADMIN — DICLOFENAC SODIUM 2 G: 10 GEL TOPICAL at 08:12

## 2024-02-18 ASSESSMENT — ACTIVITIES OF DAILY LIVING (ADL)
ADLS_ACUITY_SCORE: 35
ADLS_ACUITY_SCORE: 36
ADLS_ACUITY_SCORE: 35

## 2024-02-18 NOTE — PLAN OF CARE
PRIMARY DIAGNOSIS: FTT, Suicidal ideations     OUTPATIENT/OBSERVATION GOALS TO BE MET BEFORE DISCHARGE:  ADLs back to baseline: Yes     Activity and level of assistance: Ambulating independently.     Pain status: Improved-controlled with oral pain medications.     Return to near baseline physical activity: Yes          Discharge Planner Nurse   Safe discharge environment identified: No  Barriers to discharge: Yes       Entered by: Lito Floyd RN 02/17/2024 9:35 PM    Pt is A&O x4, Pt complained of anxiety, and scored an 8 on CIWA so was given some valium, pt was reassessed and stated she was feeling better. Pt is resting in bed and able to make needs known sitter at bedside. Plan of care ongoing.     Please review provider order for any additional goals.   Nurse to notify provider when observation goals have been met and patient is ready for discharge.

## 2024-02-18 NOTE — PLAN OF CARE
PRIMARY DIAGNOSIS: FTT / Withdrawal  OUTPATIENT/OBSERVATION GOALS TO BE MET BEFORE DISCHARGE:  ADLs back to baseline: Yes    Activity and level of assistance: Ambulating independently.    Pain status: Improved-controlled with oral pain medications.    Return to near baseline physical activity: Yes     Discharge Planner Nurse   Safe discharge environment identified: No  Barriers to discharge: Yes       Entered by: Karine Strickland RN 02/18/2024      A&Ox4, independent in the room with PSC, VSS on RA. Regular diet. Blanchable redness on buttocks. Voltaren gel applied to lower back. Oxycodone given x1 for chest pain (from chest compressions). CIWA/COW Q4h 2 - 8 mostly for anxiety.    Please review provider order for any additional goals.   Nurse to notify provider when observation goals have been met and patient is ready for discharge.

## 2024-02-18 NOTE — PLAN OF CARE
PRIMARY DIAGNOSIS: FTT / Withdrawal  OUTPATIENT/OBSERVATION GOALS TO BE MET BEFORE DISCHARGE:  ADLs back to baseline: Yes    Activity and level of assistance: Ambulating independently.    Pain status: Improved-controlled with oral pain medications.    Return to near baseline physical activity: Yes     Discharge Planner Nurse   Safe discharge environment identified: No  Barriers to discharge: Yes       Entered by: Karine Strickland RN 02/18/2024      Please review provider order for any additional goals.   Nurse to notify provider when observation goals have been met and patient is ready for discharge.

## 2024-02-18 NOTE — PLAN OF CARE
PRIMARY DIAGNOSIS: FTT, Suicidal ideations      OUTPATIENT/OBSERVATION GOALS TO BE MET BEFORE DISCHARGE:  ADLs back to baseline: Yes     Activity and level of assistance: Ambulating independently.     Pain status: Improved-controlled with oral pain medications.     Return to near baseline physical activity: Yes          Discharge Planner Nurse   Safe discharge environment identified: No  Barriers to discharge: Yes       Entered by: Lito Floyd RN 02/18/2024 12:23 AM     Pt is A&O x4, Pt is resting in bed and able to make needs known sitter at bedside. Plan of care ongoing.      Please review provider order for any additional goals.   Nurse to notify provider when observation goals have been met and patient is ready for discharge.

## 2024-02-18 NOTE — PROGRESS NOTES
[facility]    Hospitalist Progress Note      Assessment & Plan   Danielle Tadeo is a 48 year old woman who was admitted on 2/12/2024. PMH significant for substance abuse (fentanyl), alcohol abuse, HTN, history of v-fib, recent cardiac arrest due to overdose (Jan). Admitted on 2/12/2024 with failure to thrive (found by EMS in chair stooling and urinating herself), continued substance abuse.     Failure to thrive  Substance abuse    - started on suboxone (was recently recommended)    - significant other is admitted at [facility]    - seen by Psych and Chem dep: Rec residential treatment (voluntary)    - spoke with SW: patient now has insurance and referrals were sent to treatment programs. Earliest decision regarding bed for residential treatment center is tomorrow, 2/19.      Passive suicidal threats    - when patient was upset and was trying to leave previously, she made passive suicidal threats    - Patient placed on hold 2/15 evening.     - Appreciated psychiatry consult     - still on sitter/suicide precautions     Alcohol withdrawal    - on CIWA    - no actual signs of withdrawal     Buttock pain    - has wounds from sitting in her stool    - added PRN oxy    - added atarax and motrin    - WOC consult     Back pain    - added motrin, atarax, diclofenac     HTN    - Continue metoprolol     Hypocalcemia    - replaced      DVT Prophylaxis: Ambulate every shift  Code Status: Full Code  Expected discharge: Earliest decision regarding bed for residential treatment center is tomorrow, 2/19. Appreciate SW following.    Deann Galindo MD FACP  Hospitalist Service  [facility]        Interval History   No acute events. Earliest decision regarding bed for residential treatment center is tomorrow, 2/19.     -Data reviewed today: I reviewed all new labs and imaging results over the last 24 hours.     Physical Exam   Temp: 98.7  F (37.1  C) Temp src: Oral BP: (!) 154/89 Pulse: 88    Resp: 18 SpO2: 97 % O2 Device: None (Room air)    Vitals:    02/13/24 0617 02/14/24 0639 02/15/24 1535   Weight: 59.2 kg (130 lb 9.6 oz) 58.4 kg (128 lb 12.8 oz) 60.5 kg (133 lb 4.8 oz)     Vital Signs with Ranges  Temp:  [98.2  F (36.8  C)-98.8  F (37.1  C)] 98.7  F (37.1  C)  Pulse:  [74-88] 88  Resp:  [16-18] 18  BP: (129-154)/(76-89) 154/89  SpO2:  [97 %-100 %] 97 %  I/O last 3 completed shifts:  In: 600 [P.O.:600]  Out: -     Constitutional: Woman sitting up at side of bed eating dinner. Alert and oriented to person. No acute distress.   HEENT: NCAT. EOMI. Moist oral mucosa.  Respiratory: Clear to auscultation bilaterally. No crackles or wheezes. No increased work of breathing.  Cardiovascular: Regular rate and rhythm.   Musculoskeletal: No gross deformities.   Neurologic: Alert and oriented x3. No focal neurologic deficits.   Psychiatric: Tearful, anxious at time of exam waiting on update about 's cath at [facility].      Medications      buprenorphine HCl-naloxone HCl  1 Film Sublingual BID    diclofenac  2 g Topical 4x Daily    folic acid  1 mg Oral Daily    metoprolol succinate ER  50 mg Oral Daily    miconazole   Topical BID    multivitamin w/minerals  1 tablet Oral Daily    QUEtiapine  50 mg Oral At Bedtime    senna-docusate  1 tablet Oral BID    Or    senna-docusate  2 tablet Oral BID    sodium chloride (PF)  3 mL Intracatheter Q8H       Data   Recent Labs   Lab 02/13/24  0701 02/12/24  1103   WBC 6.8 11.1*   HGB 11.4* 12.9   MCV 90 91    247    141   POTASSIUM 3.4 3.7   CHLORIDE 103 104   CO2 25 18*   BUN 7.8 18.0   CR 0.46* 0.61   ANIONGAP 11 19*   HAZEL 8.5* 8.0*   * 80   ALBUMIN 3.6 3.6   PROTTOTAL 5.7* 5.7*   BILITOTAL 0.9 0.3   ALKPHOS 96 100   ALT 16 13   AST 55* 52*   LIPASE  --  38       No results found for this or any previous visit (from the past 24 hour(s)).

## 2024-02-18 NOTE — PLAN OF CARE
PRIMARY DIAGNOSIS: FTT, Suicidal ideations      OUTPATIENT/OBSERVATION GOALS TO BE MET BEFORE DISCHARGE:  ADLs back to baseline: Yes     Activity and level of assistance: Ambulating independently.     Pain status: Improved-controlled with oral pain medications.     Return to near baseline physical activity: Yes          Discharge Planner Nurse   Safe discharge environment identified: No  Barriers to discharge: Yes       Entered by: Lito Floyd RN 02/18/2024 5:49 AM     Pt is A&O x4, Pt complained of anxiety twice overnight and received some more atarax. Pt has been sleeping most of the night. Waiting for placement. Pt is resting in bed and able to make needs known sitter at bedside. Plan of care ongoing.      Please review provider order for any additional goals.   Nurse to notify provider when observation goals have been met and patient is ready for discharge

## 2024-02-19 PROCEDURE — 250N000013 HC RX MED GY IP 250 OP 250 PS 637: Performed by: PHYSICIAN ASSISTANT

## 2024-02-19 PROCEDURE — 250N000013 HC RX MED GY IP 250 OP 250 PS 637: Performed by: NURSE PRACTITIONER

## 2024-02-19 PROCEDURE — 99232 SBSQ HOSP IP/OBS MODERATE 35: CPT | Performed by: INTERNAL MEDICINE

## 2024-02-19 PROCEDURE — G0378 HOSPITAL OBSERVATION PER HR: HCPCS

## 2024-02-19 PROCEDURE — 250N000013 HC RX MED GY IP 250 OP 250 PS 637: Performed by: HOSPITALIST

## 2024-02-19 RX ORDER — LORAZEPAM 0.5 MG/1
0.5 TABLET ORAL 2 TIMES DAILY PRN
Status: COMPLETED | OUTPATIENT
Start: 2024-02-19 | End: 2024-02-20

## 2024-02-19 RX ADMIN — OXYCODONE HYDROCHLORIDE 5 MG: 5 TABLET ORAL at 07:52

## 2024-02-19 RX ADMIN — QUETIAPINE FUMARATE 50 MG: 50 TABLET ORAL at 21:39

## 2024-02-19 RX ADMIN — HYDROXYZINE HYDROCHLORIDE 25 MG: 25 TABLET, FILM COATED ORAL at 19:03

## 2024-02-19 RX ADMIN — OXYCODONE HYDROCHLORIDE 5 MG: 5 TABLET ORAL at 17:11

## 2024-02-19 RX ADMIN — DICLOFENAC SODIUM 2 G: 10 GEL TOPICAL at 17:13

## 2024-02-19 RX ADMIN — DICLOFENAC SODIUM 2 G: 10 GEL TOPICAL at 21:40

## 2024-02-19 RX ADMIN — DICLOFENAC SODIUM 2 G: 10 GEL TOPICAL at 11:44

## 2024-02-19 RX ADMIN — BUPRENORPHINE AND NALOXONE 1 FILM: 4; 1 FILM, SOLUBLE BUCCAL; SUBLINGUAL at 07:52

## 2024-02-19 RX ADMIN — SENNOSIDES AND DOCUSATE SODIUM 1 TABLET: 8.6; 5 TABLET ORAL at 07:52

## 2024-02-19 RX ADMIN — METOPROLOL SUCCINATE 50 MG: 50 TABLET, EXTENDED RELEASE ORAL at 07:52

## 2024-02-19 RX ADMIN — BUPRENORPHINE AND NALOXONE 1 FILM: 4; 1 FILM, SOLUBLE BUCCAL; SUBLINGUAL at 21:38

## 2024-02-19 RX ADMIN — OXYCODONE HYDROCHLORIDE 5 MG: 5 TABLET ORAL at 13:07

## 2024-02-19 RX ADMIN — FOLIC ACID 1 MG: 1 TABLET ORAL at 07:52

## 2024-02-19 RX ADMIN — SENNOSIDES AND DOCUSATE SODIUM 1 TABLET: 8.6; 5 TABLET ORAL at 21:38

## 2024-02-19 RX ADMIN — Medication 1 TABLET: at 07:52

## 2024-02-19 RX ADMIN — HYDROXYZINE HYDROCHLORIDE 25 MG: 25 TABLET, FILM COATED ORAL at 08:13

## 2024-02-19 RX ADMIN — HYDROXYZINE HYDROCHLORIDE 25 MG: 25 TABLET, FILM COATED ORAL at 13:07

## 2024-02-19 ASSESSMENT — ACTIVITIES OF DAILY LIVING (ADL)
ADLS_ACUITY_SCORE: 36
ADLS_ACUITY_SCORE: 35
ADLS_ACUITY_SCORE: 36
ADLS_ACUITY_SCORE: 35
ADLS_ACUITY_SCORE: 35
ADLS_ACUITY_SCORE: 36
ADLS_ACUITY_SCORE: 35
ADLS_ACUITY_SCORE: 36
ADLS_ACUITY_SCORE: 35
ADLS_ACUITY_SCORE: 36

## 2024-02-19 NOTE — PLAN OF CARE
PRIMARY DIAGNOSIS: Failure to thrive/   OUTPATIENT/OBSERVATION GOALS TO BE MET BEFORE DISCHARGE:  ADLs back to baseline: No    Activity and level of assistance: Ambulating independently.    Pain status: Improved-controlled with oral pain medications.    Return to near baseline physical activity: Yes     Discharge Planner Nurse   Safe discharge environment identified: No  Barriers to discharge: Yes       Entered by: Patience Gonzalez RN 02/18/2024 7:21 PM    Vitals are Temp: 98.7  F (37.1  C) Temp src: Oral BP: (!) 154/89 Pulse: 88   Resp: 18 SpO2: 97 %.  Patient is Alert and Oriented x4. They are independent with no assistive devices .  Pt is a Regular diet.  They are complaining of 8/10 pain in their lower back and chest.  Tylenol given for pain.  Medicatons decreased pain. Patient is [place] locked. Pt has a sitter at the bedside for suicidal ideation. Pt has been very anxious due to  being in the hospital at [facility].         Please review provider order for any additional goals.   Nurse to notify provider when observation goals have been met and patient is ready for discharge.

## 2024-02-19 NOTE — PLAN OF CARE
PRIMARY DIAGNOSIS: FTT, Suicidal ideations      OUTPATIENT/OBSERVATION GOALS TO BE MET BEFORE DISCHARGE:  ADLs back to baseline: Yes     Activity and level of assistance: Ambulating independently.     Pain status: Improved-controlled with oral pain medications.     Return to near baseline physical activity: Yes          Discharge Planner Nurse   Safe discharge environment identified: No  Barriers to discharge: Yes       Entered by: Lito Floyd RN 02/19/2024 12:28 AM     Pt is A&O x4, Pt is resting in bed and able to make needs known sitter at bedside. Plan of care ongoing.      Please review provider order for any additional goals.   Nurse to notify provider when observation goals have been met and patient is ready for discharge

## 2024-02-19 NOTE — PROGRESS NOTES
Care Management Follow Up    Length of Stay (days): 0    Expected Discharge Date: 02/19/2024     Concerns to be Addressed:  discharge planning     Patient plan of care discussed at interdisciplinary rounds: Yes    Anticipated Discharge Disposition:  [facility] CD treatment     Anticipated Discharge Services:  CD services  Anticipated Discharge DME:      Patient/Family in Agreement with the Plan:  yes    Referrals Placed by CM/SW:    Private pay costs discussed: Not applicable    Additional Information:  SW following for discharge planning.     Spoke with Paige waters:[phone] with [facility] who stated pt has been clinically accepted and their admission team will reach out to  to coordinate discharge/ as they provide transportation.     Social work will continue to follow and assist with discharge planning as needed.    BRYSON Pitt, LSW  Care Coordination  [phone]    BRYSON Johnson

## 2024-02-19 NOTE — PLAN OF CARE
"PRIMARY DIAGNOSIS: FFT/Suicide    OUTPATIENT/OBSERVATION GOALS TO BE MET BEFORE DISCHARGE:  ADLs back to baseline: No    Activity and level of assistance: Up with standby assistance.    Pain status: Improved-controlled with oral pain medications.    Return to near baseline physical activity: No     Discharge Planner Nurse   Safe discharge environment identified: Yes  Barriers to discharge: Yes       Entered by: Aysha Hutton RN 02/19/2024 10:32 AM   Pt continues 1:1 for suicide precautions, A/O X4. Possible transfer to facility today treatment today.   /81 (BP Location: Right arm)   Pulse 81   Temp 98.5  F (36.9  C) (Oral)   Resp 18   Ht 1.676 m (5' 6\")   Wt 62.1 kg (137 lb)   LMP 06/07/2015   SpO2 98%   BMI 22.11 kg/m     Please review provider order for any additional goals.   Nurse to notify provider when observation goals have been met and patient is ready for discharge.Goal Outcome Evaluation:      Plan of Care Reviewed With: patient    Overall Patient Progress: no changeOverall Patient Progress: no change           "

## 2024-02-19 NOTE — PROGRESS NOTES
[facility]    Hospitalist Progress Note      Assessment & Plan   Danielle Tadeo is a 48 year old woman who was admitted on 2/12/2024. PMH significant for substance abuse (fentanyl), alcohol abuse, HTN, history of v-fib, recent cardiac arrest due to overdose (Jan). Admitted on 2/12/2024 with failure to thrive (found by EMS in chair stooling and urinating herself), continued substance abuse.     Failure to thrive  Substance abuse    - started on suboxone (was recently recommended)    - significant other was admitted at [facility], but was transferred to [facility] 2/17 and had a cardiac procedure 2/18. Patient has been getting limited updates from him and has been understandably upset at times.     - seen by Psych and Chem dep: Rec residential treatment (voluntary)    - spoke with SW: patient now has insurance and referrals were sent to treatment programs. Awaiting paperwork. Still possible that patient could go to either [facility] or [place] depending on who facilitates this first.      Passive suicidal threats  Unspecified depressive disorder  Unspecified anxiety disorder  Insomnia    - when patient was upset and was trying to leave previously, she made passive suicidal threats    - Patient placed on hold 2/15 evening.     - Appreciated psychiatry consult     - still on sitter/suicide precautions    - Patient was using PRN diazepam on the [facility] protocol for anxiety. Will initially order lorazepam 0.5 mg BID PRN anxiety as she has been receiving benzos in past days. Question if additional agent should be started, but defer to psychiatry in setting of multiple changes. Consider re-consult if that would be helpful.   - Psychiatry did start patient on quetiapine 50 mg HS for insomnia and mood stabilization as well as 25 mg BID PRN agitation.   - Continue Ambien 5 mg HS PRN insomnia.      Alcohol use disorder, severe, dependence  Alcohol withdrawal, resolved    - Patient has been here for greater  than 1 week.     - Not in withdrawal.     - Discontinuing CIWA protocol.     Buttock pain    - has wounds from sitting in her stool    - Patient has oxycodone 5 mg every 4 hours PRN pain. She has been using 2-3 times daily.     - added atarax and motrin    - [facility] consult     Back pain    - added motrin, atarax, diclofenac     HTN    - Continue metoprolol     Hypocalcemia    - replaced      DVT Prophylaxis: Ambulate every shift  Code Status: Full Code  Expected discharge: Earliest decision regarding bed for residential treatment center is tomorrow, 2/20. Appreciate SW following.    Deann Galindo MD FACP  Hospitalist Service  [facility]        Interval History   Patient did hear from partner briefly after his cardiac procedure at [facility]. Patient reports that she is still worried about him. She does endorse anxiety and states that it never goes away. Earliest decision regarding bed for residential treatment center is tomorrow, 2/20.    -Data reviewed today: I reviewed all new labs and imaging results over the last 24 hours.     Physical Exam   Temp: 98.5  F (36.9  C) Temp src: Oral BP: 125/81 Pulse: 81   Resp: 18 SpO2: 98 % O2 Device: None (Room air)    Vitals:    02/14/24 0639 02/15/24 1535 02/19/24 0417   Weight: 58.4 kg (128 lb 12.8 oz) 60.5 kg (133 lb 4.8 oz) 62.1 kg (137 lb)     Vital Signs with Ranges  Temp:  [98.3  F (36.8  C)-98.7  F (37.1  C)] 98.5  F (36.9  C)  Pulse:  [77-88] 81  Resp:  [18] 18  BP: (125-154)/(76-89) 125/81  SpO2:  [97 %-100 %] 98 %  I/O last 3 completed shifts:  In: 600 [P.O.:600]  Out: -     Constitutional: Woman sitting up in bed. Staring forward toward TV. Flat affect and short answers. Alert. Oriented to person and place. No acute distress.   HEENT: NCAT. EOMI. Moist oral mucosa.  Respiratory: Clear to auscultation bilaterally. No crackles or wheezes. No increased work of breathing.  Cardiovascular: Regular rate and rhythm.   Musculoskeletal: No gross deformities.    Neurologic: Alert and oriented x3. No focal neurologic deficits.   Psychiatric: Flat affect today. Staring. Reports increased anxiety.     Medications      buprenorphine HCl-naloxone HCl  1 Film Sublingual BID    diclofenac  2 g Topical 4x Daily    folic acid  1 mg Oral Daily    metoprolol succinate ER  50 mg Oral Daily    miconazole   Topical BID    multivitamin w/minerals  1 tablet Oral Daily    QUEtiapine  50 mg Oral At Bedtime    senna-docusate  1 tablet Oral BID    Or    senna-docusate  2 tablet Oral BID    sodium chloride (PF)  3 mL Intracatheter Q8H       Data   Recent Labs   Lab 02/13/24  0701   WBC 6.8   HGB 11.4*   MCV 90         POTASSIUM 3.4   CHLORIDE 103   CO2 25   BUN 7.8   CR 0.46*   ANIONGAP 11   HAZEL 8.5*   *   ALBUMIN 3.6   PROTTOTAL 5.7*   BILITOTAL 0.9   ALKPHOS 96   ALT 16   AST 55*       No results found for this or any previous visit (from the past 24 hour(s)).

## 2024-02-19 NOTE — PLAN OF CARE
PRIMARY DIAGNOSIS: Failure to thrive/ court hold.     OUTPATIENT/OBSERVATION GOALS TO BE MET BEFORE DISCHARGE:    ADLs back to baseline: No     Activity and level of assistance: Ambulating independently.     Pain status: Improved-controlled with oral pain medications.     Return to near baseline physical activity: Yes          Discharge Planner Nurse   Safe discharge environment identified: No  Barriers to discharge: Yes       Entered by: Patience Gonzalez RN 02/18/2024 7:21 PM     Vitals are Temp: 98.3  F (36.8  C) Temp src: Oral BP: (!) 141/80 Pulse: 87   Resp: 18 SpO2: 100 %.  Patient is Alert and Oriented x4. They are independent with no assistive devices .  Pt is a Regular diet.  They are complaining of 8/10 pain in their lower back and chest. Tylenol given for pain.  Medications decreased pain. Patient is [place] locked. Pt has a sitter at the bedside for suicidal ideation. Pt has been very anxious due to  being in the hospital at [facility], pt was able to talk to him on this shift. No meds were given for CIWA scores on this shift. PRN Oxy, tylenol, ibuprofen given on this shift.         Please review provider order for any additional goals.   Nurse to notify provider when observation goals have been met and patient is ready for discharge.

## 2024-02-19 NOTE — PLAN OF CARE
"PRIMARY DIAGNOSIS: FTT, Suicidal ideations      OUTPATIENT/OBSERVATION GOALS TO BE MET BEFORE DISCHARGE:  ADLs back to baseline: Yes     Activity and level of assistance: Ambulating independently.     Pain status: Improved-controlled with oral pain medications.     Return to near baseline physical activity: Yes          Discharge Planner Nurse   Safe discharge environment identified: No  Barriers to discharge: Yes       Entered by: Lito Floyd RN 02/19/2024 5:34 AM     Pt is A&O x4, Pt is independent in the room, tolerating regular diet. Pt received meds for pain during the evening but has not complained of pain overnight. Sitter at bedside. Waiting for placement. Pt is resting in bed and able to make needs known sitter at bedside. Plan of care ongoing.     /76 (BP Location: Right arm)   Pulse 77   Temp 98.5  F (36.9  C) (Oral)   Resp 18   Ht 1.676 m (5' 6\")   Wt 62.1 kg (137 lb)   LMP 06/07/2015   SpO2 100%   BMI 22.11 kg/m       Please review provider order for any additional goals.   Nurse to notify provider when observation goals have been met and patient is ready for discharge  "

## 2024-02-20 LAB
ALBUMIN SERPL BCG-MCNC: 3.4 G/DL (ref 3.5–5.2)
ALP SERPL-CCNC: 89 U/L (ref 40–150)
ALT SERPL W P-5'-P-CCNC: 11 U/L (ref 0–50)
ANION GAP SERPL CALCULATED.3IONS-SCNC: 9 MMOL/L (ref 7–15)
AST SERPL W P-5'-P-CCNC: 23 U/L (ref 0–45)
ATRIAL RATE - MUSE: 80 BPM
BASOPHILS # BLD AUTO: 0 10E3/UL (ref 0–0.2)
BASOPHILS NFR BLD AUTO: 0 %
BILIRUB DIRECT SERPL-MCNC: <0.2 MG/DL (ref 0–0.3)
BILIRUB SERPL-MCNC: 0.2 MG/DL
BUN SERPL-MCNC: 16.2 MG/DL (ref 6–20)
CALCIUM SERPL-MCNC: 8.2 MG/DL (ref 8.6–10)
CHLORIDE SERPL-SCNC: 103 MMOL/L (ref 98–107)
CREAT SERPL-MCNC: 0.51 MG/DL (ref 0.51–0.95)
DEPRECATED HCO3 PLAS-SCNC: 23 MMOL/L (ref 22–29)
DIASTOLIC BLOOD PRESSURE - MUSE: NORMAL MMHG
EGFRCR SERPLBLD CKD-EPI 2021: >90 ML/MIN/1.73M2
EOSINOPHIL # BLD AUTO: 0 10E3/UL (ref 0–0.7)
EOSINOPHIL NFR BLD AUTO: 0 %
ERYTHROCYTE [DISTWIDTH] IN BLOOD BY AUTOMATED COUNT: 14.5 % (ref 10–15)
GLUCOSE SERPL-MCNC: 71 MG/DL (ref 70–99)
HCT VFR BLD AUTO: 33.1 % (ref 35–47)
HGB BLD-MCNC: 10.7 G/DL (ref 11.7–15.7)
IMM GRANULOCYTES # BLD: 0 10E3/UL
IMM GRANULOCYTES NFR BLD: 1 %
INTERPRETATION ECG - MUSE: NORMAL
LYMPHOCYTES # BLD AUTO: 1.7 10E3/UL (ref 0.8–5.3)
LYMPHOCYTES NFR BLD AUTO: 40 %
MCH RBC QN AUTO: 31.2 PG (ref 26.5–33)
MCHC RBC AUTO-ENTMCNC: 32.3 G/DL (ref 31.5–36.5)
MCV RBC AUTO: 97 FL (ref 78–100)
MONOCYTES # BLD AUTO: 0.5 10E3/UL (ref 0–1.3)
MONOCYTES NFR BLD AUTO: 11 %
NEUTROPHILS # BLD AUTO: 2.1 10E3/UL (ref 1.6–8.3)
NEUTROPHILS NFR BLD AUTO: 48 %
NRBC # BLD AUTO: 0 10E3/UL
NRBC BLD AUTO-RTO: 0 /100
P AXIS - MUSE: 63 DEGREES
PLATELET # BLD AUTO: 191 10E3/UL (ref 150–450)
POTASSIUM SERPL-SCNC: 4.1 MMOL/L (ref 3.4–5.3)
PR INTERVAL - MUSE: 140 MS
PROT SERPL-MCNC: 5.3 G/DL (ref 6.4–8.3)
QRS DURATION - MUSE: 88 MS
QT - MUSE: 422 MS
QTC - MUSE: 486 MS
R AXIS - MUSE: 18 DEGREES
RBC # BLD AUTO: 3.43 10E6/UL (ref 3.8–5.2)
SODIUM SERPL-SCNC: 135 MMOL/L (ref 135–145)
SYSTOLIC BLOOD PRESSURE - MUSE: NORMAL MMHG
T AXIS - MUSE: 234 DEGREES
VENTRICULAR RATE- MUSE: 80 BPM
WBC # BLD AUTO: 4.3 10E3/UL (ref 4–11)

## 2024-02-20 PROCEDURE — 36415 COLL VENOUS BLD VENIPUNCTURE: CPT | Performed by: INTERNAL MEDICINE

## 2024-02-20 PROCEDURE — 250N000013 HC RX MED GY IP 250 OP 250 PS 637: Performed by: PHYSICIAN ASSISTANT

## 2024-02-20 PROCEDURE — 80053 COMPREHEN METABOLIC PANEL: CPT | Performed by: INTERNAL MEDICINE

## 2024-02-20 PROCEDURE — 99233 SBSQ HOSP IP/OBS HIGH 50: CPT | Performed by: HOSPITALIST

## 2024-02-20 PROCEDURE — 85025 COMPLETE CBC W/AUTO DIFF WBC: CPT | Performed by: INTERNAL MEDICINE

## 2024-02-20 PROCEDURE — 250N000013 HC RX MED GY IP 250 OP 250 PS 637: Performed by: HOSPITALIST

## 2024-02-20 PROCEDURE — 250N000013 HC RX MED GY IP 250 OP 250 PS 637: Performed by: NURSE PRACTITIONER

## 2024-02-20 PROCEDURE — G0378 HOSPITAL OBSERVATION PER HR: HCPCS

## 2024-02-20 PROCEDURE — 250N000013 HC RX MED GY IP 250 OP 250 PS 637: Performed by: INTERNAL MEDICINE

## 2024-02-20 RX ORDER — IBUPROFEN 400 MG/1
400 TABLET, FILM COATED ORAL EVERY 8 HOURS PRN
Qty: 20 TABLET | Refills: 0 | Status: SHIPPED | OUTPATIENT
Start: 2024-02-20

## 2024-02-20 RX ORDER — METOPROLOL SUCCINATE 50 MG/1
50 TABLET, EXTENDED RELEASE ORAL DAILY
Qty: 30 TABLET | Refills: 0 | Status: SHIPPED | OUTPATIENT
Start: 2024-02-20

## 2024-02-20 RX ORDER — MULTIPLE VITAMINS W/ MINERALS TAB 9MG-400MCG
1 TAB ORAL DAILY
Qty: 30 TABLET | Refills: 0 | Status: SHIPPED | OUTPATIENT
Start: 2024-02-21

## 2024-02-20 RX ORDER — POLYETHYLENE GLYCOL 3350 17 G/17G
17 POWDER, FOR SOLUTION ORAL DAILY
Qty: 510 G | Refills: 0 | Status: SHIPPED | OUTPATIENT
Start: 2024-02-20

## 2024-02-20 RX ORDER — ACETAMINOPHEN 325 MG/1
650 TABLET ORAL EVERY 4 HOURS PRN
Qty: 20 TABLET | Refills: 0 | Status: SHIPPED | OUTPATIENT
Start: 2024-02-20

## 2024-02-20 RX ORDER — ZOLPIDEM TARTRATE 5 MG/1
5 TABLET ORAL
Qty: 30 TABLET | Refills: 0 | Status: SHIPPED | OUTPATIENT
Start: 2024-02-20

## 2024-02-20 RX ORDER — BUPRENORPHINE AND NALOXONE 4; 1 MG/1; MG/1
1 FILM, SOLUBLE BUCCAL; SUBLINGUAL 2 TIMES DAILY
Qty: 60 FILM | Refills: 0 | Status: SHIPPED | OUTPATIENT
Start: 2024-02-20 | End: 2024-02-21

## 2024-02-20 RX ORDER — QUETIAPINE FUMARATE 50 MG/1
50 TABLET, FILM COATED ORAL AT BEDTIME
Qty: 30 TABLET | Refills: 0 | Status: SHIPPED | OUTPATIENT
Start: 2024-02-20

## 2024-02-20 RX ORDER — HYDROXYZINE HYDROCHLORIDE 25 MG/1
25 TABLET, FILM COATED ORAL EVERY 6 HOURS PRN
Qty: 30 TABLET | Refills: 0 | Status: SHIPPED | OUTPATIENT
Start: 2024-02-20

## 2024-02-20 RX ORDER — AMOXICILLIN 250 MG
1 CAPSULE ORAL 2 TIMES DAILY PRN
Qty: 20 TABLET | Refills: 0 | Status: SHIPPED | OUTPATIENT
Start: 2024-02-20

## 2024-02-20 RX ORDER — OXYCODONE HYDROCHLORIDE 5 MG/1
5 TABLET ORAL EVERY 6 HOURS PRN
Qty: 12 TABLET | Refills: 0 | Status: SHIPPED | OUTPATIENT
Start: 2024-02-20

## 2024-02-20 RX ORDER — FOLIC ACID 1 MG/1
1 TABLET ORAL DAILY
Qty: 30 TABLET | Refills: 0 | Status: SHIPPED | OUTPATIENT
Start: 2024-02-21

## 2024-02-20 RX ADMIN — LORAZEPAM 0.5 MG: 0.5 TABLET ORAL at 18:28

## 2024-02-20 RX ADMIN — OXYCODONE HYDROCHLORIDE 5 MG: 5 TABLET ORAL at 03:12

## 2024-02-20 RX ADMIN — DICLOFENAC SODIUM 2 G: 10 GEL TOPICAL at 20:30

## 2024-02-20 RX ADMIN — DICLOFENAC SODIUM 2 G: 10 GEL TOPICAL at 12:57

## 2024-02-20 RX ADMIN — OXYCODONE HYDROCHLORIDE 5 MG: 5 TABLET ORAL at 15:48

## 2024-02-20 RX ADMIN — HYDROXYZINE HYDROCHLORIDE 25 MG: 25 TABLET, FILM COATED ORAL at 15:49

## 2024-02-20 RX ADMIN — Medication 1 TABLET: at 08:56

## 2024-02-20 RX ADMIN — SENNOSIDES AND DOCUSATE SODIUM 1 TABLET: 8.6; 5 TABLET ORAL at 08:56

## 2024-02-20 RX ADMIN — DICLOFENAC SODIUM 2 G: 10 GEL TOPICAL at 15:50

## 2024-02-20 RX ADMIN — QUETIAPINE FUMARATE 25 MG: 25 TABLET ORAL at 12:56

## 2024-02-20 RX ADMIN — QUETIAPINE FUMARATE 50 MG: 50 TABLET ORAL at 20:29

## 2024-02-20 RX ADMIN — METOPROLOL SUCCINATE 50 MG: 50 TABLET, EXTENDED RELEASE ORAL at 08:56

## 2024-02-20 RX ADMIN — MICONAZOLE NITRATE: 20 POWDER TOPICAL at 20:30

## 2024-02-20 RX ADMIN — HYDROXYZINE HYDROCHLORIDE 25 MG: 25 TABLET, FILM COATED ORAL at 09:02

## 2024-02-20 RX ADMIN — OXYCODONE HYDROCHLORIDE 5 MG: 5 TABLET ORAL at 09:02

## 2024-02-20 RX ADMIN — FOLIC ACID 1 MG: 1 TABLET ORAL at 08:57

## 2024-02-20 RX ADMIN — LORAZEPAM 0.5 MG: 0.5 TABLET ORAL at 09:02

## 2024-02-20 RX ADMIN — ACETAMINOPHEN 650 MG: 325 TABLET, FILM COATED ORAL at 18:23

## 2024-02-20 RX ADMIN — BUPRENORPHINE AND NALOXONE 1 FILM: 4; 1 FILM, SOLUBLE BUCCAL; SUBLINGUAL at 08:56

## 2024-02-20 RX ADMIN — BUPRENORPHINE AND NALOXONE 1 FILM: 4; 1 FILM, SOLUBLE BUCCAL; SUBLINGUAL at 20:29

## 2024-02-20 RX ADMIN — HYDROXYZINE HYDROCHLORIDE 25 MG: 25 TABLET, FILM COATED ORAL at 03:12

## 2024-02-20 RX ADMIN — SENNOSIDES AND DOCUSATE SODIUM 1 TABLET: 8.6; 5 TABLET ORAL at 20:29

## 2024-02-20 RX ADMIN — IBUPROFEN 600 MG: 600 TABLET, FILM COATED ORAL at 12:56

## 2024-02-20 RX ADMIN — MICONAZOLE NITRATE: 20 POWDER TOPICAL at 08:59

## 2024-02-20 ASSESSMENT — ACTIVITIES OF DAILY LIVING (ADL)
ADLS_ACUITY_SCORE: 35

## 2024-02-20 NOTE — PLAN OF CARE
"PRIMARY DIAGNOSIS: FTT/Suicide  OUTPATIENT/OBSERVATION GOALS TO BE MET BEFORE DISCHARGE:  ADLs back to baseline: No    Activity and level of assistance: Up with standby assistance.    Pain status: Improved-controlled with oral pain medications.    Return to near baseline physical activity: No     Discharge Planner Nurse   Safe discharge environment identified: Yes  Barriers to discharge: Yes       Entered by: Belinda Kamara RN 02/20/2024 5:34 AM    Pt AO x4. VSS on RA, LS clear, BS active. Pt up with SBA. PIV SL. Tolerating regular diet. Pt noted pain to be 6-7/10, oxycodone given for and atarax given for anxiety. Continues to be 1:1 for suicide precautions. Plan for transfer to treatment facility. Will continue to monitor and provide supportive cares.     /86 (BP Location: Right arm)   Pulse 83   Temp 98.7  F (37.1  C) (Oral)   Resp 18   Ht 1.676 m (5' 6\")   Wt 62.1 kg (137 lb)   LMP 06/07/2015   SpO2 98%   BMI 22.11 kg/m      Please review provider order for any additional goals.   Nurse to notify provider when observation goals have been met and patient is ready for discharge.  "

## 2024-02-20 NOTE — PROGRESS NOTES
PRIMARY DIAGNOSIS: GENERALIZED WEAKNESS/FTT    OUTPATIENT/OBSERVATION GOALS TO BE MET BEFORE DISCHARGE  1. Orthostatic performed: No    2. Tolerating PO medications: No    3. Return to near baseline physical activity: Yes    4. Cleared for discharge by consultants (if involved): Yes    Discharge Planner Nurse   Safe discharge environment identified: Yes  Barriers to discharge: Yes       Entered by: Aysha Kim RN 02/20/2024 10:55 AM     Please review provider order for any additional goals.   Nurse to notify provider when observation goals have been met and patient is ready for discharge.

## 2024-02-20 NOTE — PROGRESS NOTES
PRIMARY DIAGNOSIS: GENERALIZED WEAKNESS/FTT    OUTPATIENT/OBSERVATION GOALS TO BE MET BEFORE DISCHARGE  1. Orthostatic performed: No    2. Tolerating PO medications: No    3. Return to near baseline physical activity: Yes    4. Cleared for discharge by consultants (if involved): Yes    Discharge Planner Nurse   Safe discharge environment identified: Yes  Barriers to discharge: Yes       Entered by: Ambrose Cooper RN 02/20/2024    Please review provider order for any additional goals.   Nurse to notify provider when observation goals have been met and patient is ready for discharge.

## 2024-02-20 NOTE — PROGRESS NOTES
[facility]    Hospitalist Progress Note      Assessment & Plan   Danielle Tadeo is a 48 year old woman who was admitted on 2/12/2024. PMH significant for substance abuse (fentanyl), alcohol abuse, HTN, history of v-fib, recent cardiac arrest due to overdose (Jan). Admitted on 2/12/2024 with failure to thrive (found by EMS in chair stooling and urinating herself), continued substance abuse.  Has a bed at [facility], but we have not heard from their contact for discharge/transfer     Failure to thrive  Substance abuse    - started on suboxone (was recently recommended)    - significant other was admitted at [place], but was transferred to [facility] 2/17 and had a cardiac procedure 2/18. Patient has been getting limited updates from him and has been understandably upset at times.     - seen by Psych and Chem dep: Rec residential treatment (voluntary)    - spoke with SW: patient now has insurance and referrals were sent to treatment programs. Apparently has a bed at [facility] but we have not been able to speak to their staff about transfer     Passive suicidal threats  Unspecified depressive disorder  Unspecified anxiety disorder  Insomnia    - when patient was upset and was trying to leave previously, she made passive suicidal threats    - patient placed on hold 2/15 evening.     - still on sitter/suicide precautions    - patient was using PRN diazepam on the CIWA protocol for anxiety. Will initially order lorazepam 0.5 mg BID PRN anxiety as she has been receiving benzos in past days    - psychiatry did start patient on quetiapine 50 mg HS for insomnia and mood stabilization as well as 25 mg BID PRN agitation.     - continue Ambien 5 mg HS PRN insomnia.      Hallucinations    - states she sometimes sees things (bugs) that are not there    - possible side effect of seroquel?    - monitor    Alcohol use disorder, severe, dependence  Alcohol withdrawal, resolved    - patient has been here for  greater than 1 week.     - CIWA discontinued    Buttock pain    - has wounds from sitting in her stool    - Patient has oxycodone 5 mg every 4 hours PRN pain. She has been using 2-3 times daily.     - added atarax and motrin    - [facility] consult (recs made)     Back pain    - added motrin, atarax, diclofenac     HTN    - Continue metoprolol     Hypocalcemia    - replaced      DVT Prophylaxis: Ambulate every shift  Code Status: Full Code  Expected discharge:today?? Tomorrow?    Luis E Burciaga MD   Hospitalist Service  [facility]    Interval History   Patient in bed eating a popsicle. I reviewed the plan with her. She tells me she needs to know more about [place] and needs to talk to her daughter. States she is sometimes seeing things (bugs) that aren't there. States she has anxiety    -Data reviewed today: I reviewed all new labs and imaging results over the last 24 hours.     Physical Exam   Temp: 98.3  F (36.8  C) Temp src: Oral BP: (!) 151/79 Pulse: 93   Resp: 18 SpO2: 99 % O2 Device: None (Room air)    Vitals:    02/14/24 0639 02/15/24 1535 02/19/24 0417   Weight: 58.4 kg (128 lb 12.8 oz) 60.5 kg (133 lb 4.8 oz) 62.1 kg (137 lb)     Vital Signs with Ranges  Temp:  [98  F (36.7  C)-98.7  F (37.1  C)] 98.3  F (36.8  C)  Pulse:  [74-93] 93  Resp:  [16-18] 18  BP: (129-158)/(79-90) 151/79  SpO2:  [98 %-100 %] 99 %  No intake/output data recorded.    Constitutional: Woman sitting up in bed. Staring forward toward TV. Flat affect and short answers. Alert. Oriented to person and place. No acute distress.   HEENT: NCAT. EOMI. Moist oral mucosa.    Medications      buprenorphine HCl-naloxone HCl  1 Film Sublingual BID    diclofenac  2 g Topical 4x Daily    folic acid  1 mg Oral Daily    metoprolol succinate ER  50 mg Oral Daily    miconazole   Topical BID    multivitamin w/minerals  1 tablet Oral Daily    QUEtiapine  50 mg Oral At Bedtime    senna-docusate  1 tablet Oral BID    Or    senna-docusate  2 tablet  Oral BID    sodium chloride (PF)  3 mL Intracatheter Q8H       Data   Recent Labs   Lab 02/20/24  0602   WBC 4.3   HGB 10.7*   MCV 97         POTASSIUM 4.1   CHLORIDE 103   CO2 23   BUN 16.2   CR 0.51   ANIONGAP 9   HAZEL 8.2*   GLC 71   ALBUMIN 3.4*   PROTTOTAL 5.3*   BILITOTAL 0.2   ALKPHOS 89   ALT 11   AST 23       No results found for this or any previous visit (from the past 24 hour(s)).

## 2024-02-20 NOTE — PROGRESS NOTES
Care Management Follow Up    Length of Stay (days): 0    Expected Discharge Date: 02/21/2024     Concerns to be Addressed: discharge planning      Patient plan of care discussed at interdisciplinary rounds: Yes    Anticipated Discharge Disposition:  [facility] residential treatment     Anticipated Discharge Services:    Anticipated Discharge DME:      Patient/Family in Agreement with the Plan:  yes    Referrals Placed by CM/SW:    Private pay costs discussed: Not applicable    Additional Information:  SW following for discharge planning.     Pt has been accepted at [facility]. Received call at 1330 from intake p:[phone] to schedule admission/ for tomorrow. SW requested an early discharge and the  stated she would schedule the admission for tomorrow and give a return call regarding exact timing.     Medications can be filled and sent with. Discharge orders do not need to be faxed, just sent with pt.     Updated pt on discharge plan.     Social work will continue to follow and assist with discharge planning as needed.    BRYSON Pitt, LSW  Care Coordination  [phone]    BRYSON Johnson

## 2024-02-20 NOTE — PLAN OF CARE
PRIMARY DIAGNOSIS: GENERALIZED WEAKNESS/FFT    OUTPATIENT/OBSERVATION GOALS TO BE MET BEFORE DISCHARGE  1. Orthostatic performed: N/A    2. Tolerating PO medications: Yes    3. Return to near baseline physical activity: Yes    4. Cleared for discharge by consultants (if involved): Yes    Discharge Planner Nurse   Safe discharge environment identified: Yes  Barriers to discharge: Yes pt on 72 hour hold sitter at bedside        Entered by: Aysha Hutton RN 02/19/2024 6:45 PM     Please review provider order for any additional goals.   Nurse to notify provider when observation goals have been met and patient is ready for discharge.Goal Outcome Evaluation:      Plan of Care Reviewed With: patient    Overall Patient Progress: no changeOverall Patient Progress: no change

## 2024-02-20 NOTE — PROGRESS NOTES
PRIMARY DIAGNOSIS: GENERALIZED WEAKNESS/FTT    OUTPATIENT/OBSERVATION GOALS TO BE MET BEFORE DISCHARGE  1. Orthostatic performed: No    2. Tolerating PO medications: No    3. Return to near baseline physical activity: Yes    4. Cleared for discharge by consultants (if involved): Yes    Discharge Planner Nurse   Safe discharge environment identified: Yes  Barriers to discharge: Yes       Entered by: Aysha Kim RN 02/20/2024 2:17 PM     Please review provider order for any additional goals.   Nurse to notify provider when observation goals have been met and patient is ready for discharge.    Plan for pt to go to [facility] today or tomorrow.

## 2024-02-20 NOTE — PLAN OF CARE
"PRIMARY DIAGNOSIS: FTT, Suicidal ideations      OUTPATIENT/OBSERVATION GOALS TO BE MET BEFORE DISCHARGE:  ADLs back to baseline: Yes     Activity and level of assistance: Ambulating independently.     Pain status: Improved-controlled with oral pain medications.     Return to near baseline physical activity: Yes          Discharge Planner Nurse   Safe discharge environment identified: No  Barriers to discharge: Yes       Entered by: Lito Floyd RN 02/19/2024 5:34 AM     Pt is A&O x4,VSS on RA, Pt is independent in the room, tolerating regular diet.denies pain,Sitter at bedside. Waiting for placement.      /86 (BP Location: Right arm)   Pulse 83   Temp 98.7  F (37.1  C) (Oral)   Resp 18   Ht 1.676 m (5' 6\")   Wt 62.1 kg (137 lb)   LMP 06/07/2015   SpO2 98%   BMI 22.11 kg/m       Please review provider order for any additional goals.   Nurse to notify provider when observation goals have been met and patient is ready for discharge  "

## 2024-02-20 NOTE — PLAN OF CARE
"PRIMARY DIAGNOSIS: FTT/Suicide  OUTPATIENT/OBSERVATION GOALS TO BE MET BEFORE DISCHARGE:  ADLs back to baseline: No    Activity and level of assistance: Up with standby assistance.    Pain status: Improved-controlled with oral pain medications.    Return to near baseline physical activity: No     Discharge Planner Nurse   Safe discharge environment identified: Yes  Barriers to discharge: Yes       Entered by: Belinda Kamara RN 02/20/2024 12:50 AM    Pt AO x4. VSS on RA, LS clear, BS active. Pt up with SBA. PIV SL. Tolerating regular diet. Denies pain. Continues to be 1:1 for suicide precautions. Plan for transfer to treatment facility. Will continue to monitor and provide supportive cares.     /86 (BP Location: Right arm)   Pulse 83   Temp 98.7  F (37.1  C) (Oral)   Resp 18   Ht 1.676 m (5' 6\")   Wt 62.1 kg (137 lb)   LMP 06/07/2015   SpO2 98%   BMI 22.11 kg/m      Please review provider order for any additional goals.   Nurse to notify provider when observation goals have been met and patient is ready for discharge.  "

## 2024-02-21 VITALS
TEMPERATURE: 97.5 F | WEIGHT: 142.5 LBS | OXYGEN SATURATION: 98 % | DIASTOLIC BLOOD PRESSURE: 84 MMHG | RESPIRATION RATE: 17 BRPM | SYSTOLIC BLOOD PRESSURE: 133 MMHG | BODY MASS INDEX: 22.9 KG/M2 | HEART RATE: 80 BPM | HEIGHT: 66 IN

## 2024-02-21 PROCEDURE — 99239 HOSP IP/OBS DSCHRG MGMT >30: CPT | Performed by: HOSPITALIST

## 2024-02-21 PROCEDURE — G0378 HOSPITAL OBSERVATION PER HR: HCPCS

## 2024-02-21 PROCEDURE — 250N000013 HC RX MED GY IP 250 OP 250 PS 637: Performed by: PHYSICIAN ASSISTANT

## 2024-02-21 PROCEDURE — 250N000013 HC RX MED GY IP 250 OP 250 PS 637: Performed by: HOSPITALIST

## 2024-02-21 RX ORDER — LORAZEPAM 0.5 MG/1
0.5 TABLET ORAL DAILY PRN
Status: DISCONTINUED | OUTPATIENT
Start: 2024-02-21 | End: 2024-02-21 | Stop reason: HOSPADM

## 2024-02-21 RX ORDER — BUPRENORPHINE AND NALOXONE 4; 1 MG/1; MG/1
1 FILM, SOLUBLE BUCCAL; SUBLINGUAL 2 TIMES DAILY
Qty: 60 FILM | Refills: 1 | Status: SHIPPED | OUTPATIENT
Start: 2024-02-21

## 2024-02-21 RX ADMIN — METOPROLOL SUCCINATE 50 MG: 50 TABLET, EXTENDED RELEASE ORAL at 08:42

## 2024-02-21 RX ADMIN — ZOLPIDEM TARTRATE 5 MG: 5 TABLET ORAL at 01:15

## 2024-02-21 RX ADMIN — ACETAMINOPHEN 650 MG: 325 TABLET, FILM COATED ORAL at 01:15

## 2024-02-21 RX ADMIN — HYDROXYZINE HYDROCHLORIDE 25 MG: 25 TABLET, FILM COATED ORAL at 06:18

## 2024-02-21 RX ADMIN — MICONAZOLE NITRATE: 20 POWDER TOPICAL at 08:43

## 2024-02-21 RX ADMIN — OXYCODONE HYDROCHLORIDE 5 MG: 5 TABLET ORAL at 00:03

## 2024-02-21 RX ADMIN — IBUPROFEN 600 MG: 600 TABLET, FILM COATED ORAL at 00:04

## 2024-02-21 RX ADMIN — OXYCODONE HYDROCHLORIDE 5 MG: 5 TABLET ORAL at 06:18

## 2024-02-21 RX ADMIN — Medication 5 MG: at 00:03

## 2024-02-21 RX ADMIN — ACETAMINOPHEN 650 MG: 325 TABLET, FILM COATED ORAL at 06:18

## 2024-02-21 RX ADMIN — LORAZEPAM 0.5 MG: 0.5 TABLET ORAL at 09:00

## 2024-02-21 RX ADMIN — HYDROXYZINE HYDROCHLORIDE 25 MG: 25 TABLET, FILM COATED ORAL at 00:04

## 2024-02-21 RX ADMIN — SENNOSIDES AND DOCUSATE SODIUM 1 TABLET: 8.6; 5 TABLET ORAL at 08:42

## 2024-02-21 RX ADMIN — Medication 1 TABLET: at 08:42

## 2024-02-21 RX ADMIN — BUPRENORPHINE AND NALOXONE 1 FILM: 4; 1 FILM, SOLUBLE BUCCAL; SUBLINGUAL at 08:42

## 2024-02-21 RX ADMIN — FOLIC ACID 1 MG: 1 TABLET ORAL at 08:42

## 2024-02-21 ASSESSMENT — ACTIVITIES OF DAILY LIVING (ADL)
ADLS_ACUITY_SCORE: 36
ADLS_ACUITY_SCORE: 36
ADLS_ACUITY_SCORE: 35
ADLS_ACUITY_SCORE: 36
ADLS_ACUITY_SCORE: 36
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
## 2024-02-21 NOTE — PLAN OF CARE
"PRIMARY DIAGNOSIS: Failure to thrive   OUTPATIENT/OBSERVATION GOALS TO BE MET BEFORE DISCHARGE:  ADLs back to baseline: No     Activity and level of assistance: Up with standby assistance.     Pain status: Improved-controlled with oral pain medications.     Return to near baseline physical activity: No          Discharge Planner Nurse   Safe discharge environment identified: Yes  Barriers to discharge: Yes       Entered by: Michelle Winchester RN 02/21/2024 1:03 AM  Please review provider order for any additional goals.   Nurse to notify provider when observation goals have been met and patient is ready for discharge.     Temp: 98.2  F (36.8  C) Temp src: Oral BP: 135/79 Pulse: 86   Resp: 16 SpO2: 99 % O2 Device: None (Room air)          A&Ox4. Up SBA. Agitated easily, but pleasant with med administration. Complained of chest and back \"aching\", prn tylenol given x2, oxy given x1, atarax given x1. Ambien given for bed per pt request. Good appetite. Plan- SW following for discharge planning- discharge to [facility] 2/21, wound care bid/prn with incontinence, 1:1 sitter for suicide precautions.                         "

## 2024-02-21 NOTE — PROGRESS NOTES
PRIMARY DIAGNOSIS: FTT/ CD Placement  OUTPATIENT/OBSERVATION GOALS TO BE MET BEFORE DISCHARGE:  ADLs back to baseline: Yes    Activity and level of assistance: Ambulating independently.    Pain status: Pain free.    Return to near baseline physical activity: Yes     Discharge Planner Nurse   Safe discharge environment identified: Yes  Barriers to discharge: No       Entered by: Aysha Kim RN 02/21/2024 10:05 AM     Please review provider order for any additional goals.   Nurse to notify provider when observation goals have been met and patient is ready for discharge.    Pt agreeable to discharge to cd facility today and was escorted to staff transportation.Pt meds given to the staff  with discharge instructions and pt clothes.

## 2024-02-21 NOTE — PROGRESS NOTES
PRIMARY DIAGNOSIS: GENERALIZED WEAKNESS/FTT    OUTPATIENT/OBSERVATION GOALS TO BE MET BEFORE DISCHARGE  1. Orthostatic performed: No    2. Tolerating PO medications: No    3. Return to near baseline physical activity: Yes    4. Cleared for discharge by consultants (if involved): Yes    Discharge Planner Nurse   Safe discharge environment identified: Yes  Barriers to discharge: Yes       Entered by: Ambrose Cooper RN 02/20/2024    Please review provider order for any additional goals.   Nurse to notify provider when observation goals have been met and patient is ready for discharge.    Pt is A&O x4,VSS on RA, Pt is independent in the room, tolerating regular diet.denies pain,Sitter at bedside. Waiting for placement.

## 2024-02-21 NOTE — PROGRESS NOTES
Care Management Discharge Note    Discharge Date: 02/21/2024     Discharge Disposition: [facility] CD [facility]     Discharge Transportation: Facility     Private pay costs discussed: Not applicable    Does the patient's insurance plan have a 3 day qualifying hospital stay waiver?  No    Education Provided on the Discharge Plan: Yes  Patient/Family in Agreement with the Plan: Yes    Handoff Referral Completed: No    Additional Information:  Plan for [facility] in [place] today. SW met with pt to answer questions. She was upset and stated that nobody has updated her on the plan. Reiterated that pt signed EMILIA for facility and was updated by 2 SW'ers on different days on plan. SW provided address and phone number to facility if pt wishes to connect with them with any further questions. Did update her that facility reports average length of stay 30-45 days. Facility to transport at 9:30am. SW will remain available for any further needs.     MARISELA Silveira, [facility]   Inpatient Care Coordination  [facility]   [phone]

## 2024-02-21 NOTE — PLAN OF CARE
"PRIMARY DIAGNOSIS: Failure to thrive   OUTPATIENT/OBSERVATION GOALS TO BE MET BEFORE DISCHARGE:  ADLs back to baseline: No    Activity and level of assistance: Up with standby assistance.    Pain status: Improved-controlled with oral pain medications.    Return to near baseline physical activity: No     Discharge Planner Nurse   Safe discharge environment identified: Yes  Barriers to discharge: Yes       Entered by: Michelle Winchester RN 02/21/2024 1:03 AM     Please review provider order for any additional goals.   Nurse to notify provider when observation goals have been met and patient is ready for discharge.    Temp: 98.2  F (36.8  C) Temp src: Oral BP: 135/79 Pulse: 86   Resp: 16 SpO2: 99 % O2 Device: None (Room air)       A&Ox4. Up SBA. Agitated easily, but pleasant with med administration. Complained of chest \"aching\", prn atarax/oxy/ibuprofen given. Melatonin given for bed. Good appetite. Plan- SW following for discharge planning- discharge to [facility] 2/21, wound care bid/prn with incontinence, 1:1 sitter for suicide precautions.                          "

## (undated) DEVICE — MANIFOLD KIT ANGIO AUTOMATED 014613

## (undated) DEVICE — INTRO SHEATH 4FRX10CM PINNACLE RSS402

## (undated) DEVICE — DEFIB PRO-PADZ LVP LQD GEL ADULT 8900-2105-01

## (undated) DEVICE — CABLE TEMP PACCING 6FT

## (undated) DEVICE — CATH EP PACEL 5FRX110CM 1MM RIGHT HEART CURVE 401763

## (undated) DEVICE — INTRO SHEATH 6FRX25CM PINNACLE RSS606

## (undated) DEVICE — RAD INTRODUCER KIT MICRO 5FRX10CM .018 NITINOL G/W

## (undated) DEVICE — SLEEVE INTRO 30CM HMSTS REPST

## (undated) DEVICE — CATH DIAG 4FR JL 4.5 538417

## (undated) DEVICE — GUIDEWIRE VASC 0.035INX150CM INQWIRE J TIP IQ35F150J3F/A

## (undated) DEVICE — CATH ANGIO INFINITI 3DRC 4FRX100CM 538476

## (undated) DEVICE — KIT HAND CONTROL ANGIOTOUCH ACIST 65CM AT-P65

## (undated) RX ORDER — HYDRALAZINE HYDROCHLORIDE 20 MG/ML
INJECTION INTRAMUSCULAR; INTRAVENOUS
Status: DISPENSED
Start: 2024-01-24

## (undated) RX ORDER — NITROGLYCERIN 20 MG/100ML
INJECTION INTRAVENOUS
Status: DISPENSED
Start: 2024-01-24

## (undated) RX ORDER — LIDOCAINE HYDROCHLORIDE 10 MG/ML
INJECTION, SOLUTION EPIDURAL; INFILTRATION; INTRACAUDAL; PERINEURAL
Status: DISPENSED
Start: 2024-01-24

## (undated) RX ORDER — ASPIRIN 81 MG/1
TABLET, CHEWABLE ORAL
Status: DISPENSED
Start: 2024-01-24

## (undated) RX ORDER — NITROGLYCERIN 5 MG/ML
VIAL (ML) INTRAVENOUS
Status: DISPENSED
Start: 2024-01-24